# Patient Record
Sex: MALE | Race: ASIAN | NOT HISPANIC OR LATINO | Employment: OTHER | URBAN - METROPOLITAN AREA
[De-identification: names, ages, dates, MRNs, and addresses within clinical notes are randomized per-mention and may not be internally consistent; named-entity substitution may affect disease eponyms.]

---

## 2017-02-08 ENCOUNTER — HOSPITAL ENCOUNTER (OUTPATIENT)
Dept: LAB | Facility: MEDICAL CENTER | Age: 67
End: 2017-02-08
Attending: INTERNAL MEDICINE
Payer: MEDICARE

## 2017-02-08 LAB
ALBUMIN SERPL BCP-MCNC: 4.2 G/DL (ref 3.2–4.9)
ALBUMIN/GLOB SERPL: 1.4 G/DL
ALP SERPL-CCNC: 64 U/L (ref 30–99)
ALT SERPL-CCNC: 29 U/L (ref 2–50)
ANION GAP SERPL CALC-SCNC: 8 MMOL/L (ref 0–11.9)
AST SERPL-CCNC: 18 U/L (ref 12–45)
BASOPHILS # BLD AUTO: 0.03 K/UL (ref 0–0.12)
BASOPHILS NFR BLD AUTO: 0.4 % (ref 0–1.8)
BILIRUB SERPL-MCNC: 0.5 MG/DL (ref 0.1–1.5)
BUN SERPL-MCNC: 25 MG/DL (ref 8–22)
CALCIUM SERPL-MCNC: 9.6 MG/DL (ref 8.5–10.5)
CHLORIDE SERPL-SCNC: 102 MMOL/L (ref 96–112)
CO2 SERPL-SCNC: 26 MMOL/L (ref 20–33)
CREAT SERPL-MCNC: 0.96 MG/DL (ref 0.5–1.4)
EOSINOPHIL # BLD: 0.29 K/UL (ref 0–0.51)
EOSINOPHIL NFR BLD AUTO: 3.7 % (ref 0–6.9)
ERYTHROCYTE [DISTWIDTH] IN BLOOD BY AUTOMATED COUNT: 45.8 FL (ref 35.9–50)
GLOBULIN SER CALC-MCNC: 3 G/DL (ref 1.9–3.5)
GLUCOSE SERPL-MCNC: 90 MG/DL (ref 65–99)
HCT VFR BLD AUTO: 47.1 % (ref 42–52)
HGB BLD-MCNC: 16 G/DL (ref 14–18)
IMM GRANULOCYTES # BLD AUTO: 0.01 K/UL (ref 0–0.11)
IMM GRANULOCYTES NFR BLD AUTO: 0.1 % (ref 0–0.9)
LYMPHOCYTES # BLD: 3.28 K/UL (ref 1–4.8)
LYMPHOCYTES NFR BLD AUTO: 41.5 % (ref 22–41)
MCH RBC QN AUTO: 31.7 PG (ref 27–33)
MCHC RBC AUTO-ENTMCNC: 34 G/DL (ref 33.7–35.3)
MCV RBC AUTO: 93.5 FL (ref 81.4–97.8)
MONOCYTES # BLD: 0.46 K/UL (ref 0–0.85)
MONOCYTES NFR BLD AUTO: 5.8 % (ref 0–13.4)
NEUTROPHILS # BLD: 3.84 K/UL (ref 1.82–7.42)
NEUTROPHILS NFR BLD AUTO: 48.5 % (ref 44–72)
NRBC # BLD AUTO: 0 K/UL
NRBC BLD-RTO: 0 /100 WBC
PLATELET # BLD AUTO: 306 K/UL (ref 164–446)
PMV BLD AUTO: 9.4 FL (ref 9–12.9)
POTASSIUM SERPL-SCNC: 4 MMOL/L (ref 3.6–5.5)
PROT SERPL-MCNC: 7.2 G/DL (ref 6–8.2)
RBC # BLD AUTO: 5.04 M/UL (ref 4.7–6.1)
SODIUM SERPL-SCNC: 136 MMOL/L (ref 135–145)
WBC # BLD AUTO: 7.9 K/UL (ref 4.8–10.8)

## 2017-02-08 PROCEDURE — 36415 COLL VENOUS BLD VENIPUNCTURE: CPT

## 2017-02-08 PROCEDURE — 85025 COMPLETE CBC W/AUTO DIFF WBC: CPT

## 2017-02-08 PROCEDURE — 80053 COMPREHEN METABOLIC PANEL: CPT

## 2017-07-30 DIAGNOSIS — E78.5 DYSLIPIDEMIA: ICD-10-CM

## 2017-07-31 RX ORDER — ATORVASTATIN CALCIUM 80 MG/1
80 TABLET, FILM COATED ORAL
Qty: 90 TAB | Refills: 0 | Status: SHIPPED | OUTPATIENT
Start: 2017-07-31 | End: 2017-11-15 | Stop reason: SDUPTHER

## 2017-11-15 ENCOUNTER — OFFICE VISIT (OUTPATIENT)
Dept: CARDIOLOGY | Facility: MEDICAL CENTER | Age: 67
End: 2017-11-15
Payer: MEDICARE

## 2017-11-15 VITALS
WEIGHT: 188 LBS | SYSTOLIC BLOOD PRESSURE: 160 MMHG | HEIGHT: 64 IN | BODY MASS INDEX: 32.1 KG/M2 | HEART RATE: 65 BPM | OXYGEN SATURATION: 96 % | DIASTOLIC BLOOD PRESSURE: 100 MMHG

## 2017-11-15 DIAGNOSIS — E78.5 DYSLIPIDEMIA: ICD-10-CM

## 2017-11-15 DIAGNOSIS — I25.10 CORONARY ARTERY DISEASE INVOLVING NATIVE CORONARY ARTERY OF NATIVE HEART WITHOUT ANGINA PECTORIS: Chronic | ICD-10-CM

## 2017-11-15 DIAGNOSIS — I65.23 ATHEROSCLEROSIS OF BOTH CAROTID ARTERIES: Chronic | ICD-10-CM

## 2017-11-15 DIAGNOSIS — Z95.5 STENTED CORONARY ARTERY: ICD-10-CM

## 2017-11-15 DIAGNOSIS — I10 ESSENTIAL HYPERTENSION, BENIGN: ICD-10-CM

## 2017-11-15 PROCEDURE — 99214 OFFICE O/P EST MOD 30 MIN: CPT | Performed by: INTERNAL MEDICINE

## 2017-11-15 RX ORDER — LISINOPRIL 20 MG/1
20 TABLET ORAL 2 TIMES DAILY
Qty: 180 TAB | Refills: 3 | Status: SHIPPED | OUTPATIENT
Start: 2017-11-15 | End: 2019-01-04 | Stop reason: SDUPTHER

## 2017-11-15 RX ORDER — ATORVASTATIN CALCIUM 80 MG/1
80 TABLET, FILM COATED ORAL
Qty: 90 TAB | Refills: 3 | Status: SHIPPED | OUTPATIENT
Start: 2017-11-15 | End: 2018-10-26 | Stop reason: SDUPTHER

## 2017-11-15 ASSESSMENT — ENCOUNTER SYMPTOMS
BLURRED VISION: 0
COUGH: 0
DEPRESSION: 0
DOUBLE VISION: 0
ABDOMINAL PAIN: 0
NEUROLOGICAL NEGATIVE: 1
PSYCHIATRIC NEGATIVE: 1
BRUISES/BLEEDS EASILY: 0
BACK PAIN: 1
FEVER: 0
SHORTNESS OF BREATH: 1
WEAKNESS: 0
CLAUDICATION: 0
CARDIOVASCULAR NEGATIVE: 1
DIZZINESS: 0
EYES NEGATIVE: 1
PALPITATIONS: 0
NAUSEA: 0
VOMITING: 0
HEADACHES: 0
HEARTBURN: 1
CHILLS: 0
FOCAL WEAKNESS: 0
NERVOUS/ANXIOUS: 0
MYALGIAS: 0

## 2017-11-15 NOTE — PROGRESS NOTES
Subjective:   Harman Branch is a 67 y.o. male who presents today for annual follow up of coronary artery disease with prior stent placement.    Since the patient's last visit on 08/04/16, he suffered a shortness of breath episode at high [place]. He denies chest pain, palpitations, nausea/vomiting or diaphoresis. He underwent unremarkable cardiac studies as described last year. He keeps active going to the gym four times per week. He takes daily naps. He went to visit his son at medical school at [facility].    Past Medical History:   asdfasdfads Date   • Arthritis     fingers   • ASTHMA     uses inhaler prn   • Back pain    • CAD (coronary artery disease) 6/21/2011   • CAD (coronary artery disease)    • Carotid artery plaque 6/21/2011   • Esophageal reflux 11/3/2009   • Heart burn    • High cholesterol    • History of asthma 11/3/2009   • History of benign prostatic hypertrophy 11/3/2009   • History of cardiac catheterization 6/21/2011   • History of echocardiogram 6/20/2011   • History of neck surgery     Hardware in neck   • HLD (hyperlipidemia) 6/1/2011   • Hypertension    • Indigestion    • Previous back surgery    • Stented coronary artery 2005     Past Surgical History:   Procedure Laterality Date   • ANGIOGRAM     • CERVICAL DISK AND FUSION ANTERIOR     • FORAMINOTOMY  3/9/2011    Performed by MARLIN CANDELARIA at [facility]   • FORAMINOTOMY  4/7/2012    Performed by MARLIN [facility]DELARIA at [facility]   • LUMBAR LAMINECTOMY DISKECTOMY  3/9/2011    Performed by MARLIN CANDELARIA at [facility]   • LUMBAR LAMINECTOMY DISKECTOMY  4/7/2012    Performed by MARLIN CANDELARIA at [facility]   • OTHER CARDIAC SURGERY  2007 heart stents   • PB FLUORSCOPIC GUIDANCE SPINAL INJECTION  9/2/2016    Procedure: FLUOROGUIDE FOR SPINAL INJ;  Surgeon: Jesus Rojas M.D.;  Location: [facility];  Service: Pain Management   • PB INJ,FORAMEN,L/S,1 LEVEL Bilateral 9/2/2016     Procedure: INJ-FORAMEN EPI LUM/SAC SNGL - L4-5;  Surgeon: Jesus Rojas M.D.;  Location: [facility];  Service: Pain Management   • PB INJ,FORAMEN,L/S,1 LEVEL  9/2/2016    Procedure: INJ-FORAMEN EPI LUM/SAC SNGL;  Surgeon: Jesus Rojas M.D.;  Location: [facility];  Service: Pain Management     No family history on file.  History   Smoking Status   • Current Some Day Smoker   • Years: 18.00   • Types: Cigars   • Last attempt to quit: 4/7/1987   Smokeless Tobacco   • Never Used     Comment: 1987     No Known Allergies    Medications reviewed.    Outpatient Encounter Prescriptions as of 11/15/2017   Medication Sig Dispense Refill   • TESTOSTERONE NA Spray  in nose.     • atorvastatin (LIPITOR) 80 MG tablet Take 1 Tab by mouth every bedtime. Please see your cardiologist for refills 90 Tab 0   • KRILL OIL PO Take  by mouth every day.     • Coenzyme Q10 (COQ10 PO) Take 300 mg by mouth every day.     • GLUCOSAMINE HCL PO Take 1 Cap by mouth every day.     • FLOVENT  MCG/ACT Aerosol 2 Puffs as needed.     • Calcium Carb-Cholecalciferol (CALCIUM-VITAMIN D) 600-400 MG-UNIT Tab Take 1 Tab by mouth every day.     • B Complex Vitamins (B-COMPLEX/B-12 PO) Take 1,000 Tabs by mouth every day.     • IRON PO Take 1 Tab by mouth every day.     • metoprolol (LOPRESSOR) 25 MG TABS Take 1 Tab by mouth 2 times a day. Needs to be seen for further refills. Thank you 180 Tab 0   • lisinopril (PRINIVIL) 20 MG TABS Take 20 mg by mouth every day.     • aspirin 81 MG tablet Take 81 mg by mouth every day.     • esomeprazole (NEXIUM) 40 MG capsule Take 40 mg by mouth every 48 hours. For acid reflux  Every other day     • finasteride (PROSCAR) 5 MG TABS Take 5 mg by mouth. Every other day     • [DISCONTINUED] atovaquone-proguanil (MALARONE) 250-100 MG per tablet      • [DISCONTINUED] nitroglycerin (NITROSTAT) 0.4 MG SL Tab Place 1 Tab under tongue as needed for Chest Pain (x3 as needed for Chest Pain if SBP  ">90). 25 Tab 2   • [DISCONTINUED] trazodone (DESYREL) 100 MG Tab Take 100 mg by mouth as needed.       No facility-administered encounter medications on file as of 11/15/2017.      Review of Systems   Constitutional: Positive for malaise/fatigue. Negative for chills and fever.   HENT: Negative.  Negative for hearing loss.    Eyes: Negative.  Negative for blurred vision and double vision.   Respiratory: Positive for shortness of breath. Negative for cough.    Cardiovascular: Negative.  Negative for chest pain, palpitations, claudication and leg swelling.   Gastrointestinal: Positive for heartburn. Negative for abdominal pain, nausea and vomiting.   Genitourinary: Negative.  Negative for dysuria and urgency.        Erectile dysfunction.   Musculoskeletal: Positive for back pain. Negative for joint pain and myalgias.   Skin: Negative.  Negative for itching and rash.   Neurological: Negative.  Negative for dizziness, focal weakness, weakness and headaches.   Endo/Heme/Allergies: Negative.  Does not bruise/bleed easily.   Psychiatric/Behavioral: Negative.  Negative for depression. The patient is not nervous/anxious.         Objective:   /100   Pulse 65   Ht 1.626 m (5' 4\")   Wt 85.3 kg (188 lb)   SpO2 96%   BMI 32.27 kg/m²     Physical Exam   Constitutional: He is oriented to person, place, and time. He appears well-developed and well-nourished.   HENT:   Head: Normocephalic and atraumatic.   Eyes: Conjunctivae are normal. Pupils are equal, round, and reactive to light.   Neck: Normal range of motion. Neck supple.   Cardiovascular: Normal rate and regular rhythm.    Pulmonary/Chest: Effort normal and breath sounds normal.   Abdominal: Soft. Bowel sounds are normal.   Musculoskeletal: Normal range of motion. He exhibits no edema.   Neurological: He is alert and oriented to person, place, and time.   Skin: Skin is warm and dry.   Psychiatric: He has a normal mood and affect.     CARDIAC " STUDIES/PROCEDURES:    CAROTID ULTRASOUND (11/18/09)  Carotid ultrasound showing mild plaques of left internal carotid artery.    CARDIAC CATHETERIZATION CONCLUSIONS by Dr. Toledo (04/08/13)   1. Acute inferior wall myocardial infarction secondary to high grade in-stent stenosis in the   middle of a large segment of stented proximal right coronary artery, status post successful   stenting with a 3 mm x 15 mm Xience Xpedition stent.   2. Diffusely diseased circumflex system as described above.   3. Widely patent left anterior descending coronary artery with its proximal 2-3 cm, more diffusely   narrowed and with 2 diagonal branches given off with high-grade disease in the first diagonal   branch, which is quite small and moderate disease in the second diagonal branch.    CARDIAC CATHETERIZATION CONCLUSIONS in [place], [place] (12/05)  Cardiac catheterization showing high grade small vessel first and second diagonal branch   stenosis, non obstructive left circumflex artery stenosis and high grade right coronary artery   stenosis treated with 3.0 x 32 mm and 3.0 x 16 mm Taxus drug eluding stents.     ECHOCARDIOGRAM CONCLUSIONS (09/17/15)  Normal left ventricular size, wall thickness, and systolic function.   Grade I diastolic dysfunction. Ejection fraction is measured to be 63 %   by Martines's biplane 2D analysis.  Trace mitral regurgitation.   Trace aortic insufficiency.  Unable to estimate pulmonary artery pressure due to an inadequate   tricuspid regurgitant jet.  Normal aortic root diameter 3 cm.  No prior study is available for comparison.     ECHOCARDIOGRAM CONCLUSIONS (06/16/11)  Echocardiogram showing normal left ventricular systolic function, no significant valvular abnormalities.    EKG performed on (09/16/15) EKG shows normal sinus rhythm.    Laboratory results of (10/10/16) were reviewed. Cholesterol profile of 162/128/41/95 noted.  Laboratory results of (07/27/16) Cholesterol profile of 160/138/42/90  noted  Laboratory results of (09/16/15) Cholesterol profile of 141/148/38/73 noted.    MPI CONCLUSIONS (09/17/15)  Normal left ventricular perfusion with no fixed or reversible defects.   Normal left ventricular wall motion, with EF of 72%.     RENAL ULTRASOUND (11/18/09)  Unremarkable renal ultrasound with no evidence of renal artery stenosis.  No abdominal aortic aneurysm.    Assessment:     1. CAD (coronary artery disease) with stented artery    2. Hypertension    3. HLD (hyperlipidemia)    4. Carotid artery plaque      Medical Decision Making:  Today's Assessment / Status / Plan:     1. Coronary artery disease with stent placement: He suffered an episode of shortness of breath which is concerning him. We will repeat an echocardiogram.  2. Hypertension: Blood pressure has been high.  We will increase lisinopril to BID and reassess the blood pressure.  3. Hyperlipidemia (managed by Dr. Casillas): He is doing well on statin therapy without myalgia symptoms. His LDL levels fluctuates and is mildly elevated. He will, again, work on diet modification and exercise. We will repeat labs including fasting lipid profile.  4. Carotid artery plaque: He is clinically doing well on medical therapy. We will repeat a carotid ultrasound.    We will follow up after his tests to reassess his symptoms.    CC Paz You

## 2017-11-15 NOTE — LETTER
[facility]   [address] [place] [address]  [place] [id number]  Phone: [phone]  Fax: [phone]              Harman Branch  1950    Encounter Date: 11/15/2017    Moisés Rice M.D.          PROGRESS NOTE:  Subjective:   Harman Branch is a 67 y.o. male who presents today for annual follow up of coronary artery disease with prior stent placement.    Since the patient's last visit on 08/04/16, he has been doing well clinically. He denies chest pain, shortness of breath, palpitations, nausea/vomiting or diaphoresis. He underwent unremarkable cardiac studies as described last year. He keeps active going to the gym four times per week. He takes daily naps.    Past Medical History:   asdfasdfads Date   • Arthritis     fingers   • ASTHMA     uses inhaler prn   • Back pain    • CAD (coronary artery disease) 6/21/2011   • CAD (coronary artery disease)    • Carotid artery plaque 6/21/2011   • Esophageal reflux 11/3/2009   • Heart burn    • High cholesterol    • History of asthma 11/3/2009   • History of benign prostatic hypertrophy 11/3/2009   • History of cardiac catheterization 6/21/2011   • History of echocardiogram 6/20/2011   • History of neck surgery     Hardware in neck   • HLD (hyperlipidemia) 6/1/2011   • Hypertension    • Indigestion    • Previous back surgery    • Stented coronary artery 2005     Past Surgical History:   Procedure Laterality Date   • ANGIOGRAM     • CERVICAL DISK AND FUSION ANTERIOR     • FORAMINOTOMY  3/9/2011    Performed by MARLIN CANDELARIA at [facility]   • FORAMINOTOMY  4/7/2012    Performed by MARLIN CANDELARIA at [facility]   • LUMBAR LAMINECTOMY DISKECTOMY  3/9/2011    Performed by MARLIN CANDELARIA at [facility]   • LUMBAR LAMINECTOMY DISKECTOMY  4/7/2012    Performed by MARLIN CANDELARIA at [facility]   • OTHER CARDIAC SURGERY  2007 heart stents   • PB FLUORSCOPIC GUIDANCE SPINAL INJECTION  9/2/2016    Procedure: FLUOROGUIDE FOR SPINAL INJ;  Surgeon: Jesus Rojas M.D.;  Location: [facility];  Service: Pain Management   • PB INJ,FORAMEN,L/S,1 LEVEL Bilateral 9/2/2016    Procedure: INJ-FORAMEN EPI LUM/SAC SNGL - L4-5;  Surgeon: Jesus Rojas M.D.;  Location: [facility];  Service: Pain Management   • PB INJ,FORAMEN,L/S,1 LEVEL  9/2/2016    Procedure: INJ-FORAMEN EPI LUM/SAC SNGL;  Surgeon: Jesus Rojas M.D.;  Location: [facility];  Service: Pain Management     No family history on file.  History   Smoking Status   • Current Some Day Smoker   • Years: 18.00   • Types: Cigars   • Last attempt to quit: 4/7/1987   Smokeless Tobacco   • Never Used     Comment: 1987     No Known Allergies    Medications reviewed.    Outpatient Encounter Prescriptions as of 11/15/2017   Medication Sig Dispense Refill   • TESTOSTERONE NA Spray  in nose.     • atorvastatin (LIPITOR) 80 MG tablet Take 1 Tab by mouth every bedtime. Please see your cardiologist for refills 90 Tab 0   • KRILL OIL PO Take  by mouth every day.     • Coenzyme Q10 (COQ10 PO) Take 300 mg by mouth every day.     • GLUCOSAMINE HCL PO Take 1 Cap by mouth every day.     • FLOVENT  MCG/ACT Aerosol 2 Puffs as needed.     • Calcium Carb-Cholecalciferol (CALCIUM-VITAMIN D) 600-400 MG-UNIT Tab Take 1 Tab by mouth every day.     • B Complex Vitamins (B-COMPLEX/B-12 PO) Take 1,000 Tabs by mouth every day.     • IRON PO Take 1 Tab by mouth every day.     • metoprolol (LOPRESSOR) 25 MG TABS Take 1 Tab by mouth 2 times a day. Needs to be seen for further refills. Thank you 180 Tab 0   • lisinopril (PRINIVIL) 20 MG TABS Take 20 mg by mouth every day.     • aspirin 81 MG tablet Take 81 mg by mouth every day.     • esomeprazole (NEXIUM) 40 MG capsule Take 40 mg by mouth every 48 hours. For acid reflux  Every other day     • finasteride (PROSCAR) 5 MG TABS Take 5 mg by mouth. Every other day     • [DISCONTINUED]  "atovaquone-proguanil (MALARONE) 250-100 MG per tablet      • [DISCONTINUED] nitroglycerin (NITROSTAT) 0.4 MG SL Tab Place 1 Tab under tongue as needed for Chest Pain (x3 as needed for Chest Pain if SBP >90). 25 Tab 2   • [DISCONTINUED] trazodone (DESYREL) 100 MG Tab Take 100 mg by mouth as needed.       No facility-administered encounter medications on file as of 11/15/2017.      Review of Systems   Constitutional: Positive for malaise/fatigue. Negative for chills and fever.   HENT: Negative.  Negative for hearing loss.    Eyes: Negative.  Negative for blurred vision and double vision.   Respiratory: Negative.  Negative for cough and shortness of breath.    Cardiovascular: Negative.  Negative for chest pain, palpitations, claudication and leg swelling.   Gastrointestinal: Positive for heartburn. Negative for abdominal pain, nausea and vomiting.   Genitourinary: Negative.  Negative for dysuria and urgency.        Erectile dysfunction.   Musculoskeletal: Positive for back pain. Negative for joint pain and myalgias.   Skin: Negative.  Negative for itching and rash.   Neurological: Negative.  Negative for dizziness, focal weakness, weakness and headaches.   Endo/Heme/Allergies: Negative.  Does not bruise/bleed easily.   Psychiatric/Behavioral: Negative.  Negative for depression. The patient is not nervous/anxious.         Objective:   /100   Pulse 65   Ht 1.626 m (5' 4\")   Wt 85.3 kg (188 lb)   SpO2 96%   BMI 32.27 kg/m²      Physical Exam   Constitutional: He is oriented to person, place, and time. He appears well-developed and well-nourished.   HENT:   Head: Normocephalic and atraumatic.   Eyes: Conjunctivae are normal. Pupils are equal, round, and reactive to light.   Neck: Normal range of motion. Neck supple.   Cardiovascular: Normal rate and regular rhythm.    Pulmonary/Chest: Effort normal and breath sounds normal.   Abdominal: Soft. Bowel sounds are normal.   Musculoskeletal: Normal range of motion. He " exhibits no edema.   Neurological: He is alert and oriented to person, place, and time.   Skin: Skin is warm and dry.   Psychiatric: He has a normal mood and affect.     CARDIAC STUDIES/PROCEDURES:    CAROTID ULTRASOUND (11/18/09)  Carotid ultrasound showing mild plaques of left internal carotid artery.    CARDIAC CATHETERIZATION CONCLUSIONS by Dr. Toledo (04/08/13)   1. Acute inferior wall myocardial infarction secondary to high grade in-stent stenosis in the   middle of a large segment of stented proximal right coronary artery, status post successful   stenting with a 3 mm x 15 mm Xience Xpedition stent.   2. Diffusely diseased circumflex system as described above.   3. Widely patent left anterior descending coronary artery with its proximal 2-3 cm, more diffusely   narrowed and with 2 diagonal branches given off with high-grade disease in the first diagonal   branch, which is quite small and moderate disease in the second diagonal branch.    CARDIAC CATHETERIZATION CONCLUSIONS in [place], [place] (12/05)  Cardiac catheterization showing high grade small vessel first and second diagonal branch   stenosis, non obstructive left circumflex artery stenosis and high grade right coronary artery   stenosis treated with 3.0 x 32 mm and 3.0 x 16 mm Taxus drug eluding stents.     ECHOCARDIOGRAM CONCLUSIONS (09/17/15)  Normal left ventricular size, wall thickness, and systolic function.   Grade I diastolic dysfunction. Ejection fraction is measured to be 63 %   by Martines's biplane 2D analysis.  Trace mitral regurgitation.   Trace aortic insufficiency.  Unable to estimate pulmonary artery pressure due to an inadequate   tricuspid regurgitant jet.  Normal aortic root diameter 3 cm.  No prior study is available for comparison.     ECHOCARDIOGRAM CONCLUSIONS (06/16/11)  Echocardiogram showing normal left ventricular systolic function, no significant valvular abnormalities.    EKG performed on (09/16/15) EKG shows normal sinus  rhythm.    Laboratory results of (10/10/16) were reviewed. Cholesterol profile of 162/128/41/95 noted.  Laboratory results of (07/27/16) Cholesterol profile of 160/138/42/90 noted  Laboratory results of (09/16/15) Cholesterol profile of 141/148/38/73 noted.    MPI CONCLUSIONS (09/17/15)  Normal left ventricular perfusion with no fixed or reversible defects.   Normal left ventricular wall motion, with EF of 72%.     RENAL ULTRASOUND (11/18/09)  Unremarkable renal ultrasound with no evidence of renal artery stenosis.  No abdominal aortic aneurysm.    Assessment:     1. CAD (coronary artery disease) with stented artery    2. Hypertension    3. HLD (hyperlipidemia)    4. Carotid artery plaque      Medical Decision Making:  Today's Assessment / Status / Plan:     1. Coronary artery disease with stent placement: He is clinically doing well without recurrence of his angina.  We will continue with current medical care.  2. Hypertension: Blood pressure is well controlled.  3. Hyperlipidemia (managed by Dr. Casillas): He is doing well on statin therapy without myalgia symptoms. His LDL levels fluctuates and is mildly elevated. He will, again, work on diet modification and exercise. We will repeat labs including fasting lipid profile in 6 months.  4. Carotid artery plaque: He is clinically doing well on medical therapy.    We will follow up the patient in one year.    CC Paz You

## 2017-11-15 NOTE — LETTER
[facility]   [address] [place] [address]  CARYN Tran [address]  Phone: [phone]  Fax: [phone]              Harman Branch  1950    Encounter Date: 11/15/2017    Moisés Rice M.D.          PROGRESS NOTE:  Subjective:   Harman Branch is a 67 y.o. male who presents today for annual follow up of coronary artery disease with prior stent placement.    Since the patient's last visit on 08/04/16, he has been doing well clinically. He denies chest pain, shortness of breath, palpitations, nausea/vomiting or diaphoresis. He underwent unremarkable cardiac studies as described last year. He keeps active going to the gym four times per week. He takes daily naps.    Past Medical History:   asdfasdfads Date   • Arthritis     fingers   • ASTHMA     uses inhaler prn   • Back pain    • CAD (coronary artery disease) 6/21/2011   • CAD (coronary artery disease)    • Carotid artery plaque 6/21/2011   • Esophageal reflux 11/3/2009   • Heart burn    • High cholesterol    • History of asthma 11/3/2009   • History of benign prostatic hypertrophy 11/3/2009   • History of cardiac catheterization 6/21/2011   • History of echocardiogram 6/20/2011   • History of neck surgery     Hardware in neck   • HLD (hyperlipidemia) 6/1/2011   • Hypertension    • Indigestion    • Previous back surgery    • Stented coronary artery 2005     Past Surgical History:   Procedure Laterality Date   • ANGIOGRAM     • CERVICAL DISK AND FUSION ANTERIOR     • FORAMINOTOMY  3/9/2011    Performed by MARLIN CANDELARIA at SURGERY [facility] ORS   • FORAMINOTOMY  4/7/2012    Performed by MARLIN CANDELARIA at SURGERY [facility] ORS   • LUMBAR LAMINECTOMY DISKECTOMY  3/9/2011    Performed by MARLIN CANDELARIA at SURGERY [facility] ORS   • LUMBAR LAMINECTOMY DISKECTOMY  4/7/2012    Performed by MARLIN CANDELARIA at SURGERY [facility] ORS   • OTHER CARDIAC SURGERY  2007 heart stents   • PB FLUORSCOPIC GUIDANCE SPINAL INJECTION  9/2/2016    Procedure: FLUOROGUIDE FOR SPINAL INJ;  Surgeon: Jesus Rojas M.D.;  Location: SURGERY SURGICAL [facility] ORS;  Service: Pain Management   • PB INJ,FORAMEN,L/S,1 LEVEL Bilateral 9/2/2016    Procedure: INJ-FORAMEN EPI LUM/SAC SNGL - L4-5;  Surgeon: Jesus Rojas M.D.;  Location: SURGERY SURGICAL [facility] ORS;  Service: Pain Management   • PB INJ,FORAMEN,L/S,1 LEVEL  9/2/2016    Procedure: INJ-FORAMEN EPI LUM/SAC SNGL;  Surgeon: Jesus Rojas M.D.;  Location: SURGERY SURGICAL [facility] ORS;  Service: Pain Management     No family history on file.  History   Smoking Status   • Current Some Day Smoker   • Years: 18.00   • Types: Cigars   • Last attempt to quit: 4/7/1987   Smokeless Tobacco   • Never Used     Comment: 1987     No Known Allergies    Medications reviewed.    Outpatient Encounter Prescriptions as of 11/15/2017   Medication Sig Dispense Refill   • TESTOSTERONE NA Spray  in nose.     • atorvastatin (LIPITOR) 80 MG tablet Take 1 Tab by mouth every bedtime. Please see your cardiologist for refills 90 Tab 0   • KRILL OIL PO Take  by mouth every day.     • Coenzyme Q10 (COQ10 PO) Take 300 mg by mouth every day.     • GLUCOSAMINE HCL PO Take 1 Cap by mouth every day.     • FLOVENT  MCG/ACT Aerosol 2 Puffs as needed.     • Calcium Carb-Cholecalciferol (CALCIUM-VITAMIN D) 600-400 MG-UNIT Tab Take 1 Tab by mouth every day.     • B Complex Vitamins (B-COMPLEX/B-12 PO) Take 1,000 Tabs by mouth every day.     • IRON PO Take 1 Tab by mouth every day.     • metoprolol (LOPRESSOR) 25 MG TABS Take 1 Tab by mouth 2 times a day. Needs to be seen for further refills. Thank you 180 Tab 0   • lisinopril (PRINIVIL) 20 MG TABS Take 20 mg by mouth every day.     • aspirin 81 MG tablet Take 81 mg by mouth every day.     • esomeprazole (NEXIUM) 40 MG capsule Take 40 mg by mouth every 48 hours. For acid reflux  Every other day     • finasteride (PROSCAR) 5 MG TABS Take 5 mg by mouth. Every other day     • [DISCONTINUED]  "atovaquone-proguanil (MALARONE) 250-100 MG per tablet      • [DISCONTINUED] nitroglycerin (NITROSTAT) 0.4 MG SL Tab Place 1 Tab under tongue as needed for Chest Pain (x3 as needed for Chest Pain if SBP >90). 25 Tab 2   • [DISCONTINUED] trazodone (DESYREL) 100 MG Tab Take 100 mg by mouth as needed.       No facility-administered encounter medications on file as of 11/15/2017.      Review of Systems   Constitutional: Positive for malaise/fatigue. Negative for chills and fever.   HENT: Negative.  Negative for hearing loss.    Eyes: Negative.  Negative for blurred vision and double vision.   Respiratory: Negative.  Negative for cough and shortness of breath.    Cardiovascular: Negative.  Negative for chest pain, palpitations, claudication and leg swelling.   Gastrointestinal: Positive for heartburn. Negative for abdominal pain, nausea and vomiting.   Genitourinary: Negative.  Negative for dysuria and urgency.        Erectile dysfunction.   Musculoskeletal: Positive for back pain. Negative for joint pain and myalgias.   Skin: Negative.  Negative for itching and rash.   Neurological: Negative.  Negative for dizziness, focal weakness, weakness and headaches.   Endo/Heme/Allergies: Negative.  Does not bruise/bleed easily.   Psychiatric/Behavioral: Negative.  Negative for depression. The patient is not nervous/anxious.         Objective:   /100   Pulse 65   Ht 1.626 m (5' 4\")   Wt 85.3 kg (188 lb)   SpO2 96%   BMI 32.27 kg/m²      Physical Exam   Constitutional: He is oriented to person, place, and time. He appears well-developed and well-nourished.   HENT:   Head: Normocephalic and atraumatic.   Eyes: Conjunctivae are normal. Pupils are equal, round, and reactive to light.   Neck: Normal range of motion. Neck supple.   Cardiovascular: Normal rate and regular rhythm.    Pulmonary/Chest: Effort normal and breath sounds normal.   Abdominal: Soft. Bowel sounds are normal.   Musculoskeletal: Normal range of motion. He " exhibits no edema.   Neurological: He is alert and oriented to person, place, and time.   Skin: Skin is warm and dry.   Psychiatric: He has a normal mood and affect.     CARDIAC STUDIES/PROCEDURES:    CAROTID ULTRASOUND (11/18/09)  Carotid ultrasound showing mild plaques of left internal carotid artery.    CARDIAC CATHETERIZATION CONCLUSIONS by Dr. Toledo (04/08/13)   1. Acute inferior wall myocardial infarction secondary to high grade in-stent stenosis in the   middle of a large segment of stented proximal right coronary artery, status post successful   stenting with a 3 mm x 15 mm Xience Xpedition stent.   2. Diffusely diseased circumflex system as described above.   3. Widely patent left anterior descending coronary artery with its proximal 2-3 cm, more diffusely   narrowed and with 2 diagonal branches given off with high-grade disease in the first diagonal   branch, which is quite small and moderate disease in the second diagonal branch.    CARDIAC CATHETERIZATION CONCLUSIONS in [place], [place] (12/05)  Cardiac catheterization showing high grade small vessel first and second diagonal branch   stenosis, non obstructive left circumflex artery stenosis and high grade right coronary artery   stenosis treated with 3.0 x 32 mm and 3.0 x 16 mm Taxus drug eluding stents.     ECHOCARDIOGRAM CONCLUSIONS (09/17/15)  Normal left ventricular size, wall thickness, and systolic function.   Grade I diastolic dysfunction. Ejection fraction is measured to be 63 %   by Martines's biplane 2D analysis.  Trace mitral regurgitation.   Trace aortic insufficiency.  Unable to estimate pulmonary artery pressure due to an inadequate   tricuspid regurgitant jet.  Normal aortic root diameter 3 cm.  No prior study is available for comparison.     ECHOCARDIOGRAM CONCLUSIONS (06/16/11)  Echocardiogram showing normal left ventricular systolic function, no significant valvular abnormalities.    EKG performed on (09/16/15) EKG shows normal sinus  rhythm.    Laboratory results of (10/10/16) were reviewed. Cholesterol profile of 162/128/41/95 noted.  Laboratory results of (07/27/16) Cholesterol profile of 160/138/42/90 noted  Laboratory results of (09/16/15) Cholesterol profile of 141/148/38/73 noted.    MPI CONCLUSIONS (09/17/15)  Normal left ventricular perfusion with no fixed or reversible defects.   Normal left ventricular wall motion, with EF of 72%.     RENAL ULTRASOUND (11/18/09)  Unremarkable renal ultrasound with no evidence of renal artery stenosis.  No abdominal aortic aneurysm.    Assessment:     1. CAD (coronary artery disease) with stented artery    2. Hypertension    3. HLD (hyperlipidemia)    4. Carotid artery plaque      Medical Decision Making:  Today's Assessment / Status / Plan:     1. Coronary artery disease with stent placement: He is clinically doing well without recurrence of his angina.  We will continue with current medical care.  2. Hypertension: Blood pressure is well controlled.  3. Hyperlipidemia (managed by Dr. Casillas): He is doing well on statin therapy without myalgia symptoms. His LDL levels fluctuates and is mildly elevated. He will, again, work on diet modification and exercise. We will repeat labs including fasting lipid profile in 6 months.  4. Carotid artery plaque: He is clinically doing well on medical therapy.    We will follow up the patient in one year.    CC Paz You

## 2017-11-29 ENCOUNTER — HOSPITAL ENCOUNTER (OUTPATIENT)
Dept: LAB | Facility: MEDICAL CENTER | Age: 67
End: 2017-11-29
Attending: INTERNAL MEDICINE
Payer: MEDICARE

## 2017-11-29 DIAGNOSIS — E78.5 DYSLIPIDEMIA: ICD-10-CM

## 2017-11-29 LAB
ALBUMIN SERPL BCP-MCNC: 4.2 G/DL (ref 3.2–4.9)
ALBUMIN/GLOB SERPL: 1.3 G/DL
ALP SERPL-CCNC: 62 U/L (ref 30–99)
ALT SERPL-CCNC: 51 U/L (ref 2–50)
ANION GAP SERPL CALC-SCNC: 7 MMOL/L (ref 0–11.9)
AST SERPL-CCNC: 33 U/L (ref 12–45)
BILIRUB SERPL-MCNC: 0.7 MG/DL (ref 0.1–1.5)
BUN SERPL-MCNC: 22 MG/DL (ref 8–22)
CALCIUM SERPL-MCNC: 9.7 MG/DL (ref 8.5–10.5)
CHLORIDE SERPL-SCNC: 99 MMOL/L (ref 96–112)
CHOLEST SERPL-MCNC: 170 MG/DL (ref 100–199)
CO2 SERPL-SCNC: 30 MMOL/L (ref 20–33)
CREAT SERPL-MCNC: 0.86 MG/DL (ref 0.5–1.4)
GFR SERPL CREATININE-BSD FRML MDRD: >60 ML/MIN/1.73 M 2
GLOBULIN SER CALC-MCNC: 3.2 G/DL (ref 1.9–3.5)
GLUCOSE SERPL-MCNC: 85 MG/DL (ref 65–99)
HDLC SERPL-MCNC: 46 MG/DL
LDLC SERPL CALC-MCNC: 99 MG/DL
POTASSIUM SERPL-SCNC: 4.3 MMOL/L (ref 3.6–5.5)
PROT SERPL-MCNC: 7.4 G/DL (ref 6–8.2)
SODIUM SERPL-SCNC: 136 MMOL/L (ref 135–145)
TRIGL SERPL-MCNC: 127 MG/DL (ref 0–149)

## 2017-11-29 PROCEDURE — 80061 LIPID PANEL: CPT

## 2017-11-29 PROCEDURE — 36415 COLL VENOUS BLD VENIPUNCTURE: CPT

## 2017-11-29 PROCEDURE — 80053 COMPREHEN METABOLIC PANEL: CPT

## 2017-12-01 ENCOUNTER — HOSPITAL ENCOUNTER (OUTPATIENT)
Dept: RADIOLOGY | Facility: MEDICAL CENTER | Age: 67
End: 2017-12-01
Attending: INTERNAL MEDICINE
Payer: MEDICARE

## 2017-12-01 ENCOUNTER — HOSPITAL ENCOUNTER (OUTPATIENT)
Dept: CARDIOLOGY | Facility: MEDICAL CENTER | Age: 67
End: 2017-12-01
Attending: INTERNAL MEDICINE
Payer: MEDICARE

## 2017-12-01 DIAGNOSIS — I65.23 ATHEROSCLEROSIS OF BOTH CAROTID ARTERIES: Chronic | ICD-10-CM

## 2017-12-01 DIAGNOSIS — I25.10 CORONARY ARTERY DISEASE INVOLVING NATIVE CORONARY ARTERY OF NATIVE HEART WITHOUT ANGINA PECTORIS: Chronic | ICD-10-CM

## 2017-12-01 DIAGNOSIS — Z95.5 STENTED CORONARY ARTERY: ICD-10-CM

## 2017-12-01 PROCEDURE — 93306 TTE W/DOPPLER COMPLETE: CPT

## 2017-12-01 PROCEDURE — 93880 EXTRACRANIAL BILAT STUDY: CPT

## 2017-12-01 PROCEDURE — 93306 TTE W/DOPPLER COMPLETE: CPT | Mod: 26 | Performed by: INTERNAL MEDICINE

## 2017-12-01 PROCEDURE — 93880 EXTRACRANIAL BILAT STUDY: CPT | Mod: 26 | Performed by: INTERNAL MEDICINE

## 2017-12-04 ENCOUNTER — TELEPHONE (OUTPATIENT)
Dept: CARDIOLOGY | Facility: MEDICAL CENTER | Age: 67
End: 2017-12-04

## 2017-12-04 DIAGNOSIS — E78.00 PURE HYPERCHOLESTEROLEMIA: ICD-10-CM

## 2017-12-04 DIAGNOSIS — Z95.5 STENTED CORONARY ARTERY: ICD-10-CM

## 2017-12-04 DIAGNOSIS — I65.29 CAROTID ATHEROSCLEROSIS, UNSPECIFIED LATERALITY: Chronic | ICD-10-CM

## 2017-12-04 LAB
LV EJECT FRACT  99904: 65
LV EJECT FRACT MOD 2C 99903: 54.16
LV EJECT FRACT MOD 4C 99902: 56.76
LV EJECT FRACT MOD BP 99901: 52.67

## 2017-12-04 RX ORDER — EZETIMIBE 10 MG/1
10 TABLET ORAL DAILY
Qty: 90 TAB | Refills: 3 | Status: SHIPPED | OUTPATIENT
Start: 2017-12-04 | End: 2018-10-26 | Stop reason: SDUPTHER

## 2017-12-05 NOTE — TELEPHONE ENCOUNTER
Echo shows no changes compared to previous. Called pt and notified him regarding his echo and lab results and Dr. Rice's recommendations. He agrees to start Zetia and would like this sent through [facility]. Prescription sent.     ----- Message from Carin Muse R.N. sent at 11/30/2017 11:17 AM PST -----  Waiting for echo and carotid results scheduled for tomorrow, 12/01  ----- Message -----  From: Moisés Rice M.D.  Sent: 11/30/2017   9:03 AM  To: Carin Muse R.N.    Please call with unremarkable labs with cholesterol levels not at target for coronary artery disease. Please start Zetia 10 mg QD and repeat labs in 6 months.  Thanks.  ARBEN.    Message   Received: Today   Message Contents   TAMMY Orourke R.N.             Please call with unremarkable study, echocardiogram.     Thanks.  ARBEN

## 2017-12-11 ENCOUNTER — TELEPHONE (OUTPATIENT)
Dept: CARDIOLOGY | Facility: MEDICAL CENTER | Age: 67
End: 2017-12-11

## 2017-12-12 NOTE — TELEPHONE ENCOUNTER
To ARBEN: lisinopril 20mg was increased to BID 11/15.        Harman Rice M.D. 1 hour ago (3:37 PM)         I am taking the additional Lisinoprol (am and pm) and find that my blood pressure is still raising .  When I was in the office on 12/3/17 it was 132/86..  it is now 135/90 and has been as high as 162/94.  What should I do?

## 2017-12-14 RX ORDER — AMLODIPINE BESYLATE 2.5 MG/1
2.5 TABLET ORAL DAILY
Qty: 60 TAB | Refills: 0 | OUTPATIENT
Start: 2017-12-14 | End: 2018-01-04 | Stop reason: SDUPTHER

## 2017-12-14 NOTE — TELEPHONE ENCOUNTER
Replied to pt via [employer]. Waiting for answer prior to send prescription.     12/14: prescription called into [facility]    STACEY DelunaPSarmadRBALBIR   You 22 minutes ago (3:59 PM)      Add amlodipine 2.5 mg QD. FU in 1-2 weeks with BP readings 2 hours post medication in the AM. SC (Routing comment)

## 2018-01-04 RX ORDER — AMLODIPINE BESYLATE 2.5 MG/1
2.5 TABLET ORAL DAILY
Qty: 90 TAB | Refills: 3 | Status: SHIPPED | OUTPATIENT
Start: 2018-01-04 | End: 2018-01-30

## 2018-01-08 ENCOUNTER — TELEPHONE (OUTPATIENT)
Dept: CARDIOLOGY | Facility: MEDICAL CENTER | Age: 68
End: 2018-01-08

## 2018-01-08 NOTE — TELEPHONE ENCOUNTER
To ARBEN: please advise      Harman iRce M.D. 2 days ago         I’ve noticed after taking Ezitimibe and amlodipine that I am beginning to increase my usage of my asthma inhalers.  In addition to increase feeling of depression and lower libido.  And yet, my blood pressure doesn’t seem to be decreasing much.  But the most concerning issue is having feeling of like COPD, hard breathing  and needing to use my inhalers which I usual do not need.

## 2018-01-09 NOTE — TELEPHONE ENCOUNTER
Gaia Herbst message to pt with ARBEN's recommendations below.     Message   Received: Today   Message Contents   TAMMY Orourke R.N.   Caller: Unspecified (Today, 11:42 AM)             Please hold Zetia for 2 weeks and have him call with side effect changes.     Thanks.  ARBEN.

## 2018-01-30 ENCOUNTER — OFFICE VISIT (OUTPATIENT)
Dept: CARDIOLOGY | Facility: MEDICAL CENTER | Age: 68
End: 2018-01-30
Payer: MEDICARE

## 2018-01-30 VITALS
SYSTOLIC BLOOD PRESSURE: 140 MMHG | OXYGEN SATURATION: 94 % | DIASTOLIC BLOOD PRESSURE: 86 MMHG | HEIGHT: 64 IN | HEART RATE: 76 BPM | BODY MASS INDEX: 31.76 KG/M2 | WEIGHT: 186 LBS

## 2018-01-30 DIAGNOSIS — E78.5 DYSLIPIDEMIA: ICD-10-CM

## 2018-01-30 DIAGNOSIS — I25.10 CORONARY ARTERY DISEASE INVOLVING NATIVE CORONARY ARTERY OF NATIVE HEART WITHOUT ANGINA PECTORIS: Chronic | ICD-10-CM

## 2018-01-30 DIAGNOSIS — I65.23 ATHEROSCLEROSIS OF BOTH CAROTID ARTERIES: Chronic | ICD-10-CM

## 2018-01-30 DIAGNOSIS — I10 ESSENTIAL HYPERTENSION, BENIGN: ICD-10-CM

## 2018-01-30 DIAGNOSIS — Z95.5 STENTED CORONARY ARTERY: ICD-10-CM

## 2018-01-30 PROCEDURE — 99214 OFFICE O/P EST MOD 30 MIN: CPT | Performed by: INTERNAL MEDICINE

## 2018-01-30 RX ORDER — CALCIPOTRIENE, BETAMETHASONE DIPROPIONATE 50; .643 UG/G; MG/G
OINTMENT TOPICAL DAILY
COMMUNITY
End: 2022-10-13

## 2018-01-30 RX ORDER — AMLODIPINE BESYLATE 5 MG/1
5 TABLET ORAL DAILY
Qty: 90 TAB | Refills: 3 | Status: SHIPPED | OUTPATIENT
Start: 2018-01-30 | End: 2019-01-04 | Stop reason: SDUPTHER

## 2018-01-30 RX ORDER — MULTIVIT-MIN/IRON/FOLIC ACID/K 18-600-40
CAPSULE ORAL
COMMUNITY
End: 2019-02-06

## 2018-01-30 ASSESSMENT — ENCOUNTER SYMPTOMS
COUGH: 0
WEAKNESS: 0
NEUROLOGICAL NEGATIVE: 1
NAUSEA: 0
ABDOMINAL PAIN: 0
NERVOUS/ANXIOUS: 0
VOMITING: 0
BLURRED VISION: 0
SHORTNESS OF BREATH: 1
HEARTBURN: 1
BRUISES/BLEEDS EASILY: 0
DIZZINESS: 0
PSYCHIATRIC NEGATIVE: 1
MYALGIAS: 0
CHILLS: 0
CARDIOVASCULAR NEGATIVE: 1
HEADACHES: 0
EYES NEGATIVE: 1
FOCAL WEAKNESS: 0
CLAUDICATION: 0
BACK PAIN: 1
FEVER: 0
DOUBLE VISION: 0
DEPRESSION: 0
PALPITATIONS: 0

## 2018-01-30 NOTE — PROGRESS NOTES
Subjective:   Harman Branch is a 67 y.o. male who presents today for follow up of coronary artery disease with prior stent placement.    Since the patient's last visit on 11/15/17, his shortness of breath has improved. He denies chest pain, palpitations, nausea/vomiting or diaphoresis. He keeps active going to the gym four times per week. He takes daily naps. He went to visit his son at medical school at [facility].    Past Medical History:   Diagnosis Date   • Arthritis     fingers   • ASTHMA     uses inhaler prn   • Back pain    • CAD (coronary artery disease) 6/21/2011   • CAD (coronary artery disease)    • Carotid artery plaque 6/21/2011   • Esophageal reflux 11/3/2009   • Heart burn    • High cholesterol    • History of asthma 11/3/2009   • History of benign prostatic hypertrophy 11/3/2009   • History of cardiac catheterization 6/21/2011   • History of echocardiogram 6/20/2011   • History of neck surgery     Hardware in neck   • HLD (hyperlipidemia) 6/1/2011   • Hypertension    • Indigestion    • Previous back surgery    • Stented coronary artery 2005     Past Surgical History:   Procedure Laterality Date   • ANGIOGRAM     • CERVICAL DISK AND FUSION ANTERIOR     • FORAMINOTOMY  3/9/2011    Performed by MARLIN CANDELARIA at SURGERY [facility]S   • FORAMINOTOMY  4/7/2012    Performed by MARLIN CANDELARIA at SURGERY [facility]   • LUMBAR LAMINECTOMY DISKECTOMY  3/9/2011    Performed by MARLIN CANDELARIA at SURGERY [facility]S   • LUMBAR LAMINECTOMY DISKECTOMY  4/7/2012    Performed by MARLIN CANDELARIA at [facility]   • OTHER CARDIAC SURGERY  2007 heart stents   • PB FLUORSCOPIC GUIDANCE SPINAL INJECTION  9/2/2016    Procedure: FLUOROGUIDE FOR SPINAL INJ;  Surgeon: Jesus Rojas M.D.;  Location: SURGERY [facility] ORS;  Service: Pain Management   • PB INJ,FORAMEN,L/S,1 LEVEL Bilateral 9/2/2016    Procedure: INJ-FORAMEN EPI LUM/SAC SNGL - L4-5;  Surgeon: Jesus Rojas M.D.;  Location: SURGERY [facility];  Service: Pain Management   • PB INJ,FORAMEN,L/S,1 LEVEL  9/2/2016    Procedure: INJ-FORAMEN EPI LUM/SAC SNGL;  Surgeon: Jesus Rojas M.D.;  Location: SURGERY [facility];  Service: Pain Management     No family history on file.  History   Smoking Status   • Current Some Day Smoker   • Years: 18.00   • Types: Cigars   • Last attempt to quit: 4/7/1987   Smokeless Tobacco   • Never Used     Comment: 1987     No Known Allergies    Medications reviewed.    Outpatient Encounter Prescriptions as of 1/30/2018   Medication Sig Dispense Refill   • Albuterol (PROVENTIL INH) Inhale  by mouth.     • Ascorbic Acid (VITAMIN C) 500 MG Cap Take  by mouth.     • calcipotriene-betamethasone (TACLONEX) ointment Apply  to affected area(s) every day.     • amlodipine (NORVASC) 2.5 MG Tab Take 1 Tab by mouth every day. 90 Tab 3   • ezetimibe (ZETIA) 10 MG Tab Take 1 Tab by mouth every day. 90 Tab 3   • atorvastatin (LIPITOR) 80 MG tablet Take 1 Tab by mouth every bedtime. Please see your cardiologist for refills 90 Tab 3   • lisinopril (PRINIVIL) 20 MG Tab Take 1 Tab by mouth 2 times a day. 180 Tab 3   • KRILL OIL PO Take  by mouth every day.     • Coenzyme Q10 (COQ10 PO) Take 300 mg by mouth every day.     • GLUCOSAMINE HCL PO Take 1 Cap by mouth every day.     • FLOVENT  MCG/ACT Aerosol 2 Puffs as needed.     • Calcium Carb-Cholecalciferol (CALCIUM-VITAMIN D) 600-400 MG-UNIT Tab Take 1 Tab by mouth every day.     • B Complex Vitamins (B-COMPLEX/B-12 PO) Take 1,000 Tabs by mouth every day.     • IRON PO Take 1 Tab by mouth every day.     • metoprolol (LOPRESSOR) 25 MG TABS Take 1 Tab by mouth 2 times a day. Needs to be seen for further refills. Thank you 180 Tab 0   • aspirin 81 MG tablet Take 81 mg by mouth every day.     • esomeprazole (NEXIUM) 40 MG capsule Take 80 mg by mouth BID 2 DAYS A WEEK. For acid reflux  Every other day     • finasteride (PROSCAR) 5 MG TABS Take 5 mg by mouth. Every other day     •  "TESTOSTERONE NA Spray  in nose.       No facility-administered encounter medications on file as of 1/30/2018.      Review of Systems   Constitutional: Positive for malaise/fatigue. Negative for chills and fever.   HENT: Negative.  Negative for hearing loss.    Eyes: Negative.  Negative for blurred vision and double vision.   Respiratory: Positive for shortness of breath. Negative for cough.    Cardiovascular: Negative.  Negative for chest pain, palpitations, claudication and leg swelling.   Gastrointestinal: Positive for heartburn. Negative for abdominal pain, nausea and vomiting.   Genitourinary: Negative.  Negative for dysuria and urgency.        Erectile dysfunction.   Musculoskeletal: Positive for back pain. Negative for joint pain and myalgias.   Skin: Negative.  Negative for itching and rash.   Neurological: Negative.  Negative for dizziness, focal weakness, weakness and headaches.   Endo/Heme/Allergies: Negative.  Does not bruise/bleed easily.   Psychiatric/Behavioral: Negative.  Negative for depression. The patient is not nervous/anxious.         Objective:   /86   Pulse 76   Ht 1.626 m (5' 4\")   Wt 84.4 kg (186 lb)   SpO2 94%   BMI 31.93 kg/m²     Physical Exam   Constitutional: He is oriented to person, place, and time. He appears well-developed and well-nourished.   HENT:   Head: Normocephalic and atraumatic.   Eyes: Conjunctivae are normal. Pupils are equal, round, and reactive to light.   Neck: Normal range of motion. Neck supple.   Cardiovascular: Normal rate and regular rhythm.    Pulmonary/Chest: Effort normal and breath sounds normal.   Abdominal: Soft. Bowel sounds are normal.   Musculoskeletal: Normal range of motion. He exhibits no edema.   Neurological: He is alert and oriented to person, place, and time.   Skin: Skin is warm and dry.   Psychiatric: He has a normal mood and affect.     CARDIAC STUDIES/PROCEDURES:    CAROTID ULTRASOUND (12/01/17)  No prior study is available for " comparison..   Normal bilateral carotid exam.     CAROTID ULTRASOUND (11/18/09)  Carotid ultrasound showing mild plaques of left internal carotid artery.    CARDIAC CATHETERIZATION CONCLUSIONS by Dr. Toledo (04/08/13)   1. Acute inferior wall myocardial infarction secondary to high grade in-stent stenosis in the   middle of a large segment of stented proximal right coronary artery, status post successful   stenting with a 3 mm x 15 mm Xience Xpedition stent.   2. Diffusely diseased circumflex system as described above.   3. Widely patent left anterior descending coronary artery with its proximal 2-3 cm, more diffusely   narrowed and with 2 diagonal branches given off with high-grade disease in the first diagonal   branch, which is quite small and moderate disease in the second diagonal branch.    CARDIAC CATHETERIZATION CONCLUSIONS in [place], [place] (12/05)  Cardiac catheterization showing high grade small vessel first and second diagonal branch   stenosis, non obstructive left circumflex artery stenosis and high grade right coronary artery   stenosis treated with 3.0 x 32 mm and 3.0 x 16 mm Taxus drug eluding stents.     ECHOCARDIOGRAM CONCLUSIONS (12/01/17)  Prior echo 9-17-15. Compared to the report of the study done - there   has been no significant change.   Normal left ventricular systolic function.  Left ventricular ejection fraction is visually estimated to be 65%.  Normal diastolic function.  Mild mitral regurgitation.  Mild tricuspid regurgitation.  Estimated right ventricular systolic pressure is 35 mmHg.    ECHOCARDIOGRAM CONCLUSIONS (09/17/15)  Normal left ventricular size, wall thickness, and systolic function.   Grade I diastolic dysfunction. Ejection fraction is measured to be 63 %   by Martines's biplane 2D analysis.  Trace mitral regurgitation.   Trace aortic insufficiency.  Unable to estimate pulmonary artery pressure due to an inadequate   tricuspid regurgitant jet.  Normal aortic root diameter 3  cm.  No prior study is available for comparison.     ECHOCARDIOGRAM CONCLUSIONS (06/16/11)  Echocardiogram showing normal left ventricular systolic function, no significant valvular abnormalities.    EKG performed on (09/16/15) EKG shows normal sinus rhythm.    Laboratory results of (11/29/17) were reviewed. Cholesterol profile of 170/127/46/99 noted.  Laboratory results of (10/10/16) Cholesterol profile of 162/128/41/95 noted.  Laboratory results of (07/27/16) Cholesterol profile of 160/138/42/90 noted  Laboratory results of (09/16/15) Cholesterol profile of 141/148/38/73 noted.    MPI CONCLUSIONS (09/17/15)  Normal left ventricular perfusion with no fixed or reversible defects.   Normal left ventricular wall motion, with EF of 72%.     RENAL ULTRASOUND (11/18/09)  Unremarkable renal ultrasound with no evidence of renal artery stenosis.  No abdominal aortic aneurysm.    Assessment:     1. CAD (coronary artery disease) with stented artery    2. Hypertension    3. HLD (hyperlipidemia)    4. Carotid artery plaque      Medical Decision Making:  Today's Assessment / Status / Plan:     1. Coronary artery disease with stent placement: He suffered an episode of shortness of breath which is concerning him. We will repeat an echocardiogram.  2. Hypertension: Blood pressure has been high.  We will increase amlodipine to 5 mg QD and reassess the blood pressure.  3. Hyperlipidemia (managed by Dr. Casillas): He is doing well on statin therapy without myalgia symptoms. He was started on Zetia. We will repeat labs including fasting lipid profile.  4. Carotid artery plaque: He is clinically doing well on medical therapy.     We will follow up the patient in one year with labs.    CC Paz You

## 2018-01-30 NOTE — LETTER
[facility] [place] [facility]   [address] [place] [address]  CARYN Tran [address]  Phone: [phone]  Fax: [phone]              Harman Branch  1950    Encounter Date: 1/30/2018    Moisés Rice M.D.          PROGRESS NOTE:  Subjective:   Harman Branch is a 67 y.o. male who presents today for follow up of coronary artery disease with prior stent placement.    Since the patient's last visit on 11/15/17, he suffered a shortness of breath episode at high altitude. He denies chest pain, palpitations, nausea/vomiting or diaphoresis. He underwent unremarkable cardiac studies as described last year. He keeps active going to the gym four times per week. He takes daily naps. He went to visit his son at medical school at [facility].    Past Medical History:   Diagnosis Date   • Arthritis     fingers   • ASTHMA     uses inhaler prn   • Back pain    • CAD (coronary artery disease) 6/21/2011   • CAD (coronary artery disease)    • Carotid artery plaque 6/21/2011   • Esophageal reflux 11/3/2009   • Heart burn    • High cholesterol    • History of asthma 11/3/2009   • History of benign prostatic hypertrophy 11/3/2009   • History of cardiac catheterization 6/21/2011   • History of echocardiogram 6/20/2011   • History of neck surgery     Hardware in neck   • HLD (hyperlipidemia) 6/1/2011   • Hypertension    • Indigestion    • Previous back surgery    • Stented coronary artery 2005     Past Surgical History:   Procedure Laterality Date   • ANGIOGRAM     • CERVICAL DISK AND FUSION ANTERIOR     • FORAMINOTOMY  3/9/2011    Performed by MARLIN CANDELARIA at [facility]   • FORAMINOTOMY  4/7/2012    Performed by MARLIN CANDELARIA at [facility]   • LUMBAR LAMINECTOMY DISKECTOMY  3/9/2011    Performed by MARLIN CANDELARIA at [facility]   • LUMBAR LAMINECTOMY DISKECTOMY  4/7/2012    Performed by MARLIN CANDELARIA at [facility]   • OTHER CARDIAC SURGERY  2007 heart stents   •  PB FLUORSCOPIC GUIDANCE SPINAL INJECTION  9/2/2016    Procedure: FLUOROGUIDE FOR SPINAL INJ;  Surgeon: Jesus Rojas M.D.;  [facility];  Service: Pain Management   • PB INJ,FORAMEN,L/S,1 LEVEL Bilateral 9/2/2016    Procedure: INJ-FORAMEN EPI LUM/SAC SNGL - L4-5;  Surgeon: Jesus Rojas M.D.;  [facility];  Service: Pain Management   • PB INJ,FORAMEN,L/S,1 LEVEL  9/2/2016    Procedure: INJ-FORAMEN EPI LUM/SAC SNGL;  Surgeon: Jesus Rojas M.D.;  [facility]: [facility];  Service: Pain Management     No family history on file.  History   Smoking Status   • Current Some Day Smoker   • Years: 18.00   • Types: Cigars   • Last attempt to quit: 4/7/1987   Smokeless Tobacco   • Never Used     Comment: 1987     No Known Allergies    Medications reviewed.    Outpatient Encounter Prescriptions as of 1/30/2018   Medication Sig Dispense Refill   • amlodipine (NORVASC) 2.5 MG Tab Take 1 Tab by mouth every day. 90 Tab 3   • ezetimibe (ZETIA) 10 MG Tab Take 1 Tab by mouth every day. 90 Tab 3   • TESTOSTERONE NA Spray  in nose.     • atorvastatin (LIPITOR) 80 MG tablet Take 1 Tab by mouth every bedtime. Please see your cardiologist for refills 90 Tab 3   • lisinopril (PRINIVIL) 20 MG Tab Take 1 Tab by mouth 2 times a day. 180 Tab 3   • KRILL OIL PO Take  by mouth every day.     • Coenzyme Q10 (COQ10 PO) Take 300 mg by mouth every day.     • GLUCOSAMINE HCL PO Take 1 Cap by mouth every day.     • FLOVENT  MCG/ACT Aerosol 2 Puffs as needed.     • Calcium Carb-Cholecalciferol (CALCIUM-VITAMIN D) 600-400 MG-UNIT Tab Take 1 Tab by mouth every day.     • B Complex Vitamins (B-COMPLEX/B-12 PO) Take 1,000 Tabs by mouth every day.     • IRON PO Take 1 Tab by mouth every day.     • metoprolol (LOPRESSOR) 25 MG TABS Take 1 Tab by mouth 2 times a day. Needs to be seen for further refills. Thank you 180 Tab 0   • aspirin 81 MG tablet Take 81 mg by mouth every day.     •  esomeprazole (NEXIUM) 40 MG capsule Take 40 mg by mouth every 48 hours. For acid reflux  Every other day     • finasteride (PROSCAR) 5 MG TABS Take 5 mg by mouth. Every other day       No facility-administered encounter medications on file as of 1/30/2018.      Review of Systems   Constitutional: Positive for malaise/fatigue. Negative for chills and fever.   HENT: Negative.  Negative for hearing loss.    Eyes: Negative.  Negative for blurred vision and double vision.   Respiratory: Positive for shortness of breath. Negative for cough.    Cardiovascular: Negative.  Negative for chest pain, palpitations, claudication and leg swelling.   Gastrointestinal: Positive for heartburn. Negative for abdominal pain, nausea and vomiting.   Genitourinary: Negative.  Negative for dysuria and urgency.        Erectile dysfunction.   Musculoskeletal: Positive for back pain. Negative for joint pain and myalgias.   Skin: Negative.  Negative for itching and rash.   Neurological: Negative.  Negative for dizziness, focal weakness, weakness and headaches.   Endo/Heme/Allergies: Negative.  Does not bruise/bleed easily.   Psychiatric/Behavioral: Negative.  Negative for depression. The patient is not nervous/anxious.         Objective:   There were no vitals taken for this visit.    Physical Exam   Constitutional: He is oriented to person, place, and time. He appears well-developed and well-nourished.   HENT:   Head: Normocephalic and atraumatic.   Eyes: Conjunctivae are normal. Pupils are equal, round, and reactive to light.   Neck: Normal range of motion. Neck supple.   Cardiovascular: Normal rate and regular rhythm.    Pulmonary/Chest: Effort normal and breath sounds normal.   Abdominal: Soft. Bowel sounds are normal.   Musculoskeletal: Normal range of motion. He exhibits no edema.   Neurological: He is alert and oriented to person, place, and time.   Skin: Skin is warm and dry.   Psychiatric: He has a normal mood and affect.     CARDIAC  STUDIES/PROCEDURES:    CAROTID ULTRASOUND (12/01/17)  No prior study is available for comparison..   Normal bilateral carotid exam.     CAROTID ULTRASOUND (11/18/09)  Carotid ultrasound showing mild plaques of left internal carotid artery.    CARDIAC CATHETERIZATION CONCLUSIONS by Dr. Toledo (04/08/13)   1. Acute inferior wall myocardial infarction secondary to high grade in-stent stenosis in the   middle of a large segment of stented proximal right coronary artery, status post successful   stenting with a 3 mm x 15 mm Xience Xpedition stent.   2. Diffusely diseased circumflex system as described above.   3. Widely patent left anterior descending coronary artery with its proximal 2-3 cm, more diffusely   narrowed and with 2 diagonal branches given off with high-grade disease in the first diagonal   branch, which is quite small and moderate disease in the second diagonal branch.    CARDIAC CATHETERIZATION CONCLUSIONS in [place], [place] (12/05)  Cardiac catheterization showing high grade small vessel first and second diagonal branch   stenosis, non obstructive left circumflex artery stenosis and high grade right coronary artery   stenosis treated with 3.0 x 32 mm and 3.0 x 16 mm Taxus drug eluding stents.     ECHOCARDIOGRAM CONCLUSIONS (12/01/17)  Prior echo 9-17-15. Compared to the report of the study done - there   has been no significant change.   Normal left ventricular systolic function.  Left ventricular ejection fraction is visually estimated to be 65%.  Normal diastolic function.  Mild mitral regurgitation.  Mild tricuspid regurgitation.  Estimated right ventricular systolic pressure is 35 mmHg.    ECHOCARDIOGRAM CONCLUSIONS (09/17/15)  Normal left ventricular size, wall thickness, and systolic function.   Grade I diastolic dysfunction. Ejection fraction is measured to be 63 %   by Martines's biplane 2D analysis.  Trace mitral regurgitation.   Trace aortic insufficiency.  Unable to estimate pulmonary artery  pressure due to an inadequate   tricuspid regurgitant jet.  Normal aortic root diameter 3 cm.  No prior study is available for comparison.     ECHOCARDIOGRAM CONCLUSIONS (06/16/11)  Echocardiogram showing normal left ventricular systolic function, no significant valvular abnormalities.    EKG performed on (09/16/15) EKG shows normal sinus rhythm.    Laboratory results of (11/29/17) were reviewed. Cholesterol profile of 170/127/46/99 noted.  Laboratory results of (10/10/16) Cholesterol profile of 162/128/41/95 noted.  Laboratory results of (07/27/16) Cholesterol profile of 160/138/42/90 noted  Laboratory results of (09/16/15) Cholesterol profile of 141/148/38/73 noted.    MPI CONCLUSIONS (09/17/15)  Normal left ventricular perfusion with no fixed or reversible defects.   Normal left ventricular wall motion, with EF of 72%.     RENAL ULTRASOUND (11/18/09)  Unremarkable renal ultrasound with no evidence of renal artery stenosis.  No abdominal aortic aneurysm.    Assessment:     1. CAD (coronary artery disease) with stented artery    2. Hypertension    3. HLD (hyperlipidemia)    4. Carotid artery plaque      Medical Decision Making:  Today's Assessment / Status / Plan:     1. Coronary artery disease with stent placement: He suffered an episode of shortness of breath which is concerning him. We will repeat an echocardiogram.  2. Hypertension: Blood pressure has been high.  We will increase lisinopril to BID and reassess the blood pressure.  3. Hyperlipidemia (managed by Dr. Casillas): He is doing well on statin therapy without myalgia symptoms. His LDL levels fluctuates and is mildly elevated. He will, again, work on diet modification and exercise. We will repeat labs including fasting lipid profile.  4. Carotid artery plaque: He is clinically doing well on medical therapy. We will repeat a carotid ultrasound.    We will follow up after his tests to reassess his symptoms.    CC Paz You  Recipients

## 2018-05-02 ENCOUNTER — HOSPITAL ENCOUNTER (OUTPATIENT)
Dept: LAB | Facility: MEDICAL CENTER | Age: 68
End: 2018-05-02
Attending: FAMILY MEDICINE
Payer: MEDICARE

## 2018-05-02 LAB
ALBUMIN SERPL BCP-MCNC: 4.2 G/DL (ref 3.2–4.9)
ALBUMIN/GLOB SERPL: 1.6 G/DL
ALP SERPL-CCNC: 43 U/L (ref 30–99)
ALT SERPL-CCNC: 34 U/L (ref 2–50)
ANION GAP SERPL CALC-SCNC: 7 MMOL/L (ref 0–11.9)
AST SERPL-CCNC: 29 U/L (ref 12–45)
BILIRUB SERPL-MCNC: 0.5 MG/DL (ref 0.1–1.5)
BUN SERPL-MCNC: 26 MG/DL (ref 8–22)
CALCIUM SERPL-MCNC: 9.5 MG/DL (ref 8.5–10.5)
CHLORIDE SERPL-SCNC: 107 MMOL/L (ref 96–112)
CO2 SERPL-SCNC: 27 MMOL/L (ref 20–33)
CREAT SERPL-MCNC: 0.85 MG/DL (ref 0.5–1.4)
GLOBULIN SER CALC-MCNC: 2.7 G/DL (ref 1.9–3.5)
GLUCOSE SERPL-MCNC: 98 MG/DL (ref 65–99)
POTASSIUM SERPL-SCNC: 4.2 MMOL/L (ref 3.6–5.5)
PROT SERPL-MCNC: 6.9 G/DL (ref 6–8.2)
SODIUM SERPL-SCNC: 141 MMOL/L (ref 135–145)

## 2018-05-02 PROCEDURE — 36415 COLL VENOUS BLD VENIPUNCTURE: CPT

## 2018-05-02 PROCEDURE — 80053 COMPREHEN METABOLIC PANEL: CPT

## 2018-05-22 ENCOUNTER — APPOINTMENT (RX ONLY)
Dept: URBAN - METROPOLITAN AREA CLINIC 20 | Facility: CLINIC | Age: 68
Setting detail: DERMATOLOGY
End: 2018-05-22

## 2018-05-22 DIAGNOSIS — L40.0 PSORIASIS VULGARIS: ICD-10-CM

## 2018-05-22 DIAGNOSIS — L71.8 OTHER ROSACEA: ICD-10-CM

## 2018-05-22 PROBLEM — E78.5 HYPERLIPIDEMIA, UNSPECIFIED: Status: ACTIVE | Noted: 2018-05-22

## 2018-05-22 PROBLEM — I25.10 ATHEROSCLEROTIC HEART DISEASE OF NATIVE CORONARY ARTERY WITHOUT ANGINA PECTORIS: Status: ACTIVE | Noted: 2018-05-22

## 2018-05-22 PROBLEM — I10 ESSENTIAL (PRIMARY) HYPERTENSION: Status: ACTIVE | Noted: 2018-05-22

## 2018-05-22 PROBLEM — J45.909 UNSPECIFIED ASTHMA, UNCOMPLICATED: Status: ACTIVE | Noted: 2018-05-22

## 2018-05-22 PROBLEM — M12.9 ARTHROPATHY, UNSPECIFIED: Status: ACTIVE | Noted: 2018-05-22

## 2018-05-22 PROCEDURE — ? COUNSELING

## 2018-05-22 PROCEDURE — ? PRESCRIPTION

## 2018-05-22 PROCEDURE — 99203 OFFICE O/P NEW LOW 30 MIN: CPT

## 2018-05-22 PROCEDURE — ? MEDICATION COUNSELING

## 2018-05-22 RX ORDER — BETAMETHASONE DIPROPIONATE 0.5 MG/G
CREAM TOPICAL
Qty: 1 | Refills: 1 | Status: ERX

## 2018-05-22 RX ORDER — CLOBETASOL PROPIONATE 0.5 MG/ML
SOLUTION TOPICAL QHS
Qty: 1 | Refills: 3 | Status: ERX

## 2018-05-22 ASSESSMENT — LOCATION DETAILED DESCRIPTION DERM
LOCATION DETAILED: LEFT CENTRAL MALAR CHEEK
LOCATION DETAILED: RIGHT MEDIAL MALAR CHEEK
LOCATION DETAILED: RIGHT SUPERIOR MEDIAL UPPER BACK

## 2018-05-22 ASSESSMENT — LOCATION SIMPLE DESCRIPTION DERM
LOCATION SIMPLE: RIGHT CHEEK
LOCATION SIMPLE: LEFT CHEEK
LOCATION SIMPLE: RIGHT UPPER BACK

## 2018-05-22 ASSESSMENT — LOCATION ZONE DERM
LOCATION ZONE: TRUNK
LOCATION ZONE: FACE

## 2018-05-22 NOTE — HPI: RASH (PSORIASIS)
Do You Have A Family History Of Psoriasis?: no
How Severe Is Your Psoriasis?: moderate
Is This A New Presentation, Or A Follow-Up?: Psoriasis
Additional History: Negative for nail involvement. Denies wrist/finger pain. Denies fingers/toes swelling like sausages. Reports he believes he had strep infection 10 years ago when he was diagnosed with psoriasis by PCP. Reports plaques mostly affect back and scalp-well controlled with topicals.

## 2018-06-15 ENCOUNTER — HOSPITAL ENCOUNTER (OUTPATIENT)
Dept: LAB | Facility: MEDICAL CENTER | Age: 68
End: 2018-06-15
Attending: FAMILY MEDICINE
Payer: MEDICARE

## 2018-06-15 LAB
ALBUMIN SERPL BCP-MCNC: 4.3 G/DL (ref 3.2–4.9)
ALBUMIN/GLOB SERPL: 1.3 G/DL
ALP SERPL-CCNC: 45 U/L (ref 30–99)
ALT SERPL-CCNC: 31 U/L (ref 2–50)
ANION GAP SERPL CALC-SCNC: 6 MMOL/L (ref 0–11.9)
APPEARANCE UR: CLEAR
AST SERPL-CCNC: 25 U/L (ref 12–45)
BASOPHILS # BLD AUTO: 0.4 % (ref 0–1.8)
BASOPHILS # BLD: 0.03 K/UL (ref 0–0.12)
BILIRUB SERPL-MCNC: 0.7 MG/DL (ref 0.1–1.5)
BILIRUB UR QL STRIP.AUTO: NEGATIVE
BUN SERPL-MCNC: 24 MG/DL (ref 8–22)
CALCIUM SERPL-MCNC: 9 MG/DL (ref 8.5–10.5)
CHLORIDE SERPL-SCNC: 107 MMOL/L (ref 96–112)
CHOLEST SERPL-MCNC: 108 MG/DL (ref 100–199)
CO2 SERPL-SCNC: 27 MMOL/L (ref 20–33)
COLOR UR: YELLOW
CREAT SERPL-MCNC: 0.69 MG/DL (ref 0.5–1.4)
EOSINOPHIL # BLD AUTO: 0.41 K/UL (ref 0–0.51)
EOSINOPHIL NFR BLD: 4.8 % (ref 0–6.9)
ERYTHROCYTE [DISTWIDTH] IN BLOOD BY AUTOMATED COUNT: 46.8 FL (ref 35.9–50)
GLOBULIN SER CALC-MCNC: 3.3 G/DL (ref 1.9–3.5)
GLUCOSE SERPL-MCNC: 95 MG/DL (ref 65–99)
GLUCOSE UR STRIP.AUTO-MCNC: NEGATIVE MG/DL
HCT VFR BLD AUTO: 45.2 % (ref 42–52)
HDLC SERPL-MCNC: 44 MG/DL
HGB BLD-MCNC: 15.5 G/DL (ref 14–18)
IMM GRANULOCYTES # BLD AUTO: 0.02 K/UL (ref 0–0.11)
IMM GRANULOCYTES NFR BLD AUTO: 0.2 % (ref 0–0.9)
KETONES UR STRIP.AUTO-MCNC: NEGATIVE MG/DL
LDLC SERPL CALC-MCNC: 48 MG/DL
LEUKOCYTE ESTERASE UR QL STRIP.AUTO: NEGATIVE
LYMPHOCYTES # BLD AUTO: 2.78 K/UL (ref 1–4.8)
LYMPHOCYTES NFR BLD: 32.7 % (ref 22–41)
MCH RBC QN AUTO: 32.1 PG (ref 27–33)
MCHC RBC AUTO-ENTMCNC: 34.3 G/DL (ref 33.7–35.3)
MCV RBC AUTO: 93.6 FL (ref 81.4–97.8)
MICRO URNS: NORMAL
MONOCYTES # BLD AUTO: 0.75 K/UL (ref 0–0.85)
MONOCYTES NFR BLD AUTO: 8.8 % (ref 0–13.4)
NEUTROPHILS # BLD AUTO: 4.51 K/UL (ref 1.82–7.42)
NEUTROPHILS NFR BLD: 53.1 % (ref 44–72)
NITRITE UR QL STRIP.AUTO: NEGATIVE
NRBC # BLD AUTO: 0 K/UL
NRBC BLD-RTO: 0 /100 WBC
PH UR STRIP.AUTO: 6.5 [PH]
PLATELET # BLD AUTO: 268 K/UL (ref 164–446)
PMV BLD AUTO: 10 FL (ref 9–12.9)
POTASSIUM SERPL-SCNC: 4.1 MMOL/L (ref 3.6–5.5)
PROT SERPL-MCNC: 7.6 G/DL (ref 6–8.2)
PROT UR QL STRIP: NEGATIVE MG/DL
RBC # BLD AUTO: 4.83 M/UL (ref 4.7–6.1)
RBC UR QL AUTO: NEGATIVE
SODIUM SERPL-SCNC: 140 MMOL/L (ref 135–145)
SP GR UR STRIP.AUTO: 1.02
TRIGL SERPL-MCNC: 81 MG/DL (ref 0–149)
TSH SERPL DL<=0.005 MIU/L-ACNC: 2.15 UIU/ML (ref 0.38–5.33)
UROBILINOGEN UR STRIP.AUTO-MCNC: 0.2 MG/DL
WBC # BLD AUTO: 8.5 K/UL (ref 4.8–10.8)

## 2018-06-15 PROCEDURE — 85025 COMPLETE CBC W/AUTO DIFF WBC: CPT

## 2018-06-15 PROCEDURE — 80053 COMPREHEN METABOLIC PANEL: CPT

## 2018-06-15 PROCEDURE — 80061 LIPID PANEL: CPT

## 2018-06-15 PROCEDURE — 84443 ASSAY THYROID STIM HORMONE: CPT

## 2018-06-15 PROCEDURE — 36415 COLL VENOUS BLD VENIPUNCTURE: CPT

## 2018-06-15 PROCEDURE — 81003 URINALYSIS AUTO W/O SCOPE: CPT

## 2018-09-01 ENCOUNTER — HOSPITAL ENCOUNTER (OUTPATIENT)
Facility: MEDICAL CENTER | Age: 68
End: 2018-09-01
Attending: EMERGENCY MEDICINE
Payer: MEDICARE

## 2018-09-01 ENCOUNTER — TELEPHONE (OUTPATIENT)
Dept: URGENT CARE | Facility: PHYSICIAN GROUP | Age: 68
End: 2018-09-01

## 2018-09-01 ENCOUNTER — OFFICE VISIT (OUTPATIENT)
Dept: URGENT CARE | Facility: PHYSICIAN GROUP | Age: 68
End: 2018-09-01
Payer: MEDICARE

## 2018-09-01 VITALS
HEART RATE: 67 BPM | HEIGHT: 64 IN | DIASTOLIC BLOOD PRESSURE: 72 MMHG | RESPIRATION RATE: 16 BRPM | WEIGHT: 181 LBS | SYSTOLIC BLOOD PRESSURE: 110 MMHG | BODY MASS INDEX: 30.9 KG/M2 | OXYGEN SATURATION: 97 % | TEMPERATURE: 98.1 F

## 2018-09-01 DIAGNOSIS — R21 RASH: ICD-10-CM

## 2018-09-01 LAB
BASOPHILS # BLD AUTO: 0.6 % (ref 0–1.8)
BASOPHILS # BLD: 0.03 K/UL (ref 0–0.12)
EOSINOPHIL # BLD AUTO: 0.12 K/UL (ref 0–0.51)
EOSINOPHIL NFR BLD: 2.6 % (ref 0–6.9)
ERYTHROCYTE [DISTWIDTH] IN BLOOD BY AUTOMATED COUNT: 47.7 FL (ref 35.9–50)
HCT VFR BLD AUTO: 49 % (ref 42–52)
HGB BLD-MCNC: 16.8 G/DL (ref 14–18)
IMM GRANULOCYTES # BLD AUTO: 0.01 K/UL (ref 0–0.11)
IMM GRANULOCYTES NFR BLD AUTO: 0.2 % (ref 0–0.9)
INT CON NEG: NEGATIVE
INT CON POS: POSITIVE
LYMPHOCYTES # BLD AUTO: 1.93 K/UL (ref 1–4.8)
LYMPHOCYTES NFR BLD: 41.7 % (ref 22–41)
MCH RBC QN AUTO: 32.2 PG (ref 27–33)
MCHC RBC AUTO-ENTMCNC: 34.3 G/DL (ref 33.7–35.3)
MCV RBC AUTO: 93.9 FL (ref 81.4–97.8)
MONOCYTES # BLD AUTO: 0.85 K/UL (ref 0–0.85)
MONOCYTES NFR BLD AUTO: 18.4 % (ref 0–13.4)
NEUTROPHILS # BLD AUTO: 1.69 K/UL (ref 1.82–7.42)
NEUTROPHILS NFR BLD: 36.5 % (ref 44–72)
NRBC # BLD AUTO: 0 K/UL
NRBC BLD-RTO: 0 /100 WBC
PLATELET # BLD AUTO: 197 K/UL (ref 164–446)
PMV BLD AUTO: 10.2 FL (ref 9–12.9)
RBC # BLD AUTO: 5.22 M/UL (ref 4.7–6.1)
S PYO AG THROAT QL: NEGATIVE
WBC # BLD AUTO: 4.6 K/UL (ref 4.8–10.8)

## 2018-09-01 PROCEDURE — 99204 OFFICE O/P NEW MOD 45 MIN: CPT | Performed by: EMERGENCY MEDICINE

## 2018-09-01 PROCEDURE — 36415 COLL VENOUS BLD VENIPUNCTURE: CPT

## 2018-09-01 PROCEDURE — 86757 RICKETTSIA ANTIBODY: CPT

## 2018-09-01 PROCEDURE — 86765 RUBEOLA ANTIBODY: CPT

## 2018-09-01 PROCEDURE — 85025 COMPLETE CBC W/AUTO DIFF WBC: CPT

## 2018-09-01 ASSESSMENT — ENCOUNTER SYMPTOMS
NAUSEA: 0
COUGH: 0
PALPITATIONS: 0
VOMITING: 0
FEVER: 0
NECK PAIN: 0
EYE DISCHARGE: 1

## 2018-09-01 NOTE — PROGRESS NOTES
Subjective:      Harman Branch is a 68 y.o. male who presents with Fever (feverish, chills, rash on face x 5 days)            HPI     Patient is a 68-year-old male who's been ill for the past 4 or 5 days after returning from [place]. He also spent time in [place], [place] the past few days he's felt feverish with a diffuse rash over his entire face and extremities. Patient denies any exposures to arthropods did read about a measles epidemic in his travels. States that he thinks he had measles but when describing and explains that he had residual pockmarks (sounds more like varicella).    PMH:  has a past medical history of Arthritis; ASTHMA; Back pain; CAD (coronary artery disease) (6/21/2011); CAD (coronary artery disease); Carotid artery plaque (6/21/2011); Esophageal reflux (11/3/2009); Heart burn; High cholesterol; History of asthma (11/3/2009); History of benign prostatic hypertrophy (11/3/2009); History of cardiac catheterization (6/21/2011); History of echocardiogram (6/20/2011); History of neck surgery; HLD (hyperlipidemia) (6/1/2011); Hypertension; Indigestion; Previous back surgery; and Stented coronary artery (2005).  MEDS:   Current Outpatient Prescriptions:   •  Ascorbic Acid (VITAMIN C) 500 MG Cap, Take  by mouth., Disp: , Rfl:   •  amlodipine (NORVASC) 5 MG Tab, Take 1 Tab by mouth every day., Disp: 90 Tab, Rfl: 3  •  ezetimibe (ZETIA) 10 MG Tab, Take 1 Tab by mouth every day., Disp: 90 Tab, Rfl: 3  •  atorvastatin (LIPITOR) 80 MG tablet, Take 1 Tab by mouth every bedtime. Please see your cardiologist for refills, Disp: 90 Tab, Rfl: 3  •  lisinopril (PRINIVIL) 20 MG Tab, Take 1 Tab by mouth 2 times a day., Disp: 180 Tab, Rfl: 3  •  KRILL OIL PO, Take  by mouth every day., Disp: , Rfl:   •  Coenzyme Q10 (COQ10 PO), Take 300 mg by mouth every day., Disp: , Rfl:   •  GLUCOSAMINE HCL PO, Take 1 Cap by mouth every day., Disp: , Rfl:   •  B Complex Vitamins (B-COMPLEX/B-12 PO), Take 1,000 Tabs by mouth  every day., Disp: , Rfl:   •  IRON PO, Take 1 Tab by mouth every day., Disp: , Rfl:   •  metoprolol (LOPRESSOR) 25 MG TABS, Take 1 Tab by mouth 2 times a day. Needs to be seen for further refills. Thank you, Disp: 180 Tab, Rfl: 0  •  aspirin 81 MG tablet, Take 81 mg by mouth every day., Disp: , Rfl:   •  esomeprazole (NEXIUM) 40 MG capsule, Take 80 mg by mouth BID 2 DAYS A WEEK. For acid reflux  Every other day, Disp: , Rfl:   •  finasteride (PROSCAR) 5 MG TABS, Take 5 mg by mouth. Every other day, Disp: , Rfl:   •  Albuterol (PROVENTIL INH), Inhale  by mouth., Disp: , Rfl:   •  calcipotriene-betamethasone (TACLONEX) ointment, Apply  to affected area(s) every day., Disp: , Rfl:   •  TESTOSTERONE NA, Spray  in nose., Disp: , Rfl:   •  FLOVENT  MCG/ACT Aerosol, 2 Puffs as needed., Disp: , Rfl:   •  Calcium Carb-Cholecalciferol (CALCIUM-VITAMIN D) 600-400 MG-UNIT Tab, Take 1 Tab by mouth every day., Disp: , Rfl:   ALLERGIES: No Known Allergies  SURGHX:   Past Surgical History:   Procedure Laterality Date   • PB INJ,FORAMEN,L/S,1 LEVEL Bilateral 9/2/2016    Procedure: INJ-FORAMEN EPI LUM/SAC SNGL - L4-5;  Surgeon: Jesus Rojas M.D.;  Location: [facility];  Service: Pain Management   • PB INJ,FORAMEN,L/S,1 LEVEL  9/2/2016    Procedure: INJ-FORAMEN EPI LUM/SAC SNGL;  Surgeon: Jesus Rojas M.D.;  Location: [facility];  Service: Pain Management   • PB FLUORSCOPIC GUIDANCE SPINAL INJECTION  9/2/2016    Procedure: FLUOROGUIDE FOR SPINAL INJ;  Surgeon: Jesus Rojas M.D.;  Location: [facility];  Service: Pain Management   • LUMBAR LAMINECTOMY DISKECTOMY  4/7/2012    Performed by MARLIN CANDELARIA at [facility]   • FORAMINOTOMY  4/7/2012    Performed by MARLIN CANDELARIA at [facility]   • LUMBAR LAMINECTOMY DISKECTOMY  3/9/2011    Performed by MARLIN CANDELARIA at [facility]   • FORAMINOTOMY  3/9/2011    Performed by MARLIN CANDELARIA at [facility]   • ANGIOGRAM     • CERVICAL DISK AND FUSION ANTERIOR     • OTHER CARDIAC SURGERY  2007 heart stents     SOCHX:  reports that he has been smoking Cigars.  He has smoked for the past 18.00 years. He has never used smokeless tobacco. He reports that he drinks alcohol. He reports that he does not use drugs.  FH: family history is not on file.  Review of Systems   Constitutional: Negative for fever.   HENT: Positive for congestion.         Patient complains of rhinorrhea   Eyes: Positive for discharge.   Respiratory: Negative for cough.    Cardiovascular: Negative for chest pain and palpitations.   Gastrointestinal: Negative for nausea and vomiting.   Genitourinary: Negative.    Musculoskeletal: Negative for neck pain.   Skin: Positive for rash. Negative for itching.        Patient has a macular rash over his face extremities including parts of the palms of his hands. No vesicular lesions present. Pharynx is diffusely inflamed no obvious Koplik spots.          Objective:     /72   Pulse 67   Temp 36.7 °C (98.1 °F)   Resp 16   Ht 1.626 m (5' 4\")   Wt 82.1 kg (181 lb)   SpO2 97%   BMI 31.07 kg/m²      Physical Exam   Constitutional: He appears well-developed and well-nourished. No distress.   HENT:   Head: Normocephalic and atraumatic.   Right Ear: External ear normal.   Left Ear: External ear normal.   Conjunctival injection bilaterally.   Eyes: Right eye exhibits no discharge. Left eye exhibits no discharge.   Patient has conjunctival injection without any discharge.   Neck: Normal range of motion.   Cardiovascular: Normal rate.    Pulmonary/Chest: Effort normal. No stridor.   Abdominal: He exhibits no distension. There is no tenderness.   Musculoskeletal: Normal range of motion.   Lymphadenopathy:     He has no cervical adenopathy.   Neurological: He is alert. Coordination normal.   Skin: Skin is warm. He is not diaphoretic.   Patient has a diffuse macular rash on his face extremities " including palms of his hands.   Psychiatric: He has a normal mood and affect. His behavior is normal.               Assessment/Plan:     1. Rash    I feel the patient is to have  ruled out based on his rash and recent epidemics.  I contacted Dr. Crawford who will return the call. Patient is retired and is willing to go home and isolate himself. He will take his temperature on a regular basis previously he's felt very warm and chilled but no temperature now is running one patient for several 11 is interesting but pathology of the  thinks he still didn't was taken.    Patient has had the rash for 4 days so is probably less likely to be contagious, but agrees to stay in his self imposed isolation with the exception of his wife.    -[facility] [place] , Dr. DEJA HAILE said when she interviewed the patient felt the patient had likely measles symptoms.  Dr. Haile got a different story that the rash was only present for 2 days therefore the patient could still be contagious he has placed himself in self-isolation.   WITH DIFFERENTIAL; Future  - RYLEE MTN SPOTTED FEV, IGG, QN  - MEASLES BY PCR; Futur  Measles IgM IgG antibody syndrome    When the patient originally came into the urgent care he was not wearing a mask until placed into the room. He wore the mask throughout his 2 hour stay and was escorted out the rear door to decrease any potential contamination.    Patient will return tomorrow for Viola nasopharyngeal swab. This would be Of all those come into contact with the patient. Waiting room lab for urgent care.

## 2018-09-02 ENCOUNTER — TELEPHONE (OUTPATIENT)
Dept: URGENT CARE | Facility: PHYSICIAN GROUP | Age: 68
End: 2018-09-02

## 2018-09-02 NOTE — TELEPHONE ENCOUNTER
I was contacted by [facility] epidemiology is Dr. Machuca and she requested that I obtain a nasopharyngeal swab, as well as keep a record of all the possible exposures in the waiting room, employees urgent care and laboratory as well as any patient who may have come into the room #12 within 2 hour. When the patient was discharged.    I contacted the patient is scheduled to stop by tomorrow at 9:30 I will rendezvous with him at the parking lot with nasopharyngeal swabs for rubeola.    Medical staff will listed as well as any patient may have been exposed for the sake of contacting the patient's.

## 2018-09-02 NOTE — TELEPHONE ENCOUNTER
Addendum: 10:45 AM. Dr. Machuca had gone to her office to get the specific culture media she needed, at the same time the patient was called and return to the parking lot without coming into the urgent care. Throat swabs as well as nasal swabs were  obtain given to the physician from [facility]. Dr. Machuca is going to process these ASAP.    I apologized the patient for the inconvenience of having him return but also stated that we wanted to make sure we got the correct test run as quickly as possible.

## 2018-09-02 NOTE — TELEPHONE ENCOUNTER
Addendum 3:24 PM the [facility]  (Dr. Machuca ). called with a negative result for measles PCR    I contacted the patient and let him know that it was negative and that I would follow up with other tests that are pending including influenza/ RMSF.

## 2018-09-03 ENCOUNTER — TELEPHONE (OUTPATIENT)
Dept: URGENT CARE | Facility: PHYSICIAN GROUP | Age: 68
End: 2018-09-03

## 2018-09-03 NOTE — TELEPHONE ENCOUNTER
Patient called on my cell phone requesting lab work I checked the computer and reassured him that the lab work has not returned he states he feels better every day will call

## 2018-09-04 LAB — MEV IGM SER IA-ACNC: 0.28 AU (ref 0–0.79)

## 2018-09-05 LAB — TEST NAME 95000: ABNORMAL

## 2018-09-06 ENCOUNTER — TELEPHONE (OUTPATIENT)
Dept: URGENT CARE | Facility: PHYSICIAN GROUP | Age: 68
End: 2018-09-06

## 2018-09-07 NOTE — TELEPHONE ENCOUNTER
Patient Patient's [place] spotted fever titer came back positive. Dr. Machuca contacted me and we agreed to treat the patient. I subsequently called the patient he was visiting in [place] in [place] with his family. I called in a doxycycline prescription 100 mg twice a day for 14 days. I also reassured that patient and his wife that [place] spotted fever is not transmissible between people. Dr. Machuca is going to follow-up with the patient     I tried to call the infectious disease at the [facility] all just within the hospital, infectious disease was closed, talked to the on-duty , the ED triage nurse in order to close the loop on the patient. I will finish this up tomorrow but again the patient's illness is not contagious.

## 2018-10-26 DIAGNOSIS — E78.5 DYSLIPIDEMIA: ICD-10-CM

## 2018-10-30 RX ORDER — ATORVASTATIN CALCIUM 80 MG/1
TABLET, FILM COATED ORAL
Qty: 90 TAB | Refills: 3 | Status: SHIPPED | OUTPATIENT
Start: 2018-10-30 | End: 2019-02-06

## 2018-10-30 RX ORDER — EZETIMIBE 10 MG/1
TABLET ORAL
Qty: 90 TAB | Refills: 3 | Status: SHIPPED | OUTPATIENT
Start: 2018-10-30 | End: 2019-11-06 | Stop reason: SDUPTHER

## 2018-11-27 ENCOUNTER — APPOINTMENT (RX ONLY)
Dept: URBAN - METROPOLITAN AREA CLINIC 20 | Facility: CLINIC | Age: 68
Setting detail: DERMATOLOGY
End: 2018-11-27

## 2018-11-27 DIAGNOSIS — L40.0 PSORIASIS VULGARIS: ICD-10-CM

## 2018-11-27 PROCEDURE — ? MEDICATION COUNSELING

## 2018-11-27 PROCEDURE — 99213 OFFICE O/P EST LOW 20 MIN: CPT

## 2018-11-27 PROCEDURE — ? COUNSELING

## 2018-11-27 PROCEDURE — ? PRESCRIPTION

## 2018-11-27 RX ORDER — BETAMETHASONE DIPROPIONATE 0.5 MG/G
OINTMENT TOPICAL
Qty: 1 | Refills: 3 | Status: ERX

## 2018-11-27 RX ORDER — CALCIPOTRIENE AND BETAMETHASONE DIPROPIONATE 50; .5 UG/G; MG/G
OINTMENT TOPICAL
Qty: 1 | Refills: 6 | Status: ERX

## 2018-11-27 ASSESSMENT — LOCATION SIMPLE DESCRIPTION DERM
LOCATION SIMPLE: SCALP
LOCATION SIMPLE: RIGHT UPPER BACK

## 2018-11-27 ASSESSMENT — LOCATION DETAILED DESCRIPTION DERM
LOCATION DETAILED: RIGHT INFERIOR MEDIAL UPPER BACK
LOCATION DETAILED: LEFT SUPERIOR PARIETAL SCALP

## 2018-11-27 ASSESSMENT — LOCATION ZONE DERM
LOCATION ZONE: SCALP
LOCATION ZONE: TRUNK

## 2018-11-27 NOTE — PROCEDURE: MEDICATION COUNSELING
5-Fu Counseling: 5-Fluorouracil Counseling:  I discussed with the patient the risks of 5-fluorouracil including but not limited to erythema, scaling, itching, weeping, crusting, and pain.
Hydroxychloroquine Pregnancy And Lactation Text: This medication has been shown to cause fetal harm but it isn't assigned a Pregnancy Risk Category. There are small amounts excreted in breast milk.
Erythromycin Counseling:  I discussed with the patient the risks of erythromycin including but not limited to GI upset, allergic reaction, drug rash, diarrhea, increase in liver enzymes, and yeast infections.
Tazorac Counseling:  Patient advised that medication is irritating and drying.  Patient may need to apply sparingly and wash off after an hour before eventually leaving it on overnight.  The patient verbalized understanding of the proper use and possible adverse effects of tazorac.  All of the patient's questions and concerns were addressed.
Cimetidine Pregnancy And Lactation Text: This medication is Pregnancy Category B and is considered safe during pregnancy. It is also excreted in breast milk and breast feeding isn't recommended.
Humira Pregnancy And Lactation Text: This medication is Pregnancy Category B and is considered safe during pregnancy. It is unknown if this medication is excreted in breast milk.
Birth Control Pills Counseling: Birth Control Pill Counseling: I discussed with the patient the potential side effects of OCPs including but not limited to increased risk of stroke, heart attack, thrombophlebitis, deep venous thrombosis, hepatic adenomas, breast changes, GI upset, headaches, and depression.  The patient verbalized understanding of the proper use and possible adverse effects of OCPs. All of the patient's questions and concerns were addressed.
Azithromycin Pregnancy And Lactation Text: This medication is considered safe during pregnancy and is also secreted in breast milk.
Rifampin Counseling: I discussed with the patient the risks of rifampin including but not limited to liver damage, kidney damage, red-orange body fluids, nausea/vomiting and severe allergy.
Xeljanz Counseling: I discussed with the patient the risks of Xeljanz therapy including increased risk of infection, liver issues, headache, diarrhea, or cold symptoms. Live vaccines should be avoided. They were instructed to call if they have any problems.
Zyclara Pregnancy And Lactation Text: This medication is Pregnancy Category C. It is unknown if this medication is excreted in breast milk.
Doxepin Counseling:  Patient advised that the medication is sedating and not to drive a car after taking this medication. Patient informed of potential adverse effects including but not limited to dry mouth, urinary retention, and blurry vision.  The patient verbalized understanding of the proper use and possible adverse effects of doxepin.  All of the patient's questions and concerns were addressed.
Humira Counseling:  I discussed with the patient the risks of adalimumab including but not limited to myelosuppression, immunosuppression, autoimmune hepatitis, demyelinating diseases, lymphoma, and serious infections.  The patient understands that monitoring is required including a PPD at baseline and must alert us or the primary physician if symptoms of infection or other concerning signs are noted.
Zyclara Counseling:  I discussed with the patient the risks of imiquimod including but not limited to erythema, scaling, itching, weeping, crusting, and pain.  Patient understands that the inflammatory response to imiquimod is variable from person to person and was educated regarded proper titration schedule.  If flu-like symptoms develop, patient knows to discontinue the medication and contact us.
Rifampin Pregnancy And Lactation Text: This medication is Pregnancy Category C and it isn't know if it is safe during pregnancy. It is also excreted in breast milk and should not be used if you are breast feeding.
Tremfya Pregnancy And Lactation Text: The risk during pregnancy and breastfeeding is uncertain with this medication.
Bactrim Counseling:  I discussed with the patient the risks of sulfa antibiotics including but not limited to GI upset, allergic reaction, drug rash, diarrhea, dizziness, photosensitivity, and yeast infections.  Rarely, more serious reactions can occur including but not limited to aplastic anemia, agranulocytosis, methemoglobinemia, blood dyscrasias, liver or kidney failure, lung infiltrates or desquamative/blistering drug rashes.
Prednisone Pregnancy And Lactation Text: This medication is Pregnancy Category C and it isn't know if it is safe during pregnancy. This medication is excreted in breast milk.
Cimzia Counseling:  I discussed with the patient the risks of Cimzia including but not limited to immunosuppression, allergic reactions and infections.  The patient understands that monitoring is required including a PPD at baseline and must alert us or the primary physician if symptoms of infection or other concerning signs are noted.
Elidel Counseling: Patient may experience a mild burning sensation during topical application. Elidel is not approved in children less than 2 years of age. There have been case reports of hematologic and skin malignancies in patients using topical calcineurin inhibitors although causality is questionable.
Itraconazole Counseling:  I discussed with the patient the risks of itraconazole including but not limited to liver damage, nausea/vomiting, neuropathy, and severe allergy.  The patient understands that this medication is best absorbed when taken with acidic beverages such as non-diet cola or ginger ale.  The patient understands that monitoring is required including baseline LFTs and repeat LFTs at intervals.  The patient understands that they are to contact us or the primary physician if concerning signs are noted.
Cellcept Pregnancy And Lactation Text: This medication is Pregnancy Category D and isn't considered safe during pregnancy. It is unknown if this medication is excreted in breast milk.
Dapsone Counseling: I discussed with the patient the risks of dapsone including but not limited to hemolytic anemia, agranulocytosis, rashes, methemoglobinemia, kidney failure, peripheral neuropathy, headaches, GI upset, and liver toxicity.  Patients who start dapsone require monitoring including baseline LFTs and weekly CBCs for the first month, then every month thereafter.  The patient verbalized understanding of the proper use and possible adverse effects of dapsone.  All of the patient's questions and concerns were addressed.
High Dose Vitamin A Pregnancy And Lactation Text: High dose vitamin A therapy is contraindicated during pregnancy and breast feeding.
Minoxidil Pregnancy And Lactation Text: This medication has not been assigned a Pregnancy Risk Category but animal studies failed to show danger with the topical medication. It is unknown if the medication is excreted in breast milk.
Cyclophosphamide Counseling:  I discussed with the patient the risks of cyclophosphamide including but not limited to hair loss, hormonal abnormalities, decreased fertility, abdominal pain, diarrhea, nausea and vomiting, bone marrow suppression and infection. The patient understands that monitoring is required while taking this medication.
Birth Control Pills Pregnancy And Lactation Text: This medication should be avoided if pregnant and for the first 30 days post-partum.
Tazorac Pregnancy And Lactation Text: This medication is not safe during pregnancy. It is unknown if this medication is excreted in breast milk.
Ilumya Counseling: I discussed with the patient the risks of tildrakizumab including but not limited to immunosuppression, malignancy, posterior leukoencephalopathy syndrome, and serious infections.  The patient understands that monitoring is required including a PPD at baseline and must alert us or the primary physician if symptoms of infection or other concerning signs are noted.
Xelkatherinez Pregnancy And Lactation Text: This medication is Pregnancy Category D and is not considered safe during pregnancy.  The risk during breast feeding is also uncertain.
Acitretin Counseling:  I discussed with the patient the risks of acitretin including but not limited to hair loss, dry lips/skin/eyes, liver damage, hyperlipidemia, depression/suicidal ideation, photosensitivity.  Serious rare side effects can include but are not limited to pancreatitis, pseudotumor cerebri, bony changes, clot formation/stroke/heart attack.  Patient understands that alcohol is contraindicated since it can result in liver toxicity and significantly prolong the elimination of the drug by many years.
Erivedge Counseling- I discussed with the patient the risks of Erivedge including but not limited to nausea, vomiting, diarrhea, constipation, weight loss, changes in the sense of taste, decreased appetite, muscle spasms, and hair loss.  The patient verbalized understanding of the proper use and possible adverse effects of Erivedge.  All of the patient's questions and concerns were addressed.
Cimzia Pregnancy And Lactation Text: This medication crosses the placenta but can be considered safe in certain situations. Cimzia may be excreted in breast milk.
Doxepin Pregnancy And Lactation Text: This medication is Pregnancy Category C and it isn't known if it is safe during pregnancy. It is also excreted in breast milk and breast feeding isn't recommended.
Dapsone Pregnancy And Lactation Text: This medication is Pregnancy Category C and is not considered safe during pregnancy or breast feeding.
Bactrim Pregnancy And Lactation Text: This medication is Pregnancy Category D and is known to cause fetal risk.  It is also excreted in breast milk.
Picato Counseling:  I discussed with the patient the risks of Picato including but not limited to erythema, scaling, itching, weeping, crusting, and pain.
Include Pregnancy/Lactation Warning?: No
Itraconazole Pregnancy And Lactation Text: This medication is Pregnancy Category C and it isn't know if it is safe during pregnancy. It is also excreted in breast milk.
Erythromycin Pregnancy And Lactation Text: This medication is Pregnancy Category B and is considered safe during pregnancy. It is also excreted in breast milk.
Nsaids Counseling: NSAID Counseling: I discussed with the patient that NSAIDs should be taken with food. Prolonged use of NSAIDs can result in the development of stomach ulcers.  Patient advised to stop taking NSAIDs if abdominal pain occurs.  The patient verbalized understanding of the proper use and possible adverse effects of NSAIDs.  All of the patient's questions and concerns were addressed.
Simponi Counseling:  I discussed with the patient the risks of golimumab including but not limited to myelosuppression, immunosuppression, autoimmune hepatitis, demyelinating diseases, lymphoma, and serious infections.  The patient understands that monitoring is required including a PPD at baseline and must alert us or the primary physician if symptoms of infection or other concerning signs are noted.
Ketoconazole Counseling:   Patient counseled regarding improving absorption with orange juice.  Adverse effects include but are not limited to breast enlargement, headache, diarrhea, nausea, upset stomach, liver function test abnormalities, taste disturbance, and stomach pain.  There is a rare possibility of liver failure that can occur when taking ketoconazole. The patient understands that monitoring of LFTs may be required, especially at baseline. The patient verbalized understanding of the proper use and possible adverse effects of ketoconazole.  All of the patient's questions and concerns were addressed.
Acitretin Pregnancy And Lactation Text: This medication is Pregnancy Category X and should not be given to women who are pregnant or may become pregnant in the future. This medication is excreted in breast milk.
Metronidazole Counseling:  I discussed with the patient the risks of metronidazole including but not limited to seizures, nausea/vomiting, a metallic taste in the mouth, nausea/vomiting and severe allergy.
Topical Clindamycin Counseling: Patient counseled that this medication may cause skin irritation or allergic reactions.  In the event of skin irritation, the patient was advised to reduce the amount of the drug applied or use it less frequently.   The patient verbalized understanding of the proper use and possible adverse effects of clindamycin.  All of the patient's questions and concerns were addressed.
Spironolactone Counseling: Patient advised regarding risks of diarrhea, abdominal pain, hyperkalemia, birth defects (for female patients), liver toxicity and renal toxicity. The patient may need blood work to monitor liver and kidney function and potassium levels while on therapy. The patient verbalized understanding of the proper use and possible adverse effects of spironolactone.  All of the patient's questions and concerns were addressed.
Tetracycline Counseling: Patient counseled regarding possible photosensitivity and increased risk for sunburn.  Patient instructed to avoid sunlight, if possible.  When exposed to sunlight, patients should wear protective clothing, sunglasses, and sunscreen.  The patient was instructed to call the office immediately if the following severe adverse effects occur:  hearing changes, easy bruising/bleeding, severe headache, or vision changes.  The patient verbalized understanding of the proper use and possible adverse effects of tetracycline.  All of the patient's questions and concerns were addressed. Patient understands to avoid pregnancy while on therapy due to potential birth defects.
Albendazole Pregnancy And Lactation Text: This medication is Pregnancy Category C and it isn't known if it is safe during pregnancy. It is also excreted in breast milk.
Arava Counseling:  Patient counseled regarding adverse effects of Arava including but not limited to nausea, vomiting, abnormalities in liver function tests. Patients may develop mouth sores, rash, diarrhea, and abnormalities in blood counts. The patient understands that monitoring is required including LFTs and blood counts.  There is a rare possibility of scarring of the liver and lung problems that can occur when taking methotrexate. Persistent nausea, loss of appetite, pale stools, dark urine, cough, and shortness of breath should be reported immediately. Patient advised to discontinue Arava treatment and consult with a physician prior to attempting conception. The patient will have to undergo a treatment to eliminate Arava from the body prior to conception.
Xolair Counseling:  Patient informed of potential adverse effects including but not limited to fever, muscle aches, rash and allergic reactions.  The patient verbalized understanding of the proper use and possible adverse effects of Xolair.  All of the patient's questions and concerns were addressed.
Tetracycline Pregnancy And Lactation Text: This medication is Pregnancy Category D and not consider safe during pregnancy. It is also excreted in breast milk.
Cephalexin Counseling: I counseled the patient regarding use of cephalexin as an antibiotic for prophylactic and/or therapeutic purposes. Cephalexin (commonly prescribed under brand name Keflex) is a cephalosporin antibiotic which is active against numerous classes of bacteria, including most skin bacteria. Side effects may include nausea, diarrhea, gastrointestinal upset, rash, hives, yeast infections, and in rare cases, hepatitis, kidney disease, seizures, fever, confusion, neurologic symptoms, and others. Patients with severe allergies to penicillin medications are cautioned that there is about a 10% incidence of cross-reactivity with cephalosporins. When possible, patients with penicillin allergies should use alternatives to cephalosporins for antibiotic therapy.
Nsaids Pregnancy And Lactation Text: These medications are considered safe up to 30 weeks gestation. It is excreted in breast milk.
Cosentyx Counseling:  I discussed with the patient the risks of Cosentyx including but not limited to worsening of Crohn's disease, immunosuppression, allergic reactions and infections.  The patient understands that monitoring is required including a PPD at baseline and must alert us or the primary physician if symptoms of infection or other concerning signs are noted.
Eucrisa Counseling: Patient may experience a mild burning sensation during topical application. Eucrisa is not approved in children less than 2 years of age.
Cyclophosphamide Pregnancy And Lactation Text: This medication is Pregnancy Category D and it isn't considered safe during pregnancy. This medication is excreted in breast milk.
Thalidomide Counseling: I discussed with the patient the risks of thalidomide including but not limited to birth defects, anxiety, weakness, chest pain, dizziness, cough and severe allergy.
Topical Clindamycin Pregnancy And Lactation Text: This medication is Pregnancy Category B and is considered safe during pregnancy. It is unknown if it is excreted in breast milk.
Ivermectin Counseling:  Patient instructed to take medication on an empty stomach with a full glass of water.  Patient informed of potential adverse effects including but not limited to nausea, diarrhea, dizziness, itching, and swelling of the extremities or lymph nodes.  The patient verbalized understanding of the proper use and possible adverse effects of ivermectin.  All of the patient's questions and concerns were addressed.
Spironolactone Pregnancy And Lactation Text: This medication can cause feminization of the male fetus and should be avoided during pregnancy. The active metabolite is also found in breast milk.
Thalidomide Pregnancy And Lactation Text: This medication is Pregnancy Category X and is absolutely contraindicated during pregnancy. It is unknown if it is excreted in breast milk.
Infliximab Counseling:  I discussed with the patient the risks of infliximab including but not limited to myelosuppression, immunosuppression, autoimmune hepatitis, demyelinating diseases, lymphoma, and serious infections.  The patient understands that monitoring is required including a PPD at baseline and must alert us or the primary physician if symptoms of infection or other concerning signs are noted.
Bexarotene Counseling:  I discussed with the patient the risks of bexarotene including but not limited to hair loss, dry lips/skin/eyes, liver abnormalities, hyperlipidemia, pancreatitis, depression/suicidal ideation, photosensitivity, drug rash/allergic reactions, hypothyroidism, anemia, leukopenia, infection, cataracts, and teratogenicity.  Patient understands that they will need regular blood tests to check lipid profile, liver function tests, white blood cell count, thyroid function tests and pregnancy test if applicable.
Ketoconazole Pregnancy And Lactation Text: This medication is Pregnancy Category C and it isn't know if it is safe during pregnancy. It is also excreted in breast milk and breast feeding isn't recommended.
Xolair Pregnancy And Lactation Text: This medication is Pregnancy Category B and is considered safe during pregnancy. This medication is excreted in breast milk.
Metronidazole Pregnancy And Lactation Text: This medication is Pregnancy Category B and considered safe during pregnancy.  It is also excreted in breast milk.
Hydroquinone Counseling:  Patient advised that medication may result in skin irritation, lightening (hypopigmentation), dryness, and burning.  In the event of skin irritation, the patient was advised to reduce the amount of the drug applied or use it less frequently.  Rarely, spots that are treated with hydroquinone can become darker (pseudoochronosis).  Should this occur, patient instructed to stop medication and call the office. The patient verbalized understanding of the proper use and possible adverse effects of hydroquinone.  All of the patient's questions and concerns were addressed.
Dupixent Counseling: I discussed with the patient the risks of dupilumab including but not limited to eye infection and irritation, cold sores, injection site reactions, worsening of asthma, allergic reactions and increased risk of parasitic infection.  Live vaccines should be avoided while taking dupilumab. Dupilumab will also interact with certain medications such as warfarin and cyclosporine. The patient understands that monitoring is required and they must alert us or the primary physician if symptoms of infection or other concerning signs are noted.
Hydroxyzine Counseling: Patient advised that the medication is sedating and not to drive a car after taking this medication.  Patient informed of potential adverse effects including but not limited to dry mouth, urinary retention, and blurry vision.  The patient verbalized understanding of the proper use and possible adverse effects of hydroxyzine.  All of the patient's questions and concerns were addressed.
Cyclosporine Counseling:  I discussed with the patient the risks of cyclosporine including but not limited to hypertension, gingival hyperplasia,myelosuppression, immunosuppression, liver damage, kidney damage, neurotoxicity, lymphoma, and serious infections. The patient understands that monitoring is required including baseline blood pressure, CBC, CMP, lipid panel and uric acid, and then 1-2 times monthly CMP and blood pressure.
Hydroxyzine Pregnancy And Lactation Text: This medication is not safe during pregnancy and should not be taken. It is also excreted in breast milk and breast feeding isn't recommended.
Stelara Counseling:  I discussed with the patient the risks of ustekinumab including but not limited to immunosuppression, malignancy, posterior leukoencephalopathy syndrome, and serious infections.  The patient understands that monitoring is required including a PPD at baseline and must alert us or the primary physician if symptoms of infection or other concerning signs are noted.
Cephalexin Pregnancy And Lactation Text: This medication is Pregnancy Category B and considered safe during pregnancy.  It is also excreted in breast milk but can be used safely for shorter doses.
Protopic Counseling: Patient may experience a mild burning sensation during topical application. Protopic is not approved in children less than 2 years of age. There have been case reports of hematologic and skin malignancies in patients using topical calcineurin inhibitors although causality is questionable.
Odomzo Counseling- I discussed with the patient the risks of Odomzo including but not limited to nausea, vomiting, diarrhea, constipation, weight loss, changes in the sense of taste, decreased appetite, muscle spasms, and hair loss.  The patient verbalized understanding of the proper use and possible adverse effects of Odomzo.  All of the patient's questions and concerns were addressed.
Terbinafine Counseling: Patient counseling regarding adverse effects of terbinafine including but not limited to headache, diarrhea, rash, upset stomach, liver function test abnormalities, itching, taste/smell disturbance, nausea, abdominal pain, and flatulence.  There is a rare possibility of liver failure that can occur when taking terbinafine.  The patient understands that a baseline LFT and kidney function test may be required. The patient verbalized understanding of the proper use and possible adverse effects of terbinafine.  All of the patient's questions and concerns were addressed.
Clofazimine Counseling:  I discussed with the patient the risks of clofazimine including but not limited to skin and eye pigmentation, liver damage, nausea/vomiting, gastrointestinal bleeding and allergy.
Minocycline Counseling: Patient advised regarding possible photosensitivity and discoloration of the teeth, skin, lips, tongue and gums.  Patient instructed to avoid sunlight, if possible.  When exposed to sunlight, patients should wear protective clothing, sunglasses, and sunscreen.  The patient was instructed to call the office immediately if the following severe adverse effects occur:  hearing changes, easy bruising/bleeding, severe headache, or vision changes.  The patient verbalized understanding of the proper use and possible adverse effects of minocycline.  All of the patient's questions and concerns were addressed.
Bexarotene Pregnancy And Lactation Text: This medication is Pregnancy Category X and should not be given to women who are pregnant or may become pregnant. This medication should not be used if you are breast feeding.
Benzoyl Peroxide Counseling: Patient counseled that medicine may cause skin irritation and bleach clothing.  In the event of skin irritation, the patient was advised to reduce the amount of the drug applied or use it less frequently.   The patient verbalized understanding of the proper use and possible adverse effects of benzoyl peroxide.  All of the patient's questions and concerns were addressed.
Gabapentin Pregnancy And Lactation Text: This medication is Pregnancy Category C and isn't considered safe during pregnancy. It is excreted in breast milk.
Solaraze Counseling:  I discussed with the patient the risks of Solaraze including but not limited to erythema, scaling, itching, weeping, crusting, and pain.
Gabapentin Counseling: I discussed with the patient the risks of gabapentin including but not limited to dizziness, somnolence, fatigue and ataxia.
Protopic Pregnancy And Lactation Text: This medication is Pregnancy Category C. It is unknown if this medication is excreted in breast milk when applied topically.
Fluconazole Counseling:  Patient counseled regarding adverse effects of fluconazole including but not limited to headache, diarrhea, nausea, upset stomach, liver function test abnormalities, taste disturbance, and stomach pain.  There is a rare possibility of liver failure that can occur when taking fluconazole.  The patient understands that monitoring of LFTs and kidney function test may be required, especially at baseline. The patient verbalized understanding of the proper use and possible adverse effects of fluconazole.  All of the patient's questions and concerns were addressed.
Clindamycin Counseling: I counseled the patient regarding use of clindamycin as an antibiotic for prophylactic and/or therapeutic purposes. Clindamycin is active against numerous classes of bacteria, including skin bacteria. Side effects may include nausea, diarrhea, gastrointestinal upset, rash, hives, yeast infections, and in rare cases, colitis.
SSKI Counseling:  I discussed with the patient the risks of SSKI including but not limited to thyroid abnormalities, metallic taste, GI upset, fever, headache, acne, arthralgias, paraesthesias, lymphadenopathy, easy bleeding, arrhythmias, and allergic reaction.
Topical Sulfur Applications Counseling: Topical Sulfur Counseling: Patient counseled that this medication may cause skin irritation or allergic reactions.  In the event of skin irritation, the patient was advised to reduce the amount of the drug applied or use it less frequently.   The patient verbalized understanding of the proper use and possible adverse effects of topical sulfur application.  All of the patient's questions and concerns were addressed.
Valtrex Counseling: I discussed with the patient the risks of valacyclovir including but not limited to kidney damage, nausea, vomiting and severe allergy.  The patient understands that if the infection seems to be worsening or is not improving, they are to call.
Valtrex Pregnancy And Lactation Text: this medication is Pregnancy Category B and is considered safe during pregnancy. This medication is not directly found in breast milk but it's metabolite acyclovir is present.
Sski Pregnancy And Lactation Text: This medication is Pregnancy Category D and isn't considered safe during pregnancy. It is excreted in breast milk.
5-Fu Pregnancy And Lactation Text: This medication is Pregnancy Category X and contraindicated in pregnancy and in women who may become pregnant. It is unknown if this medication is excreted in breast milk.
Glycopyrrolate Counseling:  I discussed with the patient the risks of glycopyrrolate including but not limited to skin rash, drowsiness, dry mouth, difficulty urinating, and blurred vision.
Enbrel Counseling:  I discussed with the patient the risks of etanercept including but not limited to myelosuppression, immunosuppression, autoimmune hepatitis, demyelinating diseases, lymphoma, and infections.  The patient understands that monitoring is required including a PPD at baseline and must alert us or the primary physician if symptoms of infection or other concerning signs are noted.
Imiquimod Counseling:  I discussed with the patient the risks of imiquimod including but not limited to erythema, scaling, itching, weeping, crusting, and pain.  Patient understands that the inflammatory response to imiquimod is variable from person to person and was educated regarded proper titration schedule.  If flu-like symptoms develop, patient knows to discontinue the medication and contact us.
Otezla Pregnancy And Lactation Text: This medication is Pregnancy Category C and it isn't known if it is safe during pregnancy. It is unknown if it is excreted in breast milk.
Otezla Counseling: The side effects of Otezla were discussed with the patient, including but not limited to worsening or new depression, weight loss, diarrhea, nausea, upper respiratory tract infection, and headache. Patient instructed to call the office should any adverse effect occur.  The patient verbalized understanding of the proper use and possible adverse effects of Otezla.  All the patient's questions and concerns were addressed.
Dupixent Pregnancy And Lactation Text: This medication likely crosses the placenta but the risk for the fetus is uncertain. This medication is excreted in breast milk.
Topical Sulfur Applications Pregnancy And Lactation Text: This medication is Pregnancy Category C and has an unknown safety profile during pregnancy. It is unknown if this topical medication is excreted in breast milk.
Taltz Counseling: I discussed with the patient the risks of ixekizumab including but not limited to immunosuppression, serious infections, worsening of inflammatory bowel disease and drug reactions.  The patient understands that monitoring is required including a PPD at baseline and must alert us or the primary physician if symptoms of infection or other concerning signs are noted.
Clindamycin Pregnancy And Lactation Text: This medication can be used in pregnancy if certain situations. Clindamycin is also present in breast milk.
Methotrexate Counseling:  Patient counseled regarding adverse effects of methotrexate including but not limited to nausea, vomiting, abnormalities in liver function tests. Patients may develop mouth sores, rash, diarrhea, and abnormalities in blood counts. The patient understands that monitoring is required including LFT's and blood counts.  There is a rare possibility of scarring of the liver and lung problems that can occur when taking methotrexate. Persistent nausea, loss of appetite, pale stools, dark urine, cough, and shortness of breath should be reported immediately. Patient advised to discontinue methotrexate treatment at least three months before attempting to become pregnant.  I discussed the need for folate supplements while taking methotrexate.  These supplements can decrease side effects during methotrexate treatment. The patient verbalized understanding of the proper use and possible adverse effects of methotrexate.  All of the patient's questions and concerns were addressed.
Rituxan Counseling:  I discussed with the patient the risks of Rituxan infusions. Side effects can include infusion reactions, severe drug rashes including mucocutaneous reactions, reactivation of latent hepatitis and other infections and rarely progressive multifocal leukoencephalopathy.  All of the patient's questions and concerns were addressed.
Azathioprine Counseling:  I discussed with the patient the risks of azathioprine including but not limited to myelosuppression, immunosuppression, hepatotoxicity, lymphoma, and infections.  The patient understands that monitoring is required including baseline LFTs, Creatinine, possible TPMP genotyping and weekly CBCs for the first month and then every 2 weeks thereafter.  The patient verbalized understanding of the proper use and possible adverse effects of azathioprine.  All of the patient's questions and concerns were addressed.
Isotretinoin Counseling: Patient should get monthly blood tests, not donate blood, not drive at night if vision affected, not share medication, and not undergo elective surgery for 6 months after tx completed. Side effects reviewed, pt to contact office should one occur.
Albendazole Counseling:  I discussed with the patient the risks of albendazole including but not limited to cytopenia, kidney damage, nausea/vomiting and severe allergy.  The patient understands that this medication is being used in an off-label manner.
Drysol Counseling:  I discussed with the patient the risks of drysol/aluminum chloride including but not limited to skin rash, itching, irritation, burning.
Glycopyrrolate Pregnancy And Lactation Text: This medication is Pregnancy Category B and is considered safe during pregnancy. It is unknown if it is excreted breast milk.
Rituxan Pregnancy And Lactation Text: This medication is Pregnancy Category C and it isn't know if it is safe during pregnancy. It is unknown if this medication is excreted in breast milk but similar antibodies are known to be excreted.
Isotretinoin Pregnancy And Lactation Text: This medication is Pregnancy Category X and is considered extremely dangerous during pregnancy. It is unknown if it is excreted in breast milk.
Oxybutynin Counseling:  I discussed with the patient the risks of oxybutynin including but not limited to skin rash, drowsiness, dry mouth, difficulty urinating, and blurred vision.
Tremfya Counseling: I discussed with the patient the risks of guselkumab including but not limited to immunosuppression, serious infections, worsening of inflammatory bowel disease and drug reactions.  The patient understands that monitoring is required including a PPD at baseline and must alert us or the primary physician if symptoms of infection or other concerning signs are noted.
Prednisone Counseling:  I discussed with the patient the risks of prolonged use of prednisone including but not limited to weight gain, insomnia, osteoporosis, mood changes, diabetes, susceptibility to infection, glaucoma and high blood pressure.  In cases where prednisone use is prolonged, patients should be monitored with blood pressure checks, serum glucose levels and an eye exam.  Additionally, the patient may need to be placed on GI prophylaxis, PCP prophylaxis, and calcium and vitamin D supplementation and/or a bisphosphonate.  The patient verbalized understanding of the proper use and the possible adverse effects of prednisone.  All of the patient's questions and concerns were addressed.
Carac Counseling:  I discussed with the patient the risks of Carac including but not limited to erythema, scaling, itching, weeping, crusting, and pain.
Quinolones Counseling:  I discussed with the patient the risks of fluoroquinolones including but not limited to GI upset, allergic reaction, drug rash, diarrhea, dizziness, photosensitivity, yeast infections, liver function test abnormalities, tendonitis/tendon rupture.
Methotrexate Pregnancy And Lactation Text: This medication is Pregnancy Category X and is known to cause fetal harm. This medication is excreted in breast milk.
Benzoyl Peroxide Pregnancy And Lactation Text: This medication is Pregnancy Category C. It is unknown if benzoyl peroxide is excreted in breast milk.
Cimetidine Counseling:  I discussed with the patient the risks of Cimetidine including but not limited to gynecomastia, headache, diarrhea, nausea, drowsiness, arrhythmias, pancreatitis, skin rashes, psychosis, bone marrow suppression and kidney toxicity.
Solaraze Pregnancy And Lactation Text: This medication is Pregnancy Category B and is considered safe. There is some data to suggest avoiding during the third trimester. It is unknown if this medication is excreted in breast milk.
Azithromycin Counseling:  I discussed with the patient the risks of azithromycin including but not limited to GI upset, allergic reaction, drug rash, diarrhea, and yeast infections.
Griseofulvin Counseling:  I discussed with the patient the risks of griseofulvin including but not limited to photosensitivity, cytopenia, liver damage, nausea/vomiting and severe allergy.  The patient understands that this medication is best absorbed when taken with a fatty meal (e.g., ice cream or french fries).
Doxycycline Counseling:  Patient counseled regarding possible photosensitivity and increased risk for sunburn.  Patient instructed to avoid sunlight, if possible.  When exposed to sunlight, patients should wear protective clothing, sunglasses, and sunscreen.  The patient was instructed to call the office immediately if the following severe adverse effects occur:  hearing changes, easy bruising/bleeding, severe headache, or vision changes.  The patient verbalized understanding of the proper use and possible adverse effects of doxycycline.  All of the patient's questions and concerns were addressed.
Colchicine Counseling:  Patient counseled regarding adverse effects including but not limited to stomach upset (nausea, vomiting, stomach pain, or diarrhea).  Patient instructed to limit alcohol consumption while taking this medication.  Colchicine may reduce blood counts especially with prolonged use.  The patient understands that monitoring of kidney function and blood counts may be required, especially at baseline. The patient verbalized understanding of the proper use and possible adverse effects of colchicine.  All of the patient's questions and concerns were addressed.
Wartpeel Counseling:  I discussed with the patient the risks of Wartpeel including but not limited to erythema, scaling, itching, weeping, crusting, and pain.
Doxycycline Pregnancy And Lactation Text: This medication is Pregnancy Category D and not consider safe during pregnancy. It is also excreted in breast milk but is considered safe for shorter treatment courses.
Minoxidil Counseling: Minoxidil is a topical medication which can increase blood flow where it is applied. It is uncertain how this medication increases hair growth. Side effects are uncommon and include stinging and allergic reactions.
Hydroxychloroquine Counseling:  I discussed with the patient that a baseline ophthalmologic exam is needed at the start of therapy and every year thereafter while on therapy. A CBC may also be warranted for monitoring.  The side effects of this medication were discussed with the patient, including but not limited to agranulocytosis, aplastic anemia, seizures, rashes, retinopathy, and liver toxicity. Patient instructed to call the office should any adverse effect occur.  The patient verbalized understanding of the proper use and possible adverse effects of Plaquenil.  All the patient's questions and concerns were addressed.
Detail Level: Zone
Topical Retinoid counseling:  Patient advised to apply a pea-sized amount only at bedtime and wait 30 minutes after washing their face before applying.  If too drying, patient may add a non-comedogenic moisturizer. The patient verbalized understanding of the proper use and possible adverse effects of retinoids.  All of the patient's questions and concerns were addressed.
Siliq Counseling:  I discussed with the patient the risks of Siliq including but not limited to new or worsening depression, suicidal thoughts and behavior, immunosuppression, malignancy, posterior leukoencephalopathy syndrome, and serious infections.  The patient understands that monitoring is required including a PPD at baseline and must alert us or the primary physician if symptoms of infection or other concerning signs are noted. There is also a special program designed to monitor depression which is required with Siliq.
Drysol Pregnancy And Lactation Text: This medication is considered safe during pregnancy and breast feeding.
High Dose Vitamin A Counseling: Side effects reviewed, pt to contact office should one occur.
Griseofulvin Pregnancy And Lactation Text: This medication is Pregnancy Category X and is known to cause serious birth defects. It is unknown if this medication is excreted in breast milk but breast feeding should be avoided.
Cellcept Counseling:  I discussed with the patient the risks of mycophenolate mofetil including but not limited to infection/immunosuppression, GI upset, hypokalemia, hypercholesterolemia, bone marrow suppression, lymphoproliferative disorders, malignancy, GI ulceration/bleed/perforation, colitis, interstitial lung disease, kidney failure, progressive multifocal leukoencephalopathy, and birth defects.  The patient understands that monitoring is required including a baseline creatinine and regular CBC testing. In addition, patient must alert us immediately if symptoms of infection or other concerning signs are noted.

## 2018-11-27 NOTE — HPI: RASH (PSORIASIS)
How Severe Is Your Psoriasis?: moderate
Is This A New Presentation, Or A Follow-Up?: Follow Up Psoriasis
Additional History: pt requesting Taclonex secondary to superior treatment of plaques

## 2019-01-04 DIAGNOSIS — I10 ESSENTIAL HYPERTENSION, BENIGN: ICD-10-CM

## 2019-01-04 RX ORDER — AMLODIPINE BESYLATE 5 MG/1
5 TABLET ORAL DAILY
Qty: 90 TAB | Refills: 0 | Status: SHIPPED | OUTPATIENT
Start: 2019-01-04 | End: 2019-02-06 | Stop reason: SDUPTHER

## 2019-01-04 RX ORDER — LISINOPRIL 20 MG/1
20 TABLET ORAL 2 TIMES DAILY
Qty: 180 TAB | Refills: 0 | Status: SHIPPED | OUTPATIENT
Start: 2019-01-04 | End: 2019-02-06 | Stop reason: SDUPTHER

## 2019-02-06 ENCOUNTER — OFFICE VISIT (OUTPATIENT)
Dept: CARDIOLOGY | Facility: MEDICAL CENTER | Age: 69
End: 2019-02-06
Payer: MEDICARE

## 2019-02-06 VITALS
HEIGHT: 64 IN | DIASTOLIC BLOOD PRESSURE: 80 MMHG | BODY MASS INDEX: 31.73 KG/M2 | WEIGHT: 185.85 LBS | SYSTOLIC BLOOD PRESSURE: 126 MMHG | HEART RATE: 66 BPM | OXYGEN SATURATION: 94 %

## 2019-02-06 DIAGNOSIS — R07.89 OTHER CHEST PAIN: ICD-10-CM

## 2019-02-06 DIAGNOSIS — I25.10 CORONARY ARTERY DISEASE INVOLVING NATIVE CORONARY ARTERY OF NATIVE HEART WITHOUT ANGINA PECTORIS: Chronic | ICD-10-CM

## 2019-02-06 DIAGNOSIS — E78.5 DYSLIPIDEMIA: ICD-10-CM

## 2019-02-06 DIAGNOSIS — Z95.5 STENTED CORONARY ARTERY: ICD-10-CM

## 2019-02-06 DIAGNOSIS — I10 ESSENTIAL HYPERTENSION, BENIGN: ICD-10-CM

## 2019-02-06 DIAGNOSIS — I65.23 ATHEROSCLEROSIS OF BOTH CAROTID ARTERIES: Chronic | ICD-10-CM

## 2019-02-06 DIAGNOSIS — I20.9 ANGINA PECTORIS (HCC): ICD-10-CM

## 2019-02-06 PROCEDURE — 99214 OFFICE O/P EST MOD 30 MIN: CPT | Performed by: INTERNAL MEDICINE

## 2019-02-06 RX ORDER — AMLODIPINE BESYLATE 5 MG/1
TABLET ORAL
COMMUNITY
Start: 2019-01-04 | End: 2019-02-06

## 2019-02-06 RX ORDER — ATORVASTATIN CALCIUM 40 MG/1
40 TABLET, FILM COATED ORAL DAILY
Qty: 90 TAB | Refills: 3 | Status: SHIPPED | OUTPATIENT
Start: 2019-02-06 | End: 2019-10-03 | Stop reason: SDUPTHER

## 2019-02-06 RX ORDER — LISINOPRIL 20 MG/1
20 TABLET ORAL 2 TIMES DAILY
Qty: 180 TAB | Refills: 3 | Status: SHIPPED | OUTPATIENT
Start: 2019-02-06 | End: 2020-03-22

## 2019-02-06 RX ORDER — BETAMETHASONE DIPROPIONATE 0.05 %
OINTMENT (GRAM) TOPICAL
COMMUNITY
Start: 2018-11-28 | End: 2019-02-06

## 2019-02-06 RX ORDER — AMLODIPINE BESYLATE 5 MG/1
5 TABLET ORAL DAILY
Qty: 90 TAB | Refills: 3 | Status: SHIPPED | OUTPATIENT
Start: 2019-02-06 | End: 2019-02-26

## 2019-02-06 ASSESSMENT — ENCOUNTER SYMPTOMS
CLAUDICATION: 0
MYALGIAS: 0
SHORTNESS OF BREATH: 0
VOMITING: 0
CARDIOVASCULAR NEGATIVE: 1
HEADACHES: 0
EYES NEGATIVE: 1
PSYCHIATRIC NEGATIVE: 1
NERVOUS/ANXIOUS: 0
DEPRESSION: 0
CONSTITUTIONAL NEGATIVE: 1
MUSCULOSKELETAL NEGATIVE: 1
ABDOMINAL PAIN: 0
BRUISES/BLEEDS EASILY: 0
NAUSEA: 0
NEUROLOGICAL NEGATIVE: 1
DOUBLE VISION: 0
CHILLS: 0
DIZZINESS: 0
WEAKNESS: 0
COUGH: 0
PALPITATIONS: 0
BLURRED VISION: 0
FEVER: 0
RESPIRATORY NEGATIVE: 1
WEIGHT LOSS: 0
GASTROINTESTINAL NEGATIVE: 1
FOCAL WEAKNESS: 0

## 2019-02-06 NOTE — PROGRESS NOTES
Chief Complaint   Patient presents with   • Coronary Artery Disease     follow up       Subjective:   Harman Branch is a 68 y.o. male who presents today for annual follow up of coronary artery disease with prior stent placement.     Since the patient's last visit on 01/30/18, he has been doing well clinically until 6 months ago when he began to experience chest pain. He describes his chest pain to be mild to moderate chest pressure sensation of his upper mid chest without radiation, lasting up one to couple of minutes hours days. He admits to associated shortness of breath, palpitations, diaphoresis, nausea and vomiting. He denies associated shortness of breath, palpitations, diaphoresis, nausea and vomiting. His pain is aggravated by exertion and is alleviated by rest. His chest pain is intermittent and he can exert himself without chest pain most of the time.    Past Medical History:   Diagnosis Date   • Arthritis     fingers   • ASTHMA     uses inhaler prn   • Back pain    • CAD (coronary artery disease) 6/21/2011   • CAD (coronary artery disease)    • Carotid artery plaque 6/21/2011   • Esophageal reflux 11/3/2009   • Heart burn    • High cholesterol    • History of asthma 11/3/2009   • History of benign prostatic hypertrophy 11/3/2009   • History of cardiac catheterization 6/21/2011   • History of echocardiogram 6/20/2011   • History of neck surgery     Hardware in neck   • HLD (hyperlipidemia) 6/1/2011   • Hypertension    • Indigestion    • Previous back surgery    • Stented coronary artery 2005     Past Surgical History:   Procedure Laterality Date   • PB INJ,FORAMEN,L/S,1 LEVEL Bilateral 9/2/2016    Procedure: INJ-FORAMEN EPI LUM/SAC SNGL - L4-5;  Surgeon: Jesus Rojas M.D.;  Location: SURGERY [facility];  Service: Pain Management   • PB INJ,FORAMEN,L/S,1 LEVEL  9/2/2016    Procedure: INJ-FORAMEN EPI LUM/SAC SNGL;  Surgeon: Jesus Rojas M.D.;  Location: SURGERY [facility];  Service: Pain  Management   • PB FLUORSCOPIC GUIDANCE SPINAL INJECTION  9/2/2016    Procedure: FLUOROGUIDE FOR SPINAL INJ;  Surgeon: Jesus Rojas M.D.;  Location: SURGERY [facility] ORS;  Service: Pain Management   • LUMBAR LAMINECTOMY DISKECTOMY  4/7/2012    Performed by MARLIN [facility]DELARIA at [facility]   • FORAMINOTOMY  4/7/2012    Performed by MARLIN CANDELARIA at [facility]   • LUMBAR LAMINECTOMY DISKECTOMY  3/9/2011    Performed by MARLIN CANDELARIA at [facility]   • FORAMINOTOMY  3/9/2011    Performed by MARLIN CANDELARIA at [facility]S   • ANGIOGRAM     • CERVICAL DISK AND FUSION ANTERIOR     • OTHER CARDIAC SURGERY  2007 heart stents     Family History   Problem Relation Age of Onset   • Heart Attack Neg Hx      Social History     Social History   • Marital status:      Spouse name: N/A   • Number of children: N/A   • Years of education: N/A     Occupational History   • Not on file.     Social History Main Topics   • Smoking status: Current Some Day Smoker     Years: 18.00     Types: Cigars     Last attempt to quit: 4/7/1987   • Smokeless tobacco: Never Used      Comment: 1987   • Alcohol use Yes      Comment: 2 cocktails per week   • Drug use: No   • Sexual activity: Not on file     Other Topics Concern   • Not on file     Social History Narrative    ** Merged History Encounter **          No Known Allergies     Medications reviewed.    Outpatient Encounter Prescriptions as of 2/6/2019   Medication Sig Dispense Refill   • Multiple Vitamin (MULTI-VITAMIN DAILY PO) Take  by mouth.     • amLODIPine (NORVASC) 5 MG Tab Take 1 Tab by mouth every day. Needs to be seen for further refills. Thank you 90 Tab 0   • lisinopril (PRINIVIL) 20 MG Tab Take 1 Tab by mouth 2 times a day. Needs to be seen for further refills. Thank you 180 Tab 0   • atorvastatin (LIPITOR) 80 MG tablet TAKE 1 TABLET AT BEDTIME (PLEASE SEE CARDIOLOGIST FOR REFILLS) 90 Tab 3   • ezetimibe (ZETIA) 10 MG Tab TAKE 1  TABLET DAILY 90 Tab 3   • Albuterol (PROVENTIL INH) Inhale  by mouth.     • calcipotriene-betamethasone (TACLONEX) ointment Apply  to affected area(s) every day.     • KRILL OIL PO Take  by mouth every day.     • Coenzyme Q10 (COQ10 PO) Take 300 mg by mouth every day.     • GLUCOSAMINE HCL PO Take 1 Cap by mouth every day.     • FLOVENT  MCG/ACT Aerosol 2 Puffs as needed.     • B Complex Vitamins (B-COMPLEX/B-12 PO) Take 1,000 Tabs by mouth every day.     • IRON PO Take 1 Tab by mouth every day.     • metoprolol (LOPRESSOR) 25 MG TABS Take 1 Tab by mouth 2 times a day. Needs to be seen for further refills. Thank you 180 Tab 0   • aspirin 81 MG tablet Take 81 mg by mouth every day.     • esomeprazole (NEXIUM) 40 MG capsule Take 80 mg by mouth BID 2 DAYS A WEEK. For acid reflux  Every other day     • finasteride (PROSCAR) 5 MG TABS Take 5 mg by mouth. Every other day     • [DISCONTINUED] amLODIPine (NORVASC) 5 MG Tab      • [DISCONTINUED] betamethasone dipropionate (DIPROLENE) 0.05 % Ointment      • [DISCONTINUED] Ascorbic Acid (VITAMIN C) 500 MG Cap Take  by mouth.     • [DISCONTINUED] TESTOSTERONE NA Spray  in nose.     • [DISCONTINUED] Calcium Carb-Cholecalciferol (CALCIUM-VITAMIN D) 600-400 MG-UNIT Tab Take 1 Tab by mouth every day.       No facility-administered encounter medications on file as of 2/6/2019.      Review of Systems   Constitutional: Negative.  Negative for chills, fever, malaise/fatigue and weight loss.   HENT: Negative.  Negative for hearing loss.    Eyes: Negative.  Negative for blurred vision and double vision.   Respiratory: Negative.  Negative for cough and shortness of breath.    Cardiovascular: Negative.  Negative for chest pain, palpitations, claudication and leg swelling.   Gastrointestinal: Negative.  Negative for abdominal pain, nausea and vomiting.   Genitourinary: Negative.  Negative for dysuria and urgency.   Musculoskeletal: Negative.  Negative for joint pain and myalgias.  "  Skin: Negative.  Negative for itching and rash.   Neurological: Negative.  Negative for dizziness, focal weakness, weakness and headaches.   Endo/Heme/Allergies: Negative.  Does not bruise/bleed easily.   Psychiatric/Behavioral: Negative.  Negative for depression. The patient is not nervous/anxious.         Objective:   /80 (BP Location: Left arm, Patient Position: Sitting, BP Cuff Size: Adult)   Pulse 66   Ht 1.626 m (5' 4\")   Wt 84.3 kg (185 lb 13.6 oz)   SpO2 94%   BMI 31.90 kg/m²     Physical Exam   Constitutional: He is oriented to person, place, and time. He appears well-developed and well-nourished.   HENT:   Head: Normocephalic and atraumatic.   Eyes: Pupils are equal, round, and reactive to light. Conjunctivae are normal.   Neck: Normal range of motion. Neck supple.   Cardiovascular: Normal rate and regular rhythm.    Pulmonary/Chest: Effort normal and breath sounds normal.   Abdominal: Soft. Bowel sounds are normal.   Musculoskeletal: Normal range of motion.   Neurological: He is alert and oriented to person, place, and time.   Skin: Skin is warm and dry.   Psychiatric: He has a normal mood and affect.     CARDIAC STUDIES/PROCEDURES:     CAROTID ULTRASOUND (12/01/17)  No prior study is available for comparison.  Normal bilateral carotid exam.      CAROTID ULTRASOUND (11/18/09)  Carotid ultrasound showing mild plaques of left internal carotid artery.     CARDIAC CATHETERIZATION CONCLUSIONS by Dr. Toledo (04/08/13)   1. Acute inferior wall myocardial infarction secondary to high grade in-stent stenosis in the   middle of a large segment of stented proximal right coronary artery, status post successful   stenting with a 3 mm x 15 mm Xience Xpedition stent.   2. Diffusely diseased circumflex system as described above.   3. Widely patent left anterior descending coronary artery with its proximal 2-3 cm, more diffusely   narrowed and with 2 diagonal branches given off with high-grade disease in the " first diagonal   branch, which is quite small and moderate disease in the second diagonal branch.     CARDIAC CATHETERIZATION CONCLUSIONS in [place], [place] (12/05)  Cardiac catheterization showing high grade small vessel first and second diagonal branch   stenosis, non obstructive left circumflex artery stenosis and high grade right coronary artery   stenosis treated with 3.0 x 32 mm and 3.0 x 16 mm Taxus drug eluding stents.      ECHOCARDIOGRAM CONCLUSIONS (12/01/17)  Prior echo 9-17-15. Compared to the report of the study done - there   has been no significant change.   Normal left ventricular systolic function.  Left ventricular ejection fraction is visually estimated to be 65%.  Normal diastolic function.  Mild mitral regurgitation.  Mild tricuspid regurgitation.  Estimated right ventricular systolic pressure is 35 mmHg.     ECHOCARDIOGRAM CONCLUSIONS (09/17/15)  Normal left ventricular size, wall thickness, and systolic function.   Grade I diastolic dysfunction. Ejection fraction is measured to be 63 %   by Martines's biplane 2D analysis.  Trace mitral regurgitation.   Trace aortic insufficiency.  Unable to estimate pulmonary artery pressure due to an inadequate   tricuspid regurgitant jet.  Normal aortic root diameter 3 cm.  No prior study is available for comparison.      ECHOCARDIOGRAM CONCLUSIONS (06/16/11)  Echocardiogram showing normal left ventricular systolic function, no significant valvular abnormalities.     EKG performed on (09/16/15) EKG shows normal sinus rhythm.     Laboratory results of (06/15/18) were reviewed. Cholesterol profile of 108/81/44/48 noted.  Laboratory results of (11/29/17) Cholesterol profile of 170/127/46/99 noted.  Laboratory results of (10/10/16) Cholesterol profile of 162/128/41/95 noted.  Laboratory results of (07/27/16) Cholesterol profile of 160/138/42/90 noted  Laboratory results of (09/16/15) Cholesterol profile of 141/148/38/73 noted.     MPI CONCLUSIONS (09/17/15)  Normal  left ventricular perfusion with no fixed or reversible defects.   Normal left ventricular wall motion, with EF of 72%.      RENAL ULTRASOUND (11/18/09)  Unremarkable renal ultrasound with no evidence of renal artery stenosis.  No abdominal aortic aneurysm.    Assessment:     1. Coronary artery disease involving native coronary artery of native heart without angina pectoris     2. Stented coronary artery     3. Essential hypertension, benign     4. Dyslipidemia     5. Atherosclerosis of both carotid arteries         Medical Decision Making:  Today's Assessment / Status / Plan:     1. Chest pain and coronary artery disease with stent placement: He is experiencing chest pain as described above. We will perform a cardiac PET scan and follow up with him.   2. Hypertension: Blood pressure is well controlled.  3. Hyperlipidemia: He is doing well on statin therapy without myalgia symptoms. We will reduce atorvastatin to 40 mg QD repeat labs including fasting lipid profile.  4. Carotid artery plaque: He is clinically doing well on medical therapy.   5. Additional information: His son at medical school at [employer].     We will follow up after his tests to reassess his symptoms.     CC Paz You

## 2019-02-06 NOTE — LETTER
[facility]   [address] [place] [address]  [place] [address]  Phone: [phone]  Fax: [phone]              Harman Branch  1950    Encounter Date: 2/6/2019    Moisés Rice M.D.          PROGRESS NOTE:  No notes on file      No Recipients

## 2019-02-06 NOTE — LETTER
[facility]   [address] [place] [address]  [place] [address]  Phone: [phone]  Fax: [phone]              Harman Branch  1950    Encounter Date: 2/6/2019    Moisés Rice M.D.          PROGRESS NOTE:  No notes on file      No Recipients

## 2019-02-15 ENCOUNTER — HOSPITAL ENCOUNTER (OUTPATIENT)
Dept: RADIOLOGY | Facility: MEDICAL CENTER | Age: 69
End: 2019-02-15
Attending: INTERNAL MEDICINE
Payer: MEDICARE

## 2019-02-15 DIAGNOSIS — R07.89 OTHER CHEST PAIN: ICD-10-CM

## 2019-02-15 DIAGNOSIS — I20.9 ANGINA PECTORIS (HCC): ICD-10-CM

## 2019-02-15 PROCEDURE — 78492 MYOCRD IMG PET MLT RST&STRS: CPT | Mod: 26 | Performed by: INTERNAL MEDICINE

## 2019-02-15 PROCEDURE — 93017 CV STRESS TEST TRACING ONLY: CPT

## 2019-02-15 PROCEDURE — 93018 CV STRESS TEST I&R ONLY: CPT | Performed by: INTERNAL MEDICINE

## 2019-02-26 ENCOUNTER — OFFICE VISIT (OUTPATIENT)
Dept: CARDIOLOGY | Facility: MEDICAL CENTER | Age: 69
End: 2019-02-26
Payer: MEDICARE

## 2019-02-26 VITALS
DIASTOLIC BLOOD PRESSURE: 80 MMHG | HEIGHT: 64 IN | HEART RATE: 70 BPM | OXYGEN SATURATION: 95 % | BODY MASS INDEX: 31.51 KG/M2 | WEIGHT: 184.6 LBS | SYSTOLIC BLOOD PRESSURE: 122 MMHG

## 2019-02-26 DIAGNOSIS — I25.10 CORONARY ARTERY DISEASE INVOLVING NATIVE CORONARY ARTERY OF NATIVE HEART WITHOUT ANGINA PECTORIS: Chronic | ICD-10-CM

## 2019-02-26 DIAGNOSIS — Z72.0 TOBACCO ABUSE: ICD-10-CM

## 2019-02-26 DIAGNOSIS — I65.23 ATHEROSCLEROSIS OF BOTH CAROTID ARTERIES: Chronic | ICD-10-CM

## 2019-02-26 DIAGNOSIS — E78.5 DYSLIPIDEMIA: ICD-10-CM

## 2019-02-26 DIAGNOSIS — I10 ESSENTIAL HYPERTENSION, BENIGN: ICD-10-CM

## 2019-02-26 PROCEDURE — 99406 BEHAV CHNG SMOKING 3-10 MIN: CPT | Performed by: INTERNAL MEDICINE

## 2019-02-26 PROCEDURE — 99214 OFFICE O/P EST MOD 30 MIN: CPT | Mod: 25 | Performed by: INTERNAL MEDICINE

## 2019-02-26 RX ORDER — HYDROCODONE BITARTRATE AND ACETAMINOPHEN 10; 325 MG/1; MG/1
TABLET ORAL
COMMUNITY
Start: 2019-02-06 | End: 2019-02-26

## 2019-02-26 RX ORDER — DIAZEPAM 5 MG/1
TABLET ORAL
COMMUNITY
Start: 2019-02-06 | End: 2019-02-26

## 2019-02-26 ASSESSMENT — ENCOUNTER SYMPTOMS
COUGH: 0
VOMITING: 0
EYES NEGATIVE: 1
DOUBLE VISION: 0
MUSCULOSKELETAL NEGATIVE: 1
PSYCHIATRIC NEGATIVE: 1
MYALGIAS: 0
GASTROINTESTINAL NEGATIVE: 1
CHILLS: 0
CLAUDICATION: 0
NERVOUS/ANXIOUS: 0
HEADACHES: 0
RESPIRATORY NEGATIVE: 1
NAUSEA: 0
FOCAL WEAKNESS: 0
PALPITATIONS: 0
ABDOMINAL PAIN: 0
DIZZINESS: 0
BRUISES/BLEEDS EASILY: 0
SHORTNESS OF BREATH: 0
WEAKNESS: 1
BLURRED VISION: 0
FEVER: 0
WEIGHT LOSS: 0
CARDIOVASCULAR NEGATIVE: 1
DEPRESSION: 0

## 2019-02-26 NOTE — PROGRESS NOTES
Chief Complaint   Patient presents with   • Coronary Artery Disease     follow up       Subjective:   Harman Branch is a 68 y.o. male who presents today follow up of chest pain, coronary artery disease with prior stent placement and study result review.    Since the patient's last visit on 02/06/19, he has been doing well clinically. His chest pain  Has resolved. He denies shortness of breath, palpitations, nausea/vomiting or diaphoresis. He underwent unremarkable cardiac studies as described. He is smoking cigars.     Past Medical History:   Diagnosis Date   • Arthritis     fingers   • ASTHMA     uses inhaler prn   • Back pain    • CAD (coronary artery disease) 6/21/2011   • CAD (coronary artery disease)    • Carotid artery plaque 6/21/2011   • Esophageal reflux 11/3/2009   • Heart burn    • High cholesterol    • History of asthma 11/3/2009   • History of benign prostatic hypertrophy 11/3/2009   • History of cardiac catheterization 6/21/2011   • History of echocardiogram 6/20/2011   • History of neck surgery     Hardware in neck   • HLD (hyperlipidemia) 6/1/2011   • Hypertension    • Indigestion    • Previous back surgery    • Stented coronary artery 2005     Past Surgical History:   Procedure Laterality Date   • PB INJ,FORAMEN,L/S,1 LEVEL Bilateral 9/2/2016    Procedure: INJ-FORAMEN EPI LUM/SAC SNGL - L4-5;  Surgeon: Jesus Rojas M.D.;  Location: [facility];  Service: Pain Management   • PB INJ,FORAMEN,L/S,1 LEVEL  9/2/2016    Procedure: INJ-FORAMEN EPI LUM/SAC SNGL;  Surgeon: Jesus Rojas M.D.;  Location: SURGERY [facility];  Service: Pain Management   • PB FLUORSCOPIC GUIDANCE SPINAL INJECTION  9/2/2016    Procedure: FLUOROGUIDE FOR SPINAL INJ;  Surgeon: Jesus Rojas M.D.;  Location: [facility];  Service: Pain Management   • LUMBAR LAMINECTOMY DISKECTOMY  4/7/2012    Performed by [facility] CANDELARIA at [facility]   • FORAMINOTOMY  4/7/2012    Performed by BARI  MARLIN COBIAN at SURGERY [facility]   • LUMBAR LAMINECTOMY DISKECTOMY  3/9/2011    Performed by MARLIN CANDELARIA at SURGERY [facility]   • FORAMINOTOMY  3/9/2011    Performed by MARLIN CANDELARIA at SURGERY [facility]   • ANGIOGRAM     • CERVICAL DISK AND FUSION ANTERIOR     • OTHER CARDIAC SURGERY  2007 heart stents     Family History   Problem Relation Age of Onset   • Heart Attack Neg Hx      Social History     Social History   • Marital status:      Spouse name: N/A   • Number of children: N/A   • Years of education: N/A     Occupational History   • Not on file.     Social History Main Topics   • Smoking status: Current Some Day Smoker     Years: 18.00     Types: Cigars     Last attempt to quit: 4/7/1987   • Smokeless tobacco: Never Used      Comment: 1987   • Alcohol use Yes      Comment: 2 cocktails per week   • Drug use: No   • Sexual activity: Not on file     Other Topics Concern   • Not on file     Social History Narrative    ** Merged History Encounter **          No Known Allergies     Medications reviewed.    Outpatient Encounter Prescriptions as of 2/26/2019   Medication Sig Dispense Refill   • Multiple Vitamin (MULTI-VITAMIN DAILY PO) Take  by mouth.     • metoprolol (LOPRESSOR) 25 MG Tab Take 1 Tab by mouth 2 times a day. Needs to be seen for further refills. Thank you 180 Tab 3   • lisinopril (PRINIVIL) 20 MG Tab Take 1 Tab by mouth 2 times a day. Needs to be seen for further refills. Thank you 180 Tab 3   • atorvastatin (LIPITOR) 40 MG Tab Take 1 Tab by mouth every day. 90 Tab 3   • ezetimibe (ZETIA) 10 MG Tab TAKE 1 TABLET DAILY 90 Tab 3   • Albuterol (PROVENTIL INH) Inhale  by mouth.     • calcipotriene-betamethasone (TACLONEX) ointment Apply  to affected area(s) every day.     • KRILL OIL PO Take  by mouth every day.     • Coenzyme Q10 (COQ10 PO) Take 300 mg by mouth every day.     • GLUCOSAMINE HCL PO Take 1 Cap by mouth every day.     • FLOVENT  MCG/ACT Aerosol 2 Puffs as needed.   "   • B Complex Vitamins (B-COMPLEX/B-12 PO) Take 1,000 Tabs by mouth every day.     • IRON PO Take 1 Tab by mouth every day.     • aspirin 81 MG tablet Take 81 mg by mouth every day.     • esomeprazole (NEXIUM) 40 MG capsule Take 80 mg by mouth BID 2 DAYS A WEEK. For acid reflux  Every other day     • [DISCONTINUED] diazePAM (VALIUM) 5 MG Tab      • [DISCONTINUED] HYDROcodone/acetaminophen (NORCO)  MG Tab      • [DISCONTINUED] amLODIPine (NORVASC) 5 MG Tab Take 1 Tab by mouth every day. Needs to be seen for further refills. Thank you 90 Tab 3   • [DISCONTINUED] finasteride (PROSCAR) 5 MG TABS Take 5 mg by mouth. Every other day       No facility-administered encounter medications on file as of 2/26/2019.      Review of Systems   Constitutional: Positive for malaise/fatigue. Negative for chills, fever and weight loss.   HENT: Negative.  Negative for hearing loss.    Eyes: Negative.  Negative for blurred vision and double vision.   Respiratory: Negative.  Negative for cough and shortness of breath.    Cardiovascular: Negative.  Negative for chest pain, palpitations, claudication and leg swelling.   Gastrointestinal: Negative.  Negative for abdominal pain, nausea and vomiting.   Genitourinary: Negative.  Negative for dysuria and urgency.   Musculoskeletal: Negative.  Negative for joint pain and myalgias.   Skin: Negative.  Negative for itching and rash.   Neurological: Positive for weakness. Negative for dizziness, focal weakness and headaches.   Endo/Heme/Allergies: Negative.  Does not bruise/bleed easily.   Psychiatric/Behavioral: Negative.  Negative for depression. The patient is not nervous/anxious.         Objective:   /80 (BP Location: Left arm, Patient Position: Sitting, BP Cuff Size: Adult)   Pulse 70   Ht 1.626 m (5' 4\")   Wt 83.7 kg (184 lb 9.6 oz)   SpO2 95%   BMI 31.69 kg/m²     Physical Exam   Constitutional: He is oriented to person, place, and time. He appears well-developed and " well-nourished.   HENT:   Head: Normocephalic and atraumatic.   Eyes: Pupils are equal, round, and reactive to light. Conjunctivae are normal.   Neck: Normal range of motion. Neck supple.   Cardiovascular: Normal rate and regular rhythm.    Pulmonary/Chest: Effort normal and breath sounds normal.   Abdominal: Soft. Bowel sounds are normal.   Musculoskeletal: Normal range of motion.   Neurological: He is alert and oriented to person, place, and time.   Skin: Skin is warm and dry.   Psychiatric: He has a normal mood and affect.     CARDIAC STUDIES/PROCEDURES:     CAROTID ULTRASOUND (12/01/17)  No prior study is available for comparison.  Normal bilateral carotid exam.      CAROTID ULTRASOUND (11/18/09)  Carotid ultrasound showing mild plaques of left internal carotid artery.     CARDIAC CATHETERIZATION CONCLUSIONS by Dr. Toledo (04/08/13)   1. Acute inferior wall myocardial infarction secondary to high grade in-stent stenosis in the   middle of a large segment of stented proximal right coronary artery, status post successful   stenting with a 3 mm x 15 mm Xience Xpedition stent.   2. Diffusely diseased circumflex system as described above.   3. Widely patent left anterior descending coronary artery with its proximal 2-3 cm, more diffusely   narrowed and with 2 diagonal branches given off with high-grade disease in the first diagonal   branch, which is quite small and moderate disease in the second diagonal branch.     CARDIAC CATHETERIZATION CONCLUSIONS in [place], [place] (12/05)  Cardiac catheterization showing high grade small vessel first and second diagonal branch   stenosis, non obstructive left circumflex artery stenosis and high grade right coronary artery   stenosis treated with 3.0 x 32 mm and 3.0 x 16 mm Taxus drug eluding stents.      ECHOCARDIOGRAM CONCLUSIONS (12/01/17)  Prior echo 9-17-15. Compared to the report of the study done - there   has been no significant change.   Normal left ventricular systolic  function.  Left ventricular ejection fraction is visually estimated to be 65%.  Normal diastolic function.  Mild mitral regurgitation.  Mild tricuspid regurgitation.  Estimated right ventricular systolic pressure is 35 mmHg.     ECHOCARDIOGRAM CONCLUSIONS (09/17/15)  Normal left ventricular size, wall thickness, and systolic function.   Grade I diastolic dysfunction. Ejection fraction is measured to be 63 %   by Martines's biplane 2D analysis.  Trace mitral regurgitation.   Trace aortic insufficiency.  Unable to estimate pulmonary artery pressure due to an inadequate   tricuspid regurgitant jet.  Normal aortic root diameter 3 cm.  No prior study is available for comparison.      ECHOCARDIOGRAM CONCLUSIONS (06/16/11)  Echocardiogram showing normal left ventricular systolic function, no significant valvular abnormalities.     EKG performed on (09/16/15) EKG shows normal sinus rhythm.     Laboratory results of (06/15/18) Cholesterol profile of 108/81/44/48 noted.  Laboratory results of (11/29/17) Cholesterol profile of 170/127/46/99 noted.  Laboratory results of (10/10/16) Cholesterol profile of 162/128/41/95 noted.  Laboratory results of (07/27/16) Cholesterol profile of 160/138/42/90 noted  Laboratory results of (09/16/15) Cholesterol profile of 141/148/38/73 noted.     MPI CONCLUSIONS (09/17/15)  Normal left ventricular perfusion with no fixed or reversible defects.   Normal left ventricular wall motion, with EF of 72%.      RENAL ULTRASOUND (11/18/09)  Unremarkable renal ultrasound with no evidence of renal artery stenosis.  No abdominal aortic aneurysm.    PET SCAN (02/15/19)  ELECTROCARDIOGRAPHIC FINDINGS:   Normal sinus rhythm.  Normal ECG response to dipyridamole infusion.    No ischemic changes noted   SCINTOGRAPHIC FINDINGS:    Normal left ventricular perfusion with stress and rest images.    There is no evidence of ischemia or scar.  GATED WALL MOTION FINDINGS:    The left ventricle wall motion is normal with  stress and rest  imagings.    Measured resting ejection fraction is 65 %.     (study result reviewed)    Assessment:     1. Coronary artery disease involving native coronary artery of native heart without angina pectoris     2. Essential hypertension, benign     3. Dyslipidemia     4. Atherosclerosis of both carotid arteries     5. Tobacco abuse       Medical Decision Making:  Today's Assessment / Status / Plan:     1. Coronary artery disease with stent placement: He is clinically doing well without recurrence of his angina. We will continue with current medical care.  2. Hypertension: Blood pressure is well controlled. He wishes to discontinue amlodipine.  3. Hyperlipidemia: He is doing well on statin therapy without myalgia symptoms. We will repeat labs including fasting lipid profile.  4. Carotid artery plaque: He is clinically doing well on medical therapy.   5. Chronic tobacco use: Smoking cessation recommended with discussions of health effects and plans for over 3 minutes.  6. Additional information: His son at medical school at [employer].    We will follow up the patient in 8 months with labs.     CC Paz You

## 2019-02-26 NOTE — LETTER
[facility]   [address], [address]  [place] [address]  Phone: [phone]  Fax: [phone]              Harman Branch  1950    Encounter Date: 2/26/2019    Moisés Rice M.D.          PROGRESS NOTE:  Chief Complaint   Patient presents with   • Coronary Artery Disease     follow up       Subjective:   Harman Branch is a 68 y.o. male who presents today follow up of chest pain, coronary artery disease with prior stent placement and study result review.      Past Medical History:   Diagnosis Date   • Arthritis     fingers   • ASTHMA     uses inhaler prn   • Back pain    • CAD (coronary artery disease) 6/21/2011   • CAD (coronary artery disease)    • Carotid artery plaque 6/21/2011   • Esophageal reflux 11/3/2009   • Heart burn    • High cholesterol    • History of asthma 11/3/2009   • History of benign prostatic hypertrophy 11/3/2009   • History of cardiac catheterization 6/21/2011   • History of echocardiogram 6/20/2011   • History of neck surgery     Hardware in neck   • HLD (hyperlipidemia) 6/1/2011   • Hypertension    • Indigestion    • Previous back surgery    • Stented coronary artery 2005     Past Surgical History:   Procedure Laterality Date   • PB INJ,FORAMEN,L/S,1 LEVEL Bilateral 9/2/2016    Procedure: INJ-FORAMEN EPI LUM/SAC SNGL - L4-5;  Surgeon: Jesus Rojas M.D.;  Location: [facility] ORS;  Service: Pain Management   • PB INJ,FORAMEN,L/S,1 LEVEL  9/2/2016    Procedure: INJ-FORAMEN EPI LUM/SAC SNGL;  Surgeon: Jesus Rojas M.D.;  Location: [facility] ORS;  Service: Pain Management   • PB FLUORSCOPIC GUIDANCE SPINAL INJECTION  9/2/2016    Procedure: FLUOROGUIDE FOR SPINAL INJ;  Surgeon: Jesus Rojas M.D.;  Location: [facility];  Service: Pain Management   • LUMBAR LAMINECTOMY DISKECTOMY  4/7/2012    Performed by MARLIN [facility]DEL[facility]IA at [facility]   • FORAMINOTOMY  4/7/2012    Performed by MARLIN CANDELARIA at  SURGERY [facility] ORS   • LUMBAR LAMINECTOMY DISKECTOMY  3/9/2011    Performed by MARLIN CANDELARIA at SURGERY [facility] ORS   • FORAMINOTOMY  3/9/2011    Performed by MARLIN CANDELARIA at SURGERY [facility] ORS   • ANGIOGRAM     • CERVICAL DISK AND FUSION ANTERIOR     • OTHER CARDIAC SURGERY  2007 heart stents     Family History   Problem Relation Age of Onset   • Heart Attack Neg Hx      Social History     Social History   • Marital status:      Spouse name: N/A   • Number of children: N/A   • Years of education: N/A     Occupational History   • Not on file.     Social History Main Topics   • Smoking status: Current Some Day Smoker     Years: 18.00     Types: Cigars     Last attempt to quit: 4/7/1987   • Smokeless tobacco: Never Used      Comment: 1987   • Alcohol use Yes      Comment: 2 cocktails per week   • Drug use: No   • Sexual activity: Not on file     Other Topics Concern   • Not on file     Social History Narrative    ** Merged History Encounter **          No Known Allergies     Medications reviewed.    Outpatient Encounter Prescriptions as of 2/26/2019   Medication Sig Dispense Refill   • diazePAM (VALIUM) 5 MG Tab      • HYDROcodone/acetaminophen (NORCO)  MG Tab      • Multiple Vitamin (MULTI-VITAMIN DAILY PO) Take  by mouth.     • metoprolol (LOPRESSOR) 25 MG Tab Take 1 Tab by mouth 2 times a day. Needs to be seen for further refills. Thank you 180 Tab 3   • lisinopril (PRINIVIL) 20 MG Tab Take 1 Tab by mouth 2 times a day. Needs to be seen for further refills. Thank you 180 Tab 3   • amLODIPine (NORVASC) 5 MG Tab Take 1 Tab by mouth every day. Needs to be seen for further refills. Thank you 90 Tab 3   • atorvastatin (LIPITOR) 40 MG Tab Take 1 Tab by mouth every day. 90 Tab 3   • ezetimibe (ZETIA) 10 MG Tab TAKE 1 TABLET DAILY 90 Tab 3   • Albuterol (PROVENTIL INH) Inhale  by mouth.     • calcipotriene-betamethasone (TACLONEX) ointment Apply  to affected area(s) every day.     • KRILL OIL PO  "Take  by mouth every day.     • Coenzyme Q10 (COQ10 PO) Take 300 mg by mouth every day.     • GLUCOSAMINE HCL PO Take 1 Cap by mouth every day.     • FLOVENT  MCG/ACT Aerosol 2 Puffs as needed.     • B Complex Vitamins (B-COMPLEX/B-12 PO) Take 1,000 Tabs by mouth every day.     • IRON PO Take 1 Tab by mouth every day.     • aspirin 81 MG tablet Take 81 mg by mouth every day.     • esomeprazole (NEXIUM) 40 MG capsule Take 80 mg by mouth BID 2 DAYS A WEEK. For acid reflux  Every other day     • finasteride (PROSCAR) 5 MG TABS Take 5 mg by mouth. Every other day       No facility-administered encounter medications on file as of 2/26/2019.      Review of Systems   Constitutional: Negative.  Negative for chills, fever, malaise/fatigue and weight loss.   HENT: Negative.  Negative for hearing loss.    Eyes: Negative.  Negative for blurred vision and double vision.   Respiratory: Negative.  Negative for cough and shortness of breath.    Cardiovascular: Negative.  Negative for chest pain, palpitations, claudication and leg swelling.   Gastrointestinal: Negative.  Negative for abdominal pain, nausea and vomiting.   Genitourinary: Negative.  Negative for dysuria and urgency.   Musculoskeletal: Negative.  Negative for joint pain and myalgias.   Skin: Negative.  Negative for itching and rash.   Neurological: Negative.  Negative for dizziness, focal weakness, weakness and headaches.   Endo/Heme/Allergies: Negative.  Does not bruise/bleed easily.   Psychiatric/Behavioral: Negative.  Negative for depression. The patient is not nervous/anxious.         Objective:   Ht 1.626 m (5' 4\")   BMI 31.90 kg/m²      Physical Exam   Constitutional: He is oriented to person, place, and time. He appears well-developed and well-nourished.   HENT:   Head: Normocephalic and atraumatic.   Eyes: Pupils are equal, round, and reactive to light. Conjunctivae are normal.   Neck: Normal range of motion. Neck supple.   Cardiovascular: Normal rate " and regular rhythm.    Pulmonary/Chest: Effort normal and breath sounds normal.   Abdominal: Soft. Bowel sounds are normal.   Musculoskeletal: Normal range of motion.   Neurological: He is alert and oriented to person, place, and time.   Skin: Skin is warm and dry.   Psychiatric: He has a normal mood and affect.   CARDIAC STUDIES/PROCEDURES:     CAROTID ULTRASOUND (12/01/17)  No prior study is available for comparison.  Normal bilateral carotid exam.      CAROTID ULTRASOUND (11/18/09)  Carotid ultrasound showing mild plaques of left internal carotid artery.     CARDIAC CATHETERIZATION CONCLUSIONS by Dr. Toledo (04/08/13)   1. Acute inferior wall myocardial infarction secondary to high grade in-stent stenosis in the   middle of a large segment of stented proximal right coronary artery, status post successful   stenting with a 3 mm x 15 mm Xience Xpedition stent.   2. Diffusely diseased circumflex system as described above.   3. Widely patent left anterior descending coronary artery with its proximal 2-3 cm, more diffusely   narrowed and with 2 diagonal branches given off with high-grade disease in the first diagonal   branch, which is quite small and moderate disease in the second diagonal branch.     CARDIAC CATHETERIZATION CONCLUSIONS in [place], [place] (12/05)  Cardiac catheterization showing high grade small vessel first and second diagonal branch   stenosis, non obstructive left circumflex artery stenosis and high grade right coronary artery   stenosis treated with 3.0 x 32 mm and 3.0 x 16 mm Taxus drug eluding stents.      ECHOCARDIOGRAM CONCLUSIONS (12/01/17)  Prior echo 9-17-15. Compared to the report of the study done - there   has been no significant change.   Normal left ventricular systolic function.  Left ventricular ejection fraction is visually estimated to be 65%.  Normal diastolic function.  Mild mitral regurgitation.  Mild tricuspid regurgitation.  Estimated right ventricular systolic pressure is 35  mmHg.     ECHOCARDIOGRAM CONCLUSIONS (09/17/15)  Normal left ventricular size, wall thickness, and systolic function.   Grade I diastolic dysfunction. Ejection fraction is measured to be 63 %   by Martines's biplane 2D analysis.  Trace mitral regurgitation.   Trace aortic insufficiency.  Unable to estimate pulmonary artery pressure due to an inadequate   tricuspid regurgitant jet.  Normal aortic root diameter 3 cm.  No prior study is available for comparison.      ECHOCARDIOGRAM CONCLUSIONS (06/16/11)  Echocardiogram showing normal left ventricular systolic function, no significant valvular abnormalities.     EKG performed on (09/16/15) EKG shows normal sinus rhythm.     Laboratory results of (06/15/18) Cholesterol profile of 108/81/44/48 noted.  Laboratory results of (11/29/17) Cholesterol profile of 170/127/46/99 noted.  Laboratory results of (10/10/16) Cholesterol profile of 162/128/41/95 noted.  Laboratory results of (07/27/16) Cholesterol profile of 160/138/42/90 noted  Laboratory results of (09/16/15) Cholesterol profile of 141/148/38/73 noted.     MPI CONCLUSIONS (09/17/15)  Normal left ventricular perfusion with no fixed or reversible defects.   Normal left ventricular wall motion, with EF of 72%.      RENAL ULTRASOUND (11/18/09)  Unremarkable renal ultrasound with no evidence of renal artery stenosis.  No abdominal aortic aneurysm.    PET SCAN (02/15/19)  ELECTROCARDIOGRAPHIC FINDINGS:   Normal sinus rhythm.  Normal ECG response to dipyridamole infusion.    No ischemic changes noted   SCINTOGRAPHIC FINDINGS:    Normal left ventricular perfusion with stress and rest images.    There is no evidence of ischemia or scar.  GATED WALL MOTION FINDINGS:    The left ventricle wall motion is normal with stress and rest  imagings.    Measured resting ejection fraction is 65 %.     (study result reviewed)    Assessment:     1. Coronary artery disease involving native coronary artery of native heart without angina  pectoris     2. Essential hypertension, benign     3. Dyslipidemia     4. Atherosclerosis of both carotid arteries         Medical Decision Making:  Today's Assessment / Status / Plan:     1. Coronary artery disease with stent placement: He is clinically doing well without recurrence of his angina.  We will continue with current medical care.  2. Hypertension: Blood pressure is well controlled.  3. Hyperlipidemia: He is doing well on statin therapy without myalgia symptoms. We will reduce atorvastatin to 40 mg QD repeat labs including fasting lipid profile.  4. Carotid artery plaque: He is clinically doing well on medical therapy.   5. Additional information: His son at medical school at [employer] [facility] [employer].     We will follow up after his tests to reassess his symptoms.     CC Paz You              No Recipients

## 2019-03-12 ENCOUNTER — OFFICE VISIT (OUTPATIENT)
Dept: URGENT CARE | Facility: PHYSICIAN GROUP | Age: 69
End: 2019-03-12
Payer: MEDICARE

## 2019-03-12 VITALS
BODY MASS INDEX: 30.39 KG/M2 | HEIGHT: 64 IN | TEMPERATURE: 98.2 F | SYSTOLIC BLOOD PRESSURE: 130 MMHG | HEART RATE: 74 BPM | DIASTOLIC BLOOD PRESSURE: 84 MMHG | WEIGHT: 178 LBS | OXYGEN SATURATION: 96 %

## 2019-03-12 DIAGNOSIS — S61.211A LACERATION OF LEFT INDEX FINGER WITHOUT FOREIGN BODY WITHOUT DAMAGE TO NAIL, INITIAL ENCOUNTER: ICD-10-CM

## 2019-03-12 PROCEDURE — 90471 IMMUNIZATION ADMIN: CPT | Performed by: FAMILY MEDICINE

## 2019-03-12 PROCEDURE — 90715 TDAP VACCINE 7 YRS/> IM: CPT | Performed by: FAMILY MEDICINE

## 2019-03-12 PROCEDURE — 99213 OFFICE O/P EST LOW 20 MIN: CPT | Mod: 25 | Performed by: FAMILY MEDICINE

## 2019-03-12 NOTE — PROGRESS NOTES
Subjective:      Chief Complaint   Patient presents with   • Laceration     Left pointer finger                Laceration   The incident occurred last night.    He cut his left index finger with a knife.         The laceration is 2 cm in size. The laceration mechanism was a metal edge. The pain is mild. The pain has been constant since onset.  tetanus status is: not current        Social History   Substance Use Topics   • Smoking status: Current Some Day Smoker     Years: 18.00     Types: Cigars     Last attempt to quit: 4/7/1987   • Smokeless tobacco: Never Used      Comment: 1987   • Alcohol use Yes      Comment: 2 cocktails per week           Past Medical History:   Diagnosis Date   • Arthritis     fingers   • ASTHMA     uses inhaler prn   • Back pain    • CAD (coronary artery disease) 6/21/2011   • CAD (coronary artery disease)    • Carotid artery plaque 6/21/2011   • Esophageal reflux 11/3/2009   • Heart burn    • High cholesterol    • History of asthma 11/3/2009   • History of benign prostatic hypertrophy 11/3/2009   • History of cardiac catheterization 6/21/2011   • History of echocardiogram 6/20/2011   • History of neck surgery     Hardware in neck   • HLD (hyperlipidemia) 6/1/2011   • Hypertension    • Indigestion    • Previous back surgery    • Stented coronary artery 2005         Current Outpatient Prescriptions on File Prior to Visit   Medication Sig Dispense Refill   • Multiple Vitamin (MULTI-VITAMIN DAILY PO) Take  by mouth.     • metoprolol (LOPRESSOR) 25 MG Tab Take 1 Tab by mouth 2 times a day. Needs to be seen for further refills. Thank you 180 Tab 3   • lisinopril (PRINIVIL) 20 MG Tab Take 1 Tab by mouth 2 times a day. Needs to be seen for further refills. Thank you 180 Tab 3   • atorvastatin (LIPITOR) 40 MG Tab Take 1 Tab by mouth every day. 90 Tab 3   • ezetimibe (ZETIA) 10 MG Tab TAKE 1 TABLET DAILY 90 Tab 3   • Albuterol (PROVENTIL INH) Inhale  by mouth.     • calcipotriene-betamethasone  "(TACLONEX) ointment Apply  to affected area(s) every day.     • KRILL OIL PO Take  by mouth every day.     • Coenzyme Q10 (COQ10 PO) Take 300 mg by mouth every day.     • GLUCOSAMINE HCL PO Take 1 Cap by mouth every day.     • FLOVENT  MCG/ACT Aerosol 2 Puffs as needed.     • B Complex Vitamins (B-COMPLEX/B-12 PO) Take 1,000 Tabs by mouth every day.     • IRON PO Take 1 Tab by mouth every day.     • aspirin 81 MG tablet Take 81 mg by mouth every day.     • esomeprazole (NEXIUM) 40 MG capsule Take 80 mg by mouth BID 2 DAYS A WEEK. For acid reflux  Every other day       No current facility-administered medications on file prior to visit.          Review of Systems   Cardio - denies chest pain  resp - denies SOB.   Neurological: Negative for tingling, sensory change and focal weakness.   All other systems reviewed and are negative.         Objective:     Blood pressure 130/84, pulse 74, temperature 36.8 °C (98.2 °F), temperature source Temporal, height 1.626 m (5' 4\"), weight 80.7 kg (178 lb), SpO2 96 %.      Physical Exam   Constitutional: pt is oriented to person, place, and time. Pt appears well-developed. No distress.   HENT:   Head: Normocephalic and atraumatic.   Eyes: Conjunctivae are normal.   Cardiovascular: Normal rate.    Pulmonary/Chest: Effort normal.   Musculoskeletal:   Pt exhibits left index finger - there is a 2.5cm transverse, linear, superficial laceration at dorsal aspect of the finger.   No bleeding or erythema.    normal range of motion and normal capillary refill. Normal sensation noted. Normal strength noted.           Neurological: He is alert and oriented to person, place, and time.   Skin: Skin is warm. Pt is not diaphoretic. No erythema.   Psychiatric:  behavior is normal.   Nursing note and vitals reviewed.              Assessment/Plan:          1. Laceration of left index finger without foreign body without damage to nail, initial encounter    Lac is very superficial and it is too " late for primary closure.     Steri-strips applied    TDaP given                Wound care instructions and ER precautions given.   RTC in 7-10 d for wound check , sooner for worsening sx

## 2019-05-28 ENCOUNTER — APPOINTMENT (RX ONLY)
Dept: URBAN - METROPOLITAN AREA CLINIC 20 | Facility: CLINIC | Age: 69
Setting detail: DERMATOLOGY
End: 2019-05-28

## 2019-05-28 DIAGNOSIS — L21.8 OTHER SEBORRHEIC DERMATITIS: ICD-10-CM

## 2019-05-28 DIAGNOSIS — L40.0 PSORIASIS VULGARIS: ICD-10-CM

## 2019-05-28 DIAGNOSIS — L60.8 OTHER NAIL DISORDERS: ICD-10-CM

## 2019-05-28 DIAGNOSIS — L40.59 OTHER PSORIATIC ARTHROPATHY: ICD-10-CM

## 2019-05-28 PROCEDURE — 99214 OFFICE O/P EST MOD 30 MIN: CPT

## 2019-05-28 PROCEDURE — ? ADDITIONAL NOTES

## 2019-05-28 PROCEDURE — ? COUNSELING

## 2019-05-28 PROCEDURE — ? PRESCRIPTION

## 2019-05-28 RX ORDER — KETOCONAZOLE 20 MG/G
CREAM TOPICAL
Qty: 1 | Refills: 3 | Status: ERX

## 2019-05-28 ASSESSMENT — LOCATION DETAILED DESCRIPTION DERM
LOCATION DETAILED: GLABELLA
LOCATION DETAILED: RIGHT RADIAL DORSAL HAND
LOCATION DETAILED: LEFT DISTAL ULNAR DORSAL MIDDLE FINGER
LOCATION DETAILED: RIGHT INFERIOR MEDIAL UPPER BACK
LOCATION DETAILED: RIGHT MIDDLE FINGER LUNULA

## 2019-05-28 ASSESSMENT — LOCATION SIMPLE DESCRIPTION DERM
LOCATION SIMPLE: RIGHT HAND
LOCATION SIMPLE: LEFT MIDDLE FINGER
LOCATION SIMPLE: RIGHT UPPER BACK
LOCATION SIMPLE: GLABELLA
LOCATION SIMPLE: RIGHT MIDDLE FINGERNAIL

## 2019-05-28 ASSESSMENT — LOCATION ZONE DERM
LOCATION ZONE: FINGER
LOCATION ZONE: TRUNK
LOCATION ZONE: HAND
LOCATION ZONE: FACE
LOCATION ZONE: FINGERNAIL

## 2019-05-28 NOTE — HPI: RASH (PSORIASIS)
Do You Have A Family History Of Psoriasis?: no
How Severe Is Your Psoriasis?: moderate
Is This A New Presentation, Or A Follow-Up?: Follow Up Psoriasis
Additional History: Pt states right hand/wrist has joint pain and mild swelling of fingers.

## 2019-05-28 NOTE — PROCEDURE: ADDITIONAL NOTES
Additional Notes: Pain Scale of 1-10 pt states is a 3
Detail Level: Detailed
Additional Notes: Starter pack of Otezla was given today, paperwork work was started

## 2019-08-08 ENCOUNTER — TELEPHONE (OUTPATIENT)
Dept: CARDIOLOGY | Facility: MEDICAL CENTER | Age: 69
End: 2019-08-08

## 2019-08-08 NOTE — TELEPHONE ENCOUNTER
ARBEN pt requesting to have lab slips mailed to his address.    Monroe Yadav -   Pt wants to know if a glucose lab can be ordered.  Ph# [phone]

## 2019-08-08 NOTE — TELEPHONE ENCOUNTER
Notified pt that glucose was already included in complete metabolic panel. Pt appreciated. Lab slips mailed.

## 2019-09-05 ENCOUNTER — HOSPITAL ENCOUNTER (OUTPATIENT)
Dept: LAB | Facility: MEDICAL CENTER | Age: 69
End: 2019-09-05
Attending: INTERNAL MEDICINE
Payer: MEDICARE

## 2019-09-05 DIAGNOSIS — E78.5 DYSLIPIDEMIA: ICD-10-CM

## 2019-09-05 LAB
ALBUMIN SERPL BCP-MCNC: 4 G/DL (ref 3.2–4.9)
ALBUMIN/GLOB SERPL: 1.2 G/DL
ALP SERPL-CCNC: 54 U/L (ref 30–99)
ALT SERPL-CCNC: 32 U/L (ref 2–50)
ANION GAP SERPL CALC-SCNC: 9 MMOL/L (ref 0–11.9)
AST SERPL-CCNC: 20 U/L (ref 12–45)
BILIRUB SERPL-MCNC: 0.6 MG/DL (ref 0.1–1.5)
BUN SERPL-MCNC: 31 MG/DL (ref 8–22)
CALCIUM SERPL-MCNC: 9.2 MG/DL (ref 8.5–10.5)
CHLORIDE SERPL-SCNC: 103 MMOL/L (ref 96–112)
CHOLEST SERPL-MCNC: 128 MG/DL (ref 100–199)
CO2 SERPL-SCNC: 27 MMOL/L (ref 20–33)
CREAT SERPL-MCNC: 0.89 MG/DL (ref 0.5–1.4)
FASTING STATUS PATIENT QL REPORTED: NORMAL
GLOBULIN SER CALC-MCNC: 3.3 G/DL (ref 1.9–3.5)
GLUCOSE SERPL-MCNC: 79 MG/DL (ref 65–99)
HDLC SERPL-MCNC: 40 MG/DL
LDLC SERPL CALC-MCNC: 62 MG/DL
POTASSIUM SERPL-SCNC: 4.1 MMOL/L (ref 3.6–5.5)
PROT SERPL-MCNC: 7.3 G/DL (ref 6–8.2)
SODIUM SERPL-SCNC: 139 MMOL/L (ref 135–145)
TRIGL SERPL-MCNC: 132 MG/DL (ref 0–149)

## 2019-09-05 PROCEDURE — 80053 COMPREHEN METABOLIC PANEL: CPT

## 2019-09-05 PROCEDURE — 36415 COLL VENOUS BLD VENIPUNCTURE: CPT

## 2019-09-05 PROCEDURE — 80061 LIPID PANEL: CPT

## 2019-09-11 ENCOUNTER — OFFICE VISIT (OUTPATIENT)
Dept: CARDIOLOGY | Facility: MEDICAL CENTER | Age: 69
End: 2019-09-11
Payer: MEDICARE

## 2019-09-11 VITALS
BODY MASS INDEX: 30.6 KG/M2 | HEIGHT: 64 IN | HEART RATE: 74 BPM | WEIGHT: 179.23 LBS | SYSTOLIC BLOOD PRESSURE: 110 MMHG | OXYGEN SATURATION: 94 % | DIASTOLIC BLOOD PRESSURE: 80 MMHG

## 2019-09-11 DIAGNOSIS — E78.5 DYSLIPIDEMIA: ICD-10-CM

## 2019-09-11 DIAGNOSIS — I10 ESSENTIAL HYPERTENSION, BENIGN: ICD-10-CM

## 2019-09-11 DIAGNOSIS — Z95.5 STENTED CORONARY ARTERY: ICD-10-CM

## 2019-09-11 DIAGNOSIS — I25.10 CORONARY ARTERY DISEASE INVOLVING NATIVE CORONARY ARTERY OF NATIVE HEART WITHOUT ANGINA PECTORIS: Chronic | ICD-10-CM

## 2019-09-11 DIAGNOSIS — I65.23 ATHEROSCLEROSIS OF BOTH CAROTID ARTERIES: Chronic | ICD-10-CM

## 2019-09-11 DIAGNOSIS — Z72.0 TOBACCO ABUSE: ICD-10-CM

## 2019-09-11 PROCEDURE — 99406 BEHAV CHNG SMOKING 3-10 MIN: CPT | Performed by: INTERNAL MEDICINE

## 2019-09-11 PROCEDURE — 99213 OFFICE O/P EST LOW 20 MIN: CPT | Mod: 25 | Performed by: INTERNAL MEDICINE

## 2019-09-11 RX ORDER — AMLODIPINE BESYLATE 5 MG/1
5 TABLET ORAL DAILY
COMMUNITY
End: 2020-09-17

## 2019-09-11 ASSESSMENT — ENCOUNTER SYMPTOMS
RESPIRATORY NEGATIVE: 1
NEUROLOGICAL NEGATIVE: 1
PSYCHIATRIC NEGATIVE: 1
DEPRESSION: 0
COUGH: 0
NAUSEA: 0
VOMITING: 0
DOUBLE VISION: 0
BRUISES/BLEEDS EASILY: 0
WEIGHT LOSS: 0
FOCAL WEAKNESS: 0
DIZZINESS: 0
SHORTNESS OF BREATH: 0
PALPITATIONS: 0
NERVOUS/ANXIOUS: 0
CLAUDICATION: 0
MYALGIAS: 0
HEADACHES: 0
EYES NEGATIVE: 1
FEVER: 0
CHILLS: 0
BLURRED VISION: 0
CONSTITUTIONAL NEGATIVE: 1
CARDIOVASCULAR NEGATIVE: 1
WEAKNESS: 0
GASTROINTESTINAL NEGATIVE: 1
ABDOMINAL PAIN: 0

## 2019-09-11 NOTE — PROGRESS NOTES
Chief Complaint   Patient presents with   • Coronary Artery Disease     Follow up       Subjective:   Cornelio Branch is a 69 y.o. male who presents today for follow up of coronary artery disease with prior stent placement.    Since the patient's last visit on 02/26/19, he has been doing well clinically. He denies chest pain, shortness of breath, palpitations, nausea/vomiting or diaphoresis. He has stopped social cigar use. He keeps active walking three miles 3 times a week and five miles 2 times a week. He and his wife went to [facility] and had good japanese food.     Past Medical History:   Diagnosis Date   • Arthritis     fingers   • ASTHMA     uses inhaler prn   • Back pain    • CAD (coronary artery disease) 6/21/2011   • CAD (coronary artery disease)    • Carotid artery plaque 6/21/2011   • Esophageal reflux 11/3/2009   • Heart burn    • High cholesterol    • History of asthma 11/3/2009   • History of benign prostatic hypertrophy 11/3/2009   • History of cardiac catheterization 6/21/2011   • History of echocardiogram 6/20/2011   • History of neck surgery     Hardware in neck   • HLD (hyperlipidemia) 6/1/2011   • Hypertension    • Indigestion    • Previous back surgery    • Stented coronary artery 2005     Past Surgical History:   Procedure Laterality Date   • PB INJ,FORAMEN,L/S,1 LEVEL Bilateral 9/2/2016    Procedure: INJ-FORAMEN EPI LUM/SAC SNGL - L4-5;  Surgeon: Jesus Rojas M.D.;  Location: [facility];  Service: Pain Management   • PB INJ,FORAMEN,L/S,1 LEVEL  9/2/2016    Procedure: INJ-FORAMEN EPI LUM/SAC SNGL;  Surgeon: Jesus Rojas M.D.;  Location: [facility];  Service: Pain Management   • PB FLUORSCOPIC GUIDANCE SPINAL INJECTION  9/2/2016    Procedure: FLUOROGUIDE FOR SPINAL INJ;  Surgeon: Jesus Rojas M.D.;  Location: [facility];  Service: Pain Management   • LUMBAR LAMINECTOMY DISKECTOMY  4/7/2012    Performed by MARLIN CANDELARIA at [facility]   •  FORAMINOTOMY  2012    Performed by MARLIN CANDELARIA at [facility]   • LUMBAR LAMINECTOMY DISKECTOMY  3/9/2011    Performed by MARLIN CANDELARIA at SUR[facility]   • FORAMINOTOMY  3/9/2011    Performed by MARLIN CANDELARIA at SUR[facility]ERY [facility]   • ANGIOGRAM     • CERVICAL DISK AND FUSION ANTERIOR     • OTHER CARDIAC SURGERY   heart stents     Family History   Problem Relation Age of Onset   • Heart Attack Neg Hx      Social History     Socioeconomic History   • Marital status:      Spouse name: Not on file   • Number of children: Not on file   • Years of education: Not on file   • Highest education level: Not on file   Occupational History   • Not on file   Social Needs   • Financial resource strain: Not on file   • Food insecurity:     Worry: Not on file     Inability: Not on file   • Transportation needs:     Medical: Not on file     Non-medical: Not on file   Tobacco Use   • Smoking status: Former Smoker     Years: 18.00     Types: Cigars     Last attempt to quit: 1987     Years since quittin.4   • Smokeless tobacco: Never Used   • Tobacco comment:    Substance and Sexual Activity   • Alcohol use: Yes     Comment: 2 cocktails per week   • Drug use: No   • Sexual activity: Not on file   Lifestyle   • Physical activity:     Days per week: Not on file     Minutes per session: Not on file   • Stress: Not on file   Relationships   • Social connections:     Talks on phone: Not on file     Gets together: Not on file     Attends [employer] service: Not on file     Active member of club or organization: Not on file     Attends meetings of clubs or organizations: Not on file     Relationship status: Not on file   • Intimate partner violence:     Fear of current or ex partner: Not on file     Emotionally abused: Not on file     Physically abused: Not on file     Forced sexual activity: Not on file   Other Topics Concern   • Not on file   Social History Narrative    ** Merged  History Encounter **          No Known Allergies     Medications reviewed.    Outpatient Encounter Medications as of 9/11/2019   Medication Sig Dispense Refill   • amLODIPine (NORVASC) 5 MG Tab Take 5 mg by mouth every day.     • Multiple Vitamin (MULTI-VITAMIN DAILY PO) Take  by mouth.     • metoprolol (LOPRESSOR) 25 MG Tab Take 1 Tab by mouth 2 times a day. Needs to be seen for further refills. Thank you 180 Tab 3   • lisinopril (PRINIVIL) 20 MG Tab Take 1 Tab by mouth 2 times a day. Needs to be seen for further refills. Thank you 180 Tab 3   • atorvastatin (LIPITOR) 40 MG Tab Take 1 Tab by mouth every day. 90 Tab 3   • ezetimibe (ZETIA) 10 MG Tab TAKE 1 TABLET DAILY 90 Tab 3   • Albuterol (PROVENTIL INH) Inhale  by mouth.     • calcipotriene-betamethasone (TACLONEX) ointment Apply  to affected area(s) every day.     • KRILL OIL PO Take  by mouth every day.     • Coenzyme Q10 (COQ10 PO) Take 300 mg by mouth every day.     • GLUCOSAMINE HCL PO Take 1 Cap by mouth every day.     • FLOVENT  MCG/ACT Aerosol 2 Puffs as needed.     • B Complex Vitamins (B-COMPLEX/B-12 PO) Take 1,000 Tabs by mouth every day.     • IRON PO Take 1 Tab by mouth every day.     • aspirin 81 MG tablet Take 81 mg by mouth every day.     • esomeprazole (NEXIUM) 40 MG capsule Take 80 mg by mouth BID 2 DAYS A WEEK. For acid reflux  Every other day       No facility-administered encounter medications on file as of 9/11/2019.      Review of Systems   Constitutional: Negative.  Negative for chills, fever, malaise/fatigue and weight loss.   HENT: Negative.  Negative for hearing loss.    Eyes: Negative.  Negative for blurred vision and double vision.   Respiratory: Negative.  Negative for cough and shortness of breath.    Cardiovascular: Negative.  Negative for chest pain, palpitations, claudication and leg swelling.   Gastrointestinal: Negative.  Negative for abdominal pain, nausea and vomiting.   Genitourinary: Negative.  Negative for dysuria  "and urgency.   Musculoskeletal: Positive for joint pain. Negative for myalgias.   Skin: Negative.  Negative for itching and rash.   Neurological: Negative.  Negative for dizziness, focal weakness, weakness and headaches.   Endo/Heme/Allergies: Negative.  Does not bruise/bleed easily.   Psychiatric/Behavioral: Negative.  Negative for depression. The patient is not nervous/anxious.         Objective:   /80 (BP Location: Right arm, Patient Position: Sitting, BP Cuff Size: Adult)   Pulse 74   Ht 1.626 m (5' 4\")   Wt 81.3 kg (179 lb 3.7 oz)   SpO2 94%   BMI 30.77 kg/m²     Physical Exam   Constitutional: He is oriented to person, place, and time. He appears well-developed and well-nourished.   HENT:   Head: Normocephalic and atraumatic.   Eyes: EOM are normal.   Neck: No JVD present.   Cardiovascular: Normal rate, regular rhythm and normal heart sounds.   Pulmonary/Chest: Effort normal and breath sounds normal.   Abdominal: Soft. Bowel sounds are normal.   No hepatosplenomegaly.   Musculoskeletal: Normal range of motion.   Lymphadenopathy:     He has no cervical adenopathy.   Neurological: He is alert and oriented to person, place, and time.   Skin: Skin is warm and dry.   Psychiatric: He has a normal mood and affect.     CARDIAC STUDIES/PROCEDURES:     CAROTID ULTRASOUND (12/01/17)  No prior study is available for comparison.  Normal bilateral carotid exam.      CAROTID ULTRASOUND (11/18/09)  Carotid ultrasound showing mild plaques of left internal carotid artery.     CARDIAC CATHETERIZATION CONCLUSIONS by Dr. Toledo (04/08/13)   1. Acute inferior wall myocardial infarction secondary to high grade in-stent stenosis in the   middle of a large segment of stented proximal right coronary artery, status post successful   stenting with a 3 mm x 15 mm Xience Xpedition stent.   2. Diffusely diseased circumflex system as described above.   3. Widely patent left anterior descending coronary artery with its proximal 2-3 " cm, more diffusely   narrowed and with 2 diagonal branches given off with high-grade disease in the first diagonal   branch, which is quite small and moderate disease in the second diagonal branch.     CARDIAC CATHETERIZATION CONCLUSIONS in [place], [place] (12/05)  Cardiac catheterization showing high grade small vessel first and second diagonal branch   stenosis, non obstructive left circumflex artery stenosis and high grade right coronary artery   stenosis treated with 3.0 x 32 mm and 3.0 x 16 mm Taxus drug eluding stents.      ECHOCARDIOGRAM CONCLUSIONS (12/01/17)  Prior echo 9-17-15. Compared to the report of the study done - there   has been no significant change.   Normal left ventricular systolic function.  Left ventricular ejection fraction is visually estimated to be 65%.  Normal diastolic function.  Mild mitral regurgitation.  Mild tricuspid regurgitation.  Estimated right ventricular systolic pressure is 35 mmHg.     ECHOCARDIOGRAM CONCLUSIONS (09/17/15)  Normal left ventricular size, wall thickness, and systolic function.   Grade I diastolic dysfunction. Ejection fraction is measured to be 63 %   by Martines's biplane 2D analysis.  Trace mitral regurgitation.   Trace aortic insufficiency.  Unable to estimate pulmonary artery pressure due to an inadequate   tricuspid regurgitant jet.  Normal aortic root diameter 3 cm.  No prior study is available for comparison.      ECHOCARDIOGRAM CONCLUSIONS (06/16/11)  Echocardiogram showing normal left ventricular systolic function, no significant valvular abnormalities.     EKG performed on (09/16/15) EKG shows normal sinus rhythm.     Laboratory results of (09/05/19) were reviewed. Cholesterol profile of 128/132/40/62 noted.  Laboratory results of (06/15/18) Cholesterol profile of 108/81/44/48 noted.  Laboratory results of (11/29/17) Cholesterol profile of 170/127/46/99 noted.  Laboratory results of (10/10/16) Cholesterol profile of 162/128/41/95 noted.  Laboratory  results of (07/27/16) Cholesterol profile of 160/138/42/90 noted  Laboratory results of (09/16/15) Cholesterol profile of 141/148/38/73 noted.     MPI CONCLUSIONS (09/17/15)  Normal left ventricular perfusion with no fixed or reversible defects.   Normal left ventricular wall motion, with EF of 72%.      RENAL ULTRASOUND (11/18/09)  Unremarkable renal ultrasound with no evidence of renal artery stenosis.  No abdominal aortic aneurysm.     PET SCAN (02/15/19)  ELECTROCARDIOGRAPHIC FINDINGS:   Normal sinus rhythm.  Normal ECG response to dipyridamole infusion.    No ischemic changes noted   SCINTOGRAPHIC FINDINGS:    Normal left ventricular perfusion with stress and rest images.    There is no evidence of ischemia or scar.  GATED WALL MOTION FINDINGS:    The left ventricle wall motion is normal with stress and rest  imagings.    Measured resting ejection fraction is 65 %.       Assessment:     1. Coronary artery disease involving native coronary artery of native heart without angina pectoris     2. Stented coronary artery     3. Essential hypertension, benign     4. Dyslipidemia     5. Atherosclerosis of both carotid arteries     6. Tobacco abuse         Medical Decision Making:  Today's Assessment / Status / Plan:     1. Coronary artery disease with stent placement: He is clinically doing well without recurrence of his angina.  We will continue with current medical care including aspirin, metoprolol, lisinopril and atorvastatin.  2. Hypertension: Blood pressure is well controlled. We will continue with beta blockade therapy and ace inhibitor therapy.  3. Hyperlipidemia: He is doing well on statin therapy without myalgia symptoms.  4. Carotid artery plaque: Clinically stable on medical therapy.  5. Chronic tobacco use: Smoking cessation recommended with discussions of health effects and plans for over 3 minutes.  6. Additional information: His son at medical school at [employer].     We will follow up the patient in  one year.     CC Paz You

## 2019-10-03 DIAGNOSIS — E78.5 DYSLIPIDEMIA: Primary | ICD-10-CM

## 2019-10-03 RX ORDER — ATORVASTATIN CALCIUM 40 MG/1
40 TABLET, FILM COATED ORAL DAILY
Qty: 90 TAB | Refills: 3 | Status: SHIPPED | OUTPATIENT
Start: 2019-10-03 | End: 2020-08-31 | Stop reason: SDUPTHER

## 2019-11-05 ENCOUNTER — APPOINTMENT (RX ONLY)
Dept: URBAN - METROPOLITAN AREA CLINIC 20 | Facility: CLINIC | Age: 69
Setting detail: DERMATOLOGY
End: 2019-11-05

## 2019-11-05 DIAGNOSIS — L40.0 PSORIASIS VULGARIS: ICD-10-CM

## 2019-11-05 DIAGNOSIS — M25.50 PAIN IN UNSPECIFIED JOINT: ICD-10-CM

## 2019-11-05 PROCEDURE — ? PRESCRIPTION

## 2019-11-05 PROCEDURE — 99214 OFFICE O/P EST MOD 30 MIN: CPT

## 2019-11-05 PROCEDURE — ? ORDER PLAIN FILM

## 2019-11-05 PROCEDURE — ? MEDICATION COUNSELING

## 2019-11-05 PROCEDURE — ? COUNSELING

## 2019-11-05 PROCEDURE — ? ADDITIONAL NOTES

## 2019-11-05 RX ORDER — BETAMETHASONE DIPROPIONATE 0.5 MG/G
OINTMENT TOPICAL
Qty: 1 | Refills: 1 | Status: ERX | COMMUNITY
Start: 2019-11-05

## 2019-11-05 RX ADMIN — BETAMETHASONE DIPROPIONATE 1: 0.5 OINTMENT TOPICAL at 00:00

## 2019-11-05 ASSESSMENT — LOCATION ZONE DERM
LOCATION ZONE: FEET
LOCATION ZONE: LEG
LOCATION ZONE: HAND

## 2019-11-05 ASSESSMENT — LOCATION DETAILED DESCRIPTION DERM
LOCATION DETAILED: LEFT ACHILLES SKIN
LOCATION DETAILED: LEFT ULNAR DORSAL HAND
LOCATION DETAILED: RIGHT HEEL
LOCATION DETAILED: RIGHT RADIAL DORSAL HAND

## 2019-11-05 ASSESSMENT — LOCATION SIMPLE DESCRIPTION DERM
LOCATION SIMPLE: LEFT ACHILLES SKIN
LOCATION SIMPLE: RIGHT HAND
LOCATION SIMPLE: LEFT HAND
LOCATION SIMPLE: RIGHT HEEL

## 2019-11-05 NOTE — PROCEDURE: MEDICATION COUNSELING
Simponi Counseling:  I discussed with the patient the risks of golimumab including but not limited to myelosuppression, immunosuppression, autoimmune hepatitis, demyelinating diseases, lymphoma, and serious infections.  The patient understands that monitoring is required including a PPD at baseline and must alert us or the primary physician if symptoms of infection or other concerning signs are noted.
Doxycycline Pregnancy And Lactation Text: This medication is Pregnancy Category D and not consider safe during pregnancy. It is also excreted in breast milk but is considered safe for shorter treatment courses.
Topical Clindamycin Pregnancy And Lactation Text: This medication is Pregnancy Category B and is considered safe during pregnancy. It is unknown if it is excreted in breast milk.
Cyclophosphamide Counseling:  I discussed with the patient the risks of cyclophosphamide including but not limited to hair loss, hormonal abnormalities, decreased fertility, abdominal pain, diarrhea, nausea and vomiting, bone marrow suppression and infection. The patient understands that monitoring is required while taking this medication.
Itraconazole Pregnancy And Lactation Text: This medication is Pregnancy Category C and it isn't know if it is safe during pregnancy. It is also excreted in breast milk.
Imiquimod Counseling:  I discussed with the patient the risks of imiquimod including but not limited to erythema, scaling, itching, weeping, crusting, and pain.  Patient understands that the inflammatory response to imiquimod is variable from person to person and was educated regarded proper titration schedule.  If flu-like symptoms develop, patient knows to discontinue the medication and contact us.
Benzoyl Peroxide Counseling: Patient counseled that medicine may cause skin irritation and bleach clothing.  In the event of skin irritation, the patient was advised to reduce the amount of the drug applied or use it less frequently.   The patient verbalized understanding of the proper use and possible adverse effects of benzoyl peroxide.  All of the patient's questions and concerns were addressed.
Erivedge Counseling- I discussed with the patient the risks of Erivedge including but not limited to nausea, vomiting, diarrhea, constipation, weight loss, changes in the sense of taste, decreased appetite, muscle spasms, and hair loss.  The patient verbalized understanding of the proper use and possible adverse effects of Erivedge.  All of the patient's questions and concerns were addressed.
Propranolol Counseling:  I discussed with the patient the risks of propranolol including but not limited to low heart rate, low blood pressure, low blood sugar, restlessness and increased cold sensitivity. They should call the office if they experience any of these side effects.
Imiquimod Pregnancy And Lactation Text: This medication is Pregnancy Category C. It is unknown if this medication is excreted in breast milk.
Simponi Pregnancy And Lactation Text: The risk during pregnancy and breastfeeding is uncertain with this medication.
Erythromycin Counseling:  I discussed with the patient the risks of erythromycin including but not limited to GI upset, allergic reaction, drug rash, diarrhea, increase in liver enzymes, and yeast infections.
Ketoconazole Counseling:   Patient counseled regarding improving absorption with orange juice.  Adverse effects include but are not limited to breast enlargement, headache, diarrhea, nausea, upset stomach, liver function test abnormalities, taste disturbance, and stomach pain.  There is a rare possibility of liver failure that can occur when taking ketoconazole. The patient understands that monitoring of LFTs may be required, especially at baseline. The patient verbalized understanding of the proper use and possible adverse effects of ketoconazole.  All of the patient's questions and concerns were addressed.
Erivedge Pregnancy And Lactation Text: This medication is Pregnancy Category X and is absolutely contraindicated during pregnancy. It is unknown if it is excreted in breast milk.
Cyclophosphamide Pregnancy And Lactation Text: This medication is Pregnancy Category D and it isn't considered safe during pregnancy. This medication is excreted in breast milk.
Topical Sulfur Applications Counseling: Topical Sulfur Counseling: Patient counseled that this medication may cause skin irritation or allergic reactions.  In the event of skin irritation, the patient was advised to reduce the amount of the drug applied or use it less frequently.   The patient verbalized understanding of the proper use and possible adverse effects of topical sulfur application.  All of the patient's questions and concerns were addressed.
Cimzia Counseling:  I discussed with the patient the risks of Cimzia including but not limited to immunosuppression, allergic reactions and infections.  The patient understands that monitoring is required including a PPD at baseline and must alert us or the primary physician if symptoms of infection or other concerning signs are noted.
Propranolol Pregnancy And Lactation Text: This medication is Pregnancy Category C and it isn't known if it is safe during pregnancy. It is excreted in breast milk.
Minoxidil Counseling: Minoxidil is a topical medication which can increase blood flow where it is applied. It is uncertain how this medication increases hair growth. Side effects are uncommon and include stinging and allergic reactions.
Erythromycin Pregnancy And Lactation Text: This medication is Pregnancy Category B and is considered safe during pregnancy. It is also excreted in breast milk.
Finasteride Male Counseling: Finasteride Counseling:  I discussed with the patient the risks of use of finasteride including but not limited to decreased libido, decreased ejaculate volume, gynecomastia, and depression. Women should not handle medication.  All of the patient's questions and concerns were addressed.
Skyrizi Counseling: I discussed with the patient the risks of risankizumab-rzaa including but not limited to immunosuppression, and serious infections.  The patient understands that monitoring is required including a PPD at baseline and must alert us or the primary physician if symptoms of infection or other concerning signs are noted.
Ketoconazole Pregnancy And Lactation Text: This medication is Pregnancy Category C and it isn't know if it is safe during pregnancy. It is also excreted in breast milk and breast feeding isn't recommended.
Topical Sulfur Applications Pregnancy And Lactation Text: This medication is Pregnancy Category C and has an unknown safety profile during pregnancy. It is unknown if this topical medication is excreted in breast milk.
Cyclosporine Counseling:  I discussed with the patient the risks of cyclosporine including but not limited to hypertension, gingival hyperplasia,myelosuppression, immunosuppression, liver damage, kidney damage, neurotoxicity, lymphoma, and serious infections. The patient understands that monitoring is required including baseline blood pressure, CBC, CMP, lipid panel and uric acid, and then 1-2 times monthly CMP and blood pressure.
Cimzia Pregnancy And Lactation Text: This medication crosses the placenta but can be considered safe in certain situations. Cimzia may be excreted in breast milk.
Metronidazole Counseling:  I discussed with the patient the risks of metronidazole including but not limited to seizures, nausea/vomiting, a metallic taste in the mouth, nausea/vomiting and severe allergy.
Wartpeel Counseling:  I discussed with the patient the risks of Wartpeel including but not limited to erythema, scaling, itching, weeping, crusting, and pain.
Birth Control Pills Counseling: Birth Control Pill Counseling: I discussed with the patient the potential side effects of OCPs including but not limited to increased risk of stroke, heart attack, thrombophlebitis, deep venous thrombosis, hepatic adenomas, breast changes, GI upset, headaches, and depression.  The patient verbalized understanding of the proper use and possible adverse effects of OCPs. All of the patient's questions and concerns were addressed.
Terbinafine Counseling: Patient counseling regarding adverse effects of terbinafine including but not limited to headache, diarrhea, rash, upset stomach, liver function test abnormalities, itching, taste/smell disturbance, nausea, abdominal pain, and flatulence.  There is a rare possibility of liver failure that can occur when taking terbinafine.  The patient understands that a baseline LFT and kidney function test may be required. The patient verbalized understanding of the proper use and possible adverse effects of terbinafine.  All of the patient's questions and concerns were addressed.
Cyclosporine Pregnancy And Lactation Text: This medication is Pregnancy Category C and it isn't know if it is safe during pregnancy. This medication is excreted in breast milk.
Minoxidil Pregnancy And Lactation Text: This medication has not been assigned a Pregnancy Risk Category but animal studies failed to show danger with the topical medication. It is unknown if the medication is excreted in breast milk.
Cosentyx Counseling:  I discussed with the patient the risks of Cosentyx including but not limited to worsening of Crohn's disease, immunosuppression, allergic reactions and infections.  The patient understands that monitoring is required including a PPD at baseline and must alert us or the primary physician if symptoms of infection or other concerning signs are noted.
Finasteride Pregnancy And Lactation Text: This medication is absolutely contraindicated during pregnancy. It is unknown if it is excreted in breast milk.
Mirvaso Counseling: Mirvaso is a topical medication which can decrease superficial blood flow where applied. Side effects are uncommon and include stinging, redness and allergic reactions.
Detail Level: Zone
Birth Control Pills Pregnancy And Lactation Text: This medication should be avoided if pregnant and for the first 30 days post-partum.
Metronidazole Pregnancy And Lactation Text: This medication is Pregnancy Category B and considered safe during pregnancy.  It is also excreted in breast milk.
Stelara Counseling:  I discussed with the patient the risks of ustekinumab including but not limited to immunosuppression, malignancy, posterior leukoencephalopathy syndrome, and serious infections.  The patient understands that monitoring is required including a PPD at baseline and must alert us or the primary physician if symptoms of infection or other concerning signs are noted.
Gabapentin Counseling: I discussed with the patient the risks of gabapentin including but not limited to dizziness, somnolence, fatigue and ataxia.
Methotrexate Counseling:  Patient counseled regarding adverse effects of methotrexate including but not limited to nausea, vomiting, abnormalities in liver function tests. Patients may develop mouth sores, rash, diarrhea, and abnormalities in blood counts. The patient understands that monitoring is required including LFT's and blood counts.  There is a rare possibility of scarring of the liver and lung problems that can occur when taking methotrexate. Persistent nausea, loss of appetite, pale stools, dark urine, cough, and shortness of breath should be reported immediately. Patient advised to discontinue methotrexate treatment at least three months before attempting to become pregnant.  I discussed the need for folate supplements while taking methotrexate.  These supplements can decrease side effects during methotrexate treatment. The patient verbalized understanding of the proper use and possible adverse effects of methotrexate.  All of the patient's questions and concerns were addressed.
Terbinafine Pregnancy And Lactation Text: This medication is Pregnancy Category B and is considered safe during pregnancy. It is also excreted in breast milk and breast feeding isn't recommended.
Wartpeel Pregnancy And Lactation Text: This medication is Pregnancy Category X and contraindicated in pregnancy and in women who may become pregnant. It is unknown if this medication is excreted in breast milk.
Cosentyx Pregnancy And Lactation Text: This medication is Pregnancy Category B and is considered safe during pregnancy. It is unknown if this medication is excreted in breast milk.
Spironolactone Counseling: Patient advised regarding risks of diarrhea, abdominal pain, hyperkalemia, birth defects (for female patients), liver toxicity and renal toxicity. The patient may need blood work to monitor liver and kidney function and potassium levels while on therapy. The patient verbalized understanding of the proper use and possible adverse effects of spironolactone.  All of the patient's questions and concerns were addressed.
Mirvaso Pregnancy And Lactation Text: This medication has not been assigned a Pregnancy Risk Category. It is unknown if the medication is excreted in breast milk.
Methotrexate Pregnancy And Lactation Text: This medication is Pregnancy Category X and is known to cause fetal harm. This medication is excreted in breast milk.
Minocycline Counseling: Patient advised regarding possible photosensitivity and discoloration of the teeth, skin, lips, tongue and gums.  Patient instructed to avoid sunlight, if possible.  When exposed to sunlight, patients should wear protective clothing, sunglasses, and sunscreen.  The patient was instructed to call the office immediately if the following severe adverse effects occur:  hearing changes, easy bruising/bleeding, severe headache, or vision changes.  The patient verbalized understanding of the proper use and possible adverse effects of minocycline.  All of the patient's questions and concerns were addressed.
Gabapentin Pregnancy And Lactation Text: This medication is Pregnancy Category C and isn't considered safe during pregnancy. It is excreted in breast milk.
Zyclara Counseling:  I discussed with the patient the risks of imiquimod including but not limited to erythema, scaling, itching, weeping, crusting, and pain.  Patient understands that the inflammatory response to imiquimod is variable from person to person and was educated regarded proper titration schedule.  If flu-like symptoms develop, patient knows to discontinue the medication and contact us.
Dupixent Counseling: I discussed with the patient the risks of dupilumab including but not limited to eye infection and irritation, cold sores, injection site reactions, worsening of asthma, allergic reactions and increased risk of parasitic infection.  Live vaccines should be avoided while taking dupilumab. Dupilumab will also interact with certain medications such as warfarin and cyclosporine. The patient understands that monitoring is required and they must alert us or the primary physician if symptoms of infection or other concerning signs are noted.
Include Pregnancy/Lactation Warning?: No
Minocycline Pregnancy And Lactation Text: This medication is Pregnancy Category D and not consider safe during pregnancy. It is also excreted in breast milk.
Cimetidine Counseling:  I discussed with the patient the risks of Cimetidine including but not limited to gynecomastia, headache, diarrhea, nausea, drowsiness, arrhythmias, pancreatitis, skin rashes, psychosis, bone marrow suppression and kidney toxicity.
Picato Counseling:  I discussed with the patient the risks of Picato including but not limited to erythema, scaling, itching, weeping, crusting, and pain.
Prednisone Counseling:  I discussed with the patient the risks of prolonged use of prednisone including but not limited to weight gain, insomnia, osteoporosis, mood changes, diabetes, susceptibility to infection, glaucoma and high blood pressure.  In cases where prednisone use is prolonged, patients should be monitored with blood pressure checks, serum glucose levels and an eye exam.  Additionally, the patient may need to be placed on GI prophylaxis, PCP prophylaxis, and calcium and vitamin D supplementation and/or a bisphosphonate.  The patient verbalized understanding of the proper use and the possible adverse effects of prednisone.  All of the patient's questions and concerns were addressed.
Spironolactone Pregnancy And Lactation Text: This medication can cause feminization of the male fetus and should be avoided during pregnancy. The active metabolite is also found in breast milk.
Dupixent Pregnancy And Lactation Text: This medication likely crosses the placenta but the risk for the fetus is uncertain. This medication is excreted in breast milk.
Glycopyrrolate Counseling:  I discussed with the patient the risks of glycopyrrolate including but not limited to skin rash, drowsiness, dry mouth, difficulty urinating, and blurred vision.
Taltz Counseling: I discussed with the patient the risks of ixekizumab including but not limited to immunosuppression, serious infections, worsening of inflammatory bowel disease and drug reactions.  The patient understands that monitoring is required including a PPD at baseline and must alert us or the primary physician if symptoms of infection or other concerning signs are noted.
Benzoyl Peroxide Pregnancy And Lactation Text: This medication is Pregnancy Category C. It is unknown if benzoyl peroxide is excreted in breast milk.
SSKI Counseling:  I discussed with the patient the risks of SSKI including but not limited to thyroid abnormalities, metallic taste, GI upset, fever, headache, acne, arthralgias, paraesthesias, lymphadenopathy, easy bleeding, arrhythmias, and allergic reaction.
Carac Counseling:  I discussed with the patient the risks of Carac including but not limited to erythema, scaling, itching, weeping, crusting, and pain.
Glycopyrrolate Pregnancy And Lactation Text: This medication is Pregnancy Category B and is considered safe during pregnancy. It is unknown if it is excreted breast milk.
Enbrel Counseling:  I discussed with the patient the risks of etanercept including but not limited to myelosuppression, immunosuppression, autoimmune hepatitis, demyelinating diseases, lymphoma, and infections.  The patient understands that monitoring is required including a PPD at baseline and must alert us or the primary physician if symptoms of infection or other concerning signs are noted.
Quinolones Counseling:  I discussed with the patient the risks of fluoroquinolones including but not limited to GI upset, allergic reaction, drug rash, diarrhea, dizziness, photosensitivity, yeast infections, liver function test abnormalities, tendonitis/tendon rupture.
Sski Pregnancy And Lactation Text: This medication is Pregnancy Category D and isn't considered safe during pregnancy. It is excreted in breast milk.
Protopic Counseling: Patient may experience a mild burning sensation during topical application. Protopic is not approved in children less than 2 years of age. There have been case reports of hematologic and skin malignancies in patients using topical calcineurin inhibitors although causality is questionable.
Doxepin Counseling:  Patient advised that the medication is sedating and not to drive a car after taking this medication. Patient informed of potential adverse effects including but not limited to dry mouth, urinary retention, and blurry vision.  The patient verbalized understanding of the proper use and possible adverse effects of doxepin.  All of the patient's questions and concerns were addressed.
Hydroxychloroquine Counseling:  I discussed with the patient that a baseline ophthalmologic exam is needed at the start of therapy and every year thereafter while on therapy. A CBC may also be warranted for monitoring.  The side effects of this medication were discussed with the patient, including but not limited to agranulocytosis, aplastic anemia, seizures, rashes, retinopathy, and liver toxicity. Patient instructed to call the office should any adverse effect occur.  The patient verbalized understanding of the proper use and possible adverse effects of Plaquenil.  All the patient's questions and concerns were addressed.
Opioid Counseling: I discussed with the patient the potential side effects of opioids including but not limited to addiction, altered mental status, and depression. I stressed avoiding alcohol, benzodiazepines, muscle relaxants and sleep aids unless specifically okayed by a physician. The patient verbalized understanding of the proper use and possible adverse effects of opioids. All of the patient's questions and concerns were addressed. They were instructed to flush the remaining pills down the toilet if they did not need them for pain.
Tremfya Counseling: I discussed with the patient the risks of guselkumab including but not limited to immunosuppression, serious infections, worsening of inflammatory bowel disease and drug reactions.  The patient understands that monitoring is required including a PPD at baseline and must alert us or the primary physician if symptoms of infection or other concerning signs are noted.
Opioid Pregnancy And Lactation Text: These medications can lead to premature delivery and should be avoided during pregnancy. These medications are also present in breast milk in small amounts.
Doxepin Pregnancy And Lactation Text: This medication is Pregnancy Category C and it isn't known if it is safe during pregnancy. It is also excreted in breast milk and breast feeding isn't recommended.
Acitretin Counseling:  I discussed with the patient the risks of acitretin including but not limited to hair loss, dry lips/skin/eyes, liver damage, hyperlipidemia, depression/suicidal ideation, photosensitivity.  Serious rare side effects can include but are not limited to pancreatitis, pseudotumor cerebri, bony changes, clot formation/stroke/heart attack.  Patient understands that alcohol is contraindicated since it can result in liver toxicity and significantly prolong the elimination of the drug by many years.
Humira Counseling:  I discussed with the patient the risks of adalimumab including but not limited to myelosuppression, immunosuppression, autoimmune hepatitis, demyelinating diseases, lymphoma, and serious infections.  The patient understands that monitoring is required including a PPD at baseline and must alert us or the primary physician if symptoms of infection or other concerning signs are noted.
Protopic Pregnancy And Lactation Text: This medication is Pregnancy Category C. It is unknown if this medication is excreted in breast milk when applied topically.
Thalidomide Counseling: I discussed with the patient the risks of thalidomide including but not limited to birth defects, anxiety, weakness, chest pain, dizziness, cough and severe allergy.
Rifampin Counseling: I discussed with the patient the risks of rifampin including but not limited to liver damage, kidney damage, red-orange body fluids, nausea/vomiting and severe allergy.
Hydroxychloroquine Pregnancy And Lactation Text: This medication has been shown to cause fetal harm but it isn't assigned a Pregnancy Risk Category. There are small amounts excreted in breast milk.
5-Fu Counseling: 5-Fluorouracil Counseling:  I discussed with the patient the risks of 5-fluorouracil including but not limited to erythema, scaling, itching, weeping, crusting, and pain.
Hydroxyzine Counseling: Patient advised that the medication is sedating and not to drive a car after taking this medication.  Patient informed of potential adverse effects including but not limited to dry mouth, urinary retention, and blurry vision.  The patient verbalized understanding of the proper use and possible adverse effects of hydroxyzine.  All of the patient's questions and concerns were addressed.
Acitretin Pregnancy And Lactation Text: This medication is Pregnancy Category X and should not be given to women who are pregnant or may become pregnant in the future. This medication is excreted in breast milk.
Azithromycin Counseling:  I discussed with the patient the risks of azithromycin including but not limited to GI upset, allergic reaction, drug rash, diarrhea, and yeast infections.
Niacinamide Counseling: I recommended taking niacin or niacinamide, also know as vitamin B3, twice daily. Recent evidence suggests that taking vitamin B3 (500 mg twice daily) can reduce the risk of actinic keratoses and non-melanoma skin cancers. Side effects of vitamin B3 include flushing and headache.
Rifampin Pregnancy And Lactation Text: This medication is Pregnancy Category C and it isn't know if it is safe during pregnancy. It is also excreted in breast milk and should not be used if you are breast feeding.
Xeljanz Counseling: I discussed with the patient the risks of Xeljanz therapy including increased risk of infection, liver issues, headache, diarrhea, or cold symptoms. Live vaccines should be avoided. They were instructed to call if they have any problems.
Rhofade Counseling: Rhofade is a topical medication which can decrease superficial blood flow where applied. Side effects are uncommon and include stinging, redness and allergic reactions.
Bexarotene Counseling:  I discussed with the patient the risks of bexarotene including but not limited to hair loss, dry lips/skin/eyes, liver abnormalities, hyperlipidemia, pancreatitis, depression/suicidal ideation, photosensitivity, drug rash/allergic reactions, hypothyroidism, anemia, leukopenia, infection, cataracts, and teratogenicity.  Patient understands that they will need regular blood tests to check lipid profile, liver function tests, white blood cell count, thyroid function tests and pregnancy test if applicable.
Hydroxyzine Pregnancy And Lactation Text: This medication is not safe during pregnancy and should not be taken. It is also excreted in breast milk and breast feeding isn't recommended.
Drysol Counseling:  I discussed with the patient the risks of drysol/aluminum chloride including but not limited to skin rash, itching, irritation, burning.
Niacinamide Pregnancy And Lactation Text: These medications are considered safe during pregnancy.
Ilumya Counseling: I discussed with the patient the risks of tildrakizumab including but not limited to immunosuppression, malignancy, posterior leukoencephalopathy syndrome, and serious infections.  The patient understands that monitoring is required including a PPD at baseline and must alert us or the primary physician if symptoms of infection or other concerning signs are noted.
Azithromycin Pregnancy And Lactation Text: This medication is considered safe during pregnancy and is also secreted in breast milk.
Arava Counseling:  Patient counseled regarding adverse effects of Arava including but not limited to nausea, vomiting, abnormalities in liver function tests. Patients may develop mouth sores, rash, diarrhea, and abnormalities in blood counts. The patient understands that monitoring is required including LFTs and blood counts.  There is a rare possibility of scarring of the liver and lung problems that can occur when taking methotrexate. Persistent nausea, loss of appetite, pale stools, dark urine, cough, and shortness of breath should be reported immediately. Patient advised to discontinue Arava treatment and consult with a physician prior to attempting conception. The patient will have to undergo a treatment to eliminate Arava from the body prior to conception.
Xelkatherinez Pregnancy And Lactation Text: This medication is Pregnancy Category D and is not considered safe during pregnancy.  The risk during breast feeding is also uncertain.
Tetracycline Counseling: Patient counseled regarding possible photosensitivity and increased risk for sunburn.  Patient instructed to avoid sunlight, if possible.  When exposed to sunlight, patients should wear protective clothing, sunglasses, and sunscreen.  The patient was instructed to call the office immediately if the following severe adverse effects occur:  hearing changes, easy bruising/bleeding, severe headache, or vision changes.  The patient verbalized understanding of the proper use and possible adverse effects of tetracycline.  All of the patient's questions and concerns were addressed. Patient understands to avoid pregnancy while on therapy due to potential birth defects.
Tranexamic Acid Counseling:  Patient advised of the small risk of bleeding problems with tranexamic acid. They were also instructed to call if they developed any nausea, vomiting or diarrhea. All of the patient's questions and concerns were addressed.
Bexarotene Pregnancy And Lactation Text: This medication is Pregnancy Category X and should not be given to women who are pregnant or may become pregnant. This medication should not be used if you are breast feeding.
Solaraze Counseling:  I discussed with the patient the risks of Solaraze including but not limited to erythema, scaling, itching, weeping, crusting, and pain.
Tranexamic Acid Pregnancy And Lactation Text: It is unknown if this medication is safe during pregnancy or breast feeding.
Bactrim Counseling:  I discussed with the patient the risks of sulfa antibiotics including but not limited to GI upset, allergic reaction, drug rash, diarrhea, dizziness, photosensitivity, and yeast infections.  Rarely, more serious reactions can occur including but not limited to aplastic anemia, agranulocytosis, methemoglobinemia, blood dyscrasias, liver or kidney failure, lung infiltrates or desquamative/blistering drug rashes.
Drysol Pregnancy And Lactation Text: This medication is considered safe during pregnancy and breast feeding.
Nsaids Counseling: NSAID Counseling: I discussed with the patient that NSAIDs should be taken with food. Prolonged use of NSAIDs can result in the development of stomach ulcers.  Patient advised to stop taking NSAIDs if abdominal pain occurs.  The patient verbalized understanding of the proper use and possible adverse effects of NSAIDs.  All of the patient's questions and concerns were addressed.
Xolair Counseling:  Patient informed of potential adverse effects including but not limited to fever, muscle aches, rash and allergic reactions.  The patient verbalized understanding of the proper use and possible adverse effects of Xolair.  All of the patient's questions and concerns were addressed.
Clofazimine Counseling:  I discussed with the patient the risks of clofazimine including but not limited to skin and eye pigmentation, liver damage, nausea/vomiting, gastrointestinal bleeding and allergy.
Isotretinoin Counseling: Patient should get monthly blood tests, not donate blood, not drive at night if vision affected, not share medication, and not undergo elective surgery for 6 months after tx completed. Side effects reviewed, pt to contact office should one occur.
Valtrex Counseling: I discussed with the patient the risks of valacyclovir including but not limited to kidney damage, nausea, vomiting and severe allergy.  The patient understands that if the infection seems to be worsening or is not improving, they are to call.
Albendazole Counseling:  I discussed with the patient the risks of albendazole including but not limited to cytopenia, kidney damage, nausea/vomiting and severe allergy.  The patient understands that this medication is being used in an off-label manner.
Solaraze Pregnancy And Lactation Text: This medication is Pregnancy Category B and is considered safe. There is some data to suggest avoiding during the third trimester. It is unknown if this medication is excreted in breast milk.
Bactrim Pregnancy And Lactation Text: This medication is Pregnancy Category D and is known to cause fetal risk.  It is also excreted in breast milk.
Nsaids Pregnancy And Lactation Text: These medications are considered safe up to 30 weeks gestation. It is excreted in breast milk.
Infliximab Counseling:  I discussed with the patient the risks of infliximab including but not limited to myelosuppression, immunosuppression, autoimmune hepatitis, demyelinating diseases, lymphoma, and serious infections.  The patient understands that monitoring is required including a PPD at baseline and must alert us or the primary physician if symptoms of infection or other concerning signs are noted.
Elidel Counseling: Patient may experience a mild burning sensation during topical application. Elidel is not approved in children less than 2 years of age. There have been case reports of hematologic and skin malignancies in patients using topical calcineurin inhibitors although causality is questionable.
Xolair Pregnancy And Lactation Text: This medication is Pregnancy Category B and is considered safe during pregnancy. This medication is excreted in breast milk.
Odomzo Counseling- I discussed with the patient the risks of Odomzo including but not limited to nausea, vomiting, diarrhea, constipation, weight loss, changes in the sense of taste, decreased appetite, muscle spasms, and hair loss.  The patient verbalized understanding of the proper use and possible adverse effects of Odomzo.  All of the patient's questions and concerns were addressed.
Isotretinoin Pregnancy And Lactation Text: This medication is Pregnancy Category X and is considered extremely dangerous during pregnancy. It is unknown if it is excreted in breast milk.
Cephalexin Counseling: I counseled the patient regarding use of cephalexin as an antibiotic for prophylactic and/or therapeutic purposes. Cephalexin (commonly prescribed under brand name Keflex) is a cephalosporin antibiotic which is active against numerous classes of bacteria, including most skin bacteria. Side effects may include nausea, diarrhea, gastrointestinal upset, rash, hives, yeast infections, and in rare cases, hepatitis, kidney disease, seizures, fever, confusion, neurologic symptoms, and others. Patients with severe allergies to penicillin medications are cautioned that there is about a 10% incidence of cross-reactivity with cephalosporins. When possible, patients with penicillin allergies should use alternatives to cephalosporins for antibiotic therapy.
Fluconazole Counseling:  Patient counseled regarding adverse effects of fluconazole including but not limited to headache, diarrhea, nausea, upset stomach, liver function test abnormalities, taste disturbance, and stomach pain.  There is a rare possibility of liver failure that can occur when taking fluconazole.  The patient understands that monitoring of LFTs and kidney function test may be required, especially at baseline. The patient verbalized understanding of the proper use and possible adverse effects of fluconazole.  All of the patient's questions and concerns were addressed.
Albendazole Pregnancy And Lactation Text: This medication is Pregnancy Category C and it isn't known if it is safe during pregnancy. It is also excreted in breast milk.
Topical Retinoid counseling:  Patient advised to apply a pea-sized amount only at bedtime and wait 30 minutes after washing their face before applying.  If too drying, patient may add a non-comedogenic moisturizer. The patient verbalized understanding of the proper use and possible adverse effects of retinoids.  All of the patient's questions and concerns were addressed.
Valtrex Pregnancy And Lactation Text: this medication is Pregnancy Category B and is considered safe during pregnancy. This medication is not directly found in breast milk but it's metabolite acyclovir is present.
Colchicine Counseling:  Patient counseled regarding adverse effects including but not limited to stomach upset (nausea, vomiting, stomach pain, or diarrhea).  Patient instructed to limit alcohol consumption while taking this medication.  Colchicine may reduce blood counts especially with prolonged use.  The patient understands that monitoring of kidney function and blood counts may be required, especially at baseline. The patient verbalized understanding of the proper use and possible adverse effects of colchicine.  All of the patient's questions and concerns were addressed.
Cephalexin Pregnancy And Lactation Text: This medication is Pregnancy Category B and considered safe during pregnancy.  It is also excreted in breast milk but can be used safely for shorter doses.
Rituxan Counseling:  I discussed with the patient the risks of Rituxan infusions. Side effects can include infusion reactions, severe drug rashes including mucocutaneous reactions, reactivation of latent hepatitis and other infections and rarely progressive multifocal leukoencephalopathy.  All of the patient's questions and concerns were addressed.
Eucrisa Counseling: Patient may experience a mild burning sensation during topical application. Eucrisa is not approved in children less than 2 years of age.
Azathioprine Counseling:  I discussed with the patient the risks of azathioprine including but not limited to myelosuppression, immunosuppression, hepatotoxicity, lymphoma, and infections.  The patient understands that monitoring is required including baseline LFTs, Creatinine, possible TPMP genotyping and weekly CBCs for the first month and then every 2 weeks thereafter.  The patient verbalized understanding of the proper use and possible adverse effects of azathioprine.  All of the patient's questions and concerns were addressed.
High Dose Vitamin A Counseling: Side effects reviewed, pt to contact office should one occur.
Ivermectin Counseling:  Patient instructed to take medication on an empty stomach with a full glass of water.  Patient informed of potential adverse effects including but not limited to nausea, diarrhea, dizziness, itching, and swelling of the extremities or lymph nodes.  The patient verbalized understanding of the proper use and possible adverse effects of ivermectin.  All of the patient's questions and concerns were addressed.
Rituxan Pregnancy And Lactation Text: This medication is Pregnancy Category C and it isn't know if it is safe during pregnancy. It is unknown if this medication is excreted in breast milk but similar antibodies are known to be excreted.
Tazorac Counseling:  Patient advised that medication is irritating and drying.  Patient may need to apply sparingly and wash off after an hour before eventually leaving it on overnight.  The patient verbalized understanding of the proper use and possible adverse effects of tazorac.  All of the patient's questions and concerns were addressed.
Azathioprine Pregnancy And Lactation Text: This medication is Pregnancy Category D and isn't considered safe during pregnancy. It is unknown if this medication is excreted in breast milk.
Clindamycin Counseling: I counseled the patient regarding use of clindamycin as an antibiotic for prophylactic and/or therapeutic purposes. Clindamycin is active against numerous classes of bacteria, including skin bacteria. Side effects may include nausea, diarrhea, gastrointestinal upset, rash, hives, yeast infections, and in rare cases, colitis.
Otezla Counseling: The side effects of Otezla were discussed with the patient, including but not limited to worsening or new depression, weight loss, diarrhea, nausea, upper respiratory tract infection, and headache. Patient instructed to call the office should any adverse effect occur.  The patient verbalized understanding of the proper use and possible adverse effects of Otezla.  All the patient's questions and concerns were addressed.
Griseofulvin Counseling:  I discussed with the patient the risks of griseofulvin including but not limited to photosensitivity, cytopenia, liver damage, nausea/vomiting and severe allergy.  The patient understands that this medication is best absorbed when taken with a fatty meal (e.g., ice cream or french fries).
High Dose Vitamin A Pregnancy And Lactation Text: High dose vitamin A therapy is contraindicated during pregnancy and breast feeding.
Otezla Pregnancy And Lactation Text: This medication is Pregnancy Category C and it isn't known if it is safe during pregnancy. It is unknown if it is excreted in breast milk.
Hydroquinone Counseling:  Patient advised that medication may result in skin irritation, lightening (hypopigmentation), dryness, and burning.  In the event of skin irritation, the patient was advised to reduce the amount of the drug applied or use it less frequently.  Rarely, spots that are treated with hydroquinone can become darker (pseudoochronosis).  Should this occur, patient instructed to stop medication and call the office. The patient verbalized understanding of the proper use and possible adverse effects of hydroquinone.  All of the patient's questions and concerns were addressed.
Dapsone Counseling: I discussed with the patient the risks of dapsone including but not limited to hemolytic anemia, agranulocytosis, rashes, methemoglobinemia, kidney failure, peripheral neuropathy, headaches, GI upset, and liver toxicity.  Patients who start dapsone require monitoring including baseline LFTs and weekly CBCs for the first month, then every month thereafter.  The patient verbalized understanding of the proper use and possible adverse effects of dapsone.  All of the patient's questions and concerns were addressed.
Siliq Counseling:  I discussed with the patient the risks of Siliq including but not limited to new or worsening depression, suicidal thoughts and behavior, immunosuppression, malignancy, posterior leukoencephalopathy syndrome, and serious infections.  The patient understands that monitoring is required including a PPD at baseline and must alert us or the primary physician if symptoms of infection or other concerning signs are noted. There is also a special program designed to monitor depression which is required with Siliq.
Clindamycin Pregnancy And Lactation Text: This medication can be used in pregnancy if certain situations. Clindamycin is also present in breast milk.
Griseofulvin Pregnancy And Lactation Text: This medication is Pregnancy Category X and is known to cause serious birth defects. It is unknown if this medication is excreted in breast milk but breast feeding should be avoided.
Cellcept Counseling:  I discussed with the patient the risks of mycophenolate mofetil including but not limited to infection/immunosuppression, GI upset, hypokalemia, hypercholesterolemia, bone marrow suppression, lymphoproliferative disorders, malignancy, GI ulceration/bleed/perforation, colitis, interstitial lung disease, kidney failure, progressive multifocal leukoencephalopathy, and birth defects.  The patient understands that monitoring is required including a baseline creatinine and regular CBC testing. In addition, patient must alert us immediately if symptoms of infection or other concerning signs are noted.
Tazorac Pregnancy And Lactation Text: This medication is not safe during pregnancy. It is unknown if this medication is excreted in breast milk.
Oxybutynin Counseling:  I discussed with the patient the risks of oxybutynin including but not limited to skin rash, drowsiness, dry mouth, difficulty urinating, and blurred vision.
Dapsone Pregnancy And Lactation Text: This medication is Pregnancy Category C and is not considered safe during pregnancy or breast feeding.
Doxycycline Counseling:  Patient counseled regarding possible photosensitivity and increased risk for sunburn.  Patient instructed to avoid sunlight, if possible.  When exposed to sunlight, patients should wear protective clothing, sunglasses, and sunscreen.  The patient was instructed to call the office immediately if the following severe adverse effects occur:  hearing changes, easy bruising/bleeding, severe headache, or vision changes.  The patient verbalized understanding of the proper use and possible adverse effects of doxycycline.  All of the patient's questions and concerns were addressed.
Topical Clindamycin Counseling: Patient counseled that this medication may cause skin irritation or allergic reactions.  In the event of skin irritation, the patient was advised to reduce the amount of the drug applied or use it less frequently.   The patient verbalized understanding of the proper use and possible adverse effects of clindamycin.  All of the patient's questions and concerns were addressed.
Itraconazole Counseling:  I discussed with the patient the risks of itraconazole including but not limited to liver damage, nausea/vomiting, neuropathy, and severe allergy.  The patient understands that this medication is best absorbed when taken with acidic beverages such as non-diet cola or ginger ale.  The patient understands that monitoring is required including baseline LFTs and repeat LFTs at intervals.  The patient understands that they are to contact us or the primary physician if concerning signs are noted.

## 2019-11-05 NOTE — PROCEDURE: ADDITIONAL NOTES
Detail Level: Detailed
Additional Notes: Patient reports mild hand/foot joint pain. Nail pitting present. \\nOtezla was approved but patient declined oral treatment at this time, he will continue with topical treatments.

## 2019-11-05 NOTE — PROCEDURE: MIPS QUALITY
Patient called and states she spoke with her insurance company who states they cannot see an order for a CT. Patient gave this RN number ([phone]) to call. Representative states they see no order/referral and was unable to assist me. Attempted to reach Connie Serrano with no answer. Will try again later.   
Quality 226: Preventive Care And Screening: Tobacco Use: Screening And Cessation Intervention: Patient screened for tobacco use, is a smoker AND received Cessation Counseling
Detail Level: Detailed

## 2019-11-05 NOTE — PROCEDURE: ORDER PLAIN FILM
Provider: Madina WRIGHT
Priority: normal
Detail Level: Detailed
Additional Treatment Instructions: Rule out osteo arthritis vs psoriatic arthritis

## 2019-11-06 DIAGNOSIS — E78.5 DYSLIPIDEMIA: Primary | ICD-10-CM

## 2019-11-06 RX ORDER — EZETIMIBE 10 MG/1
TABLET ORAL
Qty: 90 TAB | Refills: 3 | Status: SHIPPED | OUTPATIENT
Start: 2019-11-06 | End: 2020-09-21

## 2019-12-05 ENCOUNTER — HOSPITAL ENCOUNTER (OUTPATIENT)
Dept: RADIOLOGY | Facility: MEDICAL CENTER | Age: 69
End: 2019-12-05
Attending: NURSE PRACTITIONER
Payer: MEDICARE

## 2019-12-05 ENCOUNTER — APPOINTMENT (OUTPATIENT)
Dept: LAB | Facility: MEDICAL CENTER | Age: 69
End: 2019-12-05
Attending: NURSE PRACTITIONER
Payer: MEDICARE

## 2019-12-05 DIAGNOSIS — M25.50 PAIN IN JOINT, MULTIPLE SITES: ICD-10-CM

## 2019-12-05 PROCEDURE — 73620 X-RAY EXAM OF FOOT: CPT | Mod: LT

## 2019-12-05 PROCEDURE — 73120 X-RAY EXAM OF HAND: CPT | Mod: RT

## 2020-03-06 ENCOUNTER — HOSPITAL ENCOUNTER (OUTPATIENT)
Dept: LAB | Facility: MEDICAL CENTER | Age: 70
End: 2020-03-06
Attending: FAMILY MEDICINE
Payer: MEDICARE

## 2020-03-06 LAB
ALBUMIN SERPL BCP-MCNC: 4.2 G/DL (ref 3.2–4.9)
ALBUMIN/GLOB SERPL: 1.3 G/DL
ALP SERPL-CCNC: 57 U/L (ref 30–99)
ALT SERPL-CCNC: 30 U/L (ref 2–50)
ANION GAP SERPL CALC-SCNC: 7 MMOL/L (ref 0–11.9)
AST SERPL-CCNC: 25 U/L (ref 12–45)
BILIRUB SERPL-MCNC: 1 MG/DL (ref 0.1–1.5)
BUN SERPL-MCNC: 21 MG/DL (ref 8–22)
CALCIUM SERPL-MCNC: 9.4 MG/DL (ref 8.5–10.5)
CHLORIDE SERPL-SCNC: 102 MMOL/L (ref 96–112)
CHOLEST SERPL-MCNC: 121 MG/DL (ref 100–199)
CO2 SERPL-SCNC: 29 MMOL/L (ref 20–33)
CREAT SERPL-MCNC: 0.96 MG/DL (ref 0.5–1.4)
EST. AVERAGE GLUCOSE BLD GHB EST-MCNC: 131 MG/DL
FASTING STATUS PATIENT QL REPORTED: NORMAL
GLOBULIN SER CALC-MCNC: 3.3 G/DL (ref 1.9–3.5)
GLUCOSE SERPL-MCNC: 93 MG/DL (ref 65–99)
HBA1C MFR BLD: 6.2 % (ref 0–5.6)
HDLC SERPL-MCNC: 41 MG/DL
LDLC SERPL CALC-MCNC: 63 MG/DL
POTASSIUM SERPL-SCNC: 4.4 MMOL/L (ref 3.6–5.5)
PROT SERPL-MCNC: 7.5 G/DL (ref 6–8.2)
SODIUM SERPL-SCNC: 138 MMOL/L (ref 135–145)
T4 FREE SERPL-MCNC: 0.84 NG/DL (ref 0.53–1.43)
TRIGL SERPL-MCNC: 87 MG/DL (ref 0–149)
TSH SERPL DL<=0.005 MIU/L-ACNC: 2.03 UIU/ML (ref 0.38–5.33)

## 2020-03-06 PROCEDURE — 80061 LIPID PANEL: CPT

## 2020-03-06 PROCEDURE — 80053 COMPREHEN METABOLIC PANEL: CPT

## 2020-03-06 PROCEDURE — 84443 ASSAY THYROID STIM HORMONE: CPT

## 2020-03-06 PROCEDURE — 84439 ASSAY OF FREE THYROXINE: CPT

## 2020-03-06 PROCEDURE — 84681 ASSAY OF C-PEPTIDE: CPT

## 2020-03-06 PROCEDURE — 36415 COLL VENOUS BLD VENIPUNCTURE: CPT

## 2020-03-06 PROCEDURE — 83036 HEMOGLOBIN GLYCOSYLATED A1C: CPT | Mod: GA

## 2020-03-07 LAB — C PEPTIDE SERPL-MCNC: 1.8 NG/ML (ref 0.8–3.5)

## 2020-03-21 DIAGNOSIS — I10 ESSENTIAL HYPERTENSION, BENIGN: Primary | ICD-10-CM

## 2020-03-23 RX ORDER — LISINOPRIL 20 MG/1
20 TABLET ORAL 2 TIMES DAILY
Qty: 180 TAB | Refills: 2 | Status: SHIPPED | OUTPATIENT
Start: 2020-03-23 | End: 2022-06-27 | Stop reason: SDUPTHER

## 2020-03-25 DIAGNOSIS — I10 ESSENTIAL HYPERTENSION, BENIGN: Primary | ICD-10-CM

## 2020-04-10 ENCOUNTER — RX ONLY (OUTPATIENT)
Age: 70
Setting detail: RX ONLY
End: 2020-04-10

## 2020-04-10 RX ORDER — BETAMETHASONE DIPROPIONATE 0.5 MG/G
OINTMENT TOPICAL
Qty: 3 | Refills: 1 | Status: ERX

## 2020-07-20 ENCOUNTER — APPOINTMENT (RX ONLY)
Dept: URBAN - METROPOLITAN AREA CLINIC 20 | Facility: CLINIC | Age: 70
Setting detail: DERMATOLOGY
End: 2020-07-20

## 2020-07-20 DIAGNOSIS — M25.50 PAIN IN UNSPECIFIED JOINT: ICD-10-CM

## 2020-07-20 DIAGNOSIS — L40.0 PSORIASIS VULGARIS: ICD-10-CM | Status: INADEQUATELY CONTROLLED

## 2020-07-20 DIAGNOSIS — L60.8 OTHER NAIL DISORDERS: ICD-10-CM

## 2020-07-20 PROCEDURE — ? RECOMMENDATIONS

## 2020-07-20 PROCEDURE — ? COUNSELING

## 2020-07-20 PROCEDURE — ? PRESCRIPTION

## 2020-07-20 PROCEDURE — ? ADDITIONAL NOTES

## 2020-07-20 PROCEDURE — ? PRESCRIPTION SAMPLES PROVIDED

## 2020-07-20 PROCEDURE — ? MEDICATION COUNSELING

## 2020-07-20 PROCEDURE — 99214 OFFICE O/P EST MOD 30 MIN: CPT

## 2020-07-20 RX ORDER — APREMILAST 30 MG/1
TABLET, FILM COATED ORAL
Qty: 180 | Refills: 3 | Status: ERX

## 2020-07-20 ASSESSMENT — LOCATION SIMPLE DESCRIPTION DERM
LOCATION SIMPLE: LEFT MIDDLE FINGER
LOCATION SIMPLE: RIGHT HAND
LOCATION SIMPLE: RIGHT MIDDLE FINGERNAIL
LOCATION SIMPLE: LEFT UPPER BACK

## 2020-07-20 ASSESSMENT — LOCATION DETAILED DESCRIPTION DERM
LOCATION DETAILED: RIGHT MIDDLE FINGER LUNULA
LOCATION DETAILED: RIGHT RADIAL DORSAL HAND
LOCATION DETAILED: LEFT DISTAL ULNAR DORSAL MIDDLE FINGER
LOCATION DETAILED: LEFT MID-UPPER BACK

## 2020-07-20 ASSESSMENT — LOCATION ZONE DERM
LOCATION ZONE: FINGERNAIL
LOCATION ZONE: HAND
LOCATION ZONE: TRUNK
LOCATION ZONE: FINGER

## 2020-07-20 NOTE — PROCEDURE: ADDITIONAL NOTES
Additional Notes: Pt had radiograph of right hand and findings consistent with osteoarthritis
Detail Level: Detailed
Additional Notes: Pt was approved for Otezla in 2019. Pt has been hesitant to begin systemic therapy but since his psoriasis is not well controlled with topical steroids, he has reconsidered.

## 2020-07-20 NOTE — PROCEDURE: MEDICATION COUNSELING
Bexarotene Pregnancy And Lactation Text: This medication is Pregnancy Category X and should not be given to women who are pregnant or may become pregnant. This medication should not be used if you are breast feeding.
Xolair Pregnancy And Lactation Text: This medication is Pregnancy Category B and is considered safe during pregnancy. This medication is excreted in breast milk.
Use Enhanced Medication Counseling?: No
Otezla Counseling: The side effects of Otezla were discussed with the patient, including but not limited to worsening or new depression, weight loss, diarrhea, nausea, upper respiratory tract infection, and headache. Patient instructed to call the office should any adverse effect occur.  The patient verbalized understanding of the proper use and possible adverse effects of Otezla.  All the patient's questions and concerns were addressed.
Imiquimod Counseling:  I discussed with the patient the risks of imiquimod including but not limited to erythema, scaling, itching, weeping, crusting, and pain.  Patient understands that the inflammatory response to imiquimod is variable from person to person and was educated regarded proper titration schedule.  If flu-like symptoms develop, patient knows to discontinue the medication and contact us.
Infliximab Counseling:  I discussed with the patient the risks of infliximab including but not limited to myelosuppression, immunosuppression, autoimmune hepatitis, demyelinating diseases, lymphoma, and serious infections.  The patient understands that monitoring is required including a PPD at baseline and must alert us or the primary physician if symptoms of infection or other concerning signs are noted.
Valtrex Pregnancy And Lactation Text: this medication is Pregnancy Category B and is considered safe during pregnancy. This medication is not directly found in breast milk but it's metabolite acyclovir is present.
Doxycycline Counseling:  Patient counseled regarding possible photosensitivity and increased risk for sunburn.  Patient instructed to avoid sunlight, if possible.  When exposed to sunlight, patients should wear protective clothing, sunglasses, and sunscreen.  The patient was instructed to call the office immediately if the following severe adverse effects occur:  hearing changes, easy bruising/bleeding, severe headache, or vision changes.  The patient verbalized understanding of the proper use and possible adverse effects of doxycycline.  All of the patient's questions and concerns were addressed.
Odomzo Pregnancy And Lactation Text: This medication is Pregnancy Category X and is absolutely contraindicated during pregnancy. It is unknown if it is excreted in breast milk.
Colchicine Pregnancy And Lactation Text: This medication is Pregnancy Category C and isn't considered safe during pregnancy. It is excreted in breast milk.
Fluconazole Pregnancy And Lactation Text: This medication is Pregnancy Category C and it isn't know if it is safe during pregnancy. It is also excreted in breast milk.
Bexarotene Counseling:  I discussed with the patient the risks of bexarotene including but not limited to hair loss, dry lips/skin/eyes, liver abnormalities, hyperlipidemia, pancreatitis, depression/suicidal ideation, photosensitivity, drug rash/allergic reactions, hypothyroidism, anemia, leukopenia, infection, cataracts, and teratogenicity.  Patient understands that they will need regular blood tests to check lipid profile, liver function tests, white blood cell count, thyroid function tests and pregnancy test if applicable.
Topical Clindamycin Counseling: Patient counseled that this medication may cause skin irritation or allergic reactions.  In the event of skin irritation, the patient was advised to reduce the amount of the drug applied or use it less frequently.   The patient verbalized understanding of the proper use and possible adverse effects of clindamycin.  All of the patient's questions and concerns were addressed.
Albendazole Pregnancy And Lactation Text: This medication is Pregnancy Category C and it isn't known if it is safe during pregnancy. It is also excreted in breast milk.
Rituxan Counseling:  I discussed with the patient the risks of Rituxan infusions. Side effects can include infusion reactions, severe drug rashes including mucocutaneous reactions, reactivation of latent hepatitis and other infections and rarely progressive multifocal leukoencephalopathy.  All of the patient's questions and concerns were addressed.
Topical Sulfur Applications Counseling: Topical Sulfur Counseling: Patient counseled that this medication may cause skin irritation or allergic reactions.  In the event of skin irritation, the patient was advised to reduce the amount of the drug applied or use it less frequently.   The patient verbalized understanding of the proper use and possible adverse effects of topical sulfur application.  All of the patient's questions and concerns were addressed.
Isotretinoin Counseling: Patient should get monthly blood tests, not donate blood, not drive at night if vision affected, not share medication, and not undergo elective surgery for 6 months after tx completed. Side effects reviewed, pt to contact office should one occur.
Dapsone Counseling: I discussed with the patient the risks of dapsone including but not limited to hemolytic anemia, agranulocytosis, rashes, methemoglobinemia, kidney failure, peripheral neuropathy, headaches, GI upset, and liver toxicity.  Patients who start dapsone require monitoring including baseline LFTs and weekly CBCs for the first month, then every month thereafter.  The patient verbalized understanding of the proper use and possible adverse effects of dapsone.  All of the patient's questions and concerns were addressed.
Topical Clindamycin Pregnancy And Lactation Text: This medication is Pregnancy Category B and is considered safe during pregnancy. It is unknown if it is excreted in breast milk.
Griseofulvin Counseling:  I discussed with the patient the risks of griseofulvin including but not limited to photosensitivity, cytopenia, liver damage, nausea/vomiting and severe allergy.  The patient understands that this medication is best absorbed when taken with a fatty meal (e.g., ice cream or french fries).
Imiquimod Pregnancy And Lactation Text: This medication is Pregnancy Category C. It is unknown if this medication is excreted in breast milk.
Infliximab Pregnancy And Lactation Text: This medication is Pregnancy Category B and is considered safe during pregnancy. It is unknown if this medication is excreted in breast milk.
Ivermectin Counseling:  Patient instructed to take medication on an empty stomach with a full glass of water.  Patient informed of potential adverse effects including but not limited to nausea, diarrhea, dizziness, itching, and swelling of the extremities or lymph nodes.  The patient verbalized understanding of the proper use and possible adverse effects of ivermectin.  All of the patient's questions and concerns were addressed.
Doxycycline Pregnancy And Lactation Text: This medication is Pregnancy Category D and not consider safe during pregnancy. It is also excreted in breast milk but is considered safe for shorter treatment courses.
Azathioprine Counseling:  I discussed with the patient the risks of azathioprine including but not limited to myelosuppression, immunosuppression, hepatotoxicity, lymphoma, and infections.  The patient understands that monitoring is required including baseline LFTs, Creatinine, possible TPMP genotyping and weekly CBCs for the first month and then every 2 weeks thereafter.  The patient verbalized understanding of the proper use and possible adverse effects of azathioprine.  All of the patient's questions and concerns were addressed.
Erythromycin Pregnancy And Lactation Text: This medication is Pregnancy Category B and is considered safe during pregnancy. It is also excreted in breast milk.
Itraconazole Counseling:  I discussed with the patient the risks of itraconazole including but not limited to liver damage, nausea/vomiting, neuropathy, and severe allergy.  The patient understands that this medication is best absorbed when taken with acidic beverages such as non-diet cola or ginger ale.  The patient understands that monitoring is required including baseline LFTs and repeat LFTs at intervals.  The patient understands that they are to contact us or the primary physician if concerning signs are noted.
Oxybutynin Counseling:  I discussed with the patient the risks of oxybutynin including but not limited to skin rash, drowsiness, dry mouth, difficulty urinating, and blurred vision.
Erivedge Counseling- I discussed with the patient the risks of Erivedge including but not limited to nausea, vomiting, diarrhea, constipation, weight loss, changes in the sense of taste, decreased appetite, muscle spasms, and hair loss.  The patient verbalized understanding of the proper use and possible adverse effects of Erivedge.  All of the patient's questions and concerns were addressed.
Isotretinoin Pregnancy And Lactation Text: This medication is Pregnancy Category X and is considered extremely dangerous during pregnancy. It is unknown if it is excreted in breast milk.
Rituxan Pregnancy And Lactation Text: This medication is Pregnancy Category C and it isn't know if it is safe during pregnancy. It is unknown if this medication is excreted in breast milk but similar antibodies are known to be excreted.
Topical Sulfur Applications Pregnancy And Lactation Text: This medication is Pregnancy Category C and has an unknown safety profile during pregnancy. It is unknown if this topical medication is excreted in breast milk.
Otezla Pregnancy And Lactation Text: This medication is Pregnancy Category C and it isn't known if it is safe during pregnancy. It is unknown if it is excreted in breast milk.
Dapsone Pregnancy And Lactation Text: This medication is Pregnancy Category C and is not considered safe during pregnancy or breast feeding.
Erythromycin Counseling:  I discussed with the patient the risks of erythromycin including but not limited to GI upset, allergic reaction, drug rash, diarrhea, increase in liver enzymes, and yeast infections.
Griseofulvin Pregnancy And Lactation Text: This medication is Pregnancy Category X and is known to cause serious birth defects. It is unknown if this medication is excreted in breast milk but breast feeding should be avoided.
Minoxidil Counseling: Minoxidil is a topical medication which can increase blood flow where it is applied. It is uncertain how this medication increases hair growth. Side effects are uncommon and include stinging and allergic reactions.
Wartpeel Counseling:  I discussed with the patient the risks of Wartpeel including but not limited to erythema, scaling, itching, weeping, crusting, and pain.
Mirvaso Counseling: Mirvaso is a topical medication which can decrease superficial blood flow where applied. Side effects are uncommon and include stinging, redness and allergic reactions.
Siliq Counseling:  I discussed with the patient the risks of Siliq including but not limited to new or worsening depression, suicidal thoughts and behavior, immunosuppression, malignancy, posterior leukoencephalopathy syndrome, and serious infections.  The patient understands that monitoring is required including a PPD at baseline and must alert us or the primary physician if symptoms of infection or other concerning signs are noted. There is also a special program designed to monitor depression which is required with Siliq.
Metronidazole Counseling:  I discussed with the patient the risks of metronidazole including but not limited to seizures, nausea/vomiting, a metallic taste in the mouth, nausea/vomiting and severe allergy.
Azathioprine Pregnancy And Lactation Text: This medication is Pregnancy Category D and isn't considered safe during pregnancy. It is unknown if this medication is excreted in breast milk.
High Dose Vitamin A Counseling: Side effects reviewed, pt to contact office should one occur.
Minoxidil Pregnancy And Lactation Text: This medication has not been assigned a Pregnancy Risk Category but animal studies failed to show danger with the topical medication. It is unknown if the medication is excreted in breast milk.
Propranolol Counseling:  I discussed with the patient the risks of propranolol including but not limited to low heart rate, low blood pressure, low blood sugar, restlessness and increased cold sensitivity. They should call the office if they experience any of these side effects.
Finasteride Male Counseling: Finasteride Counseling:  I discussed with the patient the risks of use of finasteride including but not limited to decreased libido, decreased ejaculate volume, gynecomastia, and depression. Women should not handle medication.  All of the patient's questions and concerns were addressed.
Metronidazole Pregnancy And Lactation Text: This medication is Pregnancy Category B and considered safe during pregnancy.  It is also excreted in breast milk.
High Dose Vitamin A Pregnancy And Lactation Text: High dose vitamin A therapy is contraindicated during pregnancy and breast feeding.
Wartpeel Pregnancy And Lactation Text: This medication is Pregnancy Category X and contraindicated in pregnancy and in women who may become pregnant. It is unknown if this medication is excreted in breast milk.
Ketoconazole Counseling:   Patient counseled regarding improving absorption with orange juice.  Adverse effects include but are not limited to breast enlargement, headache, diarrhea, nausea, upset stomach, liver function test abnormalities, taste disturbance, and stomach pain.  There is a rare possibility of liver failure that can occur when taking ketoconazole. The patient understands that monitoring of LFTs may be required, especially at baseline. The patient verbalized understanding of the proper use and possible adverse effects of ketoconazole.  All of the patient's questions and concerns were addressed.
Mirvaso Pregnancy And Lactation Text: This medication has not been assigned a Pregnancy Risk Category. It is unknown if the medication is excreted in breast milk.
Cellcept Counseling:  I discussed with the patient the risks of mycophenolate mofetil including but not limited to infection/immunosuppression, GI upset, hypokalemia, hypercholesterolemia, bone marrow suppression, lymphoproliferative disorders, malignancy, GI ulceration/bleed/perforation, colitis, interstitial lung disease, kidney failure, progressive multifocal leukoencephalopathy, and birth defects.  The patient understands that monitoring is required including a baseline creatinine and regular CBC testing. In addition, patient must alert us immediately if symptoms of infection or other concerning signs are noted.
Siliq Pregnancy And Lactation Text: The risk during pregnancy and breastfeeding is uncertain with this medication.
Detail Level: Zone
Minocycline Counseling: Patient advised regarding possible photosensitivity and discoloration of the teeth, skin, lips, tongue and gums.  Patient instructed to avoid sunlight, if possible.  When exposed to sunlight, patients should wear protective clothing, sunglasses, and sunscreen.  The patient was instructed to call the office immediately if the following severe adverse effects occur:  hearing changes, easy bruising/bleeding, severe headache, or vision changes.  The patient verbalized understanding of the proper use and possible adverse effects of minocycline.  All of the patient's questions and concerns were addressed.
Zyclara Counseling:  I discussed with the patient the risks of imiquimod including but not limited to erythema, scaling, itching, weeping, crusting, and pain.  Patient understands that the inflammatory response to imiquimod is variable from person to person and was educated regarded proper titration schedule.  If flu-like symptoms develop, patient knows to discontinue the medication and contact us.
Picato Counseling:  I discussed with the patient the risks of Picato including but not limited to erythema, scaling, itching, weeping, crusting, and pain.
Simponi Counseling:  I discussed with the patient the risks of golimumab including but not limited to myelosuppression, immunosuppression, autoimmune hepatitis, demyelinating diseases, lymphoma, and serious infections.  The patient understands that monitoring is required including a PPD at baseline and must alert us or the primary physician if symptoms of infection or other concerning signs are noted.
Cimzia Counseling:  I discussed with the patient the risks of Cimzia including but not limited to immunosuppression, allergic reactions and infections.  The patient understands that monitoring is required including a PPD at baseline and must alert us or the primary physician if symptoms of infection or other concerning signs are noted.
Ketoconazole Pregnancy And Lactation Text: This medication is Pregnancy Category C and it isn't know if it is safe during pregnancy. It is also excreted in breast milk and breast feeding isn't recommended.
Propranolol Pregnancy And Lactation Text: This medication is Pregnancy Category C and it isn't known if it is safe during pregnancy. It is excreted in breast milk.
Finasteride Pregnancy And Lactation Text: This medication is absolutely contraindicated during pregnancy. It is unknown if it is excreted in breast milk.
Cyclophosphamide Pregnancy And Lactation Text: This medication is Pregnancy Category D and it isn't considered safe during pregnancy. This medication is excreted in breast milk.
Gabapentin Counseling: I discussed with the patient the risks of gabapentin including but not limited to dizziness, somnolence, fatigue and ataxia.
Minocycline Pregnancy And Lactation Text: This medication is Pregnancy Category D and not consider safe during pregnancy. It is also excreted in breast milk.
Cimzia Pregnancy And Lactation Text: This medication crosses the placenta but can be considered safe in certain situations. Cimzia may be excreted in breast milk.
Terbinafine Counseling: Patient counseling regarding adverse effects of terbinafine including but not limited to headache, diarrhea, rash, upset stomach, liver function test abnormalities, itching, taste/smell disturbance, nausea, abdominal pain, and flatulence.  There is a rare possibility of liver failure that can occur when taking terbinafine.  The patient understands that a baseline LFT and kidney function test may be required. The patient verbalized understanding of the proper use and possible adverse effects of terbinafine.  All of the patient's questions and concerns were addressed.
Benzoyl Peroxide Counseling: Patient counseled that medicine may cause skin irritation and bleach clothing.  In the event of skin irritation, the patient was advised to reduce the amount of the drug applied or use it less frequently.   The patient verbalized understanding of the proper use and possible adverse effects of benzoyl peroxide.  All of the patient's questions and concerns were addressed.
Cyclophosphamide Counseling:  I discussed with the patient the risks of cyclophosphamide including but not limited to hair loss, hormonal abnormalities, decreased fertility, abdominal pain, diarrhea, nausea and vomiting, bone marrow suppression and infection. The patient understands that monitoring is required while taking this medication.
Birth Control Pills Counseling: Birth Control Pill Counseling: I discussed with the patient the potential side effects of OCPs including but not limited to increased risk of stroke, heart attack, thrombophlebitis, deep venous thrombosis, hepatic adenomas, breast changes, GI upset, headaches, and depression.  The patient verbalized understanding of the proper use and possible adverse effects of OCPs. All of the patient's questions and concerns were addressed.
Carac Counseling:  I discussed with the patient the risks of Carac including but not limited to erythema, scaling, itching, weeping, crusting, and pain.
Terbinafine Pregnancy And Lactation Text: This medication is Pregnancy Category B and is considered safe during pregnancy. It is also excreted in breast milk and breast feeding isn't recommended.
Skyrizi Counseling: I discussed with the patient the risks of risankizumab-rzaa including but not limited to immunosuppression, and serious infections.  The patient understands that monitoring is required including a PPD at baseline and must alert us or the primary physician if symptoms of infection or other concerning signs are noted.
Quinolones Counseling:  I discussed with the patient the risks of fluoroquinolones including but not limited to GI upset, allergic reaction, drug rash, diarrhea, dizziness, photosensitivity, yeast infections, liver function test abnormalities, tendonitis/tendon rupture.
Birth Control Pills Pregnancy And Lactation Text: This medication should be avoided if pregnant and for the first 30 days post-partum.
Benzoyl Peroxide Pregnancy And Lactation Text: This medication is Pregnancy Category C. It is unknown if benzoyl peroxide is excreted in breast milk.
Cosentyx Counseling:  I discussed with the patient the risks of Cosentyx including but not limited to worsening of Crohn's disease, immunosuppression, allergic reactions and infections.  The patient understands that monitoring is required including a PPD at baseline and must alert us or the primary physician if symptoms of infection or other concerning signs are noted.
Protopic Counseling: Patient may experience a mild burning sensation during topical application. Protopic is not approved in children less than 2 years of age. There have been case reports of hematologic and skin malignancies in patients using topical calcineurin inhibitors although causality is questionable.
Dupixent Counseling: I discussed with the patient the risks of dupilumab including but not limited to eye infection and irritation, cold sores, injection site reactions, worsening of asthma, allergic reactions and increased risk of parasitic infection.  Live vaccines should be avoided while taking dupilumab. Dupilumab will also interact with certain medications such as warfarin and cyclosporine. The patient understands that monitoring is required and they must alert us or the primary physician if symptoms of infection or other concerning signs are noted.
Stelara Counseling:  I discussed with the patient the risks of ustekinumab including but not limited to immunosuppression, malignancy, posterior leukoencephalopathy syndrome, and serious infections.  The patient understands that monitoring is required including a PPD at baseline and must alert us or the primary physician if symptoms of infection or other concerning signs are noted.
Opioid Pregnancy And Lactation Text: These medications can lead to premature delivery and should be avoided during pregnancy. These medications are also present in breast milk in small amounts.
Glycopyrrolate Counseling:  I discussed with the patient the risks of glycopyrrolate including but not limited to skin rash, drowsiness, dry mouth, difficulty urinating, and blurred vision.
Opioid Counseling: I discussed with the patient the potential side effects of opioids including but not limited to addiction, altered mental status, and depression. I stressed avoiding alcohol, benzodiazepines, muscle relaxants and sleep aids unless specifically okayed by a physician. The patient verbalized understanding of the proper use and possible adverse effects of opioids. All of the patient's questions and concerns were addressed. They were instructed to flush the remaining pills down the toilet if they did not need them for pain.
Protopic Pregnancy And Lactation Text: This medication is Pregnancy Category C. It is unknown if this medication is excreted in breast milk when applied topically.
Spironolactone Counseling: Patient advised regarding risks of diarrhea, abdominal pain, hyperkalemia, birth defects (for female patients), liver toxicity and renal toxicity. The patient may need blood work to monitor liver and kidney function and potassium levels while on therapy. The patient verbalized understanding of the proper use and possible adverse effects of spironolactone.  All of the patient's questions and concerns were addressed.
Cyclosporine Counseling:  I discussed with the patient the risks of cyclosporine including but not limited to hypertension, gingival hyperplasia,myelosuppression, immunosuppression, liver damage, kidney damage, neurotoxicity, lymphoma, and serious infections. The patient understands that monitoring is required including baseline blood pressure, CBC, CMP, lipid panel and uric acid, and then 1-2 times monthly CMP and blood pressure.
Methotrexate Counseling:  Patient counseled regarding adverse effects of methotrexate including but not limited to nausea, vomiting, abnormalities in liver function tests. Patients may develop mouth sores, rash, diarrhea, and abnormalities in blood counts. The patient understands that monitoring is required including LFT's and blood counts.  There is a rare possibility of scarring of the liver and lung problems that can occur when taking methotrexate. Persistent nausea, loss of appetite, pale stools, dark urine, cough, and shortness of breath should be reported immediately. Patient advised to discontinue methotrexate treatment at least three months before attempting to become pregnant.  I discussed the need for folate supplements while taking methotrexate.  These supplements can decrease side effects during methotrexate treatment. The patient verbalized understanding of the proper use and possible adverse effects of methotrexate.  All of the patient's questions and concerns were addressed.
Rifampin Pregnancy And Lactation Text: This medication is Pregnancy Category C and it isn't know if it is safe during pregnancy. It is also excreted in breast milk and should not be used if you are breast feeding.
SSKI Counseling:  I discussed with the patient the risks of SSKI including but not limited to thyroid abnormalities, metallic taste, GI upset, fever, headache, acne, arthralgias, paraesthesias, lymphadenopathy, easy bleeding, arrhythmias, and allergic reaction.
Calcipotriene Counseling:  I discussed with the patient the risks of calcipotriene including but not limited to erythema, scaling, itching, and irritation.
Hydroxychloroquine Counseling:  I discussed with the patient that a baseline ophthalmologic exam is needed at the start of therapy and every year thereafter while on therapy. A CBC may also be warranted for monitoring.  The side effects of this medication were discussed with the patient, including but not limited to agranulocytosis, aplastic anemia, seizures, rashes, retinopathy, and liver toxicity. Patient instructed to call the office should any adverse effect occur.  The patient verbalized understanding of the proper use and possible adverse effects of Plaquenil.  All the patient's questions and concerns were addressed.
Spironolactone Pregnancy And Lactation Text: This medication can cause feminization of the male fetus and should be avoided during pregnancy. The active metabolite is also found in breast milk.
Glycopyrrolate Pregnancy And Lactation Text: This medication is Pregnancy Category B and is considered safe during pregnancy. It is unknown if it is excreted breast milk.
Azithromycin Counseling:  I discussed with the patient the risks of azithromycin including but not limited to GI upset, allergic reaction, drug rash, diarrhea, and yeast infections.
Rifampin Counseling: I discussed with the patient the risks of rifampin including but not limited to liver damage, kidney damage, red-orange body fluids, nausea/vomiting and severe allergy.
Cyclosporine Pregnancy And Lactation Text: This medication is Pregnancy Category C and it isn't know if it is safe during pregnancy. This medication is excreted in breast milk.
Cimetidine Counseling:  I discussed with the patient the risks of Cimetidine including but not limited to gynecomastia, headache, diarrhea, nausea, drowsiness, arrhythmias, pancreatitis, skin rashes, psychosis, bone marrow suppression and kidney toxicity.
Rhofade Counseling: Rhofade is a topical medication which can decrease superficial blood flow where applied. Side effects are uncommon and include stinging, redness and allergic reactions.
Doxepin Counseling:  Patient advised that the medication is sedating and not to drive a car after taking this medication. Patient informed of potential adverse effects including but not limited to dry mouth, urinary retention, and blurry vision.  The patient verbalized understanding of the proper use and possible adverse effects of doxepin.  All of the patient's questions and concerns were addressed.
Hydroxychloroquine Pregnancy And Lactation Text: This medication has been shown to cause fetal harm but it isn't assigned a Pregnancy Risk Category. There are small amounts excreted in breast milk.
Enbrel Counseling:  I discussed with the patient the risks of etanercept including but not limited to myelosuppression, immunosuppression, autoimmune hepatitis, demyelinating diseases, lymphoma, and infections.  The patient understands that monitoring is required including a PPD at baseline and must alert us or the primary physician if symptoms of infection or other concerning signs are noted.
Solaraze Counseling:  I discussed with the patient the risks of Solaraze including but not limited to erythema, scaling, itching, weeping, crusting, and pain.
Taltz Counseling: I discussed with the patient the risks of ixekizumab including but not limited to immunosuppression, serious infections, worsening of inflammatory bowel disease and drug reactions.  The patient understands that monitoring is required including a PPD at baseline and must alert us or the primary physician if symptoms of infection or other concerning signs are noted.
Elidel Counseling: Patient may experience a mild burning sensation during topical application. Elidel is not approved in children less than 2 years of age. There have been case reports of hematologic and skin malignancies in patients using topical calcineurin inhibitors although causality is questionable.
Calcipotriene Pregnancy And Lactation Text: This medication has not been proven safe during pregnancy. It is unknown if this medication is excreted in breast milk.
Methotrexate Pregnancy And Lactation Text: This medication is Pregnancy Category X and is known to cause fetal harm. This medication is excreted in breast milk.
Sski Pregnancy And Lactation Text: This medication is Pregnancy Category D and isn't considered safe during pregnancy. It is excreted in breast milk.
Drysol Pregnancy And Lactation Text: This medication is considered safe during pregnancy and breast feeding.
Dupixent Pregnancy And Lactation Text: This medication likely crosses the placenta but the risk for the fetus is uncertain. This medication is excreted in breast milk.
Azithromycin Pregnancy And Lactation Text: This medication is considered safe during pregnancy and is also secreted in breast milk.
Thalidomide Counseling: I discussed with the patient the risks of thalidomide including but not limited to birth defects, anxiety, weakness, chest pain, dizziness, cough and severe allergy.
Bactrim Pregnancy And Lactation Text: This medication is Pregnancy Category D and is known to cause fetal risk.  It is also excreted in breast milk.
Niacinamide Counseling: I recommended taking niacin or niacinamide, also know as vitamin B3, twice daily. Recent evidence suggests that taking vitamin B3 (500 mg twice daily) can reduce the risk of actinic keratoses and non-melanoma skin cancers. Side effects of vitamin B3 include flushing and headache.
5-Fu Counseling: 5-Fluorouracil Counseling:  I discussed with the patient the risks of 5-fluorouracil including but not limited to erythema, scaling, itching, weeping, crusting, and pain.
Arava Counseling:  Patient counseled regarding adverse effects of Arava including but not limited to nausea, vomiting, abnormalities in liver function tests. Patients may develop mouth sores, rash, diarrhea, and abnormalities in blood counts. The patient understands that monitoring is required including LFTs and blood counts.  There is a rare possibility of scarring of the liver and lung problems that can occur when taking methotrexate. Persistent nausea, loss of appetite, pale stools, dark urine, cough, and shortness of breath should be reported immediately. Patient advised to discontinue Arava treatment and consult with a physician prior to attempting conception. The patient will have to undergo a treatment to eliminate Arava from the body prior to conception.
Doxepin Pregnancy And Lactation Text: This medication is Pregnancy Category C and it isn't known if it is safe during pregnancy. It is also excreted in breast milk and breast feeding isn't recommended.
Solaraze Pregnancy And Lactation Text: This medication is Pregnancy Category B and is considered safe. There is some data to suggest avoiding during the third trimester. It is unknown if this medication is excreted in breast milk.
Prednisone Counseling:  I discussed with the patient the risks of prolonged use of prednisone including but not limited to weight gain, insomnia, osteoporosis, mood changes, diabetes, susceptibility to infection, glaucoma and high blood pressure.  In cases where prednisone use is prolonged, patients should be monitored with blood pressure checks, serum glucose levels and an eye exam.  Additionally, the patient may need to be placed on GI prophylaxis, PCP prophylaxis, and calcium and vitamin D supplementation and/or a bisphosphonate.  The patient verbalized understanding of the proper use and the possible adverse effects of prednisone.  All of the patient's questions and concerns were addressed.
Bactrim Counseling:  I discussed with the patient the risks of sulfa antibiotics including but not limited to GI upset, allergic reaction, drug rash, diarrhea, dizziness, photosensitivity, and yeast infections.  Rarely, more serious reactions can occur including but not limited to aplastic anemia, agranulocytosis, methemoglobinemia, blood dyscrasias, liver or kidney failure, lung infiltrates or desquamative/blistering drug rashes.
Sarecycline Counseling: Patient advised regarding possible photosensitivity and discoloration of the teeth, skin, lips, tongue and gums.  Patient instructed to avoid sunlight, if possible.  When exposed to sunlight, patients should wear protective clothing, sunglasses, and sunscreen.  The patient was instructed to call the office immediately if the following severe adverse effects occur:  hearing changes, easy bruising/bleeding, severe headache, or vision changes.  The patient verbalized understanding of the proper use and possible adverse effects of sarecycline.  All of the patient's questions and concerns were addressed.
Tetracycline Counseling: Patient counseled regarding possible photosensitivity and increased risk for sunburn.  Patient instructed to avoid sunlight, if possible.  When exposed to sunlight, patients should wear protective clothing, sunglasses, and sunscreen.  The patient was instructed to call the office immediately if the following severe adverse effects occur:  hearing changes, easy bruising/bleeding, severe headache, or vision changes.  The patient verbalized understanding of the proper use and possible adverse effects of tetracycline.  All of the patient's questions and concerns were addressed. Patient understands to avoid pregnancy while on therapy due to potential birth defects.
Topical Retinoid counseling:  Patient advised to apply a pea-sized amount only at bedtime and wait 30 minutes after washing their face before applying.  If too drying, patient may add a non-comedogenic moisturizer. The patient verbalized understanding of the proper use and possible adverse effects of retinoids.  All of the patient's questions and concerns were addressed.
Eucrisa Counseling: Patient may experience a mild burning sensation during topical application. Eucrisa is not approved in children less than 2 years of age.
Humira Counseling:  I discussed with the patient the risks of adalimumab including but not limited to myelosuppression, immunosuppression, autoimmune hepatitis, demyelinating diseases, lymphoma, and serious infections.  The patient understands that monitoring is required including a PPD at baseline and must alert us or the primary physician if symptoms of infection or other concerning signs are noted.
Tremfya Counseling: I discussed with the patient the risks of guselkumab including but not limited to immunosuppression, serious infections, worsening of inflammatory bowel disease and drug reactions.  The patient understands that monitoring is required including a PPD at baseline and must alert us or the primary physician if symptoms of infection or other concerning signs are noted.
Cephalexin Counseling: I counseled the patient regarding use of cephalexin as an antibiotic for prophylactic and/or therapeutic purposes. Cephalexin (commonly prescribed under brand name Keflex) is a cephalosporin antibiotic which is active against numerous classes of bacteria, including most skin bacteria. Side effects may include nausea, diarrhea, gastrointestinal upset, rash, hives, yeast infections, and in rare cases, hepatitis, kidney disease, seizures, fever, confusion, neurologic symptoms, and others. Patients with severe allergies to penicillin medications are cautioned that there is about a 10% incidence of cross-reactivity with cephalosporins. When possible, patients with penicillin allergies should use alternatives to cephalosporins for antibiotic therapy.
Niacinamide Pregnancy And Lactation Text: These medications are considered safe during pregnancy.
Hydroxyzine Counseling: Patient advised that the medication is sedating and not to drive a car after taking this medication.  Patient informed of potential adverse effects including but not limited to dry mouth, urinary retention, and blurry vision.  The patient verbalized understanding of the proper use and possible adverse effects of hydroxyzine.  All of the patient's questions and concerns were addressed.
Tranexamic Acid Counseling:  Patient advised of the small risk of bleeding problems with tranexamic acid. They were also instructed to call if they developed any nausea, vomiting or diarrhea. All of the patient's questions and concerns were addressed.
Nsaids Counseling: NSAID Counseling: I discussed with the patient that NSAIDs should be taken with food. Prolonged use of NSAIDs can result in the development of stomach ulcers.  Patient advised to stop taking NSAIDs if abdominal pain occurs.  The patient verbalized understanding of the proper use and possible adverse effects of NSAIDs.  All of the patient's questions and concerns were addressed.
Clofazimine Counseling:  I discussed with the patient the risks of clofazimine including but not limited to skin and eye pigmentation, liver damage, nausea/vomiting, gastrointestinal bleeding and allergy.
Cephalexin Pregnancy And Lactation Text: This medication is Pregnancy Category B and considered safe during pregnancy.  It is also excreted in breast milk but can be used safely for shorter doses.
Hydroxyzine Pregnancy And Lactation Text: This medication is not safe during pregnancy and should not be taken. It is also excreted in breast milk and breast feeding isn't recommended.
Drysol Counseling:  I discussed with the patient the risks of drysol/aluminum chloride including but not limited to skin rash, itching, irritation, burning.
Clindamycin Counseling: I counseled the patient regarding use of clindamycin as an antibiotic for prophylactic and/or therapeutic purposes. Clindamycin is active against numerous classes of bacteria, including skin bacteria. Side effects may include nausea, diarrhea, gastrointestinal upset, rash, hives, yeast infections, and in rare cases, colitis.
Tazorac Counseling:  Patient advised that medication is irritating and drying.  Patient may need to apply sparingly and wash off after an hour before eventually leaving it on overnight.  The patient verbalized understanding of the proper use and possible adverse effects of tazorac.  All of the patient's questions and concerns were addressed.
Hydroquinone Counseling:  Patient advised that medication may result in skin irritation, lightening (hypopigmentation), dryness, and burning.  In the event of skin irritation, the patient was advised to reduce the amount of the drug applied or use it less frequently.  Rarely, spots that are treated with hydroquinone can become darker (pseudoochronosis).  Should this occur, patient instructed to stop medication and call the office. The patient verbalized understanding of the proper use and possible adverse effects of hydroquinone.  All of the patient's questions and concerns were addressed.
Tranexamic Acid Pregnancy And Lactation Text: It is unknown if this medication is safe during pregnancy or breast feeding.
Acitretin Counseling:  I discussed with the patient the risks of acitretin including but not limited to hair loss, dry lips/skin/eyes, liver damage, hyperlipidemia, depression/suicidal ideation, photosensitivity.  Serious rare side effects can include but are not limited to pancreatitis, pseudotumor cerebri, bony changes, clot formation/stroke/heart attack.  Patient understands that alcohol is contraindicated since it can result in liver toxicity and significantly prolong the elimination of the drug by many years.
Xeljanz Counseling: I discussed with the patient the risks of Xeljanz therapy including increased risk of infection, liver issues, headache, diarrhea, or cold symptoms. Live vaccines should be avoided. They were instructed to call if they have any problems.
Nsaids Pregnancy And Lactation Text: These medications are considered safe up to 30 weeks gestation. It is excreted in breast milk.
Ilumya Counseling: I discussed with the patient the risks of tildrakizumab including but not limited to immunosuppression, malignancy, posterior leukoencephalopathy syndrome, and serious infections.  The patient understands that monitoring is required including a PPD at baseline and must alert us or the primary physician if symptoms of infection or other concerning signs are noted.
Xolair Counseling:  Patient informed of potential adverse effects including but not limited to fever, muscle aches, rash and allergic reactions.  The patient verbalized understanding of the proper use and possible adverse effects of Xolair.  All of the patient's questions and concerns were addressed.
Clindamycin Pregnancy And Lactation Text: This medication can be used in pregnancy if certain situations. Clindamycin is also present in breast milk.
Fluconazole Counseling:  Patient counseled regarding adverse effects of fluconazole including but not limited to headache, diarrhea, nausea, upset stomach, liver function test abnormalities, taste disturbance, and stomach pain.  There is a rare possibility of liver failure that can occur when taking fluconazole.  The patient understands that monitoring of LFTs and kidney function test may be required, especially at baseline. The patient verbalized understanding of the proper use and possible adverse effects of fluconazole.  All of the patient's questions and concerns were addressed.
Albendazole Counseling:  I discussed with the patient the risks of albendazole including but not limited to cytopenia, kidney damage, nausea/vomiting and severe allergy.  The patient understands that this medication is being used in an off-label manner.
Colchicine Counseling:  Patient counseled regarding adverse effects including but not limited to stomach upset (nausea, vomiting, stomach pain, or diarrhea).  Patient instructed to limit alcohol consumption while taking this medication.  Colchicine may reduce blood counts especially with prolonged use.  The patient understands that monitoring of kidney function and blood counts may be required, especially at baseline. The patient verbalized understanding of the proper use and possible adverse effects of colchicine.  All of the patient's questions and concerns were addressed.
Xelkatherinez Pregnancy And Lactation Text: This medication is Pregnancy Category D and is not considered safe during pregnancy.  The risk during breast feeding is also uncertain.
Tazorac Pregnancy And Lactation Text: This medication is not safe during pregnancy. It is unknown if this medication is excreted in breast milk.
Valtrex Counseling: I discussed with the patient the risks of valacyclovir including but not limited to kidney damage, nausea, vomiting and severe allergy.  The patient understands that if the infection seems to be worsening or is not improving, they are to call.
Acitretin Pregnancy And Lactation Text: This medication is Pregnancy Category X and should not be given to women who are pregnant or may become pregnant in the future. This medication is excreted in breast milk.
Odomzo Counseling- I discussed with the patient the risks of Odomzo including but not limited to nausea, vomiting, diarrhea, constipation, weight loss, changes in the sense of taste, decreased appetite, muscle spasms, and hair loss.  The patient verbalized understanding of the proper use and possible adverse effects of Odomzo.  All of the patient's questions and concerns were addressed.

## 2020-07-20 NOTE — PROCEDURE: MIPS QUALITY
Quality 226: Preventive Care And Screening: Tobacco Use: Screening And Cessation Intervention: Patient screened for tobacco use, is a smoker AND received Cessation Counseling
Quality 111:Pneumonia Vaccination Status For Older Adults: Pneumococcal Vaccination Previously Received
Quality 130: Documentation Of Current Medications In The Medical Record: Current Medications Documented
Detail Level: Detailed

## 2020-07-20 NOTE — HPI: RASH (PSORIASIS)
How Severe Is Your Psoriasis?: mild
Is This A New Presentation, Or A Follow-Up?: Follow Up Psoriasis
Additional History: Patient has declined otezla in the past due to the side effects. Today patient wants to discuss more about the Otezla treatment and possibly start after the pandemic since he is flaring and not consistent with applying the betamethasone.

## 2020-07-20 NOTE — PROCEDURE: RECOMMENDATIONS
Detail Level: Zone
Recommendation Preamble: The following recommendations were made during the visit:
Recommendations (Free Text): Ten minutes of UV light daily

## 2020-08-28 ENCOUNTER — PATIENT MESSAGE (OUTPATIENT)
Dept: CARDIOLOGY | Facility: MEDICAL CENTER | Age: 70
End: 2020-08-28

## 2020-08-28 DIAGNOSIS — E78.5 DYSLIPIDEMIA: ICD-10-CM

## 2020-08-31 RX ORDER — ATORVASTATIN CALCIUM 40 MG/1
40 TABLET, FILM COATED ORAL DAILY
Qty: 90 TAB | Refills: 3 | Status: SHIPPED | OUTPATIENT
Start: 2020-08-31 | End: 2021-11-22

## 2020-09-08 ENCOUNTER — HOSPITAL ENCOUNTER (OUTPATIENT)
Dept: LAB | Facility: MEDICAL CENTER | Age: 70
End: 2020-09-08
Attending: INTERNAL MEDICINE
Payer: MEDICARE

## 2020-09-08 DIAGNOSIS — E78.5 DYSLIPIDEMIA: ICD-10-CM

## 2020-09-08 LAB
ALBUMIN SERPL BCP-MCNC: 4.2 G/DL (ref 3.2–4.9)
ALBUMIN/GLOB SERPL: 1.4 G/DL
ALP SERPL-CCNC: 73 U/L (ref 30–99)
ALT SERPL-CCNC: 26 U/L (ref 2–50)
ANION GAP SERPL CALC-SCNC: 11 MMOL/L (ref 7–16)
AST SERPL-CCNC: 17 U/L (ref 12–45)
BILIRUB SERPL-MCNC: 0.6 MG/DL (ref 0.1–1.5)
BUN SERPL-MCNC: 22 MG/DL (ref 8–22)
CALCIUM SERPL-MCNC: 9.1 MG/DL (ref 8.5–10.5)
CHLORIDE SERPL-SCNC: 101 MMOL/L (ref 96–112)
CHOLEST SERPL-MCNC: 127 MG/DL (ref 100–199)
CO2 SERPL-SCNC: 25 MMOL/L (ref 20–33)
CREAT SERPL-MCNC: 0.81 MG/DL (ref 0.5–1.4)
FASTING STATUS PATIENT QL REPORTED: NORMAL
GLOBULIN SER CALC-MCNC: 3.1 G/DL (ref 1.9–3.5)
GLUCOSE SERPL-MCNC: 98 MG/DL (ref 65–99)
HDLC SERPL-MCNC: 42 MG/DL
LDLC SERPL CALC-MCNC: 61 MG/DL
POTASSIUM SERPL-SCNC: 3.9 MMOL/L (ref 3.6–5.5)
PROT SERPL-MCNC: 7.3 G/DL (ref 6–8.2)
SODIUM SERPL-SCNC: 137 MMOL/L (ref 135–145)
TRIGL SERPL-MCNC: 121 MG/DL (ref 0–149)

## 2020-09-08 PROCEDURE — 80053 COMPREHEN METABOLIC PANEL: CPT

## 2020-09-08 PROCEDURE — 80061 LIPID PANEL: CPT

## 2020-09-08 PROCEDURE — 36415 COLL VENOUS BLD VENIPUNCTURE: CPT

## 2020-09-15 DIAGNOSIS — E78.5 DYSLIPIDEMIA: ICD-10-CM

## 2020-09-17 ENCOUNTER — OFFICE VISIT (OUTPATIENT)
Dept: CARDIOLOGY | Facility: MEDICAL CENTER | Age: 70
End: 2020-09-17
Payer: MEDICARE

## 2020-09-17 VITALS
HEIGHT: 65 IN | SYSTOLIC BLOOD PRESSURE: 100 MMHG | DIASTOLIC BLOOD PRESSURE: 70 MMHG | HEART RATE: 68 BPM | WEIGHT: 172.4 LBS | OXYGEN SATURATION: 97 % | BODY MASS INDEX: 28.72 KG/M2 | RESPIRATION RATE: 12 BRPM

## 2020-09-17 DIAGNOSIS — E78.5 DYSLIPIDEMIA: ICD-10-CM

## 2020-09-17 DIAGNOSIS — I65.23 ATHEROSCLEROSIS OF BOTH CAROTID ARTERIES: Chronic | ICD-10-CM

## 2020-09-17 DIAGNOSIS — Z95.5 STENTED CORONARY ARTERY: ICD-10-CM

## 2020-09-17 DIAGNOSIS — Z72.0 TOBACCO ABUSE: ICD-10-CM

## 2020-09-17 DIAGNOSIS — I10 ESSENTIAL HYPERTENSION, BENIGN: ICD-10-CM

## 2020-09-17 DIAGNOSIS — I25.10 CORONARY ARTERY DISEASE INVOLVING NATIVE CORONARY ARTERY OF NATIVE HEART WITHOUT ANGINA PECTORIS: Chronic | ICD-10-CM

## 2020-09-17 PROCEDURE — 99406 BEHAV CHNG SMOKING 3-10 MIN: CPT | Performed by: INTERNAL MEDICINE

## 2020-09-17 PROCEDURE — 99214 OFFICE O/P EST MOD 30 MIN: CPT | Mod: 25 | Performed by: INTERNAL MEDICINE

## 2020-09-17 ASSESSMENT — ENCOUNTER SYMPTOMS
RESPIRATORY NEGATIVE: 1
MUSCULOSKELETAL NEGATIVE: 1
MYALGIAS: 0
HEADACHES: 0
CONSTITUTIONAL NEGATIVE: 1
DOUBLE VISION: 0
CHILLS: 0
EYES NEGATIVE: 1
BLURRED VISION: 0
PALPITATIONS: 0
CARDIOVASCULAR NEGATIVE: 1
NAUSEA: 0
COUGH: 0
WEIGHT LOSS: 0
SHORTNESS OF BREATH: 0
WEAKNESS: 0
ABDOMINAL PAIN: 0
NEUROLOGICAL NEGATIVE: 1
VOMITING: 0
PSYCHIATRIC NEGATIVE: 1
DEPRESSION: 0
CLAUDICATION: 0
FOCAL WEAKNESS: 0
NERVOUS/ANXIOUS: 0
GASTROINTESTINAL NEGATIVE: 1
DIZZINESS: 0
FEVER: 0
BRUISES/BLEEDS EASILY: 0

## 2020-09-17 NOTE — PROGRESS NOTES
Chief Complaint   Patient presents with   • Coronary Artery Disease     follow up       Subjective:   Cornelio Branch is a 70 y.o. male who presents today for annual follow up of coronary artery disease with prior stent placement.    Since the patient's last visit on 09/11/19, he has been doing well clinically. He denies chest pain, shortness of breath, palpitations, nausea/vomiting or diaphoresis. He was walking 5 miles daily until smoke. He continues to smoke cigars, however, has reduced significantly.    Past Medical History:   Diagnosis Date   • Arthritis     fingers   • ASTHMA     uses inhaler prn   • Back pain    • CAD (coronary artery disease) 6/21/2011   • CAD (coronary artery disease)    • Carotid artery plaque 6/21/2011   • Esophageal reflux 11/3/2009   • Heart burn    • High cholesterol    • History of asthma 11/3/2009   • History of benign prostatic hypertrophy 11/3/2009   • History of cardiac catheterization 6/21/2011   • History of echocardiogram 6/20/2011   • History of neck surgery     Hardware in neck   • HLD (hyperlipidemia) 6/1/2011   • Hypertension    • Indigestion    • Previous back surgery    • Stented coronary artery 2005     Past Surgical History:   Procedure Laterality Date   • PB INJ,FORAMEN,L/S,1 LEVEL Bilateral 9/2/2016    Procedure: INJ-FORAMEN EPI LUM/SAC SNGL - L4-5;  Surgeon: Jesus Rojas M.D.;  Location: [facility] ORS;  Service: Pain Management   • PB INJ,FORAMEN,L/S,1 LEVEL  9/2/2016    Procedure: INJ-FORAMEN EPI LUM/SAC SNGL;  Surgeon: Jesus Rojas M.D.;  Location: SURGERY [facility] ORS;  Service: Pain Management   • PB FLUORSCOPIC GUIDANCE SPINAL INJECTION  9/2/2016    Procedure: FLUOROGUIDE FOR SPINAL INJ;  Surgeon: Jesus Rojas M.D.;  [place];  Service: Pain Management   • LUMBAR LAMINECTOMY DISKECTOMY  4/7/2012    Performed by MARLIN CANDELARIA at [facility]   • FORAMINOTOMY  4/7/2012    Performed by MARLIN CANDELARIA at SURGERY  [facility] ORS   • LUMBAR LAMINECTOMY DISKECTOMY  3/9/2011    Performed by MARLIN CANDELARIA at SURGERY [facility] ORS   • FORAMINOTOMY  3/9/2011    Performed by MARLIN CANDELARIA at SURGERY [facility] ORS   • ANGIOGRAM     • CERVICAL DISK AND FUSION ANTERIOR     • OTHER CARDIAC SURGERY   heart stents     Family History   Problem Relation Age of Onset   • Heart Attack Neg Hx      Social History     Socioeconomic History   • Marital status:      Spouse name: Not on file   • Number of children: Not on file   • Years of education: Not on file   • Highest education level: Not on file   Occupational History   • Not on file   Social Needs   • Financial resource strain: Not on file   • Food insecurity     Worry: Not on file     Inability: Not on file   • Transportation needs     Medical: Not on file     Non-medical: Not on file   Tobacco Use   • Smoking status: Former Smoker     Years: 18.00     Types: Cigars     Quit date: 1987     Years since quittin.4   • Smokeless tobacco: Never Used   • Tobacco comment:    Substance and Sexual Activity   • Alcohol use: Yes     Comment: 2 cocktails per week   • Drug use: No   • Sexual activity: Not on file   Lifestyle   • Physical activity     Days per week: Not on file     Minutes per session: Not on file   • Stress: Not on file   Relationships   • Social connections     Talks on phone: Not on file     Gets together: Not on file     Attends Rastafarian service: Not on file     Active member of club or organization: Not on file     Attends meetings of clubs or organizations: Not on file     Relationship status: Not on file   • Intimate partner violence     Fear of current or ex partner: Not on file     Emotionally abused: Not on file     Physically abused: Not on file     Forced sexual activity: Not on file   Other Topics Concern   • Not on file   Social History Narrative    ** Merged History Encounter **          No Known Allergies     (Medications  reviewed.)  Outpatient Encounter Medications as of 9/17/2020   Medication Sig Dispense Refill   • Apremilast (OTEZLA PO) Take 10 mg by mouth 2 Times a Day.     • MELATONIN PO Take  by mouth.     • atorvastatin (LIPITOR) 40 MG Tab Take 1 Tab by mouth every day. 90 Tab 3   • metoprolol (LOPRESSOR) 25 MG Tab Take 1 Tab by mouth 2 times a day. 180 Tab 2   • lisinopril (PRINIVIL) 20 MG Tab Take 1 Tab by mouth 2 times a day. 180 Tab 2   • ezetimibe (ZETIA) 10 MG Tab TAKE 1 TABLET DAILY 90 Tab 3   • Multiple Vitamin (MULTI-VITAMIN DAILY PO) Take  by mouth.     • Albuterol (PROVENTIL INH) Inhale  by mouth.     • calcipotriene-betamethasone (TACLONEX) ointment Apply  to affected area(s) every day.     • KRILL OIL PO Take  by mouth every day.     • Coenzyme Q10 (COQ10 PO) Take 300 mg by mouth every day.     • GLUCOSAMINE HCL PO Take 1 Cap by mouth every day.     • FLOVENT  MCG/ACT Aerosol 2 Puffs as needed.     • B Complex Vitamins (B-COMPLEX/B-12 PO) Take 1,000 Tabs by mouth every day.     • IRON PO Take 1 Tab by mouth every day.     • aspirin 81 MG tablet Take 81 mg by mouth every day.     • esomeprazole (NEXIUM) 40 MG capsule Take 80 mg by mouth BID 2 DAYS A WEEK. For acid reflux  Every other day     • amLODIPine (NORVASC) 5 MG Tab Take 5 mg by mouth every day.       No facility-administered encounter medications on file as of 9/17/2020.      Review of Systems   Constitutional: Negative.  Negative for chills, fever, malaise/fatigue and weight loss.   HENT: Negative.  Negative for hearing loss.    Eyes: Negative.  Negative for blurred vision and double vision.   Respiratory: Negative.  Negative for cough and shortness of breath.    Cardiovascular: Negative.  Negative for chest pain, palpitations, claudication and leg swelling.   Gastrointestinal: Negative.  Negative for abdominal pain, nausea and vomiting.   Genitourinary: Negative.  Negative for dysuria and urgency.   Musculoskeletal: Negative.  Negative for joint  "pain and myalgias.   Skin: Negative.  Negative for itching and rash.   Neurological: Negative.  Negative for dizziness, focal weakness, weakness and headaches.   Endo/Heme/Allergies: Negative.  Does not bruise/bleed easily.   Psychiatric/Behavioral: Negative.  Negative for depression. The patient is not nervous/anxious.         Objective:   /70 (BP Location: Left arm, Patient Position: Sitting, BP Cuff Size: Adult)   Pulse 68   Resp 12   Ht 1.638 m (5' 4.5\")   Wt 78.2 kg (172 lb 6.4 oz)   SpO2 97%   BMI 29.14 kg/m²     Physical Exam   Constitutional: He is oriented to person, place, and time. He appears well-developed and well-nourished.   HENT:   Head: Normocephalic and atraumatic.   Eyes: EOM are normal.   Neck: No JVD present.   Cardiovascular: Normal rate, regular rhythm and normal heart sounds.   Pulmonary/Chest: Effort normal and breath sounds normal.   Abdominal: Soft. Bowel sounds are normal.   No hepatosplenomegaly.   Musculoskeletal: Normal range of motion.   Lymphadenopathy:     He has no cervical adenopathy.   Neurological: He is alert and oriented to person, place, and time.   Skin: Skin is warm and dry.   Psychiatric: He has a normal mood and affect.     CARDIAC STUDIES/PROCEDURES:     CAROTID ULTRASOUND (12/01/17)  No prior study is available for comparison.  Normal bilateral carotid exam.      CAROTID ULTRASOUND (11/18/09)  Carotid ultrasound showing mild plaques of left internal carotid artery.     CARDIAC CATHETERIZATION CONCLUSIONS by Dr. Toledo (04/08/13)   1. Acute inferior wall myocardial infarction secondary to high grade in-stent stenosis in the   middle of a large segment of stented proximal right coronary artery, status post successful   stenting with a 3 mm x 15 mm Xience Xpedition stent.   2. Diffusely diseased circumflex system as described above.   3. Widely patent left anterior descending coronary artery with its proximal 2-3 cm, more diffusely   narrowed and with 2 diagonal " branches given off with high-grade disease in the first diagonal   branch, which is quite small and moderate disease in the second diagonal branch.     CARDIAC CATHETERIZATION CONCLUSIONS in [place], [place] (12/05)  Cardiac catheterization showing high grade small vessel first and second diagonal branch   stenosis, non obstructive left circumflex artery stenosis and high grade right coronary artery   stenosis treated with 3.0 x 32 mm and 3.0 x 16 mm Taxus drug eluding stents.      ECHOCARDIOGRAM CONCLUSIONS (12/01/17)  Prior echo 9-17-15. Compared to the report of the study done - there   has been no significant change.   Normal left ventricular systolic function.  Left ventricular ejection fraction is visually estimated to be 65%.  Normal diastolic function.  Mild mitral regurgitation.  Mild tricuspid regurgitation.  Estimated right ventricular systolic pressure is 35 mmHg.     ECHOCARDIOGRAM CONCLUSIONS (09/17/15)  Normal left ventricular size, wall thickness, and systolic function.   Grade I diastolic dysfunction. Ejection fraction is measured to be 63 %   by Martines's biplane 2D analysis.  Trace mitral regurgitation.   Trace aortic insufficiency.  Unable to estimate pulmonary artery pressure due to an inadequate   tricuspid regurgitant jet.  Normal aortic root diameter 3 cm.  No prior study is available for comparison.      ECHOCARDIOGRAM CONCLUSIONS (06/16/11)  Echocardiogram showing normal left ventricular systolic function, no significant valvular abnormalities.     EKG performed on (09/16/15) EKG shows normal sinus rhythm.     Laboratory results of (09/08/20) were reviewed. Cholesterol profile of 127/121/42/61 noted.  Laboratory results of (09/05/19) Cholesterol profile of 128/132/40/62 noted.  Laboratory results of (06/15/18) Cholesterol profile of 108/81/44/48 noted.  Laboratory results of (11/29/17) Cholesterol profile of 170/127/46/99 noted.  Laboratory results of (10/10/16) Cholesterol profile of  162/128/41/95 noted.  Laboratory results of (07/27/16) Cholesterol profile of 160/138/42/90 noted  Laboratory results of (09/16/15) Cholesterol profile of 141/148/38/73 noted.     MPI CONCLUSIONS (09/17/15)  Normal left ventricular perfusion with no fixed or reversible defects.   Normal left ventricular wall motion, with EF of 72%.      RENAL ULTRASOUND (11/18/09)  Unremarkable renal ultrasound with no evidence of renal artery stenosis.  No abdominal aortic aneurysm.     PET SCAN (02/15/19)  ELECTROCARDIOGRAPHIC FINDINGS:   Normal sinus rhythm.    Normal ECG response to dipyridamole infusion.    No ischemic changes noted   SCINTOGRAPHIC FINDINGS:    Normal left ventricular perfusion with stress and rest images.    There is no evidence of ischemia or scar.  GATED WALL MOTION FINDINGS:    The left ventricle wall motion is normal with stress and rest  imagings.    Measured resting ejection fraction is 65 %.       Assessment:     1. Coronary artery disease involving native coronary artery of native heart without angina pectoris     2. Stented coronary artery     3. Essential hypertension, benign     4. Dyslipidemia     5. Atherosclerosis of both carotid arteries     6. Tobacco abuse       Medical Decision Making:  Today's Assessment / Status / Plan:     1. Coronary artery disease with stent placement: He is clinically doing well without recurrence of his angina.  We will continue with current medical care including aspirin, metoprolol, lisinopril and atorvastatin.  2. Hypertension: Blood pressure is well controlled. We will continue with beta blockade therapy and ace inhibitor therapy.  3. Hyperlipidemia: He is doing well on statin and ezetimibe therapy without myalgia symptoms. We will repeat labs including fasting lipid profile.  4. Carotid artery stenosis: Clinically stable on above medical therapy.  5. Chronic tobacco use: Smoking cessation recommended with discussions of health effects and plans for over 3  minutes.  6. Additional information: His son at medical school at [employer].     We will follow up the patient in one year.     CC Paz You

## 2020-09-21 RX ORDER — EZETIMIBE 10 MG/1
10 TABLET ORAL DAILY
Qty: 60 TAB | Refills: 0 | Status: SHIPPED
Start: 2020-09-21 | End: 2021-09-15

## 2020-09-21 RX ORDER — EZETIMIBE 10 MG/1
TABLET ORAL
Qty: 90 TAB | Refills: 3 | Status: SHIPPED | OUTPATIENT
Start: 2020-09-21 | End: 2021-09-02

## 2020-10-13 ENCOUNTER — APPOINTMENT (RX ONLY)
Dept: URBAN - METROPOLITAN AREA CLINIC 22 | Facility: CLINIC | Age: 70
Setting detail: DERMATOLOGY
End: 2020-10-13

## 2020-10-13 DIAGNOSIS — L40.0 PSORIASIS VULGARIS: ICD-10-CM | Status: IMPROVED

## 2020-10-13 DIAGNOSIS — L85.3 XEROSIS CUTIS: ICD-10-CM

## 2020-10-13 PROCEDURE — ? PRESCRIPTION

## 2020-10-13 PROCEDURE — ? COUNSELING

## 2020-10-13 PROCEDURE — 99213 OFFICE O/P EST LOW 20 MIN: CPT

## 2020-10-13 PROCEDURE — ? ADDITIONAL NOTES

## 2020-10-13 RX ORDER — APREMILAST 30 MG/1
TABLET, FILM COATED ORAL
Qty: 60 | Refills: 2 | Status: CANCELLED
Stop reason: CLARIF

## 2020-10-13 ASSESSMENT — LOCATION ZONE DERM: LOCATION ZONE: TRUNK

## 2020-10-13 ASSESSMENT — LOCATION DETAILED DESCRIPTION DERM: LOCATION DETAILED: SUPERIOR THORACIC SPINE

## 2020-10-13 ASSESSMENT — LOCATION SIMPLE DESCRIPTION DERM: LOCATION SIMPLE: UPPER BACK

## 2020-10-13 NOTE — PROCEDURE: ADDITIONAL NOTES
Detail Level: Detailed
Additional Notes: Patient taking hot showers at night, advised diffusers to help relax before bed, or use moisturizing oils right after shower. Gave coupon for vanicream bath oil.

## 2020-10-13 NOTE — PROCEDURE: COUNSELING
Patient Specific Counseling (Will Not Stick From Patient To Patient): Use betamethasone ointment bid to plaques on body for 2 weeks
Detail Level: Zone
Quality 337: Tuberculosis Prevention For Psoriasis And Psoriatic Arthritis Patients On A Biological Immune Response Modifier: Patient has a documented negative annual TB screening.

## 2021-01-07 ENCOUNTER — HOSPITAL ENCOUNTER (OUTPATIENT)
Dept: LAB | Facility: MEDICAL CENTER | Age: 71
End: 2021-01-07
Attending: FAMILY MEDICINE
Payer: MEDICARE

## 2021-01-07 LAB
COVID ORDER STATUS COVID19: NORMAL
SARS-COV-2 RNA RESP QL NAA+PROBE: NOTDETECTED
SPECIMEN SOURCE: NORMAL

## 2021-01-07 PROCEDURE — U0003 INFECTIOUS AGENT DETECTION BY NUCLEIC ACID (DNA OR RNA); SEVERE ACUTE RESPIRATORY SYNDROME CORONAVIRUS 2 (SARS-COV-2) (CORONAVIRUS DISEASE [COVID-19]), AMPLIFIED PROBE TECHNIQUE, MAKING USE OF HIGH THROUGHPUT TECHNOLOGIES AS DESCRIBED BY CMS-2020-01-R: HCPCS

## 2021-01-07 PROCEDURE — U0005 INFEC AGEN DETEC AMPLI PROBE: HCPCS

## 2021-01-07 PROCEDURE — C9803 HOPD COVID-19 SPEC COLLECT: HCPCS

## 2021-01-15 DIAGNOSIS — Z23 NEED FOR VACCINATION: ICD-10-CM

## 2021-01-27 ENCOUNTER — IMMUNIZATION (OUTPATIENT)
Dept: FAMILY PLANNING/WOMEN'S HEALTH CLINIC | Facility: IMMUNIZATION CENTER | Age: 71
End: 2021-01-27
Attending: INTERNAL MEDICINE
Payer: MEDICARE

## 2021-01-27 DIAGNOSIS — Z23 ENCOUNTER FOR VACCINATION: Primary | ICD-10-CM

## 2021-01-27 DIAGNOSIS — Z23 NEED FOR VACCINATION: ICD-10-CM

## 2021-01-27 PROCEDURE — 91300 PFIZER SARS-COV-2 VACCINE: CPT

## 2021-01-27 PROCEDURE — 0001A PFIZER SARS-COV-2 VACCINE: CPT

## 2021-02-10 ENCOUNTER — APPOINTMENT (RX ONLY)
Dept: URBAN - METROPOLITAN AREA CLINIC 22 | Facility: CLINIC | Age: 71
Setting detail: DERMATOLOGY
End: 2021-02-10

## 2021-02-10 DIAGNOSIS — L40.0 PSORIASIS VULGARIS: ICD-10-CM | Status: WELL CONTROLLED

## 2021-02-10 DIAGNOSIS — L85.3 XEROSIS CUTIS: ICD-10-CM

## 2021-02-10 DIAGNOSIS — H02.84 EDEMA OF EYELID: ICD-10-CM

## 2021-02-10 PROBLEM — H02.844 EDEMA OF LEFT UPPER EYELID: Status: ACTIVE | Noted: 2021-02-10

## 2021-02-10 PROBLEM — H02.849 EDEMA OF UNSPECIFIED EYE, UNSPECIFIED EYELID: Status: ACTIVE | Noted: 2021-02-10

## 2021-02-10 PROCEDURE — ? ADDITIONAL NOTES

## 2021-02-10 PROCEDURE — 99213 OFFICE O/P EST LOW 20 MIN: CPT

## 2021-02-10 PROCEDURE — ? COUNSELING

## 2021-02-10 PROCEDURE — ? PRESCRIPTION

## 2021-02-10 PROCEDURE — ? PRESCRIPTION MEDICATION MANAGEMENT

## 2021-02-10 RX ORDER — APREMILAST 30 MG/1
TABLET, FILM COATED ORAL
Qty: 180 | Refills: 0 | Status: ERX

## 2021-02-10 RX ORDER — CALCIPOTRIENE AND BETAMETHASONE DIPROPIONATE 50; .5 UG/G; MG/G
OINTMENT TOPICAL QD
Qty: 1 | Refills: 1 | Status: ERX | COMMUNITY
Start: 2021-02-10

## 2021-02-10 RX ADMIN — CALCIPOTRIENE AND BETAMETHASONE DIPROPIONATE 1: 50; .5 OINTMENT TOPICAL at 00:00

## 2021-02-10 ASSESSMENT — LOCATION DETAILED DESCRIPTION DERM
LOCATION DETAILED: LEFT SUPRATARSAL CREASE
LOCATION DETAILED: RIGHT CENTRAL EYEBROW
LOCATION DETAILED: RIGHT SUPERIOR PARIETAL SCALP
LOCATION DETAILED: SUPERIOR THORACIC SPINE

## 2021-02-10 ASSESSMENT — LOCATION ZONE DERM
LOCATION ZONE: SCALP
LOCATION ZONE: TRUNK
LOCATION ZONE: EYELID
LOCATION ZONE: FACE

## 2021-02-10 ASSESSMENT — PGA PSORIASIS: PGA PSORIASIS 2020: MILD

## 2021-02-10 ASSESSMENT — BSA PSORIASIS: % BODY COVERED IN PSORIASIS: 5

## 2021-02-10 ASSESSMENT — LOCATION SIMPLE DESCRIPTION DERM
LOCATION SIMPLE: RIGHT EYEBROW
LOCATION SIMPLE: LEFT SUPERIOR EYELID
LOCATION SIMPLE: SCALP
LOCATION SIMPLE: UPPER BACK

## 2021-02-10 NOTE — PROCEDURE: PRESCRIPTION MEDICATION MANAGEMENT
Detail Level: Zone
Initiate Treatment: Zyrtec 10 mg daily or Allegra daily
Render In Strict Bullet Format?: No

## 2021-02-10 NOTE — PROCEDURE: ADDITIONAL NOTES
Detail Level: Detailed
Additional Notes: Patient taking hot showers at night, advised diffusers to help relax before bed, or use moisturizing oils right after shower. Gave coupon for vanicream bath oil.
Additional Notes: Advised to use clobetasol bid for 2 weeks on scalp for pruritus\\nHe has this medication at home and does not need a refill.
Render Risk Assessment In Note?: no

## 2021-02-10 NOTE — PROCEDURE: COUNSELING
Patient Specific Counseling (Will Not Stick From Patient To Patient): Use betamethasone ointment bid to plaques on body for 2 weeks
Detail Level: Zone
Quality 337: Tuberculosis Prevention For Psoriasis And Psoriatic Arthritis Patients On A Biological Immune Response Modifier: Patient has a documented negative annual TB screening.
Detail Level: Simple

## 2021-02-18 ENCOUNTER — IMMUNIZATION (OUTPATIENT)
Dept: FAMILY PLANNING/WOMEN'S HEALTH CLINIC | Facility: IMMUNIZATION CENTER | Age: 71
End: 2021-02-18
Attending: INTERNAL MEDICINE
Payer: MEDICARE

## 2021-02-18 DIAGNOSIS — Z23 ENCOUNTER FOR VACCINATION: Primary | ICD-10-CM

## 2021-02-18 PROCEDURE — 91300 PFIZER SARS-COV-2 VACCINE: CPT

## 2021-02-18 PROCEDURE — 0002A PFIZER SARS-COV-2 VACCINE: CPT

## 2021-05-12 ENCOUNTER — HOSPITAL ENCOUNTER (OUTPATIENT)
Dept: RADIOLOGY | Facility: MEDICAL CENTER | Age: 71
End: 2021-05-12
Attending: FAMILY MEDICINE
Payer: MEDICARE

## 2021-05-12 ENCOUNTER — APPOINTMENT (RX ONLY)
Dept: URBAN - METROPOLITAN AREA CLINIC 22 | Facility: CLINIC | Age: 71
Setting detail: DERMATOLOGY
End: 2021-05-12

## 2021-05-12 DIAGNOSIS — L40.0 PSORIASIS VULGARIS: ICD-10-CM | Status: INADEQUATELY CONTROLLED

## 2021-05-12 DIAGNOSIS — J45.998 OTHER ASTHMA: ICD-10-CM

## 2021-05-12 PROCEDURE — ? NARROW BAND UVB ORDER

## 2021-05-12 PROCEDURE — ? ADDITIONAL NOTES

## 2021-05-12 PROCEDURE — ? COUNSELING

## 2021-05-12 PROCEDURE — 71046 X-RAY EXAM CHEST 2 VIEWS: CPT

## 2021-05-12 PROCEDURE — 99213 OFFICE O/P EST LOW 20 MIN: CPT

## 2021-05-12 ASSESSMENT — LOCATION DETAILED DESCRIPTION DERM
LOCATION DETAILED: SUPERIOR THORACIC SPINE
LOCATION DETAILED: RIGHT SUPERIOR PARIETAL SCALP
LOCATION DETAILED: RIGHT INFERIOR MEDIAL UPPER BACK

## 2021-05-12 ASSESSMENT — LOCATION SIMPLE DESCRIPTION DERM
LOCATION SIMPLE: UPPER BACK
LOCATION SIMPLE: RIGHT UPPER BACK
LOCATION SIMPLE: SCALP

## 2021-05-12 ASSESSMENT — LOCATION ZONE DERM
LOCATION ZONE: TRUNK
LOCATION ZONE: SCALP

## 2021-05-12 NOTE — PROCEDURE: ADDITIONAL NOTES
Render Risk Assessment In Note?: no
Detail Level: Detailed
Additional Notes: Discussed possibly starting biologic medication vs otezla and nbuvb treatments \\nPatient declined biologic medication.\\nPatient opted to try the in-house nbuvb treatments 3 times a week and otezla.\\nConsent on file.

## 2021-05-12 NOTE — PROCEDURE: NARROW BAND UVB ORDER
Dose: to be determined by the phototherapy office
Frequency: TIW
Protocol: NBUVB
Consent: Written consent obtained.  The risks were reviewed with the patient including but not limited to: burn, pigmentary changes, pain, blistering, scabbing, redness, increased risk of skin cancers, and the remote possibility of scarring.
Detail Level: Zone

## 2021-05-13 ENCOUNTER — RX ONLY (OUTPATIENT)
Age: 71
Setting detail: RX ONLY
End: 2021-05-13

## 2021-05-13 RX ORDER — APREMILAST 30 MG/1
TABLET, FILM COATED ORAL
Qty: 180 | Refills: 0 | Status: ERX

## 2021-06-07 ENCOUNTER — APPOINTMENT (RX ONLY)
Dept: URBAN - METROPOLITAN AREA CLINIC 4 | Facility: CLINIC | Age: 71
Setting detail: DERMATOLOGY
End: 2021-06-07

## 2021-06-07 DIAGNOSIS — L40.0 PSORIASIS VULGARIS: ICD-10-CM

## 2021-06-07 PROCEDURE — 96900 ACTINOTHERAPY UV LIGHT: CPT

## 2021-06-07 PROCEDURE — ? PHOTOTHERAPY TREATMENT

## 2021-06-07 ASSESSMENT — LOCATION DETAILED DESCRIPTION DERM
LOCATION DETAILED: RIGHT DISTAL DORSAL FOREARM
LOCATION DETAILED: LEFT LATERAL ABDOMEN
LOCATION DETAILED: RIGHT SUPERIOR LATERAL LOWER BACK
LOCATION DETAILED: RIGHT LATERAL POSTERIOR ANKLE
LOCATION DETAILED: LEFT ANTERIOR DISTAL UPPER ARM
LOCATION DETAILED: LEFT POSTERIOR ANKLE
LOCATION DETAILED: RIGHT MEDIAL TRAPEZIAL NECK
LOCATION DETAILED: LEFT PROXIMAL DORSAL FOREARM
LOCATION DETAILED: RIGHT ANTERIOR PROXIMAL UPPER ARM

## 2021-06-07 ASSESSMENT — LOCATION SIMPLE DESCRIPTION DERM
LOCATION SIMPLE: RIGHT UPPER ARM
LOCATION SIMPLE: POSTERIOR NECK
LOCATION SIMPLE: RIGHT LOWER BACK
LOCATION SIMPLE: ABDOMEN
LOCATION SIMPLE: LEFT FOREARM
LOCATION SIMPLE: RIGHT ANKLE
LOCATION SIMPLE: LEFT ANKLE
LOCATION SIMPLE: LEFT UPPER ARM
LOCATION SIMPLE: RIGHT FOREARM

## 2021-06-07 ASSESSMENT — LOCATION ZONE DERM
LOCATION ZONE: LEG
LOCATION ZONE: ARM
LOCATION ZONE: TRUNK
LOCATION ZONE: NECK

## 2021-06-07 NOTE — PROCEDURE: PHOTOTHERAPY TREATMENT
Name Of Supervising Technician: Madonna Hurley Ma
Protocol For Photochemotherapy: Petrolatum And Broad Band Uvb: The patient received Photochemotherapy: Petrolatum and Broad Band UVB.
Protocol For Puva: The patient received PUVA.
Treatment Number: 1
Protocol For Nbuvb: The patient received NBUVB.
Detail Level: Zone
Skin Type: III
Protocol For Photochemotherapy For Severe Photoresponsive Dermatoses: Puva: The patient received Photochemotherapy for severe photoresponsive dermatoses: PUVA requiring at least 4 to 8 hours of care under direct physician supervision.
Protocol For Photochemotherapy: Mineral Oil And Nbuvb: The patient received Photochemotherapy: Mineral Oil and NBUVB (mineral oil applied to all lesions prior to phototherapy).
Protocol For Uva: The patient received UVA.
Render Post-Care In The Note: no
Protocol For Photochemotherapy: Triamcinolone Ointment And Nbuvb: The patient received Photochemotherapy: Triamcinolone and NBUVB (triamcinolone ointment applied to all lesions prior to phototherapy).
Protocol For Photochemotherapy: Mineral Oil And Broad Band Uvb: The patient received Photochemotherapy: Mineral Oil and Broad Band UVB.
Protocol For Uva1: The patient received UVA1.
Protocol For Photochemotherapy: Tar And Nbuvb (Goeckerman Treatment): The patient received Photochemotherapy: Tar and NBUVB (Goeckerman treatment).
Post-Care Instructions: I reviewed with the patient in detail post-care instructions. Patient is to wear sun protection. Patients may expect sunburn like redness, discomfort and scabbing.
Protocol For Photochemotherapy For Severe Photoresponsive Dermatoses: Tar And Broad Band Uvb (Goeckerman Treatment): The patient received Photochemotherapy for severe photoresponsive dermatoses: Tar and Broad Band UVB (Goeckerman treatment) requiring at least 4 to 8 hours of care under direct physician supervision.
Protocol For Photochemotherapy For Severe Photoresponsive Dermatoses: Tar And Nbuvb (Goeckerman Treatment): The patient received Photochemotherapy for severe photoresponsive dermatoses: Tar and NBUVB (Goeckerman treatment) requiring at least 4 to 8 hours of care under direct physician supervision.
Protocol For Bath Puva: The patient received Bath PUVA.
Protocol For Protocol For Photochemotherapy For Severe Photoresponsive Dermatoses: Bath Puva: The patient received Photochemotherapy for severe photoresponsive dermatoses: Bath PUVA requiring at least 4 to 8 hours of care under direct physician supervision.
Protocol For Photochemotherapy: Petrolatum And Nbuvb: The patient received Photochemotherapy: Petrolatum and NBUVB (petrolatum applied to all lesions prior to phototherapy).
Protocol: NBUVB
Protocol For Broad Band Uvb: The patient received Broad Band UVB.
Changes In Treatment Protocol: Initial treatment. Per protocol, started treatment time at 15 seconds.
Protocol For Photochemotherapy For Severe Photoresponsive Dermatoses: Petrolatum And Broad Band Uvb: The patient received Photochemotherapyfor severe photoresponsive dermatoses: Petrolatum and Broad Band UVB requiring at least 4 to 8 hours of care under direct physician supervision.
Protocol For Photochemotherapy For Severe Photoresponsive Dermatoses: Petrolatum And Nbuvb: The patient received Photochemotherapy for severe photoresponsive dermatoses: Petrolatum and NBUVB requiring at least 4 to 8 hours of care under direct physician supervision.
Protocol For Nb Uva: The patient received NB UVA.
Consent: Written consent obtained.  The risks were reviewed with the patient including but not limited to: burn, pigmentary changes, pain, blistering, scabbing, redness, increased risk of skin cancers, and the remote possibility of scarring.
Protocol For Photochemotherapy: Tar And Broad Band Uvb (Goeckerman Treatment): The patient received Photochemotherapy: Tar and Broad Band UVB (Goeckerman treatment).
Protocol For Photochemotherapy: Baby Oil And Nbuvb: The patient received Photochemotherapy: Baby Oil and NBUVB (baby oil applied to all lesions prior to phototherapy).
Total Treatment Time: 15 seconds

## 2021-06-09 ENCOUNTER — APPOINTMENT (RX ONLY)
Dept: URBAN - METROPOLITAN AREA CLINIC 4 | Facility: CLINIC | Age: 71
Setting detail: DERMATOLOGY
End: 2021-06-09

## 2021-06-09 DIAGNOSIS — L40.0 PSORIASIS VULGARIS: ICD-10-CM

## 2021-06-09 PROCEDURE — ? PHOTOTHERAPY TREATMENT

## 2021-06-09 PROCEDURE — 96900 ACTINOTHERAPY UV LIGHT: CPT

## 2021-06-09 ASSESSMENT — LOCATION DETAILED DESCRIPTION DERM
LOCATION DETAILED: LEFT ANTERIOR DISTAL UPPER ARM
LOCATION DETAILED: LEFT PROXIMAL DORSAL FOREARM
LOCATION DETAILED: LEFT POSTERIOR ANKLE
LOCATION DETAILED: RIGHT DISTAL DORSAL FOREARM
LOCATION DETAILED: RIGHT SUPERIOR LATERAL LOWER BACK
LOCATION DETAILED: RIGHT MEDIAL TRAPEZIAL NECK
LOCATION DETAILED: LEFT LATERAL ABDOMEN
LOCATION DETAILED: RIGHT LATERAL POSTERIOR ANKLE
LOCATION DETAILED: RIGHT ANTERIOR PROXIMAL UPPER ARM

## 2021-06-09 ASSESSMENT — LOCATION SIMPLE DESCRIPTION DERM
LOCATION SIMPLE: RIGHT UPPER ARM
LOCATION SIMPLE: LEFT ANKLE
LOCATION SIMPLE: LEFT FOREARM
LOCATION SIMPLE: ABDOMEN
LOCATION SIMPLE: RIGHT FOREARM
LOCATION SIMPLE: RIGHT ANKLE
LOCATION SIMPLE: LEFT UPPER ARM
LOCATION SIMPLE: RIGHT LOWER BACK
LOCATION SIMPLE: POSTERIOR NECK

## 2021-06-09 ASSESSMENT — LOCATION ZONE DERM
LOCATION ZONE: TRUNK
LOCATION ZONE: LEG
LOCATION ZONE: NECK
LOCATION ZONE: ARM

## 2021-06-09 NOTE — PROCEDURE: PHOTOTHERAPY TREATMENT
Name Of Supervising Technician: Madonna Hurley Ma
Protocol For Photochemotherapy: Petrolatum And Broad Band Uvb: The patient received Photochemotherapy: Petrolatum and Broad Band UVB.
Protocol For Puva: The patient received PUVA.
Treatment Number: 2
Protocol For Nbuvb: The patient received NBUVB.
Detail Level: Zone
Skin Type: III
Protocol For Photochemotherapy For Severe Photoresponsive Dermatoses: Puva: The patient received Photochemotherapy for severe photoresponsive dermatoses: PUVA requiring at least 4 to 8 hours of care under direct physician supervision.
Protocol For Photochemotherapy: Mineral Oil And Nbuvb: The patient received Photochemotherapy: Mineral Oil and NBUVB (mineral oil applied to all lesions prior to phototherapy).
Protocol For Uva: The patient received UVA.
Render Post-Care In The Note: no
Protocol For Photochemotherapy: Triamcinolone Ointment And Nbuvb: The patient received Photochemotherapy: Triamcinolone and NBUVB (triamcinolone ointment applied to all lesions prior to phototherapy).
Protocol For Photochemotherapy: Mineral Oil And Broad Band Uvb: The patient received Photochemotherapy: Mineral Oil and Broad Band UVB.
Protocol For Uva1: The patient received UVA1.
Protocol For Photochemotherapy: Tar And Nbuvb (Goeckerman Treatment): The patient received Photochemotherapy: Tar and NBUVB (Goeckerman treatment).
Post-Care Instructions: I reviewed with the patient in detail post-care instructions. Patient is to wear sun protection. Patients may expect sunburn like redness, discomfort and scabbing.
Protocol For Photochemotherapy For Severe Photoresponsive Dermatoses: Tar And Broad Band Uvb (Goeckerman Treatment): The patient received Photochemotherapy for severe photoresponsive dermatoses: Tar and Broad Band UVB (Goeckerman treatment) requiring at least 4 to 8 hours of care under direct physician supervision.
Protocol For Photochemotherapy For Severe Photoresponsive Dermatoses: Tar And Nbuvb (Goeckerman Treatment): The patient received Photochemotherapy for severe photoresponsive dermatoses: Tar and NBUVB (Goeckerman treatment) requiring at least 4 to 8 hours of care under direct physician supervision.
Comments On Previous Treatment: Patient denies any redness or discomfort from previous treatment.
Protocol For Bath Puva: The patient received Bath PUVA.
Protocol For Protocol For Photochemotherapy For Severe Photoresponsive Dermatoses: Bath Puva: The patient received Photochemotherapy for severe photoresponsive dermatoses: Bath PUVA requiring at least 4 to 8 hours of care under direct physician supervision.
Protocol For Photochemotherapy: Petrolatum And Nbuvb: The patient received Photochemotherapy: Petrolatum and NBUVB (petrolatum applied to all lesions prior to phototherapy).
Protocol: NBUVB
Protocol For Broad Band Uvb: The patient received Broad Band UVB.
Changes In Treatment Protocol: Per protocol, increased treatment time by 15 seconds.
Protocol For Photochemotherapy For Severe Photoresponsive Dermatoses: Petrolatum And Broad Band Uvb: The patient received Photochemotherapyfor severe photoresponsive dermatoses: Petrolatum and Broad Band UVB requiring at least 4 to 8 hours of care under direct physician supervision.
Protocol For Photochemotherapy For Severe Photoresponsive Dermatoses: Petrolatum And Nbuvb: The patient received Photochemotherapy for severe photoresponsive dermatoses: Petrolatum and NBUVB requiring at least 4 to 8 hours of care under direct physician supervision.
Protocol For Nb Uva: The patient received NB UVA.
Consent: Written consent obtained.  The risks were reviewed with the patient including but not limited to: burn, pigmentary changes, pain, blistering, scabbing, redness, increased risk of skin cancers, and the remote possibility of scarring.
Protocol For Photochemotherapy: Tar And Broad Band Uvb (Goeckerman Treatment): The patient received Photochemotherapy: Tar and Broad Band UVB (Goeckerman treatment).
Protocol For Photochemotherapy: Baby Oil And Nbuvb: The patient received Photochemotherapy: Baby Oil and NBUVB (baby oil applied to all lesions prior to phototherapy).
Total Treatment Time: 30 seconds

## 2021-06-19 DIAGNOSIS — I10 ESSENTIAL HYPERTENSION, BENIGN: ICD-10-CM

## 2021-06-21 ENCOUNTER — APPOINTMENT (RX ONLY)
Dept: URBAN - METROPOLITAN AREA CLINIC 4 | Facility: CLINIC | Age: 71
Setting detail: DERMATOLOGY
End: 2021-06-21

## 2021-06-21 DIAGNOSIS — L40.0 PSORIASIS VULGARIS: ICD-10-CM

## 2021-06-21 PROCEDURE — ? PHOTOTHERAPY TREATMENT

## 2021-06-21 PROCEDURE — 96900 ACTINOTHERAPY UV LIGHT: CPT

## 2021-06-21 ASSESSMENT — LOCATION DETAILED DESCRIPTION DERM
LOCATION DETAILED: RIGHT DISTAL DORSAL FOREARM
LOCATION DETAILED: LEFT LATERAL ABDOMEN
LOCATION DETAILED: LEFT POSTERIOR ANKLE
LOCATION DETAILED: RIGHT MEDIAL TRAPEZIAL NECK
LOCATION DETAILED: LEFT ANTERIOR DISTAL UPPER ARM
LOCATION DETAILED: RIGHT LATERAL POSTERIOR ANKLE
LOCATION DETAILED: LEFT PROXIMAL DORSAL FOREARM
LOCATION DETAILED: RIGHT SUPERIOR LATERAL LOWER BACK
LOCATION DETAILED: RIGHT ANTERIOR PROXIMAL UPPER ARM

## 2021-06-21 ASSESSMENT — LOCATION SIMPLE DESCRIPTION DERM
LOCATION SIMPLE: RIGHT FOREARM
LOCATION SIMPLE: POSTERIOR NECK
LOCATION SIMPLE: ABDOMEN
LOCATION SIMPLE: RIGHT ANKLE
LOCATION SIMPLE: LEFT UPPER ARM
LOCATION SIMPLE: RIGHT UPPER ARM
LOCATION SIMPLE: RIGHT LOWER BACK
LOCATION SIMPLE: LEFT ANKLE
LOCATION SIMPLE: LEFT FOREARM

## 2021-06-21 ASSESSMENT — LOCATION ZONE DERM
LOCATION ZONE: TRUNK
LOCATION ZONE: NECK
LOCATION ZONE: ARM
LOCATION ZONE: LEG

## 2021-06-21 NOTE — PROCEDURE: PHOTOTHERAPY TREATMENT
Name Of Supervising Technician: Sravanthi Vega Ma
Protocol For Photochemotherapy: Petrolatum And Broad Band Uvb: The patient received Photochemotherapy: Petrolatum and Broad Band UVB.
Protocol For Puva: The patient received PUVA.
Treatment Number: 3
Protocol For Nbuvb: The patient received NBUVB.
Detail Level: Zone
Skin Type: III
Protocol For Photochemotherapy For Severe Photoresponsive Dermatoses: Puva: The patient received Photochemotherapy for severe photoresponsive dermatoses: PUVA requiring at least 4 to 8 hours of care under direct physician supervision.
Protocol For Photochemotherapy: Mineral Oil And Nbuvb: The patient received Photochemotherapy: Mineral Oil and NBUVB (mineral oil applied to all lesions prior to phototherapy).
Protocol For Uva: The patient received UVA.
Render Post-Care In The Note: no
Protocol For Photochemotherapy: Triamcinolone Ointment And Nbuvb: The patient received Photochemotherapy: Triamcinolone and NBUVB (triamcinolone ointment applied to all lesions prior to phototherapy).
Protocol For Photochemotherapy: Mineral Oil And Broad Band Uvb: The patient received Photochemotherapy: Mineral Oil and Broad Band UVB.
Protocol For Uva1: The patient received UVA1.
Protocol For Photochemotherapy: Tar And Nbuvb (Goeckerman Treatment): The patient received Photochemotherapy: Tar and NBUVB (Goeckerman treatment).
Post-Care Instructions: I reviewed with the patient in detail post-care instructions. Patient is to wear sun protection. Patients may expect sunburn like redness, discomfort and scabbing.
Protocol For Photochemotherapy For Severe Photoresponsive Dermatoses: Tar And Broad Band Uvb (Goeckerman Treatment): The patient received Photochemotherapy for severe photoresponsive dermatoses: Tar and Broad Band UVB (Goeckerman treatment) requiring at least 4 to 8 hours of care under direct physician supervision.
Protocol For Photochemotherapy For Severe Photoresponsive Dermatoses: Tar And Nbuvb (Goeckerman Treatment): The patient received Photochemotherapy for severe photoresponsive dermatoses: Tar and NBUVB (Goeckerman treatment) requiring at least 4 to 8 hours of care under direct physician supervision.
Comments On Previous Treatment: Patient denies any redness or discomfort from previous treatment.
Protocol For Bath Puva: The patient received Bath PUVA.
Protocol For Protocol For Photochemotherapy For Severe Photoresponsive Dermatoses: Bath Puva: The patient received Photochemotherapy for severe photoresponsive dermatoses: Bath PUVA requiring at least 4 to 8 hours of care under direct physician supervision.
Protocol For Photochemotherapy: Petrolatum And Nbuvb: The patient received Photochemotherapy: Petrolatum and NBUVB (petrolatum applied to all lesions prior to phototherapy).
Protocol: NBUVB
Protocol For Broad Band Uvb: The patient received Broad Band UVB.
Changes In Treatment Protocol: Per protocol and patient has not been seen for treatment since 6/9/21 patient treatment time decreased to 15 seconds. May increase by 15 seconds at next visit if applicable.
Protocol For Photochemotherapy For Severe Photoresponsive Dermatoses: Petrolatum And Broad Band Uvb: The patient received Photochemotherapyfor severe photoresponsive dermatoses: Petrolatum and Broad Band UVB requiring at least 4 to 8 hours of care under direct physician supervision.
Protocol For Photochemotherapy For Severe Photoresponsive Dermatoses: Petrolatum And Nbuvb: The patient received Photochemotherapy for severe photoresponsive dermatoses: Petrolatum and NBUVB requiring at least 4 to 8 hours of care under direct physician supervision.
Protocol For Nb Uva: The patient received NB UVA.
Consent: Written consent obtained.  The risks were reviewed with the patient including but not limited to: burn, pigmentary changes, pain, blistering, scabbing, redness, increased risk of skin cancers, and the remote possibility of scarring.
Protocol For Photochemotherapy: Tar And Broad Band Uvb (Goeckerman Treatment): The patient received Photochemotherapy: Tar and Broad Band UVB (Goeckerman treatment).
Protocol For Photochemotherapy: Baby Oil And Nbuvb: The patient received Photochemotherapy: Baby Oil and NBUVB (baby oil applied to all lesions prior to phototherapy).
Total Treatment Time: 15 seconds

## 2021-06-23 ENCOUNTER — APPOINTMENT (RX ONLY)
Dept: URBAN - METROPOLITAN AREA CLINIC 4 | Facility: CLINIC | Age: 71
Setting detail: DERMATOLOGY
End: 2021-06-23

## 2021-06-23 DIAGNOSIS — L40.0 PSORIASIS VULGARIS: ICD-10-CM

## 2021-06-23 PROCEDURE — ? PHOTOTHERAPY TREATMENT

## 2021-06-23 PROCEDURE — 96900 ACTINOTHERAPY UV LIGHT: CPT

## 2021-06-23 ASSESSMENT — LOCATION ZONE DERM
LOCATION ZONE: ARM
LOCATION ZONE: NECK
LOCATION ZONE: LEG
LOCATION ZONE: TRUNK

## 2021-06-23 ASSESSMENT — LOCATION SIMPLE DESCRIPTION DERM
LOCATION SIMPLE: RIGHT LOWER BACK
LOCATION SIMPLE: RIGHT UPPER ARM
LOCATION SIMPLE: POSTERIOR NECK
LOCATION SIMPLE: LEFT ANKLE
LOCATION SIMPLE: RIGHT FOREARM
LOCATION SIMPLE: LEFT UPPER ARM
LOCATION SIMPLE: RIGHT ANKLE
LOCATION SIMPLE: ABDOMEN
LOCATION SIMPLE: LEFT FOREARM

## 2021-06-23 ASSESSMENT — LOCATION DETAILED DESCRIPTION DERM
LOCATION DETAILED: RIGHT DISTAL DORSAL FOREARM
LOCATION DETAILED: LEFT LATERAL ABDOMEN
LOCATION DETAILED: LEFT POSTERIOR ANKLE
LOCATION DETAILED: LEFT PROXIMAL DORSAL FOREARM
LOCATION DETAILED: RIGHT LATERAL POSTERIOR ANKLE
LOCATION DETAILED: RIGHT MEDIAL TRAPEZIAL NECK
LOCATION DETAILED: LEFT ANTERIOR DISTAL UPPER ARM
LOCATION DETAILED: RIGHT SUPERIOR LATERAL LOWER BACK
LOCATION DETAILED: RIGHT ANTERIOR PROXIMAL UPPER ARM

## 2021-06-23 NOTE — PROCEDURE: PHOTOTHERAPY TREATMENT
Name Of Supervising Technician: Greta Jensen Ma
Protocol For Photochemotherapy: Petrolatum And Broad Band Uvb: The patient received Photochemotherapy: Petrolatum and Broad Band UVB.
Protocol For Puva: The patient received PUVA.
Treatment Number: 4
Protocol For Nbuvb: The patient received NBUVB.
Detail Level: Zone
Skin Type: III
Protocol For Photochemotherapy For Severe Photoresponsive Dermatoses: Puva: The patient received Photochemotherapy for severe photoresponsive dermatoses: PUVA requiring at least 4 to 8 hours of care under direct physician supervision.
Protocol For Photochemotherapy: Mineral Oil And Nbuvb: The patient received Photochemotherapy: Mineral Oil and NBUVB (mineral oil applied to all lesions prior to phototherapy).
Protocol For Uva: The patient received UVA.
Render Post-Care In The Note: no
Protocol For Photochemotherapy: Triamcinolone Ointment And Nbuvb: The patient received Photochemotherapy: Triamcinolone and NBUVB (triamcinolone ointment applied to all lesions prior to phototherapy).
Protocol For Photochemotherapy: Mineral Oil And Broad Band Uvb: The patient received Photochemotherapy: Mineral Oil and Broad Band UVB.
Protocol For Uva1: The patient received UVA1.
Protocol For Photochemotherapy: Tar And Nbuvb (Goeckerman Treatment): The patient received Photochemotherapy: Tar and NBUVB (Goeckerman treatment).
Post-Care Instructions: I reviewed with the patient in detail post-care instructions. Patient is to wear sun protection. Patients may expect sunburn like redness, discomfort and scabbing.
Protocol For Photochemotherapy For Severe Photoresponsive Dermatoses: Tar And Broad Band Uvb (Goeckerman Treatment): The patient received Photochemotherapy for severe photoresponsive dermatoses: Tar and Broad Band UVB (Goeckerman treatment) requiring at least 4 to 8 hours of care under direct physician supervision.
Protocol For Photochemotherapy For Severe Photoresponsive Dermatoses: Tar And Nbuvb (Goeckerman Treatment): The patient received Photochemotherapy for severe photoresponsive dermatoses: Tar and NBUVB (Goeckerman treatment) requiring at least 4 to 8 hours of care under direct physician supervision.
Comments On Previous Treatment: Patient denies any redness or discomfort from previous treatment.
Protocol For Bath Puva: The patient received Bath PUVA.
Protocol For Protocol For Photochemotherapy For Severe Photoresponsive Dermatoses: Bath Puva: The patient received Photochemotherapy for severe photoresponsive dermatoses: Bath PUVA requiring at least 4 to 8 hours of care under direct physician supervision.
Protocol For Photochemotherapy: Petrolatum And Nbuvb: The patient received Photochemotherapy: Petrolatum and NBUVB (petrolatum applied to all lesions prior to phototherapy).
Protocol: NBUVB
Protocol For Broad Band Uvb: The patient received Broad Band UVB.
Changes In Treatment Protocol: Per protocol, increased by 15 seconds.
Protocol For Photochemotherapy For Severe Photoresponsive Dermatoses: Petrolatum And Broad Band Uvb: The patient received Photochemotherapyfor severe photoresponsive dermatoses: Petrolatum and Broad Band UVB requiring at least 4 to 8 hours of care under direct physician supervision.
Protocol For Photochemotherapy For Severe Photoresponsive Dermatoses: Petrolatum And Nbuvb: The patient received Photochemotherapy for severe photoresponsive dermatoses: Petrolatum and NBUVB requiring at least 4 to 8 hours of care under direct physician supervision.
Protocol For Nb Uva: The patient received NB UVA.
Consent: Written consent obtained.  The risks were reviewed with the patient including but not limited to: burn, pigmentary changes, pain, blistering, scabbing, redness, increased risk of skin cancers, and the remote possibility of scarring.
Protocol For Photochemotherapy: Tar And Broad Band Uvb (Goeckerman Treatment): The patient received Photochemotherapy: Tar and Broad Band UVB (Goeckerman treatment).
Protocol For Photochemotherapy: Baby Oil And Nbuvb: The patient received Photochemotherapy: Baby Oil and NBUVB (baby oil applied to all lesions prior to phototherapy).
Total Treatment Time: 30 seconds

## 2021-06-25 ENCOUNTER — APPOINTMENT (RX ONLY)
Dept: URBAN - METROPOLITAN AREA CLINIC 4 | Facility: CLINIC | Age: 71
Setting detail: DERMATOLOGY
End: 2021-06-25

## 2021-06-25 DIAGNOSIS — L40.0 PSORIASIS VULGARIS: ICD-10-CM

## 2021-06-25 PROCEDURE — ? PHOTOTHERAPY TREATMENT

## 2021-06-25 PROCEDURE — 96900 ACTINOTHERAPY UV LIGHT: CPT

## 2021-06-25 ASSESSMENT — LOCATION DETAILED DESCRIPTION DERM
LOCATION DETAILED: LEFT PROXIMAL DORSAL FOREARM
LOCATION DETAILED: LEFT LATERAL ABDOMEN
LOCATION DETAILED: RIGHT DISTAL DORSAL FOREARM
LOCATION DETAILED: RIGHT SUPERIOR LATERAL LOWER BACK
LOCATION DETAILED: LEFT ANTERIOR DISTAL UPPER ARM
LOCATION DETAILED: LEFT POSTERIOR ANKLE
LOCATION DETAILED: RIGHT LATERAL POSTERIOR ANKLE
LOCATION DETAILED: RIGHT ANTERIOR PROXIMAL UPPER ARM
LOCATION DETAILED: RIGHT MEDIAL TRAPEZIAL NECK

## 2021-06-25 ASSESSMENT — LOCATION SIMPLE DESCRIPTION DERM
LOCATION SIMPLE: RIGHT UPPER ARM
LOCATION SIMPLE: RIGHT LOWER BACK
LOCATION SIMPLE: ABDOMEN
LOCATION SIMPLE: LEFT UPPER ARM
LOCATION SIMPLE: RIGHT FOREARM
LOCATION SIMPLE: POSTERIOR NECK
LOCATION SIMPLE: LEFT FOREARM
LOCATION SIMPLE: LEFT ANKLE
LOCATION SIMPLE: RIGHT ANKLE

## 2021-06-25 ASSESSMENT — LOCATION ZONE DERM
LOCATION ZONE: LEG
LOCATION ZONE: ARM
LOCATION ZONE: NECK
LOCATION ZONE: TRUNK

## 2021-06-25 NOTE — PROCEDURE: PHOTOTHERAPY TREATMENT
Name Of Supervising Technician: Angelica Briones MA
Protocol For Photochemotherapy: Petrolatum And Broad Band Uvb: The patient received Photochemotherapy: Petrolatum and Broad Band UVB.
Protocol For Puva: The patient received PUVA.
Treatment Number: 5
Protocol For Nbuvb: The patient received NBUVB.
Detail Level: Zone
Skin Type: III
Protocol For Photochemotherapy For Severe Photoresponsive Dermatoses: Puva: The patient received Photochemotherapy for severe photoresponsive dermatoses: PUVA requiring at least 4 to 8 hours of care under direct physician supervision.
Protocol For Photochemotherapy: Mineral Oil And Nbuvb: The patient received Photochemotherapy: Mineral Oil and NBUVB (mineral oil applied to all lesions prior to phototherapy).
Protocol For Uva: The patient received UVA.
Render Post-Care In The Note: no
Protocol For Photochemotherapy: Triamcinolone Ointment And Nbuvb: The patient received Photochemotherapy: Triamcinolone and NBUVB (triamcinolone ointment applied to all lesions prior to phototherapy).
Protocol For Photochemotherapy: Mineral Oil And Broad Band Uvb: The patient received Photochemotherapy: Mineral Oil and Broad Band UVB.
Protocol For Uva1: The patient received UVA1.
Protocol For Photochemotherapy: Tar And Nbuvb (Goeckerman Treatment): The patient received Photochemotherapy: Tar and NBUVB (Goeckerman treatment).
Post-Care Instructions: I reviewed with the patient in detail post-care instructions. Patient is to wear sun protection. Patients may expect sunburn like redness, discomfort and scabbing.
Protocol For Photochemotherapy For Severe Photoresponsive Dermatoses: Tar And Broad Band Uvb (Goeckerman Treatment): The patient received Photochemotherapy for severe photoresponsive dermatoses: Tar and Broad Band UVB (Goeckerman treatment) requiring at least 4 to 8 hours of care under direct physician supervision.
Protocol For Photochemotherapy For Severe Photoresponsive Dermatoses: Tar And Nbuvb (Goeckerman Treatment): The patient received Photochemotherapy for severe photoresponsive dermatoses: Tar and NBUVB (Goeckerman treatment) requiring at least 4 to 8 hours of care under direct physician supervision.
Comments On Previous Treatment: Patient denies any redness or discomfort from previous treatment.
Protocol For Bath Puva: The patient received Bath PUVA.
Protocol For Protocol For Photochemotherapy For Severe Photoresponsive Dermatoses: Bath Puva: The patient received Photochemotherapy for severe photoresponsive dermatoses: Bath PUVA requiring at least 4 to 8 hours of care under direct physician supervision.
Protocol For Photochemotherapy: Petrolatum And Nbuvb: The patient received Photochemotherapy: Petrolatum and NBUVB (petrolatum applied to all lesions prior to phototherapy).
Protocol: NBUVB
Protocol For Broad Band Uvb: The patient received Broad Band UVB.
Changes In Treatment Protocol: Per protocol, increased by 15 seconds.
Protocol For Photochemotherapy For Severe Photoresponsive Dermatoses: Petrolatum And Broad Band Uvb: The patient received Photochemotherapyfor severe photoresponsive dermatoses: Petrolatum and Broad Band UVB requiring at least 4 to 8 hours of care under direct physician supervision.
Protocol For Photochemotherapy For Severe Photoresponsive Dermatoses: Petrolatum And Nbuvb: The patient received Photochemotherapy for severe photoresponsive dermatoses: Petrolatum and NBUVB requiring at least 4 to 8 hours of care under direct physician supervision.
Protocol For Nb Uva: The patient received NB UVA.
Consent: Written consent obtained.  The risks were reviewed with the patient including but not limited to: burn, pigmentary changes, pain, blistering, scabbing, redness, increased risk of skin cancers, and the remote possibility of scarring.
Protocol For Photochemotherapy: Tar And Broad Band Uvb (Goeckerman Treatment): The patient received Photochemotherapy: Tar and Broad Band UVB (Goeckerman treatment).
Protocol For Photochemotherapy: Baby Oil And Nbuvb: The patient received Photochemotherapy: Baby Oil and NBUVB (baby oil applied to all lesions prior to phototherapy).
Total Treatment Time: 45 seconds

## 2021-06-28 ENCOUNTER — APPOINTMENT (RX ONLY)
Dept: URBAN - METROPOLITAN AREA CLINIC 4 | Facility: CLINIC | Age: 71
Setting detail: DERMATOLOGY
End: 2021-06-28

## 2021-06-28 DIAGNOSIS — L40.0 PSORIASIS VULGARIS: ICD-10-CM

## 2021-06-28 PROCEDURE — 96900 ACTINOTHERAPY UV LIGHT: CPT

## 2021-06-28 PROCEDURE — ? PHOTOTHERAPY TREATMENT

## 2021-06-28 ASSESSMENT — LOCATION DETAILED DESCRIPTION DERM
LOCATION DETAILED: LEFT POSTERIOR ANKLE
LOCATION DETAILED: RIGHT MEDIAL TRAPEZIAL NECK
LOCATION DETAILED: LEFT PROXIMAL DORSAL FOREARM
LOCATION DETAILED: RIGHT SUPERIOR LATERAL LOWER BACK
LOCATION DETAILED: RIGHT DISTAL DORSAL FOREARM
LOCATION DETAILED: LEFT ANTERIOR DISTAL UPPER ARM
LOCATION DETAILED: LEFT LATERAL ABDOMEN
LOCATION DETAILED: RIGHT LATERAL POSTERIOR ANKLE
LOCATION DETAILED: RIGHT ANTERIOR PROXIMAL UPPER ARM

## 2021-06-28 ASSESSMENT — LOCATION SIMPLE DESCRIPTION DERM
LOCATION SIMPLE: POSTERIOR NECK
LOCATION SIMPLE: LEFT ANKLE
LOCATION SIMPLE: RIGHT ANKLE
LOCATION SIMPLE: ABDOMEN
LOCATION SIMPLE: RIGHT FOREARM
LOCATION SIMPLE: RIGHT UPPER ARM
LOCATION SIMPLE: LEFT FOREARM
LOCATION SIMPLE: RIGHT LOWER BACK
LOCATION SIMPLE: LEFT UPPER ARM

## 2021-06-28 ASSESSMENT — LOCATION ZONE DERM
LOCATION ZONE: LEG
LOCATION ZONE: TRUNK
LOCATION ZONE: ARM
LOCATION ZONE: NECK

## 2021-06-28 NOTE — PROCEDURE: PHOTOTHERAPY TREATMENT
Name Of Supervising Technician: Angelica Briones MA
Protocol For Photochemotherapy: Petrolatum And Broad Band Uvb: The patient received Photochemotherapy: Petrolatum and Broad Band UVB.
Protocol For Puva: The patient received PUVA.
Treatment Number: 6
Protocol For Nbuvb: The patient received NBUVB.
Detail Level: Zone
Skin Type: III
Protocol For Photochemotherapy For Severe Photoresponsive Dermatoses: Puva: The patient received Photochemotherapy for severe photoresponsive dermatoses: PUVA requiring at least 4 to 8 hours of care under direct physician supervision.
Protocol For Photochemotherapy: Mineral Oil And Nbuvb: The patient received Photochemotherapy: Mineral Oil and NBUVB (mineral oil applied to all lesions prior to phototherapy).
Protocol For Uva: The patient received UVA.
Render Post-Care In The Note: no
Protocol For Photochemotherapy: Triamcinolone Ointment And Nbuvb: The patient received Photochemotherapy: Triamcinolone and NBUVB (triamcinolone ointment applied to all lesions prior to phototherapy).
Protocol For Photochemotherapy: Mineral Oil And Broad Band Uvb: The patient received Photochemotherapy: Mineral Oil and Broad Band UVB.
Protocol For Uva1: The patient received UVA1.
Protocol For Photochemotherapy: Tar And Nbuvb (Goeckerman Treatment): The patient received Photochemotherapy: Tar and NBUVB (Goeckerman treatment).
Post-Care Instructions: I reviewed with the patient in detail post-care instructions. Patient is to wear sun protection. Patients may expect sunburn like redness, discomfort and scabbing.
Protocol For Photochemotherapy For Severe Photoresponsive Dermatoses: Tar And Broad Band Uvb (Goeckerman Treatment): The patient received Photochemotherapy for severe photoresponsive dermatoses: Tar and Broad Band UVB (Goeckerman treatment) requiring at least 4 to 8 hours of care under direct physician supervision.
Protocol For Photochemotherapy For Severe Photoresponsive Dermatoses: Tar And Nbuvb (Goeckerman Treatment): The patient received Photochemotherapy for severe photoresponsive dermatoses: Tar and NBUVB (Goeckerman treatment) requiring at least 4 to 8 hours of care under direct physician supervision.
Comments On Previous Treatment: Patient denies any redness or discomfort from previous treatment.
Protocol For Bath Puva: The patient received Bath PUVA.
Protocol For Protocol For Photochemotherapy For Severe Photoresponsive Dermatoses: Bath Puva: The patient received Photochemotherapy for severe photoresponsive dermatoses: Bath PUVA requiring at least 4 to 8 hours of care under direct physician supervision.
Protocol For Photochemotherapy: Petrolatum And Nbuvb: The patient received Photochemotherapy: Petrolatum and NBUVB (petrolatum applied to all lesions prior to phototherapy).
Protocol: NBUVB
Protocol For Broad Band Uvb: The patient received Broad Band UVB.
Changes In Treatment Protocol: Per protocol, increased by 15 seconds.
Protocol For Photochemotherapy For Severe Photoresponsive Dermatoses: Petrolatum And Broad Band Uvb: The patient received Photochemotherapyfor severe photoresponsive dermatoses: Petrolatum and Broad Band UVB requiring at least 4 to 8 hours of care under direct physician supervision.
Protocol For Photochemotherapy For Severe Photoresponsive Dermatoses: Petrolatum And Nbuvb: The patient received Photochemotherapy for severe photoresponsive dermatoses: Petrolatum and NBUVB requiring at least 4 to 8 hours of care under direct physician supervision.
Protocol For Nb Uva: The patient received NB UVA.
Consent: Written consent obtained.  The risks were reviewed with the patient including but not limited to: burn, pigmentary changes, pain, blistering, scabbing, redness, increased risk of skin cancers, and the remote possibility of scarring.
Protocol For Photochemotherapy: Tar And Broad Band Uvb (Goeckerman Treatment): The patient received Photochemotherapy: Tar and Broad Band UVB (Goeckerman treatment).
Protocol For Photochemotherapy: Baby Oil And Nbuvb: The patient received Photochemotherapy: Baby Oil and NBUVB (baby oil applied to all lesions prior to phototherapy).
Total Treatment Time: 1 minute 00 seconds

## 2021-06-29 ENCOUNTER — HOSPITAL ENCOUNTER (OUTPATIENT)
Dept: LAB | Facility: MEDICAL CENTER | Age: 71
End: 2021-06-29
Attending: FAMILY MEDICINE
Payer: MEDICARE

## 2021-06-29 LAB
ALBUMIN SERPL BCP-MCNC: 4.2 G/DL (ref 3.2–4.9)
ALBUMIN/GLOB SERPL: 1.3 G/DL
ALP SERPL-CCNC: 68 U/L (ref 30–99)
ALT SERPL-CCNC: 33 U/L (ref 2–50)
ANION GAP SERPL CALC-SCNC: 9 MMOL/L (ref 7–16)
APPEARANCE UR: CLEAR
AST SERPL-CCNC: 26 U/L (ref 12–45)
BASOPHILS # BLD AUTO: 0.7 % (ref 0–1.8)
BASOPHILS # BLD: 0.07 K/UL (ref 0–0.12)
BILIRUB SERPL-MCNC: 0.5 MG/DL (ref 0.1–1.5)
BILIRUB UR QL STRIP.AUTO: NEGATIVE
BUN SERPL-MCNC: 29 MG/DL (ref 8–22)
CALCIUM SERPL-MCNC: 9 MG/DL (ref 8.5–10.5)
CHLORIDE SERPL-SCNC: 105 MMOL/L (ref 96–112)
CHOLEST SERPL-MCNC: 135 MG/DL (ref 100–199)
CO2 SERPL-SCNC: 26 MMOL/L (ref 20–33)
COLOR UR: YELLOW
CREAT SERPL-MCNC: 0.89 MG/DL (ref 0.5–1.4)
CREAT UR-MCNC: 171.38 MG/DL
EOSINOPHIL # BLD AUTO: 0.31 K/UL (ref 0–0.51)
EOSINOPHIL NFR BLD: 2.9 % (ref 0–6.9)
ERYTHROCYTE [DISTWIDTH] IN BLOOD BY AUTOMATED COUNT: 48.1 FL (ref 35.9–50)
EST. AVERAGE GLUCOSE BLD GHB EST-MCNC: 128 MG/DL
FASTING STATUS PATIENT QL REPORTED: NORMAL
GLOBULIN SER CALC-MCNC: 3.3 G/DL (ref 1.9–3.5)
GLUCOSE SERPL-MCNC: 100 MG/DL (ref 65–99)
GLUCOSE UR STRIP.AUTO-MCNC: NEGATIVE MG/DL
HBA1C MFR BLD: 6.1 % (ref 4–5.6)
HCT VFR BLD AUTO: 49.5 % (ref 42–52)
HDLC SERPL-MCNC: 43 MG/DL
HGB BLD-MCNC: 16.4 G/DL (ref 14–18)
IMM GRANULOCYTES # BLD AUTO: 0.03 K/UL (ref 0–0.11)
IMM GRANULOCYTES NFR BLD AUTO: 0.3 % (ref 0–0.9)
KETONES UR STRIP.AUTO-MCNC: NEGATIVE MG/DL
LDLC SERPL CALC-MCNC: 73 MG/DL
LEUKOCYTE ESTERASE UR QL STRIP.AUTO: NEGATIVE
LYMPHOCYTES # BLD AUTO: 3.81 K/UL (ref 1–4.8)
LYMPHOCYTES NFR BLD: 36 % (ref 22–41)
MCH RBC QN AUTO: 31.7 PG (ref 27–33)
MCHC RBC AUTO-ENTMCNC: 33.1 G/DL (ref 33.7–35.3)
MCV RBC AUTO: 95.6 FL (ref 81.4–97.8)
MICRO URNS: NORMAL
MICROALBUMIN UR-MCNC: 1.2 MG/DL
MICROALBUMIN/CREAT UR: 7 MG/G (ref 0–30)
MONOCYTES # BLD AUTO: 0.73 K/UL (ref 0–0.85)
MONOCYTES NFR BLD AUTO: 6.9 % (ref 0–13.4)
NEUTROPHILS # BLD AUTO: 5.64 K/UL (ref 1.82–7.42)
NEUTROPHILS NFR BLD: 53.2 % (ref 44–72)
NITRITE UR QL STRIP.AUTO: NEGATIVE
NRBC # BLD AUTO: 0 K/UL
NRBC BLD-RTO: 0 /100 WBC
PH UR STRIP.AUTO: 6 [PH] (ref 5–8)
PLATELET # BLD AUTO: 297 K/UL (ref 164–446)
PMV BLD AUTO: 9.5 FL (ref 9–12.9)
POTASSIUM SERPL-SCNC: 4.4 MMOL/L (ref 3.6–5.5)
PROT SERPL-MCNC: 7.5 G/DL (ref 6–8.2)
PROT UR QL STRIP: NEGATIVE MG/DL
PSA SERPL-MCNC: 2.02 NG/ML (ref 0–4)
RBC # BLD AUTO: 5.18 M/UL (ref 4.7–6.1)
RBC UR QL AUTO: NEGATIVE
SODIUM SERPL-SCNC: 140 MMOL/L (ref 135–145)
SP GR UR STRIP.AUTO: 1.03
TRIGL SERPL-MCNC: 96 MG/DL (ref 0–149)
TSH SERPL DL<=0.005 MIU/L-ACNC: 1.58 UIU/ML (ref 0.38–5.33)
UROBILINOGEN UR STRIP.AUTO-MCNC: 0.2 MG/DL
WBC # BLD AUTO: 10.6 K/UL (ref 4.8–10.8)

## 2021-06-29 PROCEDURE — 82043 UR ALBUMIN QUANTITATIVE: CPT

## 2021-06-29 PROCEDURE — 84153 ASSAY OF PSA TOTAL: CPT | Mod: GA

## 2021-06-29 PROCEDURE — 83036 HEMOGLOBIN GLYCOSYLATED A1C: CPT | Mod: GA

## 2021-06-29 PROCEDURE — 82570 ASSAY OF URINE CREATININE: CPT

## 2021-06-29 PROCEDURE — 80053 COMPREHEN METABOLIC PANEL: CPT

## 2021-06-29 PROCEDURE — 85025 COMPLETE CBC W/AUTO DIFF WBC: CPT

## 2021-06-29 PROCEDURE — 80061 LIPID PANEL: CPT

## 2021-06-29 PROCEDURE — 36415 COLL VENOUS BLD VENIPUNCTURE: CPT

## 2021-06-29 PROCEDURE — 84443 ASSAY THYROID STIM HORMONE: CPT

## 2021-06-29 PROCEDURE — 81003 URINALYSIS AUTO W/O SCOPE: CPT

## 2021-06-30 ENCOUNTER — APPOINTMENT (RX ONLY)
Dept: URBAN - METROPOLITAN AREA CLINIC 4 | Facility: CLINIC | Age: 71
Setting detail: DERMATOLOGY
End: 2021-06-30

## 2021-06-30 DIAGNOSIS — L40.0 PSORIASIS VULGARIS: ICD-10-CM

## 2021-06-30 PROCEDURE — 96900 ACTINOTHERAPY UV LIGHT: CPT

## 2021-06-30 PROCEDURE — ? PHOTOTHERAPY TREATMENT

## 2021-06-30 ASSESSMENT — LOCATION SIMPLE DESCRIPTION DERM
LOCATION SIMPLE: RIGHT ANKLE
LOCATION SIMPLE: RIGHT FOREARM
LOCATION SIMPLE: LEFT UPPER ARM
LOCATION SIMPLE: LEFT ANKLE
LOCATION SIMPLE: RIGHT LOWER BACK
LOCATION SIMPLE: LEFT FOREARM
LOCATION SIMPLE: RIGHT UPPER ARM
LOCATION SIMPLE: ABDOMEN
LOCATION SIMPLE: POSTERIOR NECK

## 2021-06-30 ASSESSMENT — LOCATION DETAILED DESCRIPTION DERM
LOCATION DETAILED: RIGHT DISTAL DORSAL FOREARM
LOCATION DETAILED: LEFT LATERAL ABDOMEN
LOCATION DETAILED: RIGHT LATERAL POSTERIOR ANKLE
LOCATION DETAILED: RIGHT MEDIAL TRAPEZIAL NECK
LOCATION DETAILED: LEFT ANTERIOR DISTAL UPPER ARM
LOCATION DETAILED: RIGHT SUPERIOR LATERAL LOWER BACK
LOCATION DETAILED: RIGHT ANTERIOR PROXIMAL UPPER ARM
LOCATION DETAILED: LEFT POSTERIOR ANKLE
LOCATION DETAILED: LEFT PROXIMAL DORSAL FOREARM

## 2021-06-30 ASSESSMENT — LOCATION ZONE DERM
LOCATION ZONE: ARM
LOCATION ZONE: LEG
LOCATION ZONE: NECK
LOCATION ZONE: TRUNK

## 2021-06-30 NOTE — PROCEDURE: PHOTOTHERAPY TREATMENT
Name Of Supervising Technician: Angelica Briones MA
Protocol For Photochemotherapy: Petrolatum And Broad Band Uvb: The patient received Photochemotherapy: Petrolatum and Broad Band UVB.
Protocol For Puva: The patient received PUVA.
Treatment Number: 7
Protocol For Nbuvb: The patient received NBUVB.
Detail Level: Zone
Skin Type: III
Protocol For Photochemotherapy For Severe Photoresponsive Dermatoses: Puva: The patient received Photochemotherapy for severe photoresponsive dermatoses: PUVA requiring at least 4 to 8 hours of care under direct physician supervision.
Protocol For Photochemotherapy: Mineral Oil And Nbuvb: The patient received Photochemotherapy: Mineral Oil and NBUVB (mineral oil applied to all lesions prior to phototherapy).
Protocol For Uva: The patient received UVA.
Render Post-Care In The Note: no
Protocol For Photochemotherapy: Triamcinolone Ointment And Nbuvb: The patient received Photochemotherapy: Triamcinolone and NBUVB (triamcinolone ointment applied to all lesions prior to phototherapy).
Protocol For Photochemotherapy: Mineral Oil And Broad Band Uvb: The patient received Photochemotherapy: Mineral Oil and Broad Band UVB.
Protocol For Uva1: The patient received UVA1.
Protocol For Photochemotherapy: Tar And Nbuvb (Goeckerman Treatment): The patient received Photochemotherapy: Tar and NBUVB (Goeckerman treatment).
Post-Care Instructions: I reviewed with the patient in detail post-care instructions. Patient is to wear sun protection. Patients may expect sunburn like redness, discomfort and scabbing.
Protocol For Photochemotherapy For Severe Photoresponsive Dermatoses: Tar And Broad Band Uvb (Goeckerman Treatment): The patient received Photochemotherapy for severe photoresponsive dermatoses: Tar and Broad Band UVB (Goeckerman treatment) requiring at least 4 to 8 hours of care under direct physician supervision.
Protocol For Photochemotherapy For Severe Photoresponsive Dermatoses: Tar And Nbuvb (Goeckerman Treatment): The patient received Photochemotherapy for severe photoresponsive dermatoses: Tar and NBUVB (Goeckerman treatment) requiring at least 4 to 8 hours of care under direct physician supervision.
Comments On Previous Treatment: Patient denies any redness or discomfort from previous treatment.
Protocol For Bath Puva: The patient received Bath PUVA.
Protocol For Protocol For Photochemotherapy For Severe Photoresponsive Dermatoses: Bath Puva: The patient received Photochemotherapy for severe photoresponsive dermatoses: Bath PUVA requiring at least 4 to 8 hours of care under direct physician supervision.
Protocol For Photochemotherapy: Petrolatum And Nbuvb: The patient received Photochemotherapy: Petrolatum and NBUVB (petrolatum applied to all lesions prior to phototherapy).
Protocol: NBUVB
Protocol For Broad Band Uvb: The patient received Broad Band UVB.
Changes In Treatment Protocol: Per protocol, increased by 15 seconds.
Protocol For Photochemotherapy For Severe Photoresponsive Dermatoses: Petrolatum And Broad Band Uvb: The patient received Photochemotherapyfor severe photoresponsive dermatoses: Petrolatum and Broad Band UVB requiring at least 4 to 8 hours of care under direct physician supervision.
Protocol For Photochemotherapy For Severe Photoresponsive Dermatoses: Petrolatum And Nbuvb: The patient received Photochemotherapy for severe photoresponsive dermatoses: Petrolatum and NBUVB requiring at least 4 to 8 hours of care under direct physician supervision.
Protocol For Nb Uva: The patient received NB UVA.
Consent: Written consent obtained.  The risks were reviewed with the patient including but not limited to: burn, pigmentary changes, pain, blistering, scabbing, redness, increased risk of skin cancers, and the remote possibility of scarring.
Protocol For Photochemotherapy: Tar And Broad Band Uvb (Goeckerman Treatment): The patient received Photochemotherapy: Tar and Broad Band UVB (Goeckerman treatment).
Protocol For Photochemotherapy: Baby Oil And Nbuvb: The patient received Photochemotherapy: Baby Oil and NBUVB (baby oil applied to all lesions prior to phototherapy).
Total Treatment Time: 1 minute 15 seconds

## 2021-07-07 ENCOUNTER — APPOINTMENT (RX ONLY)
Dept: URBAN - METROPOLITAN AREA CLINIC 4 | Facility: CLINIC | Age: 71
Setting detail: DERMATOLOGY
End: 2021-07-07

## 2021-07-07 DIAGNOSIS — L40.0 PSORIASIS VULGARIS: ICD-10-CM

## 2021-07-07 PROCEDURE — ? PHOTOTHERAPY TREATMENT

## 2021-07-07 PROCEDURE — 96900 ACTINOTHERAPY UV LIGHT: CPT

## 2021-07-07 ASSESSMENT — LOCATION SIMPLE DESCRIPTION DERM
LOCATION SIMPLE: ABDOMEN
LOCATION SIMPLE: LEFT FOREARM
LOCATION SIMPLE: RIGHT LOWER BACK
LOCATION SIMPLE: RIGHT ANKLE
LOCATION SIMPLE: RIGHT UPPER ARM
LOCATION SIMPLE: POSTERIOR NECK
LOCATION SIMPLE: RIGHT FOREARM
LOCATION SIMPLE: LEFT UPPER ARM
LOCATION SIMPLE: LEFT ANKLE

## 2021-07-07 ASSESSMENT — LOCATION DETAILED DESCRIPTION DERM
LOCATION DETAILED: RIGHT DISTAL DORSAL FOREARM
LOCATION DETAILED: LEFT POSTERIOR ANKLE
LOCATION DETAILED: LEFT LATERAL ABDOMEN
LOCATION DETAILED: RIGHT LATERAL POSTERIOR ANKLE
LOCATION DETAILED: LEFT ANTERIOR DISTAL UPPER ARM
LOCATION DETAILED: RIGHT MEDIAL TRAPEZIAL NECK
LOCATION DETAILED: LEFT PROXIMAL DORSAL FOREARM
LOCATION DETAILED: RIGHT ANTERIOR PROXIMAL UPPER ARM
LOCATION DETAILED: RIGHT SUPERIOR LATERAL LOWER BACK

## 2021-07-07 ASSESSMENT — LOCATION ZONE DERM
LOCATION ZONE: LEG
LOCATION ZONE: TRUNK
LOCATION ZONE: NECK
LOCATION ZONE: ARM

## 2021-07-07 NOTE — PROCEDURE: PHOTOTHERAPY TREATMENT
Name Of Supervising Technician: Angelica Briones MA
Protocol For Photochemotherapy: Petrolatum And Broad Band Uvb: The patient received Photochemotherapy: Petrolatum and Broad Band UVB.
Protocol For Puva: The patient received PUVA.
Treatment Number: 8
Protocol For Nbuvb: The patient received NBUVB.
Detail Level: Zone
Skin Type: III
Protocol For Photochemotherapy For Severe Photoresponsive Dermatoses: Puva: The patient received Photochemotherapy for severe photoresponsive dermatoses: PUVA requiring at least 4 to 8 hours of care under direct physician supervision.
Protocol For Photochemotherapy: Mineral Oil And Nbuvb: The patient received Photochemotherapy: Mineral Oil and NBUVB (mineral oil applied to all lesions prior to phototherapy).
Protocol For Uva: The patient received UVA.
Render Post-Care In The Note: no
Protocol For Photochemotherapy: Triamcinolone Ointment And Nbuvb: The patient received Photochemotherapy: Triamcinolone and NBUVB (triamcinolone ointment applied to all lesions prior to phototherapy).
Protocol For Photochemotherapy: Mineral Oil And Broad Band Uvb: The patient received Photochemotherapy: Mineral Oil and Broad Band UVB.
Protocol For Uva1: The patient received UVA1.
Protocol For Photochemotherapy: Tar And Nbuvb (Goeckerman Treatment): The patient received Photochemotherapy: Tar and NBUVB (Goeckerman treatment).
Post-Care Instructions: I reviewed with the patient in detail post-care instructions. Patient is to wear sun protection. Patients may expect sunburn like redness, discomfort and scabbing.
Protocol For Photochemotherapy For Severe Photoresponsive Dermatoses: Tar And Broad Band Uvb (Goeckerman Treatment): The patient received Photochemotherapy for severe photoresponsive dermatoses: Tar and Broad Band UVB (Goeckerman treatment) requiring at least 4 to 8 hours of care under direct physician supervision.
Protocol For Photochemotherapy For Severe Photoresponsive Dermatoses: Tar And Nbuvb (Goeckerman Treatment): The patient received Photochemotherapy for severe photoresponsive dermatoses: Tar and NBUVB (Goeckerman treatment) requiring at least 4 to 8 hours of care under direct physician supervision.
Comments On Previous Treatment: Patient denies any redness or discomfort from previous treatment.
Protocol For Bath Puva: The patient received Bath PUVA.
Protocol For Protocol For Photochemotherapy For Severe Photoresponsive Dermatoses: Bath Puva: The patient received Photochemotherapy for severe photoresponsive dermatoses: Bath PUVA requiring at least 4 to 8 hours of care under direct physician supervision.
Protocol For Photochemotherapy: Petrolatum And Nbuvb: The patient received Photochemotherapy: Petrolatum and NBUVB (petrolatum applied to all lesions prior to phototherapy).
Protocol: NBUVB
Protocol For Broad Band Uvb: The patient received Broad Band UVB.
Changes In Treatment Protocol: Per protocol, held time due to a 7 day gap.
Protocol For Photochemotherapy For Severe Photoresponsive Dermatoses: Petrolatum And Broad Band Uvb: The patient received Photochemotherapyfor severe photoresponsive dermatoses: Petrolatum and Broad Band UVB requiring at least 4 to 8 hours of care under direct physician supervision.
Protocol For Photochemotherapy For Severe Photoresponsive Dermatoses: Petrolatum And Nbuvb: The patient received Photochemotherapy for severe photoresponsive dermatoses: Petrolatum and NBUVB requiring at least 4 to 8 hours of care under direct physician supervision.
Protocol For Nb Uva: The patient received NB UVA.
Consent: Written consent obtained.  The risks were reviewed with the patient including but not limited to: burn, pigmentary changes, pain, blistering, scabbing, redness, increased risk of skin cancers, and the remote possibility of scarring.
Protocol For Photochemotherapy: Tar And Broad Band Uvb (Goeckerman Treatment): The patient received Photochemotherapy: Tar and Broad Band UVB (Goeckerman treatment).
Protocol For Photochemotherapy: Baby Oil And Nbuvb: The patient received Photochemotherapy: Baby Oil and NBUVB (baby oil applied to all lesions prior to phototherapy).
Total Treatment Time: 1 minute 15 seconds

## 2021-07-09 ENCOUNTER — APPOINTMENT (RX ONLY)
Dept: URBAN - METROPOLITAN AREA CLINIC 4 | Facility: CLINIC | Age: 71
Setting detail: DERMATOLOGY
End: 2021-07-09

## 2021-07-09 DIAGNOSIS — L40.0 PSORIASIS VULGARIS: ICD-10-CM

## 2021-07-09 PROCEDURE — 96900 ACTINOTHERAPY UV LIGHT: CPT

## 2021-07-09 PROCEDURE — ? PHOTOTHERAPY TREATMENT

## 2021-07-09 ASSESSMENT — LOCATION DETAILED DESCRIPTION DERM
LOCATION DETAILED: RIGHT ANTERIOR PROXIMAL UPPER ARM
LOCATION DETAILED: LEFT ANTERIOR DISTAL UPPER ARM
LOCATION DETAILED: LEFT POSTERIOR ANKLE
LOCATION DETAILED: LEFT LATERAL ABDOMEN
LOCATION DETAILED: RIGHT LATERAL POSTERIOR ANKLE
LOCATION DETAILED: RIGHT MEDIAL TRAPEZIAL NECK
LOCATION DETAILED: RIGHT DISTAL DORSAL FOREARM
LOCATION DETAILED: RIGHT SUPERIOR LATERAL LOWER BACK
LOCATION DETAILED: LEFT PROXIMAL DORSAL FOREARM

## 2021-07-09 ASSESSMENT — LOCATION SIMPLE DESCRIPTION DERM
LOCATION SIMPLE: POSTERIOR NECK
LOCATION SIMPLE: ABDOMEN
LOCATION SIMPLE: RIGHT LOWER BACK
LOCATION SIMPLE: RIGHT UPPER ARM
LOCATION SIMPLE: RIGHT FOREARM
LOCATION SIMPLE: RIGHT ANKLE
LOCATION SIMPLE: LEFT UPPER ARM
LOCATION SIMPLE: LEFT ANKLE
LOCATION SIMPLE: LEFT FOREARM

## 2021-07-09 ASSESSMENT — LOCATION ZONE DERM
LOCATION ZONE: ARM
LOCATION ZONE: LEG
LOCATION ZONE: TRUNK
LOCATION ZONE: NECK

## 2021-07-09 NOTE — PROCEDURE: PHOTOTHERAPY TREATMENT
Name Of Supervising Technician: Carrol Kramer MA
Protocol For Photochemotherapy: Petrolatum And Broad Band Uvb: The patient received Photochemotherapy: Petrolatum and Broad Band UVB.
Protocol For Puva: The patient received PUVA.
Treatment Number: 9
Protocol For Nbuvb: The patient received NBUVB.
Detail Level: Zone
Skin Type: III
Protocol For Photochemotherapy For Severe Photoresponsive Dermatoses: Puva: The patient received Photochemotherapy for severe photoresponsive dermatoses: PUVA requiring at least 4 to 8 hours of care under direct physician supervision.
Protocol For Photochemotherapy: Mineral Oil And Nbuvb: The patient received Photochemotherapy: Mineral Oil and NBUVB (mineral oil applied to all lesions prior to phototherapy).
Protocol For Uva: The patient received UVA.
Render Post-Care In The Note: no
Protocol For Photochemotherapy: Triamcinolone Ointment And Nbuvb: The patient received Photochemotherapy: Triamcinolone and NBUVB (triamcinolone ointment applied to all lesions prior to phototherapy).
Protocol For Photochemotherapy: Mineral Oil And Broad Band Uvb: The patient received Photochemotherapy: Mineral Oil and Broad Band UVB.
Protocol For Uva1: The patient received UVA1.
Protocol For Photochemotherapy: Tar And Nbuvb (Goeckerman Treatment): The patient received Photochemotherapy: Tar and NBUVB (Goeckerman treatment).
Post-Care Instructions: I reviewed with the patient in detail post-care instructions. Patient is to wear sun protection. Patients may expect sunburn like redness, discomfort and scabbing.
Protocol For Photochemotherapy For Severe Photoresponsive Dermatoses: Tar And Broad Band Uvb (Goeckerman Treatment): The patient received Photochemotherapy for severe photoresponsive dermatoses: Tar and Broad Band UVB (Goeckerman treatment) requiring at least 4 to 8 hours of care under direct physician supervision.
Protocol For Photochemotherapy For Severe Photoresponsive Dermatoses: Tar And Nbuvb (Goeckerman Treatment): The patient received Photochemotherapy for severe photoresponsive dermatoses: Tar and NBUVB (Goeckerman treatment) requiring at least 4 to 8 hours of care under direct physician supervision.
Comments On Previous Treatment: Patient denies any redness or discomfort from previous treatment.
Protocol For Bath Puva: The patient received Bath PUVA.
Protocol For Protocol For Photochemotherapy For Severe Photoresponsive Dermatoses: Bath Puva: The patient received Photochemotherapy for severe photoresponsive dermatoses: Bath PUVA requiring at least 4 to 8 hours of care under direct physician supervision.
Protocol For Photochemotherapy: Petrolatum And Nbuvb: The patient received Photochemotherapy: Petrolatum and NBUVB (petrolatum applied to all lesions prior to phototherapy).
Protocol: NBUVB
Protocol For Broad Band Uvb: The patient received Broad Band UVB.
Changes In Treatment Protocol: Per protocol, increased treatment time by 15 seconds.
Protocol For Photochemotherapy For Severe Photoresponsive Dermatoses: Petrolatum And Broad Band Uvb: The patient received Photochemotherapyfor severe photoresponsive dermatoses: Petrolatum and Broad Band UVB requiring at least 4 to 8 hours of care under direct physician supervision.
Protocol For Photochemotherapy For Severe Photoresponsive Dermatoses: Petrolatum And Nbuvb: The patient received Photochemotherapy for severe photoresponsive dermatoses: Petrolatum and NBUVB requiring at least 4 to 8 hours of care under direct physician supervision.
Protocol For Nb Uva: The patient received NB UVA.
Consent: Written consent obtained.  The risks were reviewed with the patient including but not limited to: burn, pigmentary changes, pain, blistering, scabbing, redness, increased risk of skin cancers, and the remote possibility of scarring.
Protocol For Photochemotherapy: Tar And Broad Band Uvb (Goeckerman Treatment): The patient received Photochemotherapy: Tar and Broad Band UVB (Goeckerman treatment).
Protocol For Photochemotherapy: Baby Oil And Nbuvb: The patient received Photochemotherapy: Baby Oil and NBUVB (baby oil applied to all lesions prior to phototherapy).
Total Treatment Time: 1 minute 30 seconds

## 2021-07-12 ENCOUNTER — APPOINTMENT (RX ONLY)
Dept: URBAN - METROPOLITAN AREA CLINIC 4 | Facility: CLINIC | Age: 71
Setting detail: DERMATOLOGY
End: 2021-07-12

## 2021-07-12 DIAGNOSIS — L40.0 PSORIASIS VULGARIS: ICD-10-CM

## 2021-07-12 PROCEDURE — ? PHOTOTHERAPY TREATMENT

## 2021-07-12 PROCEDURE — 96900 ACTINOTHERAPY UV LIGHT: CPT

## 2021-07-12 ASSESSMENT — LOCATION ZONE DERM
LOCATION ZONE: LEG
LOCATION ZONE: ARM
LOCATION ZONE: NECK
LOCATION ZONE: TRUNK

## 2021-07-12 ASSESSMENT — LOCATION DETAILED DESCRIPTION DERM
LOCATION DETAILED: RIGHT MEDIAL TRAPEZIAL NECK
LOCATION DETAILED: LEFT ANTERIOR DISTAL UPPER ARM
LOCATION DETAILED: LEFT LATERAL ABDOMEN
LOCATION DETAILED: LEFT PROXIMAL DORSAL FOREARM
LOCATION DETAILED: RIGHT DISTAL DORSAL FOREARM
LOCATION DETAILED: RIGHT LATERAL POSTERIOR ANKLE
LOCATION DETAILED: LEFT POSTERIOR ANKLE
LOCATION DETAILED: RIGHT ANTERIOR PROXIMAL UPPER ARM
LOCATION DETAILED: RIGHT SUPERIOR LATERAL LOWER BACK

## 2021-07-12 ASSESSMENT — LOCATION SIMPLE DESCRIPTION DERM
LOCATION SIMPLE: RIGHT UPPER ARM
LOCATION SIMPLE: RIGHT ANKLE
LOCATION SIMPLE: RIGHT FOREARM
LOCATION SIMPLE: LEFT UPPER ARM
LOCATION SIMPLE: POSTERIOR NECK
LOCATION SIMPLE: RIGHT LOWER BACK
LOCATION SIMPLE: ABDOMEN
LOCATION SIMPLE: LEFT ANKLE
LOCATION SIMPLE: LEFT FOREARM

## 2021-07-12 NOTE — PROCEDURE: PHOTOTHERAPY TREATMENT
Name Of Supervising Technician: Angelica Briones MA
Protocol For Photochemotherapy: Petrolatum And Broad Band Uvb: The patient received Photochemotherapy: Petrolatum and Broad Band UVB.
Protocol For Puva: The patient received PUVA.
Treatment Number: 10
Protocol For Nbuvb: The patient received NBUVB.
Detail Level: Zone
Skin Type: III
Protocol For Photochemotherapy For Severe Photoresponsive Dermatoses: Puva: The patient received Photochemotherapy for severe photoresponsive dermatoses: PUVA requiring at least 4 to 8 hours of care under direct physician supervision.
Protocol For Photochemotherapy: Mineral Oil And Nbuvb: The patient received Photochemotherapy: Mineral Oil and NBUVB (mineral oil applied to all lesions prior to phototherapy).
Protocol For Uva: The patient received UVA.
Render Post-Care In The Note: no
Protocol For Photochemotherapy: Triamcinolone Ointment And Nbuvb: The patient received Photochemotherapy: Triamcinolone and NBUVB (triamcinolone ointment applied to all lesions prior to phototherapy).
Protocol For Photochemotherapy: Mineral Oil And Broad Band Uvb: The patient received Photochemotherapy: Mineral Oil and Broad Band UVB.
Protocol For Uva1: The patient received UVA1.
Protocol For Photochemotherapy: Tar And Nbuvb (Goeckerman Treatment): The patient received Photochemotherapy: Tar and NBUVB (Goeckerman treatment).
Post-Care Instructions: I reviewed with the patient in detail post-care instructions. Patient is to wear sun protection. Patients may expect sunburn like redness, discomfort and scabbing.
Protocol For Photochemotherapy For Severe Photoresponsive Dermatoses: Tar And Broad Band Uvb (Goeckerman Treatment): The patient received Photochemotherapy for severe photoresponsive dermatoses: Tar and Broad Band UVB (Goeckerman treatment) requiring at least 4 to 8 hours of care under direct physician supervision.
Protocol For Photochemotherapy For Severe Photoresponsive Dermatoses: Tar And Nbuvb (Goeckerman Treatment): The patient received Photochemotherapy for severe photoresponsive dermatoses: Tar and NBUVB (Goeckerman treatment) requiring at least 4 to 8 hours of care under direct physician supervision.
Comments On Previous Treatment: Patient denies any redness or discomfort from previous treatment.
Protocol For Bath Puva: The patient received Bath PUVA.
Protocol For Protocol For Photochemotherapy For Severe Photoresponsive Dermatoses: Bath Puva: The patient received Photochemotherapy for severe photoresponsive dermatoses: Bath PUVA requiring at least 4 to 8 hours of care under direct physician supervision.
Protocol For Photochemotherapy: Petrolatum And Nbuvb: The patient received Photochemotherapy: Petrolatum and NBUVB (petrolatum applied to all lesions prior to phototherapy).
Protocol: NBUVB
Protocol For Broad Band Uvb: The patient received Broad Band UVB.
Changes In Treatment Protocol: Per protocol, increased treatment time by 15 seconds.
Protocol For Photochemotherapy For Severe Photoresponsive Dermatoses: Petrolatum And Broad Band Uvb: The patient received Photochemotherapyfor severe photoresponsive dermatoses: Petrolatum and Broad Band UVB requiring at least 4 to 8 hours of care under direct physician supervision.
Protocol For Photochemotherapy For Severe Photoresponsive Dermatoses: Petrolatum And Nbuvb: The patient received Photochemotherapy for severe photoresponsive dermatoses: Petrolatum and NBUVB requiring at least 4 to 8 hours of care under direct physician supervision.
Protocol For Nb Uva: The patient received NB UVA.
Consent: Written consent obtained.  The risks were reviewed with the patient including but not limited to: burn, pigmentary changes, pain, blistering, scabbing, redness, increased risk of skin cancers, and the remote possibility of scarring.
Protocol For Photochemotherapy: Tar And Broad Band Uvb (Goeckerman Treatment): The patient received Photochemotherapy: Tar and Broad Band UVB (Goeckerman treatment).
Protocol For Photochemotherapy: Baby Oil And Nbuvb: The patient received Photochemotherapy: Baby Oil and NBUVB (baby oil applied to all lesions prior to phototherapy).
Total Treatment Time: 1 minute 45 seconds

## 2021-07-21 ENCOUNTER — APPOINTMENT (RX ONLY)
Dept: URBAN - METROPOLITAN AREA CLINIC 4 | Facility: CLINIC | Age: 71
Setting detail: DERMATOLOGY
End: 2021-07-21

## 2021-07-21 DIAGNOSIS — L40.0 PSORIASIS VULGARIS: ICD-10-CM

## 2021-07-21 PROCEDURE — 96900 ACTINOTHERAPY UV LIGHT: CPT

## 2021-07-21 PROCEDURE — ? PHOTOTHERAPY TREATMENT

## 2021-07-21 ASSESSMENT — LOCATION DETAILED DESCRIPTION DERM
LOCATION DETAILED: RIGHT MEDIAL TRAPEZIAL NECK
LOCATION DETAILED: LEFT LATERAL ABDOMEN
LOCATION DETAILED: RIGHT LATERAL POSTERIOR ANKLE
LOCATION DETAILED: LEFT ANTERIOR DISTAL UPPER ARM
LOCATION DETAILED: LEFT PROXIMAL DORSAL FOREARM
LOCATION DETAILED: RIGHT SUPERIOR LATERAL LOWER BACK
LOCATION DETAILED: RIGHT ANTERIOR PROXIMAL UPPER ARM
LOCATION DETAILED: RIGHT DISTAL DORSAL FOREARM
LOCATION DETAILED: LEFT POSTERIOR ANKLE

## 2021-07-21 ASSESSMENT — LOCATION SIMPLE DESCRIPTION DERM
LOCATION SIMPLE: ABDOMEN
LOCATION SIMPLE: LEFT FOREARM
LOCATION SIMPLE: RIGHT UPPER ARM
LOCATION SIMPLE: LEFT ANKLE
LOCATION SIMPLE: RIGHT FOREARM
LOCATION SIMPLE: RIGHT LOWER BACK
LOCATION SIMPLE: POSTERIOR NECK
LOCATION SIMPLE: LEFT UPPER ARM
LOCATION SIMPLE: RIGHT ANKLE

## 2021-07-21 ASSESSMENT — LOCATION ZONE DERM
LOCATION ZONE: LEG
LOCATION ZONE: TRUNK
LOCATION ZONE: NECK
LOCATION ZONE: ARM

## 2021-07-21 NOTE — PROCEDURE: PHOTOTHERAPY TREATMENT
Name Of Supervising Technician: Angelica Briones MA
Protocol For Photochemotherapy: Petrolatum And Broad Band Uvb: The patient received Photochemotherapy: Petrolatum and Broad Band UVB.
Protocol For Puva: The patient received PUVA.
Treatment Number: 10
Protocol For Nbuvb: The patient received NBUVB.
Detail Level: Zone
Skin Type: III
Protocol For Photochemotherapy For Severe Photoresponsive Dermatoses: Puva: The patient received Photochemotherapy for severe photoresponsive dermatoses: PUVA requiring at least 4 to 8 hours of care under direct physician supervision.
Protocol For Photochemotherapy: Mineral Oil And Nbuvb: The patient received Photochemotherapy: Mineral Oil and NBUVB (mineral oil applied to all lesions prior to phototherapy).
Protocol For Uva: The patient received UVA.
Render Post-Care In The Note: no
Protocol For Photochemotherapy: Triamcinolone Ointment And Nbuvb: The patient received Photochemotherapy: Triamcinolone and NBUVB (triamcinolone ointment applied to all lesions prior to phototherapy).
Protocol For Photochemotherapy: Mineral Oil And Broad Band Uvb: The patient received Photochemotherapy: Mineral Oil and Broad Band UVB.
Protocol For Uva1: The patient received UVA1.
Protocol For Photochemotherapy: Tar And Nbuvb (Goeckerman Treatment): The patient received Photochemotherapy: Tar and NBUVB (Goeckerman treatment).
Post-Care Instructions: I reviewed with the patient in detail post-care instructions. Patient is to wear sun protection. Patients may expect sunburn like redness, discomfort and scabbing.
Protocol For Photochemotherapy For Severe Photoresponsive Dermatoses: Tar And Broad Band Uvb (Goeckerman Treatment): The patient received Photochemotherapy for severe photoresponsive dermatoses: Tar and Broad Band UVB (Goeckerman treatment) requiring at least 4 to 8 hours of care under direct physician supervision.
Protocol For Photochemotherapy For Severe Photoresponsive Dermatoses: Tar And Nbuvb (Goeckerman Treatment): The patient received Photochemotherapy for severe photoresponsive dermatoses: Tar and NBUVB (Goeckerman treatment) requiring at least 4 to 8 hours of care under direct physician supervision.
Comments On Previous Treatment: Patient denies any redness or discomfort from previous treatment.
Protocol For Bath Puva: The patient received Bath PUVA.
Protocol For Protocol For Photochemotherapy For Severe Photoresponsive Dermatoses: Bath Puva: The patient received Photochemotherapy for severe photoresponsive dermatoses: Bath PUVA requiring at least 4 to 8 hours of care under direct physician supervision.
Protocol For Photochemotherapy: Petrolatum And Nbuvb: The patient received Photochemotherapy: Petrolatum and NBUVB (petrolatum applied to all lesions prior to phototherapy).
Protocol: NBUVB
Protocol For Broad Band Uvb: The patient received Broad Band UVB.
Changes In Treatment Protocol: Per protocol, decreased treatment by 25% due to time gap. .
Protocol For Photochemotherapy For Severe Photoresponsive Dermatoses: Petrolatum And Broad Band Uvb: The patient received Photochemotherapyfor severe photoresponsive dermatoses: Petrolatum and Broad Band UVB requiring at least 4 to 8 hours of care under direct physician supervision.
Protocol For Photochemotherapy For Severe Photoresponsive Dermatoses: Petrolatum And Nbuvb: The patient received Photochemotherapy for severe photoresponsive dermatoses: Petrolatum and NBUVB requiring at least 4 to 8 hours of care under direct physician supervision.
Protocol For Nb Uva: The patient received NB UVA.
Consent: Written consent obtained.  The risks were reviewed with the patient including but not limited to: burn, pigmentary changes, pain, blistering, scabbing, redness, increased risk of skin cancers, and the remote possibility of scarring.
Protocol For Photochemotherapy: Tar And Broad Band Uvb (Goeckerman Treatment): The patient received Photochemotherapy: Tar and Broad Band UVB (Goeckerman treatment).
Protocol For Photochemotherapy: Baby Oil And Nbuvb: The patient received Photochemotherapy: Baby Oil and NBUVB (baby oil applied to all lesions prior to phototherapy).
Total Treatment Time: 1 minute 20 seconds

## 2021-07-23 ENCOUNTER — APPOINTMENT (RX ONLY)
Dept: URBAN - METROPOLITAN AREA CLINIC 4 | Facility: CLINIC | Age: 71
Setting detail: DERMATOLOGY
End: 2021-07-23

## 2021-07-23 DIAGNOSIS — L40.0 PSORIASIS VULGARIS: ICD-10-CM

## 2021-07-23 PROCEDURE — 96900 ACTINOTHERAPY UV LIGHT: CPT

## 2021-07-23 PROCEDURE — ? PHOTOTHERAPY TREATMENT

## 2021-07-23 ASSESSMENT — LOCATION DETAILED DESCRIPTION DERM
LOCATION DETAILED: RIGHT LATERAL POSTERIOR ANKLE
LOCATION DETAILED: RIGHT ANTERIOR PROXIMAL UPPER ARM
LOCATION DETAILED: RIGHT MEDIAL TRAPEZIAL NECK
LOCATION DETAILED: RIGHT SUPERIOR LATERAL LOWER BACK
LOCATION DETAILED: LEFT LATERAL ABDOMEN
LOCATION DETAILED: LEFT ANTERIOR DISTAL UPPER ARM
LOCATION DETAILED: LEFT POSTERIOR ANKLE
LOCATION DETAILED: LEFT PROXIMAL DORSAL FOREARM
LOCATION DETAILED: RIGHT DISTAL DORSAL FOREARM

## 2021-07-23 ASSESSMENT — LOCATION SIMPLE DESCRIPTION DERM
LOCATION SIMPLE: LEFT UPPER ARM
LOCATION SIMPLE: RIGHT LOWER BACK
LOCATION SIMPLE: RIGHT ANKLE
LOCATION SIMPLE: POSTERIOR NECK
LOCATION SIMPLE: RIGHT FOREARM
LOCATION SIMPLE: ABDOMEN
LOCATION SIMPLE: LEFT ANKLE
LOCATION SIMPLE: RIGHT UPPER ARM
LOCATION SIMPLE: LEFT FOREARM

## 2021-07-23 ASSESSMENT — LOCATION ZONE DERM
LOCATION ZONE: ARM
LOCATION ZONE: LEG
LOCATION ZONE: NECK
LOCATION ZONE: TRUNK

## 2021-07-23 NOTE — PROCEDURE: PHOTOTHERAPY TREATMENT
Name Of Supervising Technician: Angelica Briones MA
Protocol For Photochemotherapy: Petrolatum And Broad Band Uvb: The patient received Photochemotherapy: Petrolatum and Broad Band UVB.
Protocol For Puva: The patient received PUVA.
Treatment Number: 11
Protocol For Nbuvb: The patient received NBUVB.
Detail Level: Zone
Skin Type: III
Protocol For Photochemotherapy For Severe Photoresponsive Dermatoses: Puva: The patient received Photochemotherapy for severe photoresponsive dermatoses: PUVA requiring at least 4 to 8 hours of care under direct physician supervision.
Protocol For Photochemotherapy: Mineral Oil And Nbuvb: The patient received Photochemotherapy: Mineral Oil and NBUVB (mineral oil applied to all lesions prior to phototherapy).
Protocol For Uva: The patient received UVA.
Render Post-Care In The Note: no
Protocol For Photochemotherapy: Triamcinolone Ointment And Nbuvb: The patient received Photochemotherapy: Triamcinolone and NBUVB (triamcinolone ointment applied to all lesions prior to phototherapy).
Protocol For Photochemotherapy: Mineral Oil And Broad Band Uvb: The patient received Photochemotherapy: Mineral Oil and Broad Band UVB.
Protocol For Uva1: The patient received UVA1.
Protocol For Photochemotherapy: Tar And Nbuvb (Goeckerman Treatment): The patient received Photochemotherapy: Tar and NBUVB (Goeckerman treatment).
Post-Care Instructions: I reviewed with the patient in detail post-care instructions. Patient is to wear sun protection. Patients may expect sunburn like redness, discomfort and scabbing.
Protocol For Photochemotherapy For Severe Photoresponsive Dermatoses: Tar And Broad Band Uvb (Goeckerman Treatment): The patient received Photochemotherapy for severe photoresponsive dermatoses: Tar and Broad Band UVB (Goeckerman treatment) requiring at least 4 to 8 hours of care under direct physician supervision.
Protocol For Photochemotherapy For Severe Photoresponsive Dermatoses: Tar And Nbuvb (Goeckerman Treatment): The patient received Photochemotherapy for severe photoresponsive dermatoses: Tar and NBUVB (Goeckerman treatment) requiring at least 4 to 8 hours of care under direct physician supervision.
Comments On Previous Treatment: Patient denies any redness or discomfort from previous treatment.
Protocol For Bath Puva: The patient received Bath PUVA.
Protocol For Protocol For Photochemotherapy For Severe Photoresponsive Dermatoses: Bath Puva: The patient received Photochemotherapy for severe photoresponsive dermatoses: Bath PUVA requiring at least 4 to 8 hours of care under direct physician supervision.
Protocol For Photochemotherapy: Petrolatum And Nbuvb: The patient received Photochemotherapy: Petrolatum and NBUVB (petrolatum applied to all lesions prior to phototherapy).
Protocol: NBUVB
Protocol For Broad Band Uvb: The patient received Broad Band UVB.
Changes In Treatment Protocol: Per protocol, increased treatment time by 15 seconds.
Protocol For Photochemotherapy For Severe Photoresponsive Dermatoses: Petrolatum And Broad Band Uvb: The patient received Photochemotherapyfor severe photoresponsive dermatoses: Petrolatum and Broad Band UVB requiring at least 4 to 8 hours of care under direct physician supervision.
Protocol For Photochemotherapy For Severe Photoresponsive Dermatoses: Petrolatum And Nbuvb: The patient received Photochemotherapy for severe photoresponsive dermatoses: Petrolatum and NBUVB requiring at least 4 to 8 hours of care under direct physician supervision.
Protocol For Nb Uva: The patient received NB UVA.
Consent: Written consent obtained.  The risks were reviewed with the patient including but not limited to: burn, pigmentary changes, pain, blistering, scabbing, redness, increased risk of skin cancers, and the remote possibility of scarring.
Protocol For Photochemotherapy: Tar And Broad Band Uvb (Goeckerman Treatment): The patient received Photochemotherapy: Tar and Broad Band UVB (Goeckerman treatment).
Protocol For Photochemotherapy: Baby Oil And Nbuvb: The patient received Photochemotherapy: Baby Oil and NBUVB (baby oil applied to all lesions prior to phototherapy).
Total Treatment Time: 1 minute 45 seconds

## 2021-07-26 ENCOUNTER — APPOINTMENT (RX ONLY)
Dept: URBAN - METROPOLITAN AREA CLINIC 4 | Facility: CLINIC | Age: 71
Setting detail: DERMATOLOGY
End: 2021-07-26

## 2021-07-26 DIAGNOSIS — L40.0 PSORIASIS VULGARIS: ICD-10-CM

## 2021-07-26 PROCEDURE — 96900 ACTINOTHERAPY UV LIGHT: CPT

## 2021-07-26 PROCEDURE — ? PHOTOTHERAPY TREATMENT

## 2021-07-26 ASSESSMENT — LOCATION ZONE DERM
LOCATION ZONE: NECK
LOCATION ZONE: ARM
LOCATION ZONE: LEG
LOCATION ZONE: TRUNK

## 2021-07-26 ASSESSMENT — LOCATION DETAILED DESCRIPTION DERM
LOCATION DETAILED: RIGHT SUPERIOR LATERAL LOWER BACK
LOCATION DETAILED: LEFT POSTERIOR ANKLE
LOCATION DETAILED: LEFT LATERAL ABDOMEN
LOCATION DETAILED: RIGHT DISTAL DORSAL FOREARM
LOCATION DETAILED: RIGHT LATERAL POSTERIOR ANKLE
LOCATION DETAILED: RIGHT MEDIAL TRAPEZIAL NECK
LOCATION DETAILED: LEFT PROXIMAL DORSAL FOREARM
LOCATION DETAILED: RIGHT ANTERIOR PROXIMAL UPPER ARM
LOCATION DETAILED: LEFT ANTERIOR DISTAL UPPER ARM

## 2021-07-26 ASSESSMENT — LOCATION SIMPLE DESCRIPTION DERM
LOCATION SIMPLE: LEFT ANKLE
LOCATION SIMPLE: RIGHT FOREARM
LOCATION SIMPLE: RIGHT ANKLE
LOCATION SIMPLE: LEFT UPPER ARM
LOCATION SIMPLE: ABDOMEN
LOCATION SIMPLE: RIGHT UPPER ARM
LOCATION SIMPLE: RIGHT LOWER BACK
LOCATION SIMPLE: POSTERIOR NECK
LOCATION SIMPLE: LEFT FOREARM

## 2021-07-26 NOTE — PROCEDURE: PHOTOTHERAPY TREATMENT
Name Of Supervising Technician: Angelica Briones MA
Protocol For Photochemotherapy: Petrolatum And Broad Band Uvb: The patient received Photochemotherapy: Petrolatum and Broad Band UVB.
Protocol For Puva: The patient received PUVA.
Treatment Number: 13
Protocol For Nbuvb: The patient received NBUVB.
Detail Level: Zone
Skin Type: III
Protocol For Photochemotherapy For Severe Photoresponsive Dermatoses: Puva: The patient received Photochemotherapy for severe photoresponsive dermatoses: PUVA requiring at least 4 to 8 hours of care under direct physician supervision.
Protocol For Photochemotherapy: Mineral Oil And Nbuvb: The patient received Photochemotherapy: Mineral Oil and NBUVB (mineral oil applied to all lesions prior to phototherapy).
Protocol For Uva: The patient received UVA.
Render Post-Care In The Note: no
Protocol For Photochemotherapy: Triamcinolone Ointment And Nbuvb: The patient received Photochemotherapy: Triamcinolone and NBUVB (triamcinolone ointment applied to all lesions prior to phototherapy).
Protocol For Photochemotherapy: Mineral Oil And Broad Band Uvb: The patient received Photochemotherapy: Mineral Oil and Broad Band UVB.
Protocol For Uva1: The patient received UVA1.
Protocol For Photochemotherapy: Tar And Nbuvb (Goeckerman Treatment): The patient received Photochemotherapy: Tar and NBUVB (Goeckerman treatment).
Post-Care Instructions: I reviewed with the patient in detail post-care instructions. Patient is to wear sun protection. Patients may expect sunburn like redness, discomfort and scabbing.
Protocol For Photochemotherapy For Severe Photoresponsive Dermatoses: Tar And Broad Band Uvb (Goeckerman Treatment): The patient received Photochemotherapy for severe photoresponsive dermatoses: Tar and Broad Band UVB (Goeckerman treatment) requiring at least 4 to 8 hours of care under direct physician supervision.
Protocol For Photochemotherapy For Severe Photoresponsive Dermatoses: Tar And Nbuvb (Goeckerman Treatment): The patient received Photochemotherapy for severe photoresponsive dermatoses: Tar and NBUVB (Goeckerman treatment) requiring at least 4 to 8 hours of care under direct physician supervision.
Comments On Previous Treatment: Patient denies any redness or discomfort from previous treatment.
Protocol For Bath Puva: The patient received Bath PUVA.
Protocol For Protocol For Photochemotherapy For Severe Photoresponsive Dermatoses: Bath Puva: The patient received Photochemotherapy for severe photoresponsive dermatoses: Bath PUVA requiring at least 4 to 8 hours of care under direct physician supervision.
Protocol For Photochemotherapy: Petrolatum And Nbuvb: The patient received Photochemotherapy: Petrolatum and NBUVB (petrolatum applied to all lesions prior to phototherapy).
Protocol: NBUVB
Protocol For Broad Band Uvb: The patient received Broad Band UVB.
Changes In Treatment Protocol: Per protocol, increased treatment time by 15 seconds.
Protocol For Photochemotherapy For Severe Photoresponsive Dermatoses: Petrolatum And Broad Band Uvb: The patient received Photochemotherapyfor severe photoresponsive dermatoses: Petrolatum and Broad Band UVB requiring at least 4 to 8 hours of care under direct physician supervision.
Protocol For Photochemotherapy For Severe Photoresponsive Dermatoses: Petrolatum And Nbuvb: The patient received Photochemotherapy for severe photoresponsive dermatoses: Petrolatum and NBUVB requiring at least 4 to 8 hours of care under direct physician supervision.
Protocol For Nb Uva: The patient received NB UVA.
Consent: Written consent obtained.  The risks were reviewed with the patient including but not limited to: burn, pigmentary changes, pain, blistering, scabbing, redness, increased risk of skin cancers, and the remote possibility of scarring.
Protocol For Photochemotherapy: Tar And Broad Band Uvb (Goeckerman Treatment): The patient received Photochemotherapy: Tar and Broad Band UVB (Goeckerman treatment).
Protocol For Photochemotherapy: Baby Oil And Nbuvb: The patient received Photochemotherapy: Baby Oil and NBUVB (baby oil applied to all lesions prior to phototherapy).
Total Treatment Time: 2 minute 00 seconds

## 2021-07-28 ENCOUNTER — APPOINTMENT (RX ONLY)
Dept: URBAN - METROPOLITAN AREA CLINIC 4 | Facility: CLINIC | Age: 71
Setting detail: DERMATOLOGY
End: 2021-07-28

## 2021-07-28 DIAGNOSIS — L40.0 PSORIASIS VULGARIS: ICD-10-CM

## 2021-07-28 PROCEDURE — 96900 ACTINOTHERAPY UV LIGHT: CPT

## 2021-07-28 PROCEDURE — ? PHOTOTHERAPY TREATMENT

## 2021-07-28 ASSESSMENT — LOCATION ZONE DERM
LOCATION ZONE: NECK
LOCATION ZONE: LEG
LOCATION ZONE: ARM
LOCATION ZONE: TRUNK

## 2021-07-28 ASSESSMENT — LOCATION DETAILED DESCRIPTION DERM
LOCATION DETAILED: RIGHT MEDIAL TRAPEZIAL NECK
LOCATION DETAILED: LEFT PROXIMAL DORSAL FOREARM
LOCATION DETAILED: RIGHT DISTAL DORSAL FOREARM
LOCATION DETAILED: LEFT POSTERIOR ANKLE
LOCATION DETAILED: RIGHT ANTERIOR PROXIMAL UPPER ARM
LOCATION DETAILED: LEFT LATERAL ABDOMEN
LOCATION DETAILED: LEFT ANTERIOR DISTAL UPPER ARM
LOCATION DETAILED: RIGHT LATERAL POSTERIOR ANKLE
LOCATION DETAILED: RIGHT SUPERIOR LATERAL LOWER BACK

## 2021-07-28 ASSESSMENT — LOCATION SIMPLE DESCRIPTION DERM
LOCATION SIMPLE: RIGHT ANKLE
LOCATION SIMPLE: ABDOMEN
LOCATION SIMPLE: POSTERIOR NECK
LOCATION SIMPLE: LEFT UPPER ARM
LOCATION SIMPLE: RIGHT UPPER ARM
LOCATION SIMPLE: RIGHT LOWER BACK
LOCATION SIMPLE: LEFT ANKLE
LOCATION SIMPLE: LEFT FOREARM
LOCATION SIMPLE: RIGHT FOREARM

## 2021-07-28 NOTE — PROCEDURE: PHOTOTHERAPY TREATMENT
Name Of Supervising Technician: Angelica Briones MA
Protocol For Photochemotherapy: Petrolatum And Broad Band Uvb: The patient received Photochemotherapy: Petrolatum and Broad Band UVB.
Protocol For Puva: The patient received PUVA.
Treatment Number: 14
Protocol For Nbuvb: The patient received NBUVB.
Detail Level: Zone
Skin Type: III
Protocol For Photochemotherapy For Severe Photoresponsive Dermatoses: Puva: The patient received Photochemotherapy for severe photoresponsive dermatoses: PUVA requiring at least 4 to 8 hours of care under direct physician supervision.
Protocol For Photochemotherapy: Mineral Oil And Nbuvb: The patient received Photochemotherapy: Mineral Oil and NBUVB (mineral oil applied to all lesions prior to phototherapy).
Protocol For Uva: The patient received UVA.
Render Post-Care In The Note: no
Protocol For Photochemotherapy: Triamcinolone Ointment And Nbuvb: The patient received Photochemotherapy: Triamcinolone and NBUVB (triamcinolone ointment applied to all lesions prior to phototherapy).
Protocol For Photochemotherapy: Mineral Oil And Broad Band Uvb: The patient received Photochemotherapy: Mineral Oil and Broad Band UVB.
Protocol For Uva1: The patient received UVA1.
Protocol For Photochemotherapy: Tar And Nbuvb (Goeckerman Treatment): The patient received Photochemotherapy: Tar and NBUVB (Goeckerman treatment).
Post-Care Instructions: I reviewed with the patient in detail post-care instructions. Patient is to wear sun protection. Patients may expect sunburn like redness, discomfort and scabbing.
Protocol For Photochemotherapy For Severe Photoresponsive Dermatoses: Tar And Broad Band Uvb (Goeckerman Treatment): The patient received Photochemotherapy for severe photoresponsive dermatoses: Tar and Broad Band UVB (Goeckerman treatment) requiring at least 4 to 8 hours of care under direct physician supervision.
Protocol For Photochemotherapy For Severe Photoresponsive Dermatoses: Tar And Nbuvb (Goeckerman Treatment): The patient received Photochemotherapy for severe photoresponsive dermatoses: Tar and NBUVB (Goeckerman treatment) requiring at least 4 to 8 hours of care under direct physician supervision.
Comments On Previous Treatment: Patient denies any redness or discomfort from previous treatment.
Protocol For Bath Puva: The patient received Bath PUVA.
Protocol For Protocol For Photochemotherapy For Severe Photoresponsive Dermatoses: Bath Puva: The patient received Photochemotherapy for severe photoresponsive dermatoses: Bath PUVA requiring at least 4 to 8 hours of care under direct physician supervision.
Protocol For Photochemotherapy: Petrolatum And Nbuvb: The patient received Photochemotherapy: Petrolatum and NBUVB (petrolatum applied to all lesions prior to phototherapy).
Protocol: NBUVB
Protocol For Broad Band Uvb: The patient received Broad Band UVB.
Changes In Treatment Protocol: Per protocol, increased treatment time by 15 seconds.
Protocol For Photochemotherapy For Severe Photoresponsive Dermatoses: Petrolatum And Broad Band Uvb: The patient received Photochemotherapyfor severe photoresponsive dermatoses: Petrolatum and Broad Band UVB requiring at least 4 to 8 hours of care under direct physician supervision.
Protocol For Photochemotherapy For Severe Photoresponsive Dermatoses: Petrolatum And Nbuvb: The patient received Photochemotherapy for severe photoresponsive dermatoses: Petrolatum and NBUVB requiring at least 4 to 8 hours of care under direct physician supervision.
Protocol For Nb Uva: The patient received NB UVA.
Consent: Written consent obtained.  The risks were reviewed with the patient including but not limited to: burn, pigmentary changes, pain, blistering, scabbing, redness, increased risk of skin cancers, and the remote possibility of scarring.
Protocol For Photochemotherapy: Tar And Broad Band Uvb (Goeckerman Treatment): The patient received Photochemotherapy: Tar and Broad Band UVB (Goeckerman treatment).
Protocol For Photochemotherapy: Baby Oil And Nbuvb: The patient received Photochemotherapy: Baby Oil and NBUVB (baby oil applied to all lesions prior to phototherapy).
Total Treatment Time: 2 minute 15 seconds

## 2021-08-02 ENCOUNTER — APPOINTMENT (RX ONLY)
Dept: URBAN - METROPOLITAN AREA CLINIC 4 | Facility: CLINIC | Age: 71
Setting detail: DERMATOLOGY
End: 2021-08-02

## 2021-08-02 DIAGNOSIS — L40.0 PSORIASIS VULGARIS: ICD-10-CM

## 2021-08-02 PROCEDURE — ? PHOTOTHERAPY TREATMENT

## 2021-08-02 PROCEDURE — 96900 ACTINOTHERAPY UV LIGHT: CPT

## 2021-08-02 ASSESSMENT — LOCATION SIMPLE DESCRIPTION DERM
LOCATION SIMPLE: RIGHT ANKLE
LOCATION SIMPLE: ABDOMEN
LOCATION SIMPLE: RIGHT FOREARM
LOCATION SIMPLE: LEFT ANKLE
LOCATION SIMPLE: POSTERIOR NECK
LOCATION SIMPLE: RIGHT UPPER ARM
LOCATION SIMPLE: RIGHT LOWER BACK
LOCATION SIMPLE: LEFT UPPER ARM
LOCATION SIMPLE: LEFT FOREARM

## 2021-08-02 ASSESSMENT — LOCATION DETAILED DESCRIPTION DERM
LOCATION DETAILED: RIGHT SUPERIOR LATERAL LOWER BACK
LOCATION DETAILED: LEFT LATERAL ABDOMEN
LOCATION DETAILED: RIGHT LATERAL POSTERIOR ANKLE
LOCATION DETAILED: LEFT ANTERIOR DISTAL UPPER ARM
LOCATION DETAILED: RIGHT DISTAL DORSAL FOREARM
LOCATION DETAILED: RIGHT MEDIAL TRAPEZIAL NECK
LOCATION DETAILED: RIGHT ANTERIOR PROXIMAL UPPER ARM
LOCATION DETAILED: LEFT POSTERIOR ANKLE
LOCATION DETAILED: LEFT PROXIMAL DORSAL FOREARM

## 2021-08-02 ASSESSMENT — LOCATION ZONE DERM
LOCATION ZONE: NECK
LOCATION ZONE: ARM
LOCATION ZONE: TRUNK
LOCATION ZONE: LEG

## 2021-08-02 NOTE — PROCEDURE: PHOTOTHERAPY TREATMENT
Name Of Supervising Technician: Angelica Briones MA
Protocol For Photochemotherapy: Petrolatum And Broad Band Uvb: The patient received Photochemotherapy: Petrolatum and Broad Band UVB.
Protocol For Puva: The patient received PUVA.
Treatment Number: 15
Protocol For Nbuvb: The patient received NBUVB.
Detail Level: Zone
Skin Type: III
Protocol For Photochemotherapy For Severe Photoresponsive Dermatoses: Puva: The patient received Photochemotherapy for severe photoresponsive dermatoses: PUVA requiring at least 4 to 8 hours of care under direct physician supervision.
Protocol For Photochemotherapy: Mineral Oil And Nbuvb: The patient received Photochemotherapy: Mineral Oil and NBUVB (mineral oil applied to all lesions prior to phototherapy).
Protocol For Uva: The patient received UVA.
Render Post-Care In The Note: no
Protocol For Photochemotherapy: Triamcinolone Ointment And Nbuvb: The patient received Photochemotherapy: Triamcinolone and NBUVB (triamcinolone ointment applied to all lesions prior to phototherapy).
Protocol For Photochemotherapy: Mineral Oil And Broad Band Uvb: The patient received Photochemotherapy: Mineral Oil and Broad Band UVB.
Protocol For Uva1: The patient received UVA1.
Protocol For Photochemotherapy: Tar And Nbuvb (Goeckerman Treatment): The patient received Photochemotherapy: Tar and NBUVB (Goeckerman treatment).
Post-Care Instructions: I reviewed with the patient in detail post-care instructions. Patient is to wear sun protection. Patients may expect sunburn like redness, discomfort and scabbing.
Protocol For Photochemotherapy For Severe Photoresponsive Dermatoses: Tar And Broad Band Uvb (Goeckerman Treatment): The patient received Photochemotherapy for severe photoresponsive dermatoses: Tar and Broad Band UVB (Goeckerman treatment) requiring at least 4 to 8 hours of care under direct physician supervision.
Protocol For Photochemotherapy For Severe Photoresponsive Dermatoses: Tar And Nbuvb (Goeckerman Treatment): The patient received Photochemotherapy for severe photoresponsive dermatoses: Tar and NBUVB (Goeckerman treatment) requiring at least 4 to 8 hours of care under direct physician supervision.
Comments On Previous Treatment: Patient denies any redness or discomfort from previous treatment.
Protocol For Bath Puva: The patient received Bath PUVA.
Protocol For Protocol For Photochemotherapy For Severe Photoresponsive Dermatoses: Bath Puva: The patient received Photochemotherapy for severe photoresponsive dermatoses: Bath PUVA requiring at least 4 to 8 hours of care under direct physician supervision.
Protocol For Photochemotherapy: Petrolatum And Nbuvb: The patient received Photochemotherapy: Petrolatum and NBUVB (petrolatum applied to all lesions prior to phototherapy).
Protocol: NBUVB
Protocol For Broad Band Uvb: The patient received Broad Band UVB.
Changes In Treatment Protocol: Per protocol, increased treatment time by 15 seconds.
Protocol For Photochemotherapy For Severe Photoresponsive Dermatoses: Petrolatum And Broad Band Uvb: The patient received Photochemotherapyfor severe photoresponsive dermatoses: Petrolatum and Broad Band UVB requiring at least 4 to 8 hours of care under direct physician supervision.
Protocol For Photochemotherapy For Severe Photoresponsive Dermatoses: Petrolatum And Nbuvb: The patient received Photochemotherapy for severe photoresponsive dermatoses: Petrolatum and NBUVB requiring at least 4 to 8 hours of care under direct physician supervision.
Protocol For Nb Uva: The patient received NB UVA.
Consent: Written consent obtained.  The risks were reviewed with the patient including but not limited to: burn, pigmentary changes, pain, blistering, scabbing, redness, increased risk of skin cancers, and the remote possibility of scarring.
Protocol For Photochemotherapy: Tar And Broad Band Uvb (Goeckerman Treatment): The patient received Photochemotherapy: Tar and Broad Band UVB (Goeckerman treatment).
Protocol For Photochemotherapy: Baby Oil And Nbuvb: The patient received Photochemotherapy: Baby Oil and NBUVB (baby oil applied to all lesions prior to phototherapy).
Total Treatment Time: 2 minute 30 seconds

## 2021-09-01 DIAGNOSIS — E78.5 DYSLIPIDEMIA: ICD-10-CM

## 2021-09-02 RX ORDER — EZETIMIBE 10 MG/1
10 TABLET ORAL DAILY
Qty: 90 TABLET | Refills: 3 | Status: SHIPPED | OUTPATIENT
Start: 2021-09-02 | End: 2022-08-08

## 2021-09-15 ENCOUNTER — APPOINTMENT (RX ONLY)
Dept: URBAN - METROPOLITAN AREA CLINIC 22 | Facility: CLINIC | Age: 71
Setting detail: DERMATOLOGY
End: 2021-09-15

## 2021-09-15 ENCOUNTER — OFFICE VISIT (OUTPATIENT)
Dept: CARDIOLOGY | Facility: MEDICAL CENTER | Age: 71
End: 2021-09-15
Payer: MEDICARE

## 2021-09-15 VITALS
BODY MASS INDEX: 31.17 KG/M2 | WEIGHT: 187.1 LBS | HEIGHT: 65 IN | SYSTOLIC BLOOD PRESSURE: 112 MMHG | OXYGEN SATURATION: 96 % | HEART RATE: 68 BPM | RESPIRATION RATE: 14 BRPM | DIASTOLIC BLOOD PRESSURE: 86 MMHG

## 2021-09-15 DIAGNOSIS — E78.5 DYSLIPIDEMIA: ICD-10-CM

## 2021-09-15 DIAGNOSIS — Z95.5 STENTED CORONARY ARTERY: ICD-10-CM

## 2021-09-15 DIAGNOSIS — I65.23 ATHEROSCLEROSIS OF BOTH CAROTID ARTERIES: Chronic | ICD-10-CM

## 2021-09-15 DIAGNOSIS — L40.0 PSORIASIS VULGARIS: ICD-10-CM

## 2021-09-15 DIAGNOSIS — I25.10 CORONARY ARTERY DISEASE INVOLVING NATIVE CORONARY ARTERY OF NATIVE HEART WITHOUT ANGINA PECTORIS: Chronic | ICD-10-CM

## 2021-09-15 DIAGNOSIS — I10 ESSENTIAL HYPERTENSION, BENIGN: ICD-10-CM

## 2021-09-15 PROBLEM — Z72.0 TOBACCO ABUSE: Status: RESOLVED | Noted: 2019-02-26 | Resolved: 2021-09-15

## 2021-09-15 PROCEDURE — 99214 OFFICE O/P EST MOD 30 MIN: CPT | Performed by: INTERNAL MEDICINE

## 2021-09-15 PROCEDURE — ? COUNSELING

## 2021-09-15 PROCEDURE — 99213 OFFICE O/P EST LOW 20 MIN: CPT

## 2021-09-15 PROCEDURE — ? PRESCRIPTION

## 2021-09-15 PROCEDURE — ? ADDITIONAL NOTES

## 2021-09-15 RX ORDER — APREMILAST 30 MG/1
1 TABLET, FILM COATED ORAL BID
Qty: 180 | Refills: 0 | Status: ERX

## 2021-09-15 ASSESSMENT — ENCOUNTER SYMPTOMS
NERVOUS/ANXIOUS: 0
BRUISES/BLEEDS EASILY: 0
FEVER: 0
MYALGIAS: 0
PSYCHIATRIC NEGATIVE: 1
WEIGHT LOSS: 0
MUSCULOSKELETAL NEGATIVE: 1
ABDOMINAL PAIN: 0
GASTROINTESTINAL NEGATIVE: 1
EYES NEGATIVE: 1
DOUBLE VISION: 0
CHILLS: 0
SHORTNESS OF BREATH: 0
WEAKNESS: 0
NAUSEA: 0
CLAUDICATION: 0
CARDIOVASCULAR NEGATIVE: 1
DIZZINESS: 1
HEADACHES: 0
COUGH: 0
BLURRED VISION: 0
RESPIRATORY NEGATIVE: 1
PALPITATIONS: 0
FOCAL WEAKNESS: 0
VOMITING: 0
DEPRESSION: 0

## 2021-09-15 ASSESSMENT — LOCATION SIMPLE DESCRIPTION DERM
LOCATION SIMPLE: RIGHT UPPER BACK
LOCATION SIMPLE: LEFT UPPER BACK

## 2021-09-15 ASSESSMENT — FIBROSIS 4 INDEX: FIB4 SCORE: 1.1

## 2021-09-15 ASSESSMENT — LOCATION DETAILED DESCRIPTION DERM
LOCATION DETAILED: RIGHT MID-UPPER BACK
LOCATION DETAILED: LEFT LATERAL UPPER BACK

## 2021-09-15 ASSESSMENT — LOCATION ZONE DERM: LOCATION ZONE: TRUNK

## 2021-09-15 NOTE — PROCEDURE: ADDITIONAL NOTES
Additional Notes: Daavlin ordered 8/4/21. He was advised to continue to use otezla and Taclonex ointment until light box is received. He will use nbuvb unit 3 x weekly until he is seen  again. If responding well will plan to d/c otezla because the patient does not want to be on it if he can control with nbuvb treatment.
Detail Level: Detailed
Render Risk Assessment In Note?: no

## 2021-09-15 NOTE — PROGRESS NOTES
Chief Complaint   Patient presents with   • Coronary Artery Disease   • Hypertension     F/V Dx: Essential hypertension, benign   • Dyslipidemia       Subjective     Cornelio Branch is a 71 y.o. male who presents today for annual follow up of coronary artery disease with prior stent placement.    Since the patient's last visit on 09/17/20, he has been doing well clinically. He admits to occasional dizziness, he has less appetite and energy. He denies chest pain, shortness of breath, palpitations, nausea/vomiting or diaphoresis. He has been less active due to the smoke situation. He stopped  His cigar use in February.     Past Medical History:   Diagnosis Date   • Arthritis     fingers   • ASTHMA     uses inhaler prn   • Back pain    • CAD (coronary artery disease) 6/21/2011   • CAD (coronary artery disease)    • Carotid artery plaque 6/21/2011   • Esophageal reflux 11/3/2009   • Heart burn    • High cholesterol    • History of asthma 11/3/2009   • History of benign prostatic hypertrophy 11/3/2009   • History of cardiac catheterization 6/21/2011   • History of echocardiogram 6/20/2011   • History of neck surgery     Hardware in neck   • HLD (hyperlipidemia) 6/1/2011   • Hypertension    • Indigestion    • Previous back surgery    • Stented coronary artery 2005     Past Surgical History:   Procedure Laterality Date   • GA NJX AA&/STRD TFRML EPI LUMBAR/SACRAL 1 LEVEL Bilateral 9/2/2016    Procedure: INJ-FORAMEN EPI LUM/SAC SNGL - L4-5;  Surgeon: Jesus Rojas M.D.;  Location: [facility] ORS;  Service: Pain Management   • GA NJX AA&/STRD TFRML EPI LUMBAR/SACRAL 1 LEVEL  9/2/2016    Procedure: INJ-FORAMEN EPI LUM/SAC SNGL;  Surgeon: Jesus Rojas M.D.;  Location: SURGERY [facility] ORS;  Service: Pain Management   • PB FLUORSCOPIC GUIDANCE SPINAL INJECTION  9/2/2016    Procedure: FLUOROGUIDE FOR SPINAL INJ;  Surgeon: Jesus Rojas M.D.;  Location: [facility] ORS;  Service: Pain Management   • LUMBAR  LAMINECTOMY DISKECTOMY  2012    Performed by MARLIN CANDELARIA at SURGERY [facility] ORS   • FORAMINOTOMY  2012    Performed by MARLIN CANDELARIA at SURGERY [facility] ORS   • LUMBAR LAMINECTOMY DISKECTOMY  3/9/2011    Performed by MARLIN CANDELARIA at SURGERY [facility] ORS   • FORAMINOTOMY  3/9/2011    Performed by MARLIN CANDELARIA at SURGERY [facility] ORS   • ANGIOGRAM     • CERVICAL DISK AND FUSION ANTERIOR     • OTHER CARDIAC SURGERY   heart stents     Family History   Problem Relation Age of Onset   • Heart Attack Neg Hx      Social History     Socioeconomic History   • Marital status:      Spouse name: Not on file   • Number of children: Not on file   • Years of education: Not on file   • Highest education level: Master's degree (e.g., MA, MS, Sudeep, MEd, MSW, RENEE)   Occupational History   • Not on file   Tobacco Use   • Smoking status: Former Smoker     Years: 18.00     Types: Cigars     Quit date: 1987     Years since quittin.4   • Smokeless tobacco: Never Used   • Tobacco comment:    Substance and Sexual Activity   • Alcohol use: Yes     Comment: 2 cocktails per week   • Drug use: No   • Sexual activity: Not on file   Other Topics Concern   • Not on file   Social History Narrative    ** Merged History Encounter **          Social Determinants of Health     Financial Resource Strain: Low Risk    • Difficulty of Paying Living Expenses: Not hard at all   Food Insecurity: No Food Insecurity   • Worried About Running Out of Food in the Last Year: Never true   • Ran Out of Food in the Last Year: Never true   Transportation Needs: No Transportation Needs   • Lack of Transportation (Medical): No   • Lack of Transportation (Non-Medical): No   Physical Activity: Sufficiently Active   • Days of Exercise per Week: 6 days   • Minutes of Exercise per Session: 50 min   Stress: Stress Concern Present   • Feeling of Stress : To some extent   Social Connections: Moderately Isolated   • Frequency of  Communication with Friends and Family: More than three times a week   • Frequency of Social Gatherings with Friends and Family: Twice a week   • Attends Christianity Services: Never   • Active Member of Clubs or Organizations: No   • Attends Club or Organization Meetings: Never   • Marital Status:    Intimate Partner Violence:    • Fear of Current or Ex-Partner:    • Emotionally Abused:    • Physically Abused:    • Sexually Abused:      No Known Allergies     (Medications reviewed.)  Outpatient Encounter Medications as of 9/15/2021   Medication Sig Dispense Refill   • VITAMIN D PO Take 1,000 mg by mouth every day.     • ezetimibe (ZETIA) 10 MG Tab Take 1 Tablet by mouth every day. 90 Tablet 3   • metoprolol tartrate (LOPRESSOR) 25 MG Tab TAKE 1 TABLET TWICE A DAY 60 tablet 11   • Apremilast (OTEZLA PO) Take 10 mg by mouth 2 Times a Day.     • atorvastatin (LIPITOR) 40 MG Tab Take 1 Tab by mouth every day. 90 Tab 3   • lisinopril (PRINIVIL) 20 MG Tab Take 1 Tab by mouth 2 times a day. 180 Tab 2   • Multiple Vitamin (MULTI-VITAMIN DAILY PO) Take  by mouth.     • Albuterol (PROVENTIL INH) Inhale  by mouth.     • calcipotriene-betamethasone (TACLONEX) ointment Apply  to affected area(s) every day.     • KRILL OIL PO Take  by mouth every day.     • Coenzyme Q10 (COQ10 PO) Take 300 mg by mouth every day.     • GLUCOSAMINE HCL PO Take 1 Cap by mouth every day.     • FLOVENT  MCG/ACT Aerosol 2 Puffs as needed.     • B Complex Vitamins (B-COMPLEX/B-12 PO) Take 1,000 Tabs by mouth every day.     • IRON PO Take 1 Tab by mouth every day.     • aspirin 81 MG tablet Take 81 mg by mouth every day.     • esomeprazole (NEXIUM) 40 MG capsule Take 80 mg by mouth BID 2 DAYS A WEEK. For acid reflux  Every other day     • [DISCONTINUED] ezetimibe (ZETIA) 10 MG Tab Take 1 Tab by mouth every day. (Patient not taking: Reported on 9/15/2021) 60 Tab 0   • [DISCONTINUED] MELATONIN PO Take  by mouth. (Patient not taking: Reported on  "9/15/2021)       No facility-administered encounter medications on file as of 9/15/2021.     Review of Systems   Constitutional: Positive for malaise/fatigue. Negative for chills, fever and weight loss.   HENT: Negative.  Negative for hearing loss.    Eyes: Negative.  Negative for blurred vision and double vision.   Respiratory: Negative.  Negative for cough and shortness of breath.    Cardiovascular: Negative.  Negative for chest pain, palpitations, claudication and leg swelling.   Gastrointestinal: Negative.  Negative for abdominal pain, nausea and vomiting.   Genitourinary: Negative.  Negative for dysuria and urgency.   Musculoskeletal: Negative.  Negative for joint pain and myalgias.   Skin: Negative.  Negative for itching and rash.   Neurological: Positive for dizziness. Negative for focal weakness, weakness and headaches.   Endo/Heme/Allergies: Negative.  Does not bruise/bleed easily.   Psychiatric/Behavioral: Negative.  Negative for depression. The patient is not nervous/anxious.               Objective     /86 (BP Location: Left arm, Patient Position: Sitting, BP Cuff Size: Adult)   Pulse 68   Resp 14   Ht 1.638 m (5' 4.5\")   Wt 84.9 kg (187 lb 1.6 oz)   SpO2 96%   BMI 31.62 kg/m²     Physical Exam  Constitutional:       Appearance: He is well-developed.   HENT:      Head: Normocephalic and atraumatic.   Neck:      Vascular: No JVD.   Cardiovascular:      Rate and Rhythm: Normal rate and regular rhythm.      Heart sounds: Normal heart sounds.   Pulmonary:      Effort: Pulmonary effort is normal.      Breath sounds: Normal breath sounds.   Abdominal:      General: Bowel sounds are normal.      Palpations: Abdomen is soft.      Comments: No hepatosplenomegaly.   Musculoskeletal:         General: Normal range of motion.   Lymphadenopathy:      Cervical: No cervical adenopathy.   Skin:     General: Skin is warm and dry.   Neurological:      Mental Status: He is alert and oriented to person, place, " and time.            CARDIAC STUDIES/PROCEDURES:     CAROTID ULTRASOUND (12/01/17)  No prior study is available for comparison.  Normal bilateral carotid exam.      CAROTID ULTRASOUND (11/18/09)  Carotid ultrasound showing mild plaques of left internal carotid artery.     CARDIAC CATHETERIZATION CONCLUSIONS by Dr. Toledo (04/08/13)   1. Acute inferior wall myocardial infarction secondary to high grade in-stent stenosis in the   middle of a large segment of stented proximal right coronary artery, status post successful   stenting with a 3 mm x 15 mm Xience Xpedition stent.   2. Diffusely diseased circumflex system as described above.   3. Widely patent left anterior descending coronary artery with its proximal 2-3 cm, more diffusely   narrowed and with 2 diagonal branches given off with high-grade disease in the first diagonal   branch, which is quite small and moderate disease in the second diagonal branch.     CARDIAC CATHETERIZATION CONCLUSIONS in [place], [place] (12/05)  Cardiac catheterization showing high grade small vessel first and second diagonal branch   stenosis, non obstructive left circumflex artery stenosis and high grade right coronary artery   stenosis treated with 3.0 x 32 mm and 3.0 x 16 mm Taxus drug eluding stents.      ECHOCARDIOGRAM CONCLUSIONS (12/01/17)  Prior echo 9-17-15. Compared to the report of the study done - there   has been no significant change.   Normal left ventricular systolic function.  Left ventricular ejection fraction is visually estimated to be 65%.  Normal diastolic function.  Mild mitral regurgitation.  Mild tricuspid regurgitation.  Estimated right ventricular systolic pressure is 35 mmHg.     ECHOCARDIOGRAM CONCLUSIONS (09/17/15)  Normal left ventricular size, wall thickness, and systolic function.   Grade I diastolic dysfunction. Ejection fraction is measured to be 63 %   by Martines's biplane 2D analysis.  Trace mitral regurgitation.   Trace aortic insufficiency.  Unable to  estimate pulmonary artery pressure due to an inadequate   tricuspid regurgitant jet.  Normal aortic root diameter 3 cm.  No prior study is available for comparison.      ECHOCARDIOGRAM CONCLUSIONS (06/16/11)  Echocardiogram showing normal left ventricular systolic function, no significant valvular abnormalities.     EKG performed on (09/16/15) EKG shows normal sinus rhythm.     Laboratory results of (06/29/21) were reviewed. Cholesterol profile of 135/96/43/73 mg/dL noted.  Laboratory results of (09/08/20) Cholesterol profile of 127/121/42/61 noted.  Laboratory results of (09/05/19) Cholesterol profile of 128/132/40/62 noted.  Laboratory results of (06/15/18) Cholesterol profile of 108/81/44/48 noted.  Laboratory results of (11/29/17) Cholesterol profile of 170/127/46/99 noted.  Laboratory results of (10/10/16) Cholesterol profile of 162/128/41/95 noted.  Laboratory results of (07/27/16) Cholesterol profile of 160/138/42/90 noted  Laboratory results of (09/16/15) Cholesterol profile of 141/148/38/73 noted.     MPI CONCLUSIONS (09/17/15)  Normal left ventricular perfusion with no fixed or reversible defects.   Normal left ventricular wall motion, with EF of 72%.      RENAL ULTRASOUND (11/18/09)  Unremarkable renal ultrasound with no evidence of renal artery stenosis.  No abdominal aortic aneurysm.     PET SCAN (02/15/19)  ELECTROCARDIOGRAPHIC FINDINGS:   Normal sinus rhythm.    Normal ECG response to dipyridamole infusion.    No ischemic changes noted   SCINTOGRAPHIC FINDINGS:    Normal left ventricular perfusion with stress and rest images.    There is no evidence of ischemia or scar.  GATED WALL MOTION FINDINGS:    The left ventricle wall motion is normal with stress and rest  imagings.    Measured resting ejection fraction is 65 %.       Assessment & Plan     1. Coronary artery disease involving native coronary artery of native heart without angina pectoris     2. Stented coronary artery     3. Essential  hypertension, benign     4. Dyslipidemia     5. Atherosclerosis of both carotid arteries         Medical Decision Making: Today's Assessment/Status/Plan:        1. Coronary artery disease with stent placement: He is clinically doing well without recurrence of his angina.  We will continue with current medical care including aspirin, metoprolol, lisinopril and atorvastatin.  2. Hypertension: Blood pressure is well controlled. We will continue with beta blockade therapy and ace inhibitor therapy.  3. Hyperlipidemia: He is doing well on statin therapy without myalgia symptoms. (Managed by primary care physician)  4. Carotid artery stenosis: Clinically stable on above medical therapy.  5. Additional information: His son graduated medical school at [employer] and is doing his residency. He is now engaged to his academic counselor.      We will follow up the patient in one year.    CC Paz You

## 2021-11-22 DIAGNOSIS — E78.5 DYSLIPIDEMIA: ICD-10-CM

## 2021-11-22 RX ORDER — ATORVASTATIN CALCIUM 40 MG/1
TABLET, FILM COATED ORAL
Qty: 90 TABLET | Refills: 3 | Status: SHIPPED | OUTPATIENT
Start: 2021-11-22 | End: 2022-08-15 | Stop reason: SDUPTHER

## 2022-01-24 ENCOUNTER — HOSPITAL ENCOUNTER (OUTPATIENT)
Dept: LAB | Facility: MEDICAL CENTER | Age: 72
End: 2022-01-24
Attending: FAMILY MEDICINE
Payer: MEDICARE

## 2022-01-24 LAB
ALBUMIN SERPL BCP-MCNC: 4.6 G/DL (ref 3.2–4.9)
ALBUMIN/GLOB SERPL: 1.5 G/DL
ALP SERPL-CCNC: 62 U/L (ref 30–99)
ALT SERPL-CCNC: 43 U/L (ref 2–50)
ANION GAP SERPL CALC-SCNC: 12 MMOL/L (ref 7–16)
APPEARANCE UR: CLEAR
AST SERPL-CCNC: 32 U/L (ref 12–45)
BASOPHILS # BLD AUTO: 0.6 % (ref 0–1.8)
BASOPHILS # BLD: 0.05 K/UL (ref 0–0.12)
BILIRUB SERPL-MCNC: 0.4 MG/DL (ref 0.1–1.5)
BILIRUB UR QL STRIP.AUTO: NEGATIVE
BUN SERPL-MCNC: 21 MG/DL (ref 8–22)
CALCIUM SERPL-MCNC: 9.7 MG/DL (ref 8.5–10.5)
CHLORIDE SERPL-SCNC: 101 MMOL/L (ref 96–112)
CHOLEST SERPL-MCNC: 126 MG/DL (ref 100–199)
CO2 SERPL-SCNC: 23 MMOL/L (ref 20–33)
COLOR UR: YELLOW
CREAT SERPL-MCNC: 0.85 MG/DL (ref 0.5–1.4)
EOSINOPHIL # BLD AUTO: 0.29 K/UL (ref 0–0.51)
EOSINOPHIL NFR BLD: 3.2 % (ref 0–6.9)
ERYTHROCYTE [DISTWIDTH] IN BLOOD BY AUTOMATED COUNT: 47.8 FL (ref 35.9–50)
EST. AVERAGE GLUCOSE BLD GHB EST-MCNC: 126 MG/DL
FASTING STATUS PATIENT QL REPORTED: NORMAL
GLOBULIN SER CALC-MCNC: 3 G/DL (ref 1.9–3.5)
GLUCOSE SERPL-MCNC: 90 MG/DL (ref 65–99)
GLUCOSE UR STRIP.AUTO-MCNC: NEGATIVE MG/DL
HBA1C MFR BLD: 6 % (ref 4–5.6)
HCT VFR BLD AUTO: 49.4 % (ref 42–52)
HDLC SERPL-MCNC: 40 MG/DL
HGB BLD-MCNC: 16.8 G/DL (ref 14–18)
IMM GRANULOCYTES # BLD AUTO: 0.02 K/UL (ref 0–0.11)
IMM GRANULOCYTES NFR BLD AUTO: 0.2 % (ref 0–0.9)
KETONES UR STRIP.AUTO-MCNC: NEGATIVE MG/DL
LDLC SERPL CALC-MCNC: 69 MG/DL
LEUKOCYTE ESTERASE UR QL STRIP.AUTO: NEGATIVE
LYMPHOCYTES # BLD AUTO: 3.52 K/UL (ref 1–4.8)
LYMPHOCYTES NFR BLD: 38.9 % (ref 22–41)
MCH RBC QN AUTO: 32.1 PG (ref 27–33)
MCHC RBC AUTO-ENTMCNC: 34 G/DL (ref 33.7–35.3)
MCV RBC AUTO: 94.3 FL (ref 81.4–97.8)
MICRO URNS: NORMAL
MONOCYTES # BLD AUTO: 0.67 K/UL (ref 0–0.85)
MONOCYTES NFR BLD AUTO: 7.4 % (ref 0–13.4)
NEUTROPHILS # BLD AUTO: 4.49 K/UL (ref 1.82–7.42)
NEUTROPHILS NFR BLD: 49.7 % (ref 44–72)
NITRITE UR QL STRIP.AUTO: NEGATIVE
NRBC # BLD AUTO: 0 K/UL
NRBC BLD-RTO: 0 /100 WBC
PH UR STRIP.AUTO: 6 [PH] (ref 5–8)
PLATELET # BLD AUTO: 303 K/UL (ref 164–446)
PMV BLD AUTO: 9.8 FL (ref 9–12.9)
POTASSIUM SERPL-SCNC: 4.8 MMOL/L (ref 3.6–5.5)
PROT SERPL-MCNC: 7.6 G/DL (ref 6–8.2)
PROT UR QL STRIP: NEGATIVE MG/DL
PSA SERPL-MCNC: 1.8 NG/ML (ref 0–4)
RBC # BLD AUTO: 5.24 M/UL (ref 4.7–6.1)
RBC UR QL AUTO: NEGATIVE
SODIUM SERPL-SCNC: 136 MMOL/L (ref 135–145)
SP GR UR STRIP.AUTO: 1.02
TRIGL SERPL-MCNC: 87 MG/DL (ref 0–149)
TSH SERPL DL<=0.005 MIU/L-ACNC: 1.74 UIU/ML (ref 0.38–5.33)
UROBILINOGEN UR STRIP.AUTO-MCNC: 0.2 MG/DL
WBC # BLD AUTO: 9 K/UL (ref 4.8–10.8)

## 2022-01-24 PROCEDURE — 84153 ASSAY OF PSA TOTAL: CPT

## 2022-01-24 PROCEDURE — 83036 HEMOGLOBIN GLYCOSYLATED A1C: CPT | Mod: GA

## 2022-01-24 PROCEDURE — 81003 URINALYSIS AUTO W/O SCOPE: CPT

## 2022-01-24 PROCEDURE — 80061 LIPID PANEL: CPT

## 2022-01-24 PROCEDURE — 36415 COLL VENOUS BLD VENIPUNCTURE: CPT

## 2022-01-24 PROCEDURE — 84443 ASSAY THYROID STIM HORMONE: CPT

## 2022-01-24 PROCEDURE — 80053 COMPREHEN METABOLIC PANEL: CPT

## 2022-01-24 PROCEDURE — 85025 COMPLETE CBC W/AUTO DIFF WBC: CPT

## 2022-06-02 ENCOUNTER — PATIENT MESSAGE (OUTPATIENT)
Dept: CARDIOLOGY | Facility: MEDICAL CENTER | Age: 72
End: 2022-06-02
Payer: MEDICARE

## 2022-06-15 ENCOUNTER — PATIENT MESSAGE (OUTPATIENT)
Dept: CARDIOLOGY | Facility: MEDICAL CENTER | Age: 72
End: 2022-06-15
Payer: MEDICARE

## 2022-06-16 NOTE — PATIENT COMMUNICATION
Request for stress test and labs sent to [facility] medical records at [phone] and [phone], receipts confirmed.

## 2022-06-22 DIAGNOSIS — I10 ESSENTIAL HYPERTENSION, BENIGN: ICD-10-CM

## 2022-06-27 DIAGNOSIS — I10 ESSENTIAL HYPERTENSION, BENIGN: ICD-10-CM

## 2022-06-27 RX ORDER — LISINOPRIL 20 MG/1
20 TABLET ORAL 2 TIMES DAILY
Qty: 180 TABLET | Refills: 0 | Status: SHIPPED | OUTPATIENT
Start: 2022-06-27 | End: 2022-08-15 | Stop reason: SDUPTHER

## 2022-07-18 ENCOUNTER — APPOINTMENT (RX ONLY)
Dept: URBAN - METROPOLITAN AREA CLINIC 22 | Facility: CLINIC | Age: 72
Setting detail: DERMATOLOGY
End: 2022-07-18

## 2022-07-18 DIAGNOSIS — L40.0 PSORIASIS VULGARIS: ICD-10-CM

## 2022-07-18 DIAGNOSIS — L57.0 ACTINIC KERATOSIS: ICD-10-CM

## 2022-07-18 PROCEDURE — 99213 OFFICE O/P EST LOW 20 MIN: CPT | Mod: 25

## 2022-07-18 PROCEDURE — ? COUNSELING

## 2022-07-18 PROCEDURE — ? ADDITIONAL NOTES

## 2022-07-18 PROCEDURE — 17000 DESTRUCT PREMALG LESION: CPT

## 2022-07-18 PROCEDURE — ? LIQUID NITROGEN

## 2022-07-18 ASSESSMENT — LOCATION DETAILED DESCRIPTION DERM
LOCATION DETAILED: LEFT MEDIAL UPPER BACK
LOCATION DETAILED: LEFT MEDIAL ZYGOMA

## 2022-07-18 ASSESSMENT — LOCATION ZONE DERM
LOCATION ZONE: FACE
LOCATION ZONE: TRUNK

## 2022-07-18 ASSESSMENT — LOCATION SIMPLE DESCRIPTION DERM
LOCATION SIMPLE: LEFT UPPER BACK
LOCATION SIMPLE: LEFT ZYGOMA

## 2022-07-18 ASSESSMENT — BSA PSORIASIS: % BODY COVERED IN PSORIASIS: 2

## 2022-07-18 ASSESSMENT — PGA PSORIASIS: PGA PSORIASIS 2020: CLEAR

## 2022-07-18 NOTE — PROCEDURE: LIQUID NITROGEN
Duration Of Freeze Thaw-Cycle (Seconds): 3
Show Applicator Variable?: Yes
Detail Level: Detailed
Consent: The patient's consent was obtained including but not limited to risks of crusting, scabbing, blistering, scarring, darker or lighter pigmentary change, recurrence, incomplete removal and infection.
Post-Care Instructions: I reviewed with the patient in detail post-care instructions. Patient is to wear sunprotection, and avoid picking at any of the treated lesions. Pt may apply Vaseline to crusted or scabbing areas.
Number Of Freeze-Thaw Cycles: 2 freeze-thaw cycles
Render Post-Care Instructions In Note?: no

## 2022-07-18 NOTE — PROCEDURE: ADDITIONAL NOTES
Render Risk Assessment In Note?: no
Additional Notes: Originally started at 300mj and has increased to 5000mj over the last year.\\nHe has been using the light box 16 minutes three times a week.\\nHe has been under control.  Recommend decreasing to 15 mins twice a week and then if can go to once a week if not breaking through.
Detail Level: Simple

## 2022-07-19 DIAGNOSIS — I10 ESSENTIAL HYPERTENSION, BENIGN: ICD-10-CM

## 2022-07-19 RX ORDER — LISINOPRIL 20 MG/1
20 TABLET ORAL 2 TIMES DAILY
Qty: 180 TABLET | Refills: 0 | OUTPATIENT
Start: 2022-07-19

## 2022-07-19 NOTE — TELEPHONE ENCOUNTER
Paper Rx request     Is the patient due for a refill? No    Was the patient seen the past year? Yes    Date of last office visit: 9/15/21    Does the patient have an upcoming appointment?  Yes   If yes, When? 8/15/22    Provider to refill: ARBEN    Does the patients insurance require a 100 day supply?  No      lisinopril (PRINIVIL) 20 MG Tab 180 Tablet 0/0 6/27/2022     Sig - Route: Take 1 Tablet by mouth 2 times a day. - Oral    Sent to pharmacy as: Lisinopril 20 MG Oral Tablet (PRINIVIL)    E-Prescribing Status: Receipt confirmed by pharmacy (6/27/2022  3:16 PM PDT)

## 2022-08-08 DIAGNOSIS — E78.5 DYSLIPIDEMIA: ICD-10-CM

## 2022-08-08 NOTE — TELEPHONE ENCOUNTER
Is the patient due for a refill? Yes    Was the patient seen the past year? Yes    Date of last office visit: 9/15/21    Does the patient have an upcoming appointment?  Yes   If yes, When? 8/15/22     Provider to refill:ARBEN    Does the patients insurance require a 100 day supply?  No

## 2022-08-09 RX ORDER — EZETIMIBE 10 MG/1
TABLET ORAL
Qty: 90 TABLET | Refills: 0 | Status: SHIPPED | OUTPATIENT
Start: 2022-08-09 | End: 2022-08-15 | Stop reason: SDUPTHER

## 2022-08-15 ENCOUNTER — RESEARCH ENCOUNTER (OUTPATIENT)
Dept: OTHER | Facility: MEDICAL CENTER | Age: 72
End: 2022-08-15

## 2022-08-15 ENCOUNTER — TELEPHONE (OUTPATIENT)
Dept: CARDIOLOGY | Facility: MEDICAL CENTER | Age: 72
End: 2022-08-15

## 2022-08-15 ENCOUNTER — OFFICE VISIT (OUTPATIENT)
Dept: CARDIOLOGY | Facility: MEDICAL CENTER | Age: 72
End: 2022-08-15
Payer: MEDICARE

## 2022-08-15 VITALS
DIASTOLIC BLOOD PRESSURE: 70 MMHG | OXYGEN SATURATION: 94 % | HEIGHT: 65 IN | BODY MASS INDEX: 30.02 KG/M2 | HEART RATE: 76 BPM | SYSTOLIC BLOOD PRESSURE: 110 MMHG | WEIGHT: 180.2 LBS | RESPIRATION RATE: 14 BRPM

## 2022-08-15 VITALS
HEART RATE: 76 BPM | HEIGHT: 64 IN | BODY MASS INDEX: 30.75 KG/M2 | RESPIRATION RATE: 14 BRPM | OXYGEN SATURATION: 94 % | DIASTOLIC BLOOD PRESSURE: 70 MMHG | WEIGHT: 180.12 LBS | SYSTOLIC BLOOD PRESSURE: 110 MMHG

## 2022-08-15 DIAGNOSIS — I25.10 CORONARY ARTERY DISEASE INVOLVING NATIVE CORONARY ARTERY OF NATIVE HEART WITHOUT ANGINA PECTORIS: Chronic | ICD-10-CM

## 2022-08-15 DIAGNOSIS — E78.5 DYSLIPIDEMIA: ICD-10-CM

## 2022-08-15 DIAGNOSIS — I65.23 ATHEROSCLEROSIS OF BOTH CAROTID ARTERIES: Chronic | ICD-10-CM

## 2022-08-15 DIAGNOSIS — Z95.5 STENTED CORONARY ARTERY: ICD-10-CM

## 2022-08-15 DIAGNOSIS — I10 ESSENTIAL HYPERTENSION, BENIGN: ICD-10-CM

## 2022-08-15 PROCEDURE — 99214 OFFICE O/P EST MOD 30 MIN: CPT | Performed by: INTERNAL MEDICINE

## 2022-08-15 RX ORDER — EZETIMIBE 10 MG/1
10 TABLET ORAL DAILY
Qty: 90 TABLET | Refills: 3 | Status: SHIPPED | OUTPATIENT
Start: 2022-08-15 | End: 2022-11-14

## 2022-08-15 RX ORDER — FINASTERIDE 5 MG/1
TABLET, FILM COATED ORAL
COMMUNITY
Start: 2022-07-18 | End: 2022-10-13

## 2022-08-15 RX ORDER — ATORVASTATIN CALCIUM 40 MG/1
40 TABLET, FILM COATED ORAL DAILY
Qty: 90 TABLET | Refills: 3 | Status: SHIPPED | OUTPATIENT
Start: 2022-08-15 | End: 2022-11-14

## 2022-08-15 RX ORDER — LISINOPRIL 20 MG/1
20 TABLET ORAL 2 TIMES DAILY
Qty: 180 TABLET | Refills: 3 | Status: SHIPPED | OUTPATIENT
Start: 2022-08-15 | End: 2022-11-14

## 2022-08-15 ASSESSMENT — ENCOUNTER SYMPTOMS
CHILLS: 0
BRUISES/BLEEDS EASILY: 0
COUGH: 0
GASTROINTESTINAL NEGATIVE: 1
VOMITING: 0
RESPIRATORY NEGATIVE: 1
MYALGIAS: 0
PALPITATIONS: 0
MUSCULOSKELETAL NEGATIVE: 1
WEAKNESS: 0
DIZZINESS: 0
HEADACHES: 0
NEUROLOGICAL NEGATIVE: 1
SHORTNESS OF BREATH: 0
CONSTITUTIONAL NEGATIVE: 1
DEPRESSION: 0
PSYCHIATRIC NEGATIVE: 1
BLURRED VISION: 0
EYES NEGATIVE: 1
WEIGHT LOSS: 0
ABDOMINAL PAIN: 0
FOCAL WEAKNESS: 0
CARDIOVASCULAR NEGATIVE: 1
CLAUDICATION: 0
NERVOUS/ANXIOUS: 0
FEVER: 0
DOUBLE VISION: 0
NAUSEA: 0

## 2022-08-15 ASSESSMENT — FIBROSIS 4 INDEX
FIB4 SCORE: 1.18
FIB4 SCORE: 1.18

## 2022-08-15 NOTE — PROGRESS NOTES
Chief Complaint   Patient presents with    Coronary Artery Disease     F/V Dx: Coronary artery disease involving native coronary artery of native heart without angina pectoris       Subjective     Cornelio Branch is a 72 y.o. male who presents today for annual follow up of coronary artery disease with prior stent placement.    Since the patient's last visit on 09/15/21, he has been doing well clinically. He denies chest pain, shortness of breath, palpitations, nausea/vomiting or diaphoresis. He keeps active walking 2 miles per day. He went to his [place] with his family and looked at homes in [facility].    Past Medical History:   Diagnosis Date    Arthritis     fingers    ASTHMA     uses inhaler prn    Back pain     CAD (coronary artery disease) 6/21/2011    CAD (coronary artery disease)     Carotid artery plaque 6/21/2011    Esophageal reflux 11/3/2009    Heart burn     High cholesterol     History of asthma 11/3/2009    History of benign prostatic hypertrophy 11/3/2009    History of cardiac catheterization 6/21/2011    History of echocardiogram 6/20/2011    History of neck surgery     Hardware in neck    HLD (hyperlipidemia) 6/1/2011    Hypertension     Indigestion     Previous back surgery     Stented coronary artery 2005     Past Surgical History:   Procedure Laterality Date    MO NJX AA&/STRD TFRML EPI LUMBAR/SACRAL 1 LEVEL Bilateral 9/2/2016    Procedure: INJ-FORAMEN EPI LUM/SAC SNGL - L4-5;  Surgeon: Jesus Rojas M.D.;  Location: SURGERY [facility] ORS;  Service: Pain Management    MO NJX AA&/STRD TFRML EPI LUMBAR/SACRAL 1 LEVEL  9/2/2016    Procedure: INJ-FORAMEN EPI LUM/SAC SNGL;  Surgeon: Jesus Rojas M.D.;  Location: SURGERY [facility] ORS;  Service: Pain Management    PB FLUORSCOPIC GUIDANCE SPINAL INJECTION  9/2/2016    Procedure: FLUOROGUIDE FOR SPINAL INJ;  Surgeon: Jesus Rojas M.D.;  Location: SURGERY [facility] OR[facility];  Service: Pain Management    LUMBAR LAMINECTOMY DISKECTOMY  4/7/2012    Performed  by MARLIN CANDELARIA at SURGERY [facility]S    FORAMINOTOMY  2012    Performed by MARLIN CANDELARIA at SURGERY [facility]    LUMBAR LAMINECTOMY DISKECTOMY  3/9/2011    Performed by MARLIN CANDELARIA at SURGERY [facility]    FORAMINOTOMY  3/9/2011    Performed by MARLIN CANDELARIA at SURGERY [facility]    ANGIOGRAM      CERVICAL DISK AND FUSION ANTERIOR      OTHER CARDIAC SURGERY   heart stents     Family History   Problem Relation Age of Onset    Heart Attack Neg Hx      Social History     Socioeconomic History    Marital status:      Spouse name: Not on file    Number of children: Not on file    Years of education: Not on file    Highest education level: Master's degree (e.g., MA, MS, Sudeep, MEd, MSW, RENEE)   Occupational History    Not on file   Tobacco Use    Smoking status: Former     Types: Cigars     Quit date: 1987     Years since quittin.3    Smokeless tobacco: Never    Tobacco comments:        Substance and Sexual Activity    Alcohol use: Yes     Comment: 2 cocktails per week    Drug use: No    Sexual activity: Not on file   Other Topics Concern    Not on file   Social History Narrative    ** Merged History Encounter **          Social Determinants of Health     Financial Resource Strain: Not on file   Food Insecurity: Not on file   Transportation Needs: Not on file   Physical Activity: Not on file   Stress: Not on file   Social Connections: Not on file   Intimate Partner Violence: Not on file   Housing Stability: Not on file     No Known Allergies    (Medications reviewed.)  Outpatient Encounter Medications as of 8/15/2022   Medication Sig Dispense Refill    finasteride (PROSCAR) 5 MG Tab       ezetimibe (ZETIA) 10 MG Tab TAKE 1 TABLET DAILY 90 Tablet 0    lisinopril (PRINIVIL) 20 MG Tab Take 1 Tablet by mouth 2 times a day. 180 Tablet 0    metoprolol tartrate (LOPRESSOR) 25 MG Tab TAKE 1 TABLET TWICE A  Tablet 0    atorvastatin (LIPITOR) 40 MG Tab TAKE 1 TABLET DAILY 90  Tablet 3    VITAMIN D PO Take 1,000 mg by mouth every day.      Multiple Vitamin (MULTI-VITAMIN DAILY PO) Take  by mouth.      Albuterol (PROVENTIL INH) Inhale  by mouth.      calcipotriene-betamethasone (TACLONEX) ointment Apply  to affected area(s) every day.      KRILL OIL PO Take  by mouth every day.      Coenzyme Q10 (COQ10 PO) Take 300 mg by mouth every day.      GLUCOSAMINE HCL PO Take 1 Cap by mouth every day.      FLOVENT  MCG/ACT Aerosol 2 Puffs as needed.      B Complex Vitamins (B-COMPLEX/B-12 PO) Take 1,000 Tabs by mouth every day.      IRON PO Take 1 Tab by mouth every day.      aspirin 81 MG tablet Take 81 mg by mouth every day.      Apremilast (OTEZLA PO) Take 10 mg by mouth 2 Times a Day.      [DISCONTINUED] esomeprazole (NEXIUM) 40 MG delayed-release capsule Take 80 mg by mouth BID 2 DAYS A WEEK. For acid reflux  Every other day (Patient not taking: Reported on 8/15/2022)       No facility-administered encounter medications on file as of 8/15/2022.     Review of Systems   Constitutional: Negative.  Negative for chills, fever, malaise/fatigue and weight loss.   HENT: Negative.  Negative for hearing loss.    Eyes: Negative.  Negative for blurred vision and double vision.   Respiratory: Negative.  Negative for cough and shortness of breath.    Cardiovascular: Negative.  Negative for chest pain, palpitations, claudication and leg swelling.   Gastrointestinal: Negative.  Negative for abdominal pain, nausea and vomiting.   Genitourinary: Negative.  Negative for dysuria and urgency.   Musculoskeletal: Negative.  Negative for joint pain and myalgias.   Skin: Negative.  Negative for itching and rash.   Neurological: Negative.  Negative for dizziness, focal weakness, weakness and headaches.   Endo/Heme/Allergies: Negative.  Does not bruise/bleed easily.   Psychiatric/Behavioral: Negative.  Negative for depression. The patient is not nervous/anxious.             Objective     /70 (BP Location:  "Left arm, Patient Position: Sitting, BP Cuff Size: Adult)   Pulse 76   Resp 14   Ht 1.638 m (5' 4.5\")   Wt 81.7 kg (180 lb 3.2 oz)   SpO2 94%   BMI 30.45 kg/m²     Physical Exam  Constitutional:       Appearance: He is well-developed.   HENT:      Head: Normocephalic and atraumatic.   Neck:      Vascular: No JVD.   Cardiovascular:      Rate and Rhythm: Normal rate and regular rhythm.      Heart sounds: Normal heart sounds.   Pulmonary:      Effort: Pulmonary effort is normal.      Breath sounds: Normal breath sounds.   Abdominal:      General: Bowel sounds are normal.      Palpations: Abdomen is soft.      Comments: No hepatosplenomegaly.   Musculoskeletal:         General: Normal range of motion.   Lymphadenopathy:      Cervical: No cervical adenopathy.   Skin:     General: Skin is warm and dry.   Neurological:      Mental Status: He is alert and oriented to person, place, and time.          CARDIAC STUDIES/PROCEDURES:     CAROTID ULTRASOUND (12/01/17)  No prior study is available for comparison.  Normal bilateral carotid exam.      CAROTID ULTRASOUND (11/18/09)  Carotid ultrasound showing mild plaques of left internal carotid artery.     CARDIAC CATHETERIZATION CONCLUSIONS by Dr. Toledo (04/08/13)   1. Acute inferior wall myocardial infarction secondary to high grade in-stent stenosis in the   middle of a large segment of stented proximal right coronary artery, status post successful   stenting with a 3 mm x 15 mm Xience Xpedition stent.   2. Diffusely diseased circumflex system as described above.   3. Widely patent left anterior descending coronary artery with its proximal 2-3 cm, more diffusely   narrowed and with 2 diagonal branches given off with high-grade disease in the first diagonal   branch, which is quite small and moderate disease in the second diagonal branch.     CARDIAC CATHETERIZATION CONCLUSIONS in [place] (12/05)  Cardiac catheterization showing high grade small vessel first and second " diagonal branch   stenosis, non obstructive left circumflex artery stenosis and high grade right coronary artery   stenosis treated with 3.0 x 32 mm and 3.0 x 16 mm Taxus drug eluding stents.      ECHOCARDIOGRAM CONCLUSIONS (12/01/17)  Prior echo 9-17-15. Compared to the report of the study done - there   has been no significant change.   Normal left ventricular systolic function.  Left ventricular ejection fraction is visually estimated to be 65%.  Normal diastolic function.  Mild mitral regurgitation.  Mild tricuspid regurgitation.  Estimated right ventricular systolic pressure is 35 mmHg.     ECHOCARDIOGRAM CONCLUSIONS (09/17/15)  Normal left ventricular size, wall thickness, and systolic function.   Grade I diastolic dysfunction. Ejection fraction is measured to be 63 %   by Martines's biplane 2D analysis.  Trace mitral regurgitation.   Trace aortic insufficiency.  Unable to estimate pulmonary artery pressure due to an inadequate   tricuspid regurgitant jet.  Normal aortic root diameter 3 cm.  No prior study is available for comparison.      ECHOCARDIOGRAM CONCLUSIONS (06/16/11)  Echocardiogram showing normal left ventricular systolic function, no significant valvular abnormalities.     EKG performed on (09/16/15) EKG shows normal sinus rhythm.     Laboratory results of (01/24/22) were reviewed. Cholesterol profile of 126/87/40/69 mg/dL noted.  Laboratory results of (06/29/21) Cholesterol profile of 135/96/43/73 mg/dL noted.  Laboratory results of (09/08/20) Cholesterol profile of 127/121/42/61 noted.  Laboratory results of (09/05/19) Cholesterol profile of 128/132/40/62 noted.  Laboratory results of (06/15/18) Cholesterol profile of 108/81/44/48 noted.  Laboratory results of (11/29/17) Cholesterol profile of 170/127/46/99 noted.  Laboratory results of (10/10/16) Cholesterol profile of 162/128/41/95 noted.  Laboratory results of (07/27/16) Cholesterol profile of 160/138/42/90 noted  Laboratory results of  (09/16/15) Cholesterol profile of 141/148/38/73 noted.     MPI CONCLUSIONS (09/17/15)  Normal left ventricular perfusion with no fixed or reversible defects.   Normal left ventricular wall motion, with EF of 72%.      RENAL ULTRASOUND (11/18/09)  Unremarkable renal ultrasound with no evidence of renal artery stenosis.  No abdominal aortic aneurysm.     PET SCAN (02/15/19)  ELECTROCARDIOGRAPHIC FINDINGS:   Normal sinus rhythm.    Normal ECG response to dipyridamole infusion.    No ischemic changes noted   SCINTOGRAPHIC FINDINGS:    Normal left ventricular perfusion with stress and rest images.    There is no evidence of ischemia or scar.  GATED WALL MOTION FINDINGS:    The left ventricle wall motion is normal with stress and rest  imagings.    Measured resting ejection fraction is 65 %.       Assessment & Plan     1. Coronary artery disease involving native coronary artery of native heart without angina pectoris        2. Stented coronary artery        3. Essential hypertension, benign        4. Dyslipidemia        5. Atherosclerosis of both carotid arteries            Medical Decision Making: Today's Assessment/Status/Plan:        Coronary artery disease with stent placement: He is clinically doing well without recurrence of his angina.  We will continue with current medical care including aspirin, metoprolol, lisinopril and atorvastatin.  Hypertension: Blood pressure is well controlled. We will continue with beta blockade therapy and ace inhibitor therapy..  Hyperlipidemia: He is doing well on statin therapy without myalgia symptoms.  Carotid artery stenosis: Clinically stable on above medical therapy.  Additional information: His son graduated medical school at [employer] and is doing his residency. He is finishing up soon. He is now engaged to his academic counselor.     We will follow up the patient in one year.    CC Paz You

## 2022-08-15 NOTE — RESEARCH NOTE
"Name: Harman Branch   MRN: [id number]  Participant ID:  [id number]  : 1950  Visit Date/Time: 8/15/2022 2:10 PM  Who is present: Linda Hartley    Study:      [id number] - Lp(a) Phase 0   Status Consented/Enrolled   Start Date 8/15/22   Participant ID [id number]   Coordinator Linda Hinds; Linda Zelaya; Alexandra Bob   NCT [id number]    Michael J Bloch, M.D.       I had the pleasure of speaking with Cornelio today about our LP(a) study. Cornelio consented to phase 0 and for future [facility] study contact. He does need to have his LP(a) drawn, I informed him to have [facility]. All questions were answered,I will follow up with him in one week regarding labs. Inclusion criteria met with no exclusions, W-9 has been sent to billing for stipend.     Vitals:    Vitals:    08/15/22 1404   BP: 110/70   Pulse: 76   Resp: 14   SpO2: 94%   Weight: 81.7 kg (180 lb 1.9 oz)   Height: 1.638 m (5' 4.49\")         "

## 2022-08-15 NOTE — TELEPHONE ENCOUNTER
Per ARBEN , requested stress test results from [facility] P#[phone] Fax# [phone]    Confirmation Recieved   Scanned to [facility]   Pending results

## 2022-10-10 ENCOUNTER — PATIENT MESSAGE (OUTPATIENT)
Dept: CARDIOLOGY | Facility: MEDICAL CENTER | Age: 72
End: 2022-10-10
Payer: MEDICARE

## 2022-10-10 DIAGNOSIS — R07.89 OTHER CHEST PAIN: ICD-10-CM

## 2022-10-10 DIAGNOSIS — I65.23 ATHEROSCLEROSIS OF BOTH CAROTID ARTERIES: ICD-10-CM

## 2022-10-10 DIAGNOSIS — I25.10 CORONARY ARTERY DISEASE INVOLVING NATIVE CORONARY ARTERY OF NATIVE HEART WITHOUT ANGINA PECTORIS: ICD-10-CM

## 2022-10-10 DIAGNOSIS — Z95.5 STENTED CORONARY ARTERY: ICD-10-CM

## 2022-10-12 NOTE — PATIENT COMMUNICATION
FW: Stress Test Results  Received: Today  PALLAVI Deluna R.N.  Order cardiac PET if agreeable. SC   ___________________________________________________________________    Order placed.

## 2022-10-13 ENCOUNTER — HOSPITAL ENCOUNTER (INPATIENT)
Facility: MEDICAL CENTER | Age: 72
LOS: 4 days | DRG: 251 | End: 2022-10-18
Attending: EMERGENCY MEDICINE | Admitting: HOSPITALIST
Payer: MEDICARE

## 2022-10-13 ENCOUNTER — APPOINTMENT (OUTPATIENT)
Dept: CARDIOLOGY | Facility: MEDICAL CENTER | Age: 72
DRG: 251 | End: 2022-10-13
Attending: INTERNAL MEDICINE
Payer: MEDICARE

## 2022-10-13 ENCOUNTER — APPOINTMENT (OUTPATIENT)
Dept: RADIOLOGY | Facility: MEDICAL CENTER | Age: 72
DRG: 251 | End: 2022-10-13
Attending: NURSE PRACTITIONER
Payer: MEDICARE

## 2022-10-13 ENCOUNTER — APPOINTMENT (OUTPATIENT)
Dept: RADIOLOGY | Facility: MEDICAL CENTER | Age: 72
DRG: 251 | End: 2022-10-13
Attending: EMERGENCY MEDICINE
Payer: MEDICARE

## 2022-10-13 DIAGNOSIS — I10 PRIMARY HYPERTENSION: ICD-10-CM

## 2022-10-13 DIAGNOSIS — I20.0 UNSTABLE ANGINA (HCC): ICD-10-CM

## 2022-10-13 DIAGNOSIS — I25.110 CORONARY ARTERY DISEASE INVOLVING NATIVE CORONARY ARTERY OF NATIVE HEART WITH UNSTABLE ANGINA PECTORIS (HCC): Chronic | ICD-10-CM

## 2022-10-13 DIAGNOSIS — I24.9 ACS (ACUTE CORONARY SYNDROME) (HCC): ICD-10-CM

## 2022-10-13 LAB
ACT BLD: 335 SEC (ref 74–137)
ALBUMIN SERPL BCP-MCNC: 4.5 G/DL (ref 3.2–4.9)
ALBUMIN/GLOB SERPL: 1.6 G/DL
ALP SERPL-CCNC: 67 U/L (ref 30–99)
ALT SERPL-CCNC: 28 U/L (ref 2–50)
ANION GAP SERPL CALC-SCNC: 10 MMOL/L (ref 7–16)
APTT PPP: 29.7 SEC (ref 24.7–36)
APTT PPP: >240 SEC (ref 24.7–36)
AST SERPL-CCNC: 19 U/L (ref 12–45)
BASOPHILS # BLD AUTO: 0.5 % (ref 0–1.8)
BASOPHILS # BLD: 0.05 K/UL (ref 0–0.12)
BILIRUB SERPL-MCNC: 0.6 MG/DL (ref 0.1–1.5)
BUN SERPL-MCNC: 21 MG/DL (ref 8–22)
CALCIUM SERPL-MCNC: 9.3 MG/DL (ref 8.5–10.5)
CHLORIDE SERPL-SCNC: 105 MMOL/L (ref 96–112)
CO2 SERPL-SCNC: 26 MMOL/L (ref 20–33)
CREAT SERPL-MCNC: 0.79 MG/DL (ref 0.5–1.4)
EKG IMPRESSION: NORMAL
EOSINOPHIL # BLD AUTO: 0.24 K/UL (ref 0–0.51)
EOSINOPHIL NFR BLD: 2.4 % (ref 0–6.9)
ERYTHROCYTE [DISTWIDTH] IN BLOOD BY AUTOMATED COUNT: 46.8 FL (ref 35.9–50)
GFR SERPLBLD CREATININE-BSD FMLA CKD-EPI: 94 ML/MIN/1.73 M 2
GLOBULIN SER CALC-MCNC: 2.8 G/DL (ref 1.9–3.5)
GLUCOSE SERPL-MCNC: 100 MG/DL (ref 65–99)
HCT VFR BLD AUTO: 48.2 % (ref 42–52)
HGB BLD-MCNC: 16.4 G/DL (ref 14–18)
IMM GRANULOCYTES # BLD AUTO: 0.03 K/UL (ref 0–0.11)
IMM GRANULOCYTES NFR BLD AUTO: 0.3 % (ref 0–0.9)
INR PPP: 0.99 (ref 0.87–1.13)
INR PPP: 1.2 (ref 0.87–1.13)
LYMPHOCYTES # BLD AUTO: 2.95 K/UL (ref 1–4.8)
LYMPHOCYTES NFR BLD: 29.7 % (ref 22–41)
MCH RBC QN AUTO: 31.7 PG (ref 27–33)
MCHC RBC AUTO-ENTMCNC: 34 G/DL (ref 33.7–35.3)
MCV RBC AUTO: 93.2 FL (ref 81.4–97.8)
MONOCYTES # BLD AUTO: 0.76 K/UL (ref 0–0.85)
MONOCYTES NFR BLD AUTO: 7.7 % (ref 0–13.4)
NEUTROPHILS # BLD AUTO: 5.9 K/UL (ref 1.82–7.42)
NEUTROPHILS NFR BLD: 59.4 % (ref 44–72)
NRBC # BLD AUTO: 0 K/UL
NRBC BLD-RTO: 0 /100 WBC
PLATELET # BLD AUTO: 284 K/UL (ref 164–446)
PMV BLD AUTO: 9.3 FL (ref 9–12.9)
POTASSIUM SERPL-SCNC: 4.1 MMOL/L (ref 3.6–5.5)
PROT SERPL-MCNC: 7.3 G/DL (ref 6–8.2)
PROTHROMBIN TIME: 13 SEC (ref 12–14.6)
PROTHROMBIN TIME: 15 SEC (ref 12–14.6)
RBC # BLD AUTO: 5.17 M/UL (ref 4.7–6.1)
SODIUM SERPL-SCNC: 141 MMOL/L (ref 135–145)
TROPONIN T SERPL-MCNC: 9 NG/L (ref 6–19)
UFH PPP CHRO-ACNC: >1.1 IU/ML
WBC # BLD AUTO: 9.9 K/UL (ref 4.8–10.8)

## 2022-10-13 PROCEDURE — 85347 COAGULATION TIME ACTIVATED: CPT

## 2022-10-13 PROCEDURE — 84484 ASSAY OF TROPONIN QUANT: CPT

## 2022-10-13 PROCEDURE — 85025 COMPLETE CBC W/AUTO DIFF WBC: CPT

## 2022-10-13 PROCEDURE — 93005 ELECTROCARDIOGRAM TRACING: CPT | Performed by: INTERNAL MEDICINE

## 2022-10-13 PROCEDURE — B2111ZZ FLUOROSCOPY OF MULTIPLE CORONARY ARTERIES USING LOW OSMOLAR CONTRAST: ICD-10-PCS | Performed by: INTERNAL MEDICINE

## 2022-10-13 PROCEDURE — 700111 HCHG RX REV CODE 636 W/ 250 OVERRIDE (IP)

## 2022-10-13 PROCEDURE — 71045 X-RAY EXAM CHEST 1 VIEW: CPT

## 2022-10-13 PROCEDURE — 99205 OFFICE O/P NEW HI 60 MIN: CPT | Performed by: THORACIC SURGERY (CARDIOTHORACIC VASCULAR SURGERY)

## 2022-10-13 PROCEDURE — B3101ZZ FLUOROSCOPY OF THORACIC AORTA USING LOW OSMOLAR CONTRAST: ICD-10-PCS | Performed by: INTERNAL MEDICINE

## 2022-10-13 PROCEDURE — 80053 COMPREHEN METABOLIC PANEL: CPT

## 2022-10-13 PROCEDURE — 99152 MOD SED SAME PHYS/QHP 5/>YRS: CPT | Performed by: INTERNAL MEDICINE

## 2022-10-13 PROCEDURE — 93970 EXTREMITY STUDY: CPT

## 2022-10-13 PROCEDURE — 36415 COLL VENOUS BLD VENIPUNCTURE: CPT

## 2022-10-13 PROCEDURE — 4A023N7 MEASUREMENT OF CARDIAC SAMPLING AND PRESSURE, LEFT HEART, PERCUTANEOUS APPROACH: ICD-10-PCS | Performed by: INTERNAL MEDICINE

## 2022-10-13 PROCEDURE — 99218 PR INITIAL OBSERVATION CARE,LEVL I: CPT | Performed by: HOSPITALIST

## 2022-10-13 PROCEDURE — 02703ZZ DILATION OF CORONARY ARTERY, ONE ARTERY, PERCUTANEOUS APPROACH: ICD-10-PCS | Performed by: INTERNAL MEDICINE

## 2022-10-13 PROCEDURE — 99285 EMERGENCY DEPT VISIT HI MDM: CPT

## 2022-10-13 PROCEDURE — 700102 HCHG RX REV CODE 250 W/ 637 OVERRIDE(OP): Performed by: INTERNAL MEDICINE

## 2022-10-13 PROCEDURE — A9270 NON-COVERED ITEM OR SERVICE: HCPCS | Performed by: HOSPITALIST

## 2022-10-13 PROCEDURE — 93458 L HRT ARTERY/VENTRICLE ANGIO: CPT | Mod: 26,59 | Performed by: INTERNAL MEDICINE

## 2022-10-13 PROCEDURE — 700102 HCHG RX REV CODE 250 W/ 637 OVERRIDE(OP): Performed by: EMERGENCY MEDICINE

## 2022-10-13 PROCEDURE — G0378 HOSPITAL OBSERVATION PER HR: HCPCS

## 2022-10-13 PROCEDURE — C1769 GUIDE WIRE: HCPCS

## 2022-10-13 PROCEDURE — 99215 OFFICE O/P EST HI 40 MIN: CPT | Mod: 25 | Performed by: INTERNAL MEDICINE

## 2022-10-13 PROCEDURE — B2151ZZ FLUOROSCOPY OF LEFT HEART USING LOW OSMOLAR CONTRAST: ICD-10-PCS | Performed by: INTERNAL MEDICINE

## 2022-10-13 PROCEDURE — 93005 ELECTROCARDIOGRAM TRACING: CPT

## 2022-10-13 PROCEDURE — 93567 NJX CAR CTH SPRVLV AORTGRPHY: CPT | Performed by: INTERNAL MEDICINE

## 2022-10-13 PROCEDURE — 93010 ELECTROCARDIOGRAM REPORT: CPT | Mod: 59,76 | Performed by: INTERNAL MEDICINE

## 2022-10-13 PROCEDURE — 96376 TX/PRO/DX INJ SAME DRUG ADON: CPT

## 2022-10-13 PROCEDURE — 700117 HCHG RX CONTRAST REV CODE 255: Performed by: INTERNAL MEDICINE

## 2022-10-13 PROCEDURE — 85730 THROMBOPLASTIN TIME PARTIAL: CPT

## 2022-10-13 PROCEDURE — 93880 EXTRACRANIAL BILAT STUDY: CPT

## 2022-10-13 PROCEDURE — 93010 ELECTROCARDIOGRAM REPORT: CPT | Mod: 59 | Performed by: INTERNAL MEDICINE

## 2022-10-13 PROCEDURE — 700102 HCHG RX REV CODE 250 W/ 637 OVERRIDE(OP): Performed by: HOSPITALIST

## 2022-10-13 PROCEDURE — 85610 PROTHROMBIN TIME: CPT

## 2022-10-13 PROCEDURE — A9270 NON-COVERED ITEM OR SERVICE: HCPCS | Performed by: EMERGENCY MEDICINE

## 2022-10-13 PROCEDURE — 93005 ELECTROCARDIOGRAM TRACING: CPT | Performed by: EMERGENCY MEDICINE

## 2022-10-13 PROCEDURE — 85520 HEPARIN ASSAY: CPT

## 2022-10-13 PROCEDURE — 700111 HCHG RX REV CODE 636 W/ 250 OVERRIDE (IP): Performed by: INTERNAL MEDICINE

## 2022-10-13 PROCEDURE — 700101 HCHG RX REV CODE 250

## 2022-10-13 PROCEDURE — 700105 HCHG RX REV CODE 258: Performed by: INTERNAL MEDICINE

## 2022-10-13 PROCEDURE — 96374 THER/PROPH/DIAG INJ IV PUSH: CPT

## 2022-10-13 PROCEDURE — A9270 NON-COVERED ITEM OR SERVICE: HCPCS | Performed by: INTERNAL MEDICINE

## 2022-10-13 PROCEDURE — 92920 PRQ TRLUML C ANGIOP 1ART&/BR: CPT | Mod: RC | Performed by: INTERNAL MEDICINE

## 2022-10-13 RX ORDER — HEPARIN SODIUM 200 [USP'U]/100ML
INJECTION, SOLUTION INTRAVENOUS
Status: COMPLETED
Start: 2022-10-13 | End: 2022-10-13

## 2022-10-13 RX ORDER — HEPARIN SODIUM 1000 [USP'U]/ML
30 INJECTION, SOLUTION INTRAVENOUS; SUBCUTANEOUS PRN
Status: DISCONTINUED | OUTPATIENT
Start: 2022-10-13 | End: 2022-10-17

## 2022-10-13 RX ORDER — MIDAZOLAM HYDROCHLORIDE 1 MG/ML
INJECTION INTRAMUSCULAR; INTRAVENOUS
Status: COMPLETED
Start: 2022-10-13 | End: 2022-10-13

## 2022-10-13 RX ORDER — HEPARIN SODIUM 1000 [USP'U]/ML
INJECTION, SOLUTION INTRAVENOUS; SUBCUTANEOUS
Status: COMPLETED
Start: 2022-10-13 | End: 2022-10-13

## 2022-10-13 RX ORDER — HEPARIN SODIUM 5000 [USP'U]/100ML
0-30 INJECTION, SOLUTION INTRAVENOUS CONTINUOUS
Status: DISCONTINUED | OUTPATIENT
Start: 2022-10-13 | End: 2022-10-17

## 2022-10-13 RX ORDER — OXYCODONE HYDROCHLORIDE 10 MG/1
10 TABLET ORAL
Status: DISCONTINUED | OUTPATIENT
Start: 2022-10-13 | End: 2022-10-19 | Stop reason: HOSPADM

## 2022-10-13 RX ORDER — ESOMEPRAZOLE MAGNESIUM 40 MG/1
40 CAPSULE, DELAYED RELEASE ORAL 2 TIMES DAILY
COMMUNITY
End: 2023-02-15

## 2022-10-13 RX ORDER — OXYCODONE HYDROCHLORIDE 5 MG/1
5 TABLET ORAL
Status: DISCONTINUED | OUTPATIENT
Start: 2022-10-13 | End: 2022-10-19 | Stop reason: HOSPADM

## 2022-10-13 RX ORDER — SODIUM CHLORIDE 9 MG/ML
INJECTION, SOLUTION INTRAVENOUS CONTINUOUS
Status: ACTIVE | OUTPATIENT
Start: 2022-10-13 | End: 2022-10-13

## 2022-10-13 RX ORDER — VERAPAMIL HYDROCHLORIDE 2.5 MG/ML
INJECTION, SOLUTION INTRAVENOUS
Status: COMPLETED
Start: 2022-10-13 | End: 2022-10-13

## 2022-10-13 RX ORDER — HYDROMORPHONE HYDROCHLORIDE 1 MG/ML
0.5 INJECTION, SOLUTION INTRAMUSCULAR; INTRAVENOUS; SUBCUTANEOUS
Status: DISCONTINUED | OUTPATIENT
Start: 2022-10-13 | End: 2022-10-19 | Stop reason: HOSPADM

## 2022-10-13 RX ORDER — ACETAMINOPHEN 325 MG/1
650 TABLET ORAL EVERY 6 HOURS PRN
Status: DISCONTINUED | OUTPATIENT
Start: 2022-10-13 | End: 2022-10-19 | Stop reason: HOSPADM

## 2022-10-13 RX ORDER — ONDANSETRON 2 MG/ML
4 INJECTION INTRAMUSCULAR; INTRAVENOUS EVERY 4 HOURS PRN
Status: DISCONTINUED | OUTPATIENT
Start: 2022-10-13 | End: 2022-10-19 | Stop reason: HOSPADM

## 2022-10-13 RX ORDER — LIDOCAINE HYDROCHLORIDE 20 MG/ML
INJECTION, SOLUTION INFILTRATION; PERINEURAL
Status: COMPLETED
Start: 2022-10-13 | End: 2022-10-13

## 2022-10-13 RX ORDER — ONDANSETRON 4 MG/1
4 TABLET, ORALLY DISINTEGRATING ORAL EVERY 4 HOURS PRN
Status: DISCONTINUED | OUTPATIENT
Start: 2022-10-13 | End: 2022-10-19 | Stop reason: HOSPADM

## 2022-10-13 RX ORDER — ASPIRIN 325 MG
325 TABLET ORAL ONCE
Status: COMPLETED | OUTPATIENT
Start: 2022-10-13 | End: 2022-10-13

## 2022-10-13 RX ADMIN — VERAPAMIL HYDROCHLORIDE 2.5 MG: 2.5 INJECTION, SOLUTION INTRAVENOUS at 15:01

## 2022-10-13 RX ADMIN — LIDOCAINE HYDROCHLORIDE: 20 INJECTION, SOLUTION INFILTRATION; PERINEURAL at 15:01

## 2022-10-13 RX ADMIN — HEPARIN SODIUM 12 UNITS/KG/HR: 5000 INJECTION, SOLUTION INTRAVENOUS at 18:18

## 2022-10-13 RX ADMIN — NITROGLYCERIN 10 ML: 20 INJECTION INTRAVENOUS at 15:01

## 2022-10-13 RX ADMIN — ASPIRIN 325 MG: 325 TABLET, FILM COATED ORAL at 10:00

## 2022-10-13 RX ADMIN — HEPARIN SODIUM: 1000 INJECTION, SOLUTION INTRAVENOUS; SUBCUTANEOUS at 15:01

## 2022-10-13 RX ADMIN — ASPIRIN 81 MG: 81 TABLET, COATED ORAL at 16:54

## 2022-10-13 RX ADMIN — FENTANYL CITRATE 100 MCG: 50 INJECTION, SOLUTION INTRAMUSCULAR; INTRAVENOUS at 15:00

## 2022-10-13 RX ADMIN — MIDAZOLAM HYDROCHLORIDE 2 MG: 1 INJECTION, SOLUTION INTRAMUSCULAR; INTRAVENOUS at 15:00

## 2022-10-13 RX ADMIN — HEPARIN SODIUM 2000 UNITS: 200 INJECTION, SOLUTION INTRAVENOUS at 15:01

## 2022-10-13 RX ADMIN — METOPROLOL TARTRATE 25 MG: 25 TABLET, FILM COATED ORAL at 18:27

## 2022-10-13 RX ADMIN — IOHEXOL 95 ML: 350 INJECTION, SOLUTION INTRAVENOUS at 15:10

## 2022-10-13 RX ADMIN — SODIUM CHLORIDE: 9 INJECTION, SOLUTION INTRAVENOUS at 16:56

## 2022-10-13 ASSESSMENT — ENCOUNTER SYMPTOMS
NEUROLOGICAL NEGATIVE: 1
EYES NEGATIVE: 1
GASTROINTESTINAL NEGATIVE: 1
SHORTNESS OF BREATH: 0
PSYCHIATRIC NEGATIVE: 1
RESPIRATORY NEGATIVE: 1

## 2022-10-13 ASSESSMENT — PAIN DESCRIPTION - PAIN TYPE: TYPE: SURGICAL PAIN

## 2022-10-13 ASSESSMENT — LIFESTYLE VARIABLES: DO YOU DRINK ALCOHOL: NO

## 2022-10-13 ASSESSMENT — FIBROSIS 4 INDEX: FIB4 SCORE: 1.18

## 2022-10-13 NOTE — PROCEDURES
"CARDIAC CATHETERIZATION REPORT    Referring Provider: Gabriel Garnica MD    PROCEDURE PHYSICIAN: Dwayne Zepeda MD, [facility], [facility]  ASSISTANT: None    IMPRESSIONS:  1. Unstable angina due to critical stensosis of the RCA  2. Successful restoration of VINITA III flow to the RCA using angioplasty  3. Multivessel obstructive CAD  4. Mild elevation in LVEDP at 21 mmHg  5. Normal LV systolic function    Recommendations:  CABG referral  - consider grafts to the LAD, OM3 and distal RCA, with additional grafts to the diagonal, PDA if feasible.     Pre-procedure diagnosis: Unstable angina  Post-procedure diagnosis: Same    Procedure performed  Selective coronary angiography  Left heart catheterization  PCI- Angioplasty (RCA)    Conscious sedation was supervised by myself and administered by trained personnel using fentanyl and versed between 1451 and 1508. The patient tolerated sedation without complication.     Procedure Description  1. Access: 5/6 Czech right radial artery Micropuncture technique was utilized following local anesthesia with lidocaine.  A radial cocktail containing verapamil and saline was administered in the radial artery sheath    2. Diagnostic description: The catheter was passed to the central circulation with the aide of J tipped 0.35\" wire. A 5F TIG 4.0 and 6F Pigtail were used to inject the coronary circulation and inject the left ventricle during invasive hemodynamic monitoring and inject the ascending aorta.     3. Description of Intervention: After confirming therapeutic anticoagulation intervention proceeded. A 6F Ikari 1.5 Right guiding catheter was used. The lesion in the RCA was crossed with a 0.014\" Run Through wire. The lesion was dilated with a 2.0 x15 compliant balloon which restored VINITA III flow to the vessel. The equipment was then removed.      4. Closing: At completion of the procedure the relevant equipment was removed from the body and hemostasis achieved by Radial band    Findings "   Hemodynamics:   Aorta: 116/78 mmHg  LVEDP: 21 mmHg  No significant pullback gradient across the aortic valve    Coronary Anatomy   Left Main: Normal   LAD:  70% stenosis in the proximal segment. D1 is small and has ostial 80% stenosis, D2 is a bit larger, estimated at 2 mm and has proximal 70% stenosis   LCx:  MLI in the AV groove. The first and second OM are small with proximal 50% stenosis. There is 80% stenosis of the OM3, extending into several of the small sub-branches   RCA: Dominant, 95% mid stenosis- in stent.  70% distal. The PDA has 80% ostial stenosis. The PLB is large and normal     Left Ventriculography:   LVEF= 65%. Normal wall motion No mitral regurgitation Normal caliber ascending aorta    Supravalvular aortogram:  Normal caliber aorta.      Results of intervention to the RCA:  Pre: 95% stenosis and VINITA II flow  Post: 80% residual stenosis and VINITA III flow. No dissection or distal embolization.    Technical Factors  1. Complications: None  2. Estimated Blood Loss: <50 cc  3. Specimens: None  4. Contrast Volume: 65 ml  5. Medications: Radial cocktail (Verapamil 2.5 mg, Nitroglycerin 100 mcg) Heparin 8000 units  6. Radiation (Air Kerma): 200 mGy

## 2022-10-13 NOTE — ED TRIAGE NOTES
"Chief Complaint   Patient presents with    Chest Pain     Tightness in chest, started a few days ago but this morning after a walk he still has tightness. PMH MI with stent x2.        Patient to triage ambulatory with a steady gait, AAOx4, Appropriate precautions in place.     Explained wait time and triage process. Placed back in lobby. Told to notify ED tech or RN of any changes, verbalized understanding.    BP (!) 139/101   Pulse 70   Temp 36.4 °C (97.5 °F) (Temporal)   Resp 16   Ht 1.626 m (5' 4\")   Wt 81.3 kg (179 lb 3.7 oz)   SpO2 98%   BMI 30.77 kg/m²     "

## 2022-10-13 NOTE — ED PROVIDER NOTES
ED Provider Note    Scribed for Alexandre Akbar M.D. by Rian Khan. 10/13/2022, 9:30 AM.    Primary care provider: Paz Casillas M.D.  Means of arrival: Walk-in  History obtained from: Patient  History limited by: None    CHIEF COMPLAINT  Chief Complaint   Patient presents with    Chest Pain     Tightness in chest, started a few days ago but this morning after a walk he still has tightness. PMH MI with stent x2.        HPI  Harman Branch is a 72 y.o. male who presents to the Emergency Department for evaluation of chest pain onset a few days ago. He states that in July, 2022 he began noticing increasing difficulty with physical exertion. He was experiencing chest tightness, shortness of breath, weakness, and fatigue on his normal walks. This would resolve once he stopped walked. He had a stress test done at [facility], however, he was never give the results. Over the weekend he began experiencing these symptoms again and noticed that they were more prominent. He was these symptoms even when taking out the trash and walking short distances. He also reports associated symptoms of palpitations at rest. Today the chest pain became significantly worse, prompting him to present to the ED. He denies any leg swelling or chest pain currently. He took nitro yesterday due to the palpitations, to mild alleviation. He is followed by Dr. Rice (Cardiology) and has presented to him for these symptoms but has not been instructed to change anything. He has a history of two cardiac catheterizations and has two stents in place. He takes Asprin daily, but has not taken it today.       REVIEW OF SYSTEMS  As above otherwise all other systems are negative    PAST MEDICAL HISTORY   has a past medical history of Arthritis, ASTHMA, Back pain, CAD (coronary artery disease) (6/21/2011), CAD (coronary artery disease), Carotid artery plaque (6/21/2011), Esophageal reflux (11/3/2009), Heart burn, High cholesterol, History of asthma  (11/3/2009), History of benign prostatic hypertrophy (11/3/2009), History of cardiac catheterization (2011), History of echocardiogram (2011), History of neck surgery, HLD (hyperlipidemia) (2011), Hypertension, Indigestion, Previous back surgery, and Stented coronary artery ().    SURGICAL HISTORY   has a past surgical history that includes cervical disk and fusion anterior; angiogram; lumbar laminectomy diskectomy (3/9/2011); foraminotomy (3/9/2011); other cardiac surgery ( heart stents); lumbar laminectomy diskectomy (2012); foraminotomy (2012); njx aa&/strd tfrml epi lumbar/sacral 1 level (Bilateral, 2016); njx aa&/strd tfrml epi lumbar/sacral 1 level (2016); and fluorscopic guidance spinal injection (2016).    SOCIAL HISTORY  Social History     Tobacco Use    Smoking status: Former     Types: Cigars     Quit date: 1987     Years since quittin.5    Smokeless tobacco: Never    Tobacco comments:        Substance Use Topics    Alcohol use: Yes     Comment: 2 cocktails per week    Drug use: No      Social History     Substance and Sexual Activity   Drug Use No       FAMILY HISTORY  Family History   Problem Relation Age of Onset    Heart Attack Neg Hx        CURRENT MEDICATIONS  Home Medications       Reviewed by Nila Barksdale R.N. (Registered Nurse) on 10/13/22 at 0850  Med List Status: Partial     Medication Last Dose Status   Albuterol (PROVENTIL INH)  Active   Apremilast (OTEZLA PO)  Active   aspirin 81 MG tablet  Active   atorvastatin (LIPITOR) 40 MG Tab  Active   B Complex Vitamins (B-COMPLEX/B-12 PO)  Active   calcipotriene-betamethasone (TACLONEX) ointment  Active   Coenzyme Q10 (COQ10 PO)  Active   ezetimibe (ZETIA) 10 MG Tab  Active   finasteride (PROSCAR) 5 MG Tab  Active   FLOVENT  MCG/ACT Aerosol  Active   GLUCOSAMINE HCL PO  Active   IRON PO  Active   KRILL OIL PO  Active   lisinopril (PRINIVIL) 20 MG Tab  Active   metoprolol tartrate  "(LOPRESSOR) 25 MG Tab  Active   Multiple Vitamin (MULTI-VITAMIN DAILY PO)  Active   VITAMIN D PO  Active                    ALLERGIES  No Known Allergies    PHYSICAL EXAM  VITAL SIGNS: BP (!) 139/101   Pulse 70   Temp 36.4 °C (97.5 °F) (Temporal)   Resp 16   Ht 1.626 m (5' 4\")   Wt 81.3 kg (179 lb 3.7 oz)   SpO2 98%   BMI 30.77 kg/m²     Constitutional: Well developed, Well nourished, No acute distress, Non-toxic appearance.   HENT: Normocephalic, Atraumatic, Bilateral external ears normal, oropharynx moist, No oral exudates, Nose normal.   Eyes:conjunctiva is normal, there are no signs of exudate.   Neck: Supple, no meningeal signs.  Lymphatic: No lymphadenopathy noted.   Cardiovascular: Regular rate and rhythm without murmurs gallops or rubs.   Thorax & Lungs: No respiratory distress. Breathing comfortably. Lungs are clear to auscultation bilaterally, there are no wheezes no rales. Chest wall is nontender.  Abdomen: Soft, nontender, nondistended. Bowel sounds are present.   Skin: Warm, Dry, No erythema,   Back: No tenderness, No CVA tenderness.  Extremities: No calf tenderness or unilateral leg swelling.  Musculoskeletal: Good range of motion in all major joints. No tenderness to palpation or major deformities noted. Intact distal pulses, no clubbing, no cyanosis, no edema,   Neurologic: Alert & oriented x 3, Moving all extremities. No gross abnormalities.    Psychiatric: Affect normal, Judgment normal, Mood normal.     LABS  Results for orders placed or performed during the hospital encounter of 10/13/22   CBC with Differential   Result Value Ref Range    WBC 9.9 4.8 - 10.8 K/uL    RBC 5.17 4.70 - 6.10 M/uL    Hemoglobin 16.4 14.0 - 18.0 g/dL    Hematocrit 48.2 42.0 - 52.0 %    MCV 93.2 81.4 - 97.8 fL    MCH 31.7 27.0 - 33.0 pg    MCHC 34.0 33.7 - 35.3 g/dL    RDW 46.8 35.9 - 50.0 fL    Platelet Count 284 164 - 446 K/uL    MPV 9.3 9.0 - 12.9 fL    Neutrophils-Polys 59.40 44.00 - 72.00 %    Lymphocytes " 29.70 22.00 - 41.00 %    Monocytes 7.70 0.00 - 13.40 %    Eosinophils 2.40 0.00 - 6.90 %    Basophils 0.50 0.00 - 1.80 %    Immature Granulocytes 0.30 0.00 - 0.90 %    Nucleated RBC 0.00 /100 WBC    Neutrophils (Absolute) 5.90 1.82 - 7.42 K/uL    Lymphs (Absolute) 2.95 1.00 - 4.80 K/uL    Monos (Absolute) 0.76 0.00 - 0.85 K/uL    Eos (Absolute) 0.24 0.00 - 0.51 K/uL    Baso (Absolute) 0.05 0.00 - 0.12 K/uL    Immature Granulocytes (abs) 0.03 0.00 - 0.11 K/uL    NRBC (Absolute) 0.00 K/uL   Complete Metabolic Panel (CMP)   Result Value Ref Range    Sodium 141 135 - 145 mmol/L    Potassium 4.1 3.6 - 5.5 mmol/L    Chloride 105 96 - 112 mmol/L    Co2 26 20 - 33 mmol/L    Anion Gap 10.0 7.0 - 16.0    Glucose 100 (H) 65 - 99 mg/dL    Bun 21 8 - 22 mg/dL    Creatinine 0.79 0.50 - 1.40 mg/dL    Calcium 9.3 8.5 - 10.5 mg/dL    AST(SGOT) 19 12 - 45 U/L    ALT(SGPT) 28 2 - 50 U/L    Alkaline Phosphatase 67 30 - 99 U/L    Total Bilirubin 0.6 0.1 - 1.5 mg/dL    Albumin 4.5 3.2 - 4.9 g/dL    Total Protein 7.3 6.0 - 8.2 g/dL    Globulin 2.8 1.9 - 3.5 g/dL    A-G Ratio 1.6 g/dL   Troponin   Result Value Ref Range    Troponin T 9 6 - 19 ng/L   PT/INR   Result Value Ref Range    PT 13.0 12.0 - 14.6 sec    INR 0.99 0.87 - 1.13   PTT   Result Value Ref Range    APTT 29.7 24.7 - 36.0 sec   ESTIMATED GFR   Result Value Ref Range    GFR (CKD-EPI) 94 >60 mL/min/1.73 m 2   EKG   Result Value Ref Range    Report       [facility] Emergency Dept.    Test Date:  2022-10-13  Pt Name:    LIAM VENTURA             Department: ER  MRN:        [id number]                      Room:  Gender:     Male                         Technician: [id number]  :        1950                   Requested By:ER TRIAGE PROTOCOL  Order #:    [id number]                    Reading MD: BERTA BUTTERFIELD MD    Measurements  Intervals                                Axis  Rate:       69                           P:          85  WA:         217                           QRS:        69  QRSD:       73                           T:          34  QT:         383  QTc:        411    Interpretive Statements  Sinus rhythm  Borderline prolonged WI interval  Consider left atrial enlargement  Compared to ECG 09/16/2015 12:06:34  Sinus bradycardia no longer present  no acute changes  Electronically Signed On 10- 11:27:21 PDT by BERTA BUTTERFIELD MD        All labs reviewed by me.    EKG  Interpreted by me    RADIOLOGY  DX-CHEST-PORTABLE (1 VIEW)   Final Result      No acute cardiopulmonary disease.      CL-LEFT HEART CATHETERIZATION WITH POSSIBLE INTERVENTION    (Results Pending)     The radiologist's interpretation of all radiological studies have been reviewed by me.    COURSE & MEDICAL DECISION MAKING  Pertinent Labs & Imaging studies reviewed. (See chart for details)    9:30 AM - Patient seen and examined at bedside. Patient will be treated with aspirin tablet 325 mg. Ordered labs, EKG, and imaging to evaluate his symptoms. The differential diagnoses include but are not limited to: Stable vs unstable angina, ACS, CAD.      For Coding personnel:    PT/PTT was ordered because the condition that the patient is presenting with is concerning for the possibility of bleeding and is necessary further evaluation of liver functions and bleeding functions, as well as possibility of heparinization.     11:28 AM Paged Cardiology.     11:46 AM I discussed the patient's case and the above findings with Dr. Garnica (Cardiology) who would like the patient to receive a catheterization.    11:49 AM - I reevaluated the patient at bedside. I discussed the patient's diagnostic study results which show are normal as well as the cardiology consult. I informed the patient of my plan to admit today given the patient's current presentation. Patient verbalizes understanding and support with my plan for admission.     11:52 AM Paged Hospitalist.      12:40 PM I discussed the patient's case and  the above findings with Dr. Lamas (Hospitalist) who agrees to evaluate the patient for hospitalization.      Decision Making:  Patient presents for the evaluation.  Clinically the patient is describing what appears to be exertional symptoms and then apparently had nonexertional symptoms while he was sitting down so concerns for unstable angina.  I did speak with cardiology and at this point the patient has been n.p.o. all day and they will take the patient to the cardiac unit for cardiac catheterization angiography for evaluation.  I have also spoken the hospitalist for admission of this patient.  Patient has been stable and will be admitted for further treatment and care.    DISPOSITION:  Patient will be hospitalized by Dr. Lamas in guarded condition.       FINAL IMPRESSION  1. Unstable angina (HCC)    2. ACS (acute coronary syndrome) (HCC)          Rian MORGAN (Scribe), am scribing for, and in the presence of, Alexandre Akbar M.D..    Electronically signed by: Rian Khan (Royal), 10/13/2022    IAlexandre M.D. personally performed the services described in this documentation, as scribed by Rian Khan in my presence, and it is both accurate and complete.    The note accurately reflects work and decisions made by me.  Alexandre Akbar M.D.  10/13/2022  4:05 PM

## 2022-10-13 NOTE — CONSULTS
CARDIOLOGY CONSULTATION NOTE      Date of Consultation: 10/13/2022  Consulting Provider: Alexandre Akbar MD    Patient Name: Harman Branch    YOB: 1950  MRN: [id number]    Reason for Consultation:   Unstable Angina    History of Present Illness:   Harman Branch is a 72-year-old male with history of coronary artery disease with prior PCI  (x2 in 2005 to RCA with 3x32mm and 3x16mm Taxus Raj, and again to RCA in 4/2013 for inf STEMI with 3x18mm Xience SNEHA WITHIN prior stent and known severe D1 / D2 disease), asthma, GERD, carotid artery disease, dyslipidemia, BPH, hypertension last seen in our clinic by Dr. Rice on 8/15/2020 is here with worsening chest pain with minimal exertion over last several days. Recently he started noticing recurrent exertional chest pressure and there was a decision to repeat a stress test with a cardiac PET per chart review however due to worsening symptoms he presented to the ER today. Has not taken his am meds today.    Pain resolves with rest, non-radiating, sometimes associated with SOB. No nausea or diaphoresis or LH/syncope.    Patient on aspirin, metoprolol, lisinopril and atorvastatin as an outpatient.    ECG personally reviewed: SR, borderline ND prolongation (ND 192ms), no STEMI, no q waves, no concerning ischemic changes.    Trop normal x 1.    His son graduated medical school at [employer] and is doing his residency in . Was in the [employer].    He occasionally smoked cigas, once every 1-3 months.    Family History: No fam hx ASCVD    [place]/PCI 12/20025:  [place] (12/05)  Cardiac catheterization showing high grade small vessel first and second diagonal branch stenosis, non obstructive left circumflex artery stenosis and high grade right coronary artery stenosis treated with 3.0 x 32 mm and 3.0 x 16 mm Taxus drug eluding stents.     CAROTID ULTRASOUND (11/18/09)  Carotid ultrasound showing mild plaques of left internal carotid artery.    Medical History:      Past Medical History:   Diagnosis Date    Arthritis     fingers    ASTHMA     uses inhaler prn    Back pain     CAD (coronary artery disease) 6/21/2011    CAD (coronary artery disease)     Carotid artery plaque 6/21/2011    Esophageal reflux 11/3/2009    Heart burn     High cholesterol     History of asthma 11/3/2009    History of benign prostatic hypertrophy 11/3/2009    History of cardiac catheterization 6/21/2011    History of echocardiogram 6/20/2011    History of neck surgery     Hardware in neck    HLD (hyperlipidemia) 6/1/2011    Hypertension     Indigestion     Previous back surgery     Stented coronary artery 2005       Surgical History:     Past Surgical History:   Procedure Laterality Date    DC NJX AA&/STRD TFRML EPI LUMBAR/SACRAL 1 LEVEL Bilateral 9/2/2016    Procedure: INJ-FORAMEN EPI LUM/SAC SNGL - L4-5;  Surgeon: Jesus Rojas M.D.;  Location: SURGERY [facility];  Service: Pain Management    DC NJX AA&/STRD TFRML EPI LUMBAR/SACRAL 1 LEVEL  9/2/2016    Procedure: INJ-FORAMEN EPI LUM/SAC SNGL;  Surgeon: Jesus Rojas M.D.;  Location: SURGERY [facility] ORS;  Service: Pain Management    PB FLUORSCOPIC GUIDANCE SPINAL INJECTION  9/2/2016    Procedure: FLUOROGUIDE FOR SPINAL INJ;  Surgeon: Jesus Rojas M.D.;  Location: [facility];  Service: Pain Management    LUMBAR LAMINECTOMY DISKECTOMY  4/7/2012    Performed by MARLIN CANDELARIA at SURGERY [facility] ORS    FORAMINOTOMY  4/7/2012    Performed by MARLIN CANDELARIA at SURGERY [facility] ORS    LUMBAR LAMINECTOMY DISKECTOMY  3/9/2011    Performed by MARLIN CANDELARIA at SURGERY [facility] ORS    FORAMINOTOMY  3/9/2011    Performed by MARLIN CANDELARIA at SURGERY [facility] ORS    ANGIOGRAM      CERVICAL DISK AND FUSION ANTERIOR      OTHER CARDIAC SURGERY  2007 heart stents       Family History:     Family History   Problem Relation Age of Onset    Heart Attack Neg Hx        Social History:    reports that he quit smoking about 35  "years ago. His smoking use included cigars. He has never used smokeless tobacco. He reports current alcohol use. He reports that he does not use drugs.  The patient is a non smoker but occasional cigan use (1/1-3 months)    Medications and Allergies:     No current facility-administered medications for this encounter.     Current Outpatient Medications   Medication Sig Dispense Refill    finasteride (PROSCAR) 5 MG Tab       ezetimibe (ZETIA) 10 MG Tab Take 1 Tablet by mouth every day. 90 Tablet 3    lisinopril (PRINIVIL) 20 MG Tab Take 1 Tablet by mouth 2 times a day. 180 Tablet 3    metoprolol tartrate (LOPRESSOR) 25 MG Tab Take 1 Tablet by mouth 2 times a day. 180 Tablet 3    atorvastatin (LIPITOR) 40 MG Tab Take 1 Tablet by mouth every day. 90 Tablet 3    VITAMIN D PO Take 1,000 mg by mouth every day.      Apremilast (OTEZLA PO) Take 10 mg by mouth 2 Times a Day.      Multiple Vitamin (MULTI-VITAMIN DAILY PO) Take  by mouth.      Albuterol (PROVENTIL INH) Inhale  by mouth.      calcipotriene-betamethasone (TACLONEX) ointment Apply  to affected area(s) every day.      KRILL OIL PO Take  by mouth every day.      Coenzyme Q10 (COQ10 PO) Take 300 mg by mouth every day.      GLUCOSAMINE HCL PO Take 1 Cap by mouth every day.      FLOVENT  MCG/ACT Aerosol 2 Puffs as needed.      B Complex Vitamins (B-COMPLEX/B-12 PO) Take 1,000 Tabs by mouth every day.      IRON PO Take 1 Tab by mouth every day.      aspirin 81 MG tablet Take 81 mg by mouth every day.         No Known Allergies    Review of Systems:   A pertinent review of systems was performed and was unremarkable except as per HPI above.    Vital Signs:   BP (!) 163/75   Pulse 69   Temp 36.4 °C (97.5 °F) (Temporal)   Resp 14   Ht 1.626 m (5' 4\")   Wt 81.3 kg (179 lb 3.7 oz)   SpO2 96%   BMI 30.77 kg/m²   Vitals:    10/13/22 0836 10/13/22 0847 10/13/22 0948 10/13/22 1004   BP: (!) 139/101  (!) 182/81 (!) 163/75   Pulse: 70  70 69   Resp: 16  20 14   Temp: " "36.4 °C (97.5 °F)      TempSrc: Temporal      SpO2: 98%  91% 96%   Weight:  81.3 kg (179 lb 3.7 oz)     Height:  1.626 m (5' 4\")       Body mass index is 30.77 kg/m².  Oxygen Therapy:  Pulse Oximetry: 96 %  Physical Exam    Physical Examination:     General: Well-appearing. No acute distress.  HEENT: EOM grossly intact, no scleral icterus, no pharyngeal erythema.   Neck:  No JVD, no bruits, trachea midline  Cardiovascular: Regular rate and rhythm. Normal S1, S2. no M/R/G. no LE edema.   2+ radial pulses, 2+ PT pulses  Pulmonary: CTAB. No wheezing or crackles/rhonchi. Normal respiratory effort.  Abdomen: Soft, NT, no jess hepatomegaly.  MSK/Ext: No clubbing or cyanosis.  Skin: Warm and dry, no rashes.  Neurological: CN III-XII grossly intact. No gross focal motor deficits.   Psych: A&O x 3, appropriate affect.    Laboratories:   Estimated Creatinine Clearance: 80.1 mL/min (by C-G formula based on SCr of 0.79 mg/dL).  Recent Labs     10/13/22  0902   CREATININE 0.79   BUN 21   POTASSIUM 4.1   SODIUM 141   CALCIUM 9.3   CO2 26   ALBUMIN 4.5     Recent Labs     10/13/22  0902   GLUCOSE 100*     Recent Labs     10/13/22  0902   ASTSGOT 19   ALTSGPT 28   ALKPHOSPHAT 67   INR 0.99     Recent Labs     10/13/22  0902   WBC 9.9   HEMOGLOBIN 16.4   PLATELETCT 284     Recent Labs     10/13/22  0902   TROPONINT 9     Lab Results   Component Value Date/Time    LDL 69 01/24/2022 1057    LDL 73 06/29/2021 0646    LDL 61 09/08/2020 0745     Lab Results   Component Value Date/Time    HDL 40 01/24/2022 1057    HDL 43 06/29/2021 0646    HDL 42 09/08/2020 0745       Lab Results   Component Value Date/Time    TRIGLYCERIDE 87 01/24/2022 1057    TRIGLYCERIDE 96 06/29/2021 0646    TRIGLYCERIDE 121 09/08/2020 0745       Lab Results   Component Value Date/Time    CHOLSTRLTOT 126 01/24/2022 1057    CHOLSTRLTOT 135 06/29/2021 0646    CHOLSTRLTOT 127 09/08/2020 0745         Assessment and Recommendations:   #Unstable Angina  #CAD with prior " stents (to RCA) and prior severe D1 / D2 CAD. Cath report in 2013 mention patent LAD stent but report from 2005 did not mention LA PCI ?  #HTN  #DL (TG 87 and LDL 69 1/2022, both at goal)    - plan for CA/LHC possible PCI today  - Keep NPO  - resume home meds  - no need for hep gtt given neg troponin thus far  - check another troponin    Discussed with cath lab charge RN and Dr. Zepeda.    Discussed risks, benefits, rationale, appropriateness and alternatives to coronary angiography with IV sedation in great detail with the patient.  Complications including but not limited to death, stroke, MI, urgent bypass surgery, contrast nephropathy, vascular complications, bleeding and infection were explained.  In addition, we discussed that 10% of patients will experience small to moderate bruising at the side of the arterial puncture.  Risks of major complications such as heart attack or stroke caused by the angiogram is less than 1%; the risk of death is approximately 1 in 1000.  The potential outcomes associated with the procedure (possible PCI, possible CABG, possible medical Rx only) were also discussed at length.  Patient voices understanding and with shared decision making, is in agreement to proceed.     Cardiology will continue to follow along. Please contact me with any questions.    Gabriel Garnica MD, MPH [facility]  Interventional Cardiologist  [facility]   of Clinical Internal Medicine - [facility] Marc RAMIREZ

## 2022-10-13 NOTE — ED NOTES
Pharmacy Medication Reconciliation      ~Medication reconciliation updated and complete per patient at bedside  ~Allergies have been verified  ~No oral ABX within the last 30 days  ~Patient home pharmacy: [facility]

## 2022-10-13 NOTE — H&P
Hospital Medicine History & Physical Note    Date of Service  10/13/2022    PCP: Paz Casillas M.D.    CC:  Chest pain    HPI:   This is a 72 y.o. male with progressive chest pain over last day. He has known CAD with prior PCI x2 in 2005 and again in 2013, GERD, Asthma, HLD, carotid artery disease, HTN, BPH. ERP discussed with Cardiology who would like to perform cath later today.     I discussed this patient's case with Dr Bar of the emergency department.     EKG Interpretation-  Sinus rhythm  Borderline prolonged ID interval  Consider left atrial enlargement    Troponin nl    Consultants:   Cardiology    ROS: As above. The remainder of a complete review of systems is negative in all systems except as noted.    PMHx:   has a past medical history of Arthritis, ASTHMA, Back pain, CAD (coronary artery disease) (6/21/2011), CAD (coronary artery disease), Carotid artery plaque (6/21/2011), Esophageal reflux (11/3/2009), Heart burn, High cholesterol, History of asthma (11/3/2009), History of benign prostatic hypertrophy (11/3/2009), History of cardiac catheterization (6/21/2011), History of echocardiogram (6/20/2011), History of neck surgery, HLD (hyperlipidemia) (6/1/2011), Hypertension, Indigestion, Previous back surgery, and Stented coronary artery (2005).    PSHx:   has a past surgical history that includes cervical disk and fusion anterior; angiogram; lumbar laminectomy diskectomy (3/9/2011); foraminotomy (3/9/2011); other cardiac surgery (2007 heart stents); lumbar laminectomy diskectomy (4/7/2012); foraminotomy (4/7/2012); pr njx aa&/strd tfrml epi lumbar/sacral 1 level (Bilateral, 9/2/2016); pr njx aa&/strd tfrml epi lumbar/sacral 1 level (9/2/2016); and pr fluorscopic guidance spinal injection (9/2/2016).    SHx:   reports that he quit smoking about 35 years ago. His smoking use included cigars. He has never used smokeless tobacco. He reports current alcohol use. He reports that he does not use  drugs.    FHx:  Reviewed with patient, no pertinent family history noted.    Allergies:  No Known Allergies    Medications:  No current facility-administered medications on file prior to encounter.     Current Outpatient Medications on File Prior to Encounter   Medication Sig Dispense Refill    esomeprazole (NEXIUM) 40 MG delayed-release capsule Take 40 mg by mouth 2 times a day.      ezetimibe (ZETIA) 10 MG Tab Take 1 Tablet by mouth every day. 90 Tablet 3    lisinopril (PRINIVIL) 20 MG Tab Take 1 Tablet by mouth 2 times a day. 180 Tablet 3    metoprolol tartrate (LOPRESSOR) 25 MG Tab Take 1 Tablet by mouth 2 times a day. 180 Tablet 3    atorvastatin (LIPITOR) 40 MG Tab Take 1 Tablet by mouth every day. 90 Tablet 3    Multiple Vitamin (MULTI-VITAMIN DAILY PO) Take 1 Tablet by mouth every morning.      Albuterol (PROVENTIL INH) Inhale 2 Puffs every four hours as needed.      KRILL OIL PO Take 1 Tablet by mouth every morning.      Coenzyme Q10 (COQ10 PO) Take 1 Tablet by mouth every day.      FLOVENT  MCG/ACT Aerosol Inhale 2 Puffs every four hours as needed. Indications: Asthma      B Complex Vitamins (B-COMPLEX/B-12 PO) Take 1,000 Tabs by mouth every day.      aspirin 81 MG tablet Take 81 mg by mouth every day.         Objective Exam:  Vitals:    10/13/22 1233 10/13/22 1408 10/13/22 1557 10/13/22 1603   BP: (!) 163/86 135/75 124/80 136/87   Pulse: 80 71 72 68   Resp: 20 (!) 25 18 18   Temp:       TempSrc:       SpO2: 95% 93% 99% 97%   Weight:       Height:           Physical Exam  Constitutional:       Appearance: He is not ill-appearing.   Cardiovascular:      Rate and Rhythm: Normal rate.   Pulmonary:      Effort: No respiratory distress.      Breath sounds: No wheezing.   Musculoskeletal:         General: No swelling. Normal range of motion.   Neurological:      Mental Status: He is alert and oriented to person, place, and time.      Cranial Nerves: No cranial nerve deficit.   Psychiatric:         Mood  and Affect: Mood normal.         Thought Content: Thought content normal.       Laboratory--reviewed personally and are as follows:  Lab Results   Component Value Date/Time    WBC 9.9 10/13/2022 09:02 AM    RBC 5.17 10/13/2022 09:02 AM    HEMOGLOBIN 16.4 10/13/2022 09:02 AM    HEMATOCRIT 48.2 10/13/2022 09:02 AM    MCV 93.2 10/13/2022 09:02 AM    MCH 31.7 10/13/2022 09:02 AM    MCHC 34.0 10/13/2022 09:02 AM    MPV 9.3 10/13/2022 09:02 AM    NEUTSPOLYS 59.40 10/13/2022 09:02 AM    LYMPHOCYTES 29.70 10/13/2022 09:02 AM    MONOCYTES 7.70 10/13/2022 09:02 AM    EOSINOPHILS 2.40 10/13/2022 09:02 AM    BASOPHILS 0.50 10/13/2022 09:02 AM      Lab Results   Component Value Date/Time    SODIUM 141 10/13/2022 09:02 AM    POTASSIUM 4.1 10/13/2022 09:02 AM    CHLORIDE 105 10/13/2022 09:02 AM    CO2 26 10/13/2022 09:02 AM    GLUCOSE 100 (H) 10/13/2022 09:02 AM    BUN 21 10/13/2022 09:02 AM    CREATININE 0.79 10/13/2022 09:02 AM      Lab Results   Component Value Date/Time    PROTHROMBTM 13.0 10/13/2022 09:02 AM    INR 0.99 10/13/2022 09:02 AM        Radiology  DX-CHEST-PORTABLE (1 VIEW)   Final Result      No acute cardiopulmonary disease.      CL-LEFT HEART CATHETERIZATION WITH POSSIBLE INTERVENTION    (Results Pending)   EC-ECHOCARDIOGRAM COMPLETE W/O CONT    (Results Pending)   US-CAROTID DOPPLER BILAT    (Results Pending)   US-VEIN MAPPING LOWER EXTREMITY UNILAT LEFT    (Results Pending)   US-VEIN MAPPING LOWER EXTREMITY UNILAT RIGHT    (Results Pending)       Assessment/Plan:  * ACS (acute coronary syndrome) (HCC)- (present on admission)  Assessment & Plan  Patient presenting with progressive chest pain with exertion, has known CAD and previous PCI x 2  ERP discussed with Cardiology who would like to cath patient    CAD (coronary artery disease)- (present on admission)  Assessment & Plan  Noted     Plan  Currently appears comfortable  Continue NPO  Cath pending today with Cardiology  Additional recommendations  pending  Holding HTN meds for now, follow up Cardiology recs    It is not expected patient will stay beyond 2 midnights.    VTE prophylaxis: heparin

## 2022-10-13 NOTE — CONSULTS
REFERRING PHYSICIAN: Gabriel Garnica MD.     CONSULTING PHYSICIAN: Hodan Meng MD.    CHIEF COMPLAINT: Chest pain    HISTORY OF PRESENT ILLNESS: The patient is a 72 y.o. male with CAD with prior PCI x2 in 2005 and again in 2013, GERD, Asthma, HLD, carotid artery disease, HTN, BPH who presented to the ER on 10/13/21 with worsening chest pain with minimal exertion.  He has been followed by cardiology for ongoing angina and was scheduled to get a repeat stress test and PET scan but was in too much pain and presented to the ER.  He has associated shortness of breath.  The pain is alleviated with rest. He denies syncope, dizziness, lower extremity edema, or PND.  Troponins were negative.  Patient currently denies ongoing chest pain or pressure. He does have some dyspnea with lying flat.     PAST MEDICAL HISTORY:   Past Medical History:   Diagnosis Date    Arthritis     fingers    ASTHMA     uses inhaler prn    Back pain     CAD (coronary artery disease) 6/21/2011    CAD (coronary artery disease)     Carotid artery plaque 6/21/2011    Esophageal reflux 11/3/2009    Heart burn     High cholesterol     History of asthma 11/3/2009    History of benign prostatic hypertrophy 11/3/2009    History of cardiac catheterization 6/21/2011    History of echocardiogram 6/20/2011    History of neck surgery     Hardware in neck    HLD (hyperlipidemia) 6/1/2011    Hypertension     Indigestion     Previous back surgery     Stented coronary artery 2005       PAST SURGICAL HISTORY:   Past Surgical History:   Procedure Laterality Date    ID NJX AA&/STRD TFRML EPI LUMBAR/SACRAL 1 LEVEL Bilateral 9/2/2016    Procedure: INJ-FORAMEN EPI LUM/SAC SNGL - L4-5;  Surgeon: Jesus Rojas M.D.;  [facility];  Service: Pain Management    ID NJX AA&/STRD TFRML EPI LUMBAR/SACRAL 1 LEVEL  9/2/2016    Procedure: INJ-FORAMEN EPI LUM/SAC SNGL;  Surgeon: Jesus Rojas M.D.;  Location[facility];  Service: Pain  Management    PB FLUORSCOPIC GUIDANCE SPINAL INJECTION  2016    Procedure: FLUOROGUIDE FOR SPINAL INJ;  Surgeon: Jesus Rojas M.D.;  Location: [facility];  Service: Pain Management    LUMBAR LAMINECTOMY DISKECTOMY  2012    Performed by [facility]DEL[facility]IA at [facility]    FORAMINOTOMY  2012    Performed by MARLIN CANDELARIA at [facility]    LUMBAR LAMINECTOMY DISKECTOMY  3/9/2011    Performed by MARLIN CANDELARIA at SUR[facility]ERY [facility]    FORAMINOTOMY  3/9/2011    Performed by MARLIN CANDELARIA at SURGERY [facility]    ANGIOGRAM      CERVICAL DISK AND FUSION ANTERIOR      OTHER CARDIAC SURGERY  2007 heart stents       FAMILY HISTORY:   Family History   Problem Relation Age of Onset    Heart Attack Neg Hx     Both brothers had CABG but at advanced age    SOCIAL HISTORY:   Social History     Socioeconomic History    Marital status:      Spouse name: Not on file    Number of children: Not on file    Years of education: Not on file    Highest education level: Master's degree (e.g., MA, MS, Sudeep, MEd, MSW, RENEE)   Occupational History    Not on file   Tobacco Use    Smoking status: Former     Types: Cigars     Quit date: 1987     Years since quittin.5    Smokeless tobacco: Never    Tobacco comments:        Substance and Sexual Activity    Alcohol use: Yes     Comment: 2 cocktails per week    Drug use: No    Sexual activity: Not on file   Other Topics Concern    Not on file   Social History Narrative    ** Merged History Encounter **          Social Determinants of Health     Financial Resource Strain: Not on file   Food Insecurity: Not on file   Transportation Needs: Not on file   Physical Activity: Not on file   Stress: Not on file   Social Connections: Not on file   Intimate Partner Violence: Not on file   Housing Stability: Not on file       ALLERGIES:   No Known Allergies     CURRENT MEDICATIONS:     Current Facility-Administered Medications:      metoprolol tartrate (LOPRESSOR) tablet 25 mg, 25 mg, Oral, BID, Allen Lamas M.D.    acetaminophen (Tylenol) tablet 650 mg, 650 mg, Oral, Q6HRS PRN, Allen Lamas M.D.    Notify provider if pain remains uncontrolled, , , CONTINUOUS **AND** Use the Numeric Rating Scale (NRS), Morejon-Baker Faces (WBF), or FLACC on regular floors and Critical-Care Pain Observation Tool (CPOT) on ICUs/Trauma to assess pain, , , CONTINUOUS **AND** Pulse Ox, , , CONTINUOUS **AND** Pharmacy Consult Request ...Pain Management Review 1 Each, 1 Each, Other, PHARMACY TO DOSE **AND** If patient difficult to arouse and/or has respiratory depression (respiratory rate of 10 or less), stop any opiates that are currently infusing and call a Rapid Response., , , CONTINUOUS, Allen Lamas M.D.    oxyCODONE immediate-release (ROXICODONE) tablet 5 mg, 5 mg, Oral, Q3HRS PRN **OR** oxyCODONE immediate-release (ROXICODONE) tablet 10 mg, 10 mg, Oral, Q3HRS PRN **OR** HYDROmorphone (Dilaudid) injection 0.5 mg, 0.5 mg, Intravenous, Q3HRS PRN, Allen Lamas M.D.    ondansetron (ZOFRAN) syringe/vial injection 4 mg, 4 mg, Intravenous, Q4HRS PRN, Allen Lamas M.D.    ondansetron (ZOFRAN ODT) dispertab 4 mg, 4 mg, Oral, Q4HRS PRN, Allen Lmaas M.D.    HEPARIN (PORCINE) IN NACL 2000-0.9 UNIT/L-% IV SOLN, , , , , Stopped at 10/13/22 1600    heparin infusion 25,000 units in 500 mL 0.45% NACL, 0-30 Units/kg/hr (Adjusted), Intravenous, Continuous, Dwayne Zepeda M.D.    heparin injection 2,000 Units, 30 Units/kg (Adjusted), Intravenous, PRN, Dwayne Duane, M.D.    Current Outpatient Medications:     esomeprazole (NEXIUM) 40 MG delayed-release capsule, Take 40 mg by mouth 2 times a day., Disp: , Rfl:     ezetimibe (ZETIA) 10 MG Tab, Take 1 Tablet by mouth every day., Disp: 90 Tablet, Rfl: 3    lisinopril (PRINIVIL) 20 MG Tab, Take 1 Tablet by mouth 2 times a day., Disp: 180 Tablet, Rfl: 3    metoprolol tartrate (LOPRESSOR) 25 MG Tab, Take 1 Tablet  by mouth 2 times a day., Disp: 180 Tablet, Rfl: 3    atorvastatin (LIPITOR) 40 MG Tab, Take 1 Tablet by mouth every day., Disp: 90 Tablet, Rfl: 3    Multiple Vitamin (MULTI-VITAMIN DAILY PO), Take 1 Tablet by mouth every morning., Disp: , Rfl:     Albuterol (PROVENTIL INH), Inhale 2 Puffs every four hours as needed., Disp: , Rfl:     KRILL OIL PO, Take 1 Tablet by mouth every morning., Disp: , Rfl:     Coenzyme Q10 (COQ10 PO), Take 1 Tablet by mouth every day., Disp: , Rfl:     FLOVENT  MCG/ACT Aerosol, Inhale 2 Puffs every four hours as needed. Indications: Asthma, Disp: , Rfl:     B Complex Vitamins (B-COMPLEX/B-12 PO), Take 1,000 Tabs by mouth every day., Disp: , Rfl:     aspirin 81 MG tablet, Take 81 mg by mouth every day., Disp: , Rfl:      LABS REVIEWED:  Lab Results   Component Value Date/Time    SODIUM 141 10/13/2022 09:02 AM    POTASSIUM 4.1 10/13/2022 09:02 AM    CHLORIDE 105 10/13/2022 09:02 AM    CO2 26 10/13/2022 09:02 AM    GLUCOSE 100 (H) 10/13/2022 09:02 AM    BUN 21 10/13/2022 09:02 AM    CREATININE 0.79 10/13/2022 09:02 AM      Lab Results   Component Value Date/Time    PROTHROMBTM 13.0 10/13/2022 09:02 AM    INR 0.99 10/13/2022 09:02 AM      Lab Results   Component Value Date/Time    WBC 9.9 10/13/2022 09:02 AM    RBC 5.17 10/13/2022 09:02 AM    HEMOGLOBIN 16.4 10/13/2022 09:02 AM    HEMATOCRIT 48.2 10/13/2022 09:02 AM    MCV 93.2 10/13/2022 09:02 AM    MCH 31.7 10/13/2022 09:02 AM    MCHC 34.0 10/13/2022 09:02 AM    MPV 9.3 10/13/2022 09:02 AM    NEUTSPOLYS 59.40 10/13/2022 09:02 AM    LYMPHOCYTES 29.70 10/13/2022 09:02 AM    MONOCYTES 7.70 10/13/2022 09:02 AM    EOSINOPHILS 2.40 10/13/2022 09:02 AM    BASOPHILS 0.50 10/13/2022 09:02 AM        IMAGING REVIEWED AND INTERPRETED:    ECHOCARDIOGRAM: official read pending    ANGIOGRAM: 10/13/22 [facility] [place] [place]  Hemodynamics:   Aorta: 116/78 mmHg  LVEDP: 21 mmHg  No significant pullback gradient across the aortic valve     Coronary Anatomy              Left  Main: Normal              LAD:  70% stenosis in the proximal segment. D1 is small and has ostial 80% stenosis, D2 is a bit larger, estimated at 2 mm and has proximal 70% stenosis              LCx:  MLI in the AV groove. The first and second OM are small with proximal 50% stenosis. There is 80% stenosis of the OM3, extending into several of the small sub-branches              RCA: Dominant, 95% mid stenosis- in stent.  70% distal. The PDA has 80% ostial stenosis. The PLB is large and normal                Left Ventriculography:   LVEF= 65%. Normal wall motion No mitral regurgitation Normal caliber ascending aorta     Supravalvular aortogram:  Normal caliber aorta.       Results of intervention to the RCA:  Pre: 95% stenosis and VINITA II flow  Post: 80% residual stenosis and VINITA III flow. No dissection or distal embolization    REVIEW OF SYSTEMS:   Review of Systems   Constitutional:  Positive for malaise/fatigue. Negative for fever.   HENT: Negative.     Eyes: Negative.    Respiratory: Negative.  Negative for shortness of breath.    Cardiovascular:  Positive for chest pain (with exertion; relieved with rest, last episode was more intense and longer than previous) and PND. Negative for claudication.   Gastrointestinal: Negative.    Genitourinary: Negative.         BPH   Skin: Negative.    Neurological: Negative.    Endo/Heme/Allergies: Negative.    Psychiatric/Behavioral: Negative.         PHYSICAL EXAMINATION:   Physical Exam  Vitals reviewed.   Constitutional:       General: He is not in acute distress.     Appearance: Normal appearance.   HENT:      Head: Normocephalic and atraumatic.      Right Ear: External ear normal.      Left Ear: External ear normal.      Nose: Nose normal. No congestion.      Mouth/Throat:      Mouth: Mucous membranes are moist.   Eyes:      General: No scleral icterus.     Extraocular Movements: Extraocular movements intact.      Conjunctiva/sclera: Conjunctivae normal.   Cardiovascular:     "  Rate and Rhythm: Normal rate and regular rhythm.      Pulses: Normal pulses.   Pulmonary:      Effort: Pulmonary effort is normal. No respiratory distress.   Abdominal:      General: Abdomen is flat. There is no distension.      Palpations: Abdomen is soft.   Musculoskeletal:         General: Normal range of motion.      Cervical back: Normal range of motion.   Skin:     General: Skin is warm and dry.      Coloration: Skin is not jaundiced.      Findings: No rash.   Neurological:      General: No focal deficit present.      Mental Status: He is alert and oriented to person, place, and time.      Cranial Nerves: No cranial nerve deficit.   Psychiatric:         Mood and Affect: Mood normal.         Behavior: Behavior normal.     /75   Pulse 71   Temp 36.4 °C (97.5 °F) (Temporal)   Resp (!) 25   Ht 1.626 m (5' 4\")   Wt 81.3 kg (179 lb 3.7 oz)   SpO2 93%   BMI 30.77 kg/m²        IMPRESSION:  71 yo gentleman with known conditions of CAD, GERD, Asthma, HLD, HTN, BPH and previous history of PCI x2 in 2005 and again in 2013 now with unstable angina and multivessel CAD s/p PCI proximal RCA culprit in-stent lesion.      PLAN:  I recommend CABG this admission.  The procedure, its risks, benefits, potential complications and alternative treatments were discussed with the patient in detail including the risks should he decide not to undergo my recommended treatment. All of his questions were answered to his satisfaction and he is willing to proceed with the operation. The risks include death, stroke,  infection: to include a rare bacterial infection related to the use of the heart/lung machine, blaire-operative myocardial infarction, dysrhythmias, diaphragmatic paralysis, chest wall paresthesia, tracheostomy, kidney or other organ failure, possible return to the operating room for bleeding, bleeding requiring transfusion with its attendant risks including AIDS or hepatitis, dehiscence of surgical incisions, " respiratory complications including the need for prolonged ventilator support, Protamine or other drug reaction, peripheral neuropathy, loss of limb, and miscount of surgical items. The operative mortality risk is approximately 1-2%. The STS mortality risk score is 1.5% and the morbidity and mortality risk score is 10-15%. The scores were discussed with patient.    The operation,CABG x3 is scheduled for Monday at 7:30 AM at [facility].    Findings and recommendations have been discussed with the patient’s cardiologist Gabriel Garnica MD.  Thank you for this very challenging consultation and participation in the patient’s care.  I will keep you apprised of all future developments.          Sincerely,       Hodan Meng MD.

## 2022-10-13 NOTE — ASSESSMENT & PLAN NOTE
Patient presenting with progressive chest pain with exertion, has known CAD and previous PCI x 2  [facility] 10/13: showed multivessel CAD  Initial plan was to perform CABG on October 17, 2022.  Patient transferred to ICU.  This morning he developed a rash on his right upper extremity and cardiothoracic surgery discussed it with hematology and intensivist and thought that rash could be secondary to heparin and CABG procedure was canceled.    Cardiology and cardiothoracic surgery okay with transfer to [facility] or [facility].  Await open bed at either facility  10/13 Carotid U/S showing normal carotids bilateral  10/13 Vein Mapping:   FINDINGS   Right.    The great saphenous vein is patent throughout its length.   The great saphenous vein is too minute at the distal thigh, knee, and    proximal calf.    Diameter measurements are listed above.    There is a more superficial tributary that measures 0.23 cm (distal thigh)    and 0.22 cm (knee).   Left.    The great saphenous vein is patent throughout its length.   The great saphenous vein is small in caliber from the mid thigh to proximal    calf.    Diameter measurements are listed above.    There is a more superficial tributary that measures 0.21 cm (distal thigh)    and 0.19 cm (knee).

## 2022-10-14 ENCOUNTER — APPOINTMENT (OUTPATIENT)
Dept: CARDIOLOGY | Facility: MEDICAL CENTER | Age: 72
DRG: 251 | End: 2022-10-14
Attending: NURSE PRACTITIONER
Payer: MEDICARE

## 2022-10-14 LAB
ALBUMIN SERPL BCP-MCNC: 4 G/DL (ref 3.2–4.9)
ALBUMIN/GLOB SERPL: 1.6 G/DL
ALP SERPL-CCNC: 64 U/L (ref 30–99)
ALT SERPL-CCNC: 25 U/L (ref 2–50)
ANION GAP SERPL CALC-SCNC: 10 MMOL/L (ref 7–16)
AST SERPL-CCNC: 19 U/L (ref 12–45)
BILIRUB SERPL-MCNC: 0.5 MG/DL (ref 0.1–1.5)
BUN SERPL-MCNC: 17 MG/DL (ref 8–22)
CALCIUM SERPL-MCNC: 8.9 MG/DL (ref 8.5–10.5)
CHLORIDE SERPL-SCNC: 104 MMOL/L (ref 96–112)
CO2 SERPL-SCNC: 24 MMOL/L (ref 20–33)
CREAT SERPL-MCNC: 0.86 MG/DL (ref 0.5–1.4)
ERYTHROCYTE [DISTWIDTH] IN BLOOD BY AUTOMATED COUNT: 45.9 FL (ref 35.9–50)
GFR SERPLBLD CREATININE-BSD FMLA CKD-EPI: 91 ML/MIN/1.73 M 2
GLOBULIN SER CALC-MCNC: 2.5 G/DL (ref 1.9–3.5)
GLUCOSE SERPL-MCNC: 100 MG/DL (ref 65–99)
HCT VFR BLD AUTO: 44.2 % (ref 42–52)
HGB BLD-MCNC: 15.5 G/DL (ref 14–18)
LV EJECT FRACT  99904: 60
LV EJECT FRACT MOD 2C 99903: 55.92
LV EJECT FRACT MOD 4C 99902: 65.94
LV EJECT FRACT MOD BP 99901: 60.26
MCH RBC QN AUTO: 32 PG (ref 27–33)
MCHC RBC AUTO-ENTMCNC: 35.1 G/DL (ref 33.7–35.3)
MCV RBC AUTO: 91.1 FL (ref 81.4–97.8)
PLATELET # BLD AUTO: 278 K/UL (ref 164–446)
PMV BLD AUTO: 9.5 FL (ref 9–12.9)
POTASSIUM SERPL-SCNC: 4 MMOL/L (ref 3.6–5.5)
PROT SERPL-MCNC: 6.5 G/DL (ref 6–8.2)
RBC # BLD AUTO: 4.85 M/UL (ref 4.7–6.1)
SODIUM SERPL-SCNC: 138 MMOL/L (ref 135–145)
UFH PPP CHRO-ACNC: 0.3 IU/ML
UFH PPP CHRO-ACNC: 0.45 IU/ML
WBC # BLD AUTO: 10.2 K/UL (ref 4.8–10.8)

## 2022-10-14 PROCEDURE — 700102 HCHG RX REV CODE 250 W/ 637 OVERRIDE(OP): Performed by: NURSE PRACTITIONER

## 2022-10-14 PROCEDURE — 96376 TX/PRO/DX INJ SAME DRUG ADON: CPT

## 2022-10-14 PROCEDURE — 700102 HCHG RX REV CODE 250 W/ 637 OVERRIDE(OP): Performed by: INTERNAL MEDICINE

## 2022-10-14 PROCEDURE — 99233 SBSQ HOSP IP/OBS HIGH 50: CPT | Performed by: INTERNAL MEDICINE

## 2022-10-14 PROCEDURE — 85027 COMPLETE CBC AUTOMATED: CPT

## 2022-10-14 PROCEDURE — A9270 NON-COVERED ITEM OR SERVICE: HCPCS | Performed by: HOSPITALIST

## 2022-10-14 PROCEDURE — 93306 TTE W/DOPPLER COMPLETE: CPT

## 2022-10-14 PROCEDURE — A9270 NON-COVERED ITEM OR SERVICE: HCPCS | Performed by: NURSE PRACTITIONER

## 2022-10-14 PROCEDURE — 93306 TTE W/DOPPLER COMPLETE: CPT | Mod: 26 | Performed by: INTERNAL MEDICINE

## 2022-10-14 PROCEDURE — 80053 COMPREHEN METABOLIC PANEL: CPT

## 2022-10-14 PROCEDURE — 36415 COLL VENOUS BLD VENIPUNCTURE: CPT

## 2022-10-14 PROCEDURE — A9270 NON-COVERED ITEM OR SERVICE: HCPCS | Performed by: INTERNAL MEDICINE

## 2022-10-14 PROCEDURE — 700111 HCHG RX REV CODE 636 W/ 250 OVERRIDE (IP): Performed by: INTERNAL MEDICINE

## 2022-10-14 PROCEDURE — 700102 HCHG RX REV CODE 250 W/ 637 OVERRIDE(OP): Performed by: HOSPITALIST

## 2022-10-14 PROCEDURE — 85520 HEPARIN ASSAY: CPT

## 2022-10-14 PROCEDURE — 770020 HCHG ROOM/CARE - TELE (206)

## 2022-10-14 RX ORDER — ATORVASTATIN CALCIUM 80 MG/1
80 TABLET, FILM COATED ORAL DAILY
Status: DISCONTINUED | OUTPATIENT
Start: 2022-10-14 | End: 2022-10-19 | Stop reason: HOSPADM

## 2022-10-14 RX ADMIN — ATORVASTATIN CALCIUM 80 MG: 80 TABLET, FILM COATED ORAL at 08:24

## 2022-10-14 RX ADMIN — ASPIRIN 81 MG: 81 TABLET, COATED ORAL at 04:02

## 2022-10-14 RX ADMIN — METOPROLOL TARTRATE 25 MG: 25 TABLET, FILM COATED ORAL at 04:02

## 2022-10-14 RX ADMIN — METOPROLOL TARTRATE 25 MG: 25 TABLET, FILM COATED ORAL at 17:13

## 2022-10-14 RX ADMIN — HEPARIN SODIUM 12 UNITS/KG/HR: 5000 INJECTION, SOLUTION INTRAVENOUS at 22:08

## 2022-10-14 RX ADMIN — OXYCODONE 5 MG: 5 TABLET ORAL at 20:16

## 2022-10-14 ASSESSMENT — ENCOUNTER SYMPTOMS
ORTHOPNEA: 1
DIZZINESS: 0
ABDOMINAL PAIN: 0
PALPITATIONS: 0
GASTROINTESTINAL NEGATIVE: 1
CHILLS: 0
MUSCULOSKELETAL NEGATIVE: 1
CARDIOVASCULAR NEGATIVE: 1
PSYCHIATRIC NEGATIVE: 1
FALLS: 0
CONSTITUTIONAL NEGATIVE: 1
EYE REDNESS: 0
VOMITING: 0
NEUROLOGICAL NEGATIVE: 1
HEADACHES: 1
NAUSEA: 0
RESPIRATORY NEGATIVE: 1
COUGH: 0
EYES NEGATIVE: 1
CLAUDICATION: 0
NERVOUS/ANXIOUS: 0
FEVER: 0
MYALGIAS: 0
SHORTNESS OF BREATH: 0
PND: 1
WEAKNESS: 0
CONSTIPATION: 1
BRUISES/BLEEDS EASILY: 0
HEADACHES: 0

## 2022-10-14 ASSESSMENT — PAIN DESCRIPTION - PAIN TYPE
TYPE: ACUTE PAIN
TYPE: SURGICAL PAIN
TYPE: ACUTE PAIN

## 2022-10-14 NOTE — PROGRESS NOTES
Bedside report completed with night nurseLiliana RN. Assumed pt care. Pt is resting in bed. No complaints of pain. Radial site clean dry and intact. Heparin running at 12 units/hr. Verified with night nurse. Family at bedside. Bed in low and locked position. Call light within reach. Will continue to monitor.

## 2022-10-14 NOTE — PROGRESS NOTES
Cardiology Follow Up Progress Note    Date of Service  10/14/2022    Attending Physician  Nicky Haley M.D.    Chief Complaint   1. Unstable angina, multivessel coronary artery disease.    MARCIN Branch is a 72 y.o. male admitted 10/13/2022 with above..    Interim Events  He underwent unremarkable cardiac studies as described.     Review of Systems  Review of Systems   Constitutional: Negative.  Negative for chills and fever.   HENT: Negative.  Negative for hearing loss.    Eyes: Negative.    Respiratory: Negative.  Negative for cough and shortness of breath.    Cardiovascular: Negative.  Negative for chest pain, palpitations and leg swelling.   Gastrointestinal: Negative.  Negative for abdominal pain, nausea and vomiting.   Genitourinary: Negative.  Negative for dysuria and urgency.   Musculoskeletal: Negative.  Negative for myalgias.   Skin: Negative.  Negative for rash.   Neurological: Negative.  Negative for dizziness, weakness and headaches.   Hematological:  Does not bruise/bleed easily.   Psychiatric/Behavioral: Negative.  The patient is not nervous/anxious.      Vital signs in last 24 hours  Temp:  [36.1 °C (97 °F)-36.4 °C (97.5 °F)] 36.1 °C (97 °F)  Pulse:  [65-80] 66  Resp:  [14-25] 18  BP: (124-182)/() 126/97  SpO2:  [91 %-99 %] 95 %    Physical Exam  Physical Exam  Constitutional:       Appearance: Normal appearance. He is well-developed and normal weight.   HENT:      Head: Normocephalic and atraumatic.      Mouth/Throat:      Mouth: Mucous membranes are moist.   Eyes:      Extraocular Movements: Extraocular movements intact.      Conjunctiva/sclera: Conjunctivae normal.   Cardiovascular:      Rate and Rhythm: Normal rate and regular rhythm.      Pulses: Normal pulses.      Heart sounds: Normal heart sounds.   Pulmonary:      Effort: Pulmonary effort is normal.      Breath sounds: Normal breath sounds.   Abdominal:      General: Bowel sounds are normal.      Palpations: Abdomen is soft.    Musculoskeletal:         General: Normal range of motion.      Cervical back: Normal range of motion and neck supple.   Skin:     General: Skin is warm and dry.   Neurological:      General: No focal deficit present.      Mental Status: He is alert and oriented to person, place, and time. Mental status is at baseline.   Psychiatric:         Mood and Affect: Mood normal.         Behavior: Behavior normal.         Thought Content: Thought content normal.         Judgment: Judgment normal.       Lab Review  Lab Results   Component Value Date/Time    WBC 10.2 10/14/2022 12:14 AM    RBC 4.85 10/14/2022 12:14 AM    HEMOGLOBIN 15.5 10/14/2022 12:14 AM    HEMATOCRIT 44.2 10/14/2022 12:14 AM    MCV 91.1 10/14/2022 12:14 AM    MCH 32.0 10/14/2022 12:14 AM    MCHC 35.1 10/14/2022 12:14 AM    MPV 9.5 10/14/2022 12:14 AM      Lab Results   Component Value Date/Time    SODIUM 138 10/14/2022 12:14 AM    POTASSIUM 4.0 10/14/2022 12:14 AM    CHLORIDE 104 10/14/2022 12:14 AM    CO2 24 10/14/2022 12:14 AM    GLUCOSE 100 (H) 10/14/2022 12:14 AM    BUN 17 10/14/2022 12:14 AM    CREATININE 0.86 10/14/2022 12:14 AM      Lab Results   Component Value Date/Time    ASTSGOT 19 10/14/2022 12:14 AM    ALTSGPT 25 10/14/2022 12:14 AM     Lab Results   Component Value Date/Time    CHOLSTRLTOT 126 01/24/2022 10:57 AM    LDL 69 01/24/2022 10:57 AM    HDL 40 01/24/2022 10:57 AM    TRIGLYCERIDE 87 01/24/2022 10:57 AM    TROPONINT 9 10/13/2022 09:02 AM       No results for input(s): NTPROBNP in the last 72 hours.  (Above labs reviewed.)       Current Facility-Administered Medications:     atorvastatin (LIPITOR) tablet 80 mg, 80 mg, Oral, DAILY, STACEY VeronicaPSarmadRSarmadNSarmad    metoprolol tartrate (LOPRESSOR) tablet 25 mg, 25 mg, Oral, BID, Allen Lamas M.D., 25 mg at 10/14/22 0402    acetaminophen (Tylenol) tablet 650 mg, 650 mg, Oral, Q6HRS PRN, Allen Lamas M.D.    Notify provider if pain remains uncontrolled, , , CONTINUOUS **AND** Use the  Numeric Rating Scale (NRS), Morejon-Baker Faces (WBF), or FLACC on regular floors and Critical-Care Pain Observation Tool (CPOT) on ICUs/Trauma to assess pain, , , CONTINUOUS **AND** Pulse Ox, , , CONTINUOUS **AND** Pharmacy Consult Request ...Pain Management Review 1 Each, 1 Each, Other, PHARMACY TO DOSE **AND** If patient difficult to arouse and/or has respiratory depression (respiratory rate of 10 or less), stop any opiates that are currently infusing and call a Rapid Response., , , CONTINUOUS, Allen Lamas M.D.    oxyCODONE immediate-release (ROXICODONE) tablet 5 mg, 5 mg, Oral, Q3HRS PRN **OR** oxyCODONE immediate release (ROXICODONE) tablet 10 mg, 10 mg, Oral, Q3HRS PRN **OR** HYDROmorphone (Dilaudid) injection 0.5 mg, 0.5 mg, Intravenous, Q3HRS PRN, Allen Lamas M.D.    ondansetron (ZOFRAN) syringe/vial injection 4 mg, 4 mg, Intravenous, Q4HRS PRN, Allen Lamas M.D.    ondansetron (ZOFRAN ODT) dispertab 4 mg, 4 mg, Oral, Q4HRS PRN, Allen Lamas M.D.    aspirin EC (ECOTRIN) tablet 81 mg, 81 mg, Oral, DAILY, Dwayne Zepeda M.D., 81 mg at 10/14/22 0402    heparin infusion 25,000 units in 500 mL 0.45% NACL, 0-30 Units/kg/hr (Adjusted), Intravenous, Continuous, Dwayne Zepeda M.D., Last Rate: 16.3 mL/hr at 10/14/22 0800, 12 Units/kg/hr at 10/14/22 0800    heparin injection 2,000 Units, 30 Units/kg (Adjusted), Intravenous, PRN, Dwayne Zepeda M.D.  (Medications reviewed.)    Cardiac Imaging and Procedures Review  CARDIAC STUDIES/PROCEDURES:     CARDIAC CATHETERIZATION CONCLUSIONS by Dwayne Branham (10/13.22)  1. Unstable angina due to critical stensosis of the RCA  2. Successful restoration of VINITA III flow to the RCA using angioplasty  3. Multivessel obstructive CAD  4. Mild elevation in LVEDP at 21 mmHg  5. Normal LV systolic function  (study result reviewed)    CAROTID ULTRASOUND (12/01/17)  No prior study is available for comparison.  Normal bilateral carotid exam.      CAROTID ULTRASOUND  (11/18/09)  Carotid ultrasound showing mild plaques of left internal carotid artery.     CARDIAC CATHETERIZATION CONCLUSIONS by Dr. Toledo (04/08/13)   1. Acute inferior wall myocardial infarction secondary to high grade in-stent stenosis in the   middle of a large segment of stented proximal right coronary artery, status post successful   stenting with a 3 mm x 15 mm Xience Xpedition stent.   2. Diffusely diseased circumflex system as described above.   3. Widely patent left anterior descending coronary artery with its proximal 2-3 cm, more diffusely   narrowed and with 2 diagonal branches given off with high-grade disease in the first diagonal   branch, which is quite small and moderate disease in the second diagonal branch.     CARDIAC CATHETERIZATION CONCLUSIONS in [place] (12/05)  Cardiac catheterization showing high grade small vessel first and second diagonal branch   stenosis, non obstructive left circumflex artery stenosis and high grade right coronary artery   stenosis treated with 3.0 x 32 mm and 3.0 x 16 mm Taxus drug eluding stents.     ECHOCARDIOGRAM CONCLUSIONS (10/14/22)  Results pending.   (study result reviewed)     ECHOCARDIOGRAM CONCLUSIONS (12/01/17)  Prior echo 9-17-15. Compared to the report of the study done - there   has been no significant change.   Normal left ventricular systolic function.  Left ventricular ejection fraction is visually estimated to be 65%.  Normal diastolic function.  Mild mitral regurgitation.  Mild tricuspid regurgitation.  Estimated right ventricular systolic pressure is 35 mmHg.     ECHOCARDIOGRAM CONCLUSIONS (09/17/15)  Normal left ventricular size, wall thickness, and systolic function.   Grade I diastolic dysfunction. Ejection fraction is measured to be 63 %   by Martines's biplane 2D analysis.  Trace mitral regurgitation.   Trace aortic insufficiency.  Unable to estimate pulmonary artery pressure due to an inadequate   tricuspid regurgitant jet.  Normal  aortic root diameter 3 cm.  No prior study is available for comparison.      ECHOCARDIOGRAM CONCLUSIONS (06/16/11)  Echocardiogram showing normal left ventricular systolic function, no significant valvular abnormalities.     EKG performed on (10/13/22) was reviewed: EKG personally interpreted shows sinus rhythm.  EKG performed on (09/16/15) EKG shows normal sinus rhythm.     Laboratory results of (01/24/22) were reviewed. Cholesterol profile of 126/87/40/69 mg/dL noted.  Laboratory results of (06/29/21) Cholesterol profile of 135/96/43/73 mg/dL noted.  Laboratory results of (09/08/20) Cholesterol profile of 127/121/42/61 noted.  Laboratory results of (09/05/19) Cholesterol profile of 128/132/40/62 noted.  Laboratory results of (06/15/18) Cholesterol profile of 108/81/44/48 noted.  Laboratory results of (11/29/17) Cholesterol profile of 170/127/46/99 noted.  Laboratory results of (10/10/16) Cholesterol profile of 162/128/41/95 noted.  Laboratory results of (07/27/16) Cholesterol profile of 160/138/42/90 noted  Laboratory results of (09/16/15) Cholesterol profile of 141/148/38/73 noted.     MPI CONCLUSIONS (09/17/15)  Normal left ventricular perfusion with no fixed or reversible defects.   Normal left ventricular wall motion, with EF of 72%.      RENAL ULTRASOUND (11/18/09)  Unremarkable renal ultrasound with no evidence of renal artery stenosis.  No abdominal aortic aneurysm.     PET SCAN (02/15/19)  ELECTROCARDIOGRAPHIC FINDINGS:   Normal sinus rhythm.    Normal ECG response to dipyridamole infusion.    No ischemic changes noted   SCINTOGRAPHIC FINDINGS:    Normal left ventricular perfusion with stress and rest images.    There is no evidence of ischemia or scar.  GATED WALL MOTION FINDINGS:    The left ventricle wall motion is normal with stress and rest  imagings.    Measured resting ejection fraction is 65 %.       Assessment/Plan    Coronary artery disease with prior stent placement, pending coronary bypass  graft: He is clinically doing well. Plans per surgery.   Hypertension and hyperlipidemia, carotid artery stenosis: Stable.  Additional information: His son graduated medical school at [employer] and is doing his residency. He is finishing up soon. He is now engaged to his academic counselor.      Thank you for allowing me to participate in the care of this patient.  Cardiology will sign off on this patient    Please contact me with any questions.    Moisés Rice M.D.   Cardiologist, [facility]  [phone]

## 2022-10-14 NOTE — THERAPY
Physical Therapy Contact Note    PT cardiac consult received, pt pending CABG evaluation; will follow up post decision for accurate assessment of PT/dc needs;    Shauna ROSE, PT,  [phone]

## 2022-10-14 NOTE — CARE PLAN
The patient is Stable - Low risk of patient condition declining or worsening    Shift Goals  Clinical Goals: Remove TR band, Heparin gtt  Patient Goals: remove tr band  Family Goals: n/a    Progress made toward(s) clinical / shift goals:  Pt is tolerating TR band removal at this time (9 mL left), VS are WNL, pt does not complain of pain, Respiratory status at baseline (RA), Xa scheduled for 0030    Patient is not progressing towards the following goals: NA      Problem: Hemodynamics  Goal: Patient's hemodynamics, fluid balance and neurologic status will be stable or improve  Outcome: Progressing     Problem: Respiratory  Goal: Patient will achieve/maintain optimum respiratory ventilation and gas exchange  Outcome: Progressing

## 2022-10-14 NOTE — PROGRESS NOTES
3 mL taken out of R radial TR band at this time. Pulse ox on right hand is 93%. Pt was recently started on heparin and per report, AM RN had trouble removing TR band due to anticoagulation.

## 2022-10-14 NOTE — PROGRESS NOTES
Hospital Medicine Daily Progress Note    Date of Service  10/14/2022    Chief Complaint  Harman Branch is a 72 y.o. male admitted 10/13/2022 with chest pain    Hospital Course  72-year-old male with a history of CAD with prior PCI x2 in 2005 and again in 2013, GERD, asthma, hypertension, BPH admitted with chest pain.  He underwent left heart catheterization on 10/13 which showed multivessel CAD.  CT surgery was consulted who recommended CABG.    Interval Problem Update  -On heparin drip  -No chest pain  -Does report some orthopnea  -On room air  -Plan for CABG on Monday, 10/17/2022    I have discussed this patient's plan of care and discharge plan at IDT rounds today with Case Management, Nursing, Nursing leadership, and other members of the IDT team.    Consultants/Specialty  cardiology and cardiovascular surgeon    Code Status  Full Code    Disposition  Patient is not medically cleared for discharge.   Anticipate discharge to  TBD .  I have placed the appropriate orders for post-discharge needs.    Review of Systems  Review of Systems   Constitutional:  Negative for chills and fever.   HENT: Negative.     Eyes:  Negative for redness.   Respiratory:  Negative for shortness of breath.    Cardiovascular:  Positive for orthopnea. Negative for chest pain and leg swelling.   Gastrointestinal:  Positive for constipation. Negative for nausea and vomiting.   Genitourinary: Negative.    Musculoskeletal:  Negative for falls.   Skin:  Negative for rash.   Neurological:  Positive for headaches. Negative for dizziness.      Physical Exam  Temp:  [36.1 °C (97 °F)-36.8 °C (98.2 °F)] 36.7 °C (98.1 °F)  Pulse:  [60-80] 60  Resp:  [16-25] 16  BP: (124-163)/() 151/95  SpO2:  [91 %-99 %] 94 %    Physical Exam  Constitutional:       General: He is not in acute distress.     Appearance: He is not toxic-appearing.   HENT:      Head: Normocephalic and atraumatic.      Nose: Nose normal.      Mouth/Throat:      Mouth: Mucous membranes  are moist.   Eyes:      General: No scleral icterus.     Conjunctiva/sclera: Conjunctivae normal.   Cardiovascular:      Rate and Rhythm: Normal rate and regular rhythm.      Heart sounds: No murmur heard.    No friction rub. No gallop.   Pulmonary:      Effort: Pulmonary effort is normal.      Breath sounds: Normal breath sounds.   Abdominal:      General: Bowel sounds are normal. There is no distension.      Palpations: Abdomen is soft.      Tenderness: There is no abdominal tenderness.   Musculoskeletal:      Cervical back: Normal range of motion.      Right lower leg: No edema.      Left lower leg: No edema.   Skin:     Coloration: Skin is not jaundiced.      Findings: No rash.   Neurological:      Mental Status: He is alert and oriented to person, place, and time. Mental status is at baseline.   Psychiatric:         Mood and Affect: Mood normal.         Behavior: Behavior normal.       Fluids    Intake/Output Summary (Last 24 hours) at 10/14/2022 1214  Last data filed at 10/13/2022 2100  Gross per 24 hour   Intake 240 ml   Output 600 ml   Net -360 ml       Laboratory  Recent Labs     10/13/22  0902 10/14/22  0014   WBC 9.9 10.2   RBC 5.17 4.85   HEMOGLOBIN 16.4 15.5   HEMATOCRIT 48.2 44.2   MCV 93.2 91.1   MCH 31.7 32.0   MCHC 34.0 35.1   RDW 46.8 45.9   PLATELETCT 284 278   MPV 9.3 9.5     Recent Labs     10/13/22  0902 10/14/22  0014   SODIUM 141 138   POTASSIUM 4.1 4.0   CHLORIDE 105 104   CO2 26 24   GLUCOSE 100* 100*   BUN 21 17   CREATININE 0.79 0.86   CALCIUM 9.3 8.9     Recent Labs     10/13/22  0902 10/13/22  1554   APTT 29.7 >240.0*   INR 0.99 1.20*               Imaging  EC-ECHOCARDIOGRAM COMPLETE W/O CONT   Final Result      US-VEIN MAPPING LOWER EXTREMITY BILAT   Final Result      US-CAROTID DOPPLER BILAT   Final Result      DX-CHEST-PORTABLE (1 VIEW)   Final Result      No acute cardiopulmonary disease.      CL-LEFT HEART CATHETERIZATION WITH POSSIBLE INTERVENTION    (Results Pending)         Assessment/Plan  * ACS (acute coronary syndrome) (HCC)- (present on admission)  Assessment & Plan  Patient presenting with progressive chest pain with exertion, has known CAD and previous PCI x 2  ERP discussed with Cardiology who would like to cath patient  [facility] [place] 10/13 reviewed: showed multivessel CAD  CT surgery consulted  CABG 10/17/2022  Continue heparin drip  Patient currently chest pain-free    Hypertension  Assessment & Plan  Home regimen: Metoprolol tartrate 25 mg twice daily, Lisinopril 20 mg twice daily  Inpatient regimen: Metoprolol tartrate 25 mg twice daily    CAD (coronary artery disease)- (present on admission)  Assessment & Plan  Noted       VTE prophylaxis: therapeutic anticoagulation with heparin gtt    I have performed a physical exam and reviewed and updated ROS and Plan today (10/14/2022). In review of yesterday's note (10/13/2022), there are no changes except as documented above.

## 2022-10-14 NOTE — DISCHARGE PLANNING
Case Management Discharge Planning    Admission Date: 10/13/2022  GMLOS:    ALOS: 0    6-Clicks ADL Score:    6-Clicks Mobility Score:        Anticipated Discharge Dispo: Discharge Disposition: Discharged to home/self care (01)    DME Needed: No    Action(s) Taken: Pt pending medical clearance, pt had cath 10/13, pt on room air, pt lives local with spouse and has a PCP. Cardiology recommending CABG. Case management needs pending clinical course.    Escalations Completed: None    Medically Clear: No    Next Steps: f/u with medical team and pt to discuss dc needs and barriers.    Barriers to Discharge: Medical clearance

## 2022-10-14 NOTE — ASSESSMENT & PLAN NOTE
Home regimen: Metoprolol tartrate 25 mg twice daily, Lisinopril 20 mg twice daily  Inpatient regimen: Metoprolol tartrate 25 mg twice daily and losartain  Monitor vitals

## 2022-10-14 NOTE — PROGRESS NOTES
Monitor Summary    Rhythm: SR 67-70 1st degree AV block    Ectopy: R pvc, coup    Intervals: 25/08/38

## 2022-10-14 NOTE — CARE PLAN
The patient is Stable - Low risk of patient condition declining or worsening    Shift Goals  Clinical Goals: Monitor hemodynamics, consult with CT surgery  Patient Goals: Consult CT surgery  Family Goals: Consult CT surgery    Progress made toward(s) clinical / shift goals:      Problem: Knowledge Deficit - Standard  Goal: Patient and family/care givers will demonstrate understanding of plan of care, disease process/condition, diagnostic tests and medications  Outcome: Progressing     Problem: Psychosocial  Goal: Patient's level of anxiety will decrease  Outcome: Progressing  Goal: Patient's ability to verbalize feelings about condition will improve  Outcome: Progressing  Goal: Patient's ability to re-evaluate and adapt role responsibilities will improve  Outcome: Progressing  Goal: Patient and family will demonstrate ability to cope with life altering diagnosis and/or procedure  Outcome: Progressing  Goal: Spiritual and cultural needs incorporated into hospitalization  Outcome: Progressing     Problem: Hemodynamics  Goal: Patient's hemodynamics, fluid balance and neurologic status will be stable or improve  Outcome: Progressing     Problem: Respiratory  Goal: Patient will achieve/maintain optimum respiratory ventilation and gas exchange  Outcome: Progressing     Problem: Mechanical Ventilation  Goal: Safe management of artificial airway and ventilation  Outcome: Progressing  Goal: Successful weaning off mechanical ventilator, spontaneously maintains adequate gas exchange  Outcome: Progressing  Goal: Patient will be able to express needs and understand communication  Outcome: Progressing     Problem: Risk for Aspiration  Goal: Patient's risk for aspiration will be absent or decrease  Outcome: Progressing     Problem: Urinary - Renal Perfusion  Goal: Ability to achieve and maintain adequate renal perfusion and functioning will improve  Outcome: Progressing     Problem: Venous Thromboembolism (VTE) Prevention  Goal:  The patient will remain free from venous thromboembolism (VTE)  Outcome: Progressing     Problem: Nutrition  Goal: Patient's nutritional and fluid intake will be adequate or improve  Outcome: Progressing  Goal: Enteral nutrition will be maintained or improve  Outcome: Progressing  Goal: Enteral nutrition will be maintained or improve  Outcome: Progressing     Problem: Urinary Elimination  Goal: Establish and maintain regular urinary output  Outcome: Progressing     Problem: Bowel Elimination  Goal: Establish and maintain regular bowel function  Outcome: Progressing

## 2022-10-15 PROBLEM — K59.00 CONSTIPATION: Status: ACTIVE | Noted: 2022-10-15

## 2022-10-15 LAB — UFH PPP CHRO-ACNC: 0.49 IU/ML

## 2022-10-15 PROCEDURE — 99232 SBSQ HOSP IP/OBS MODERATE 35: CPT | Performed by: INTERNAL MEDICINE

## 2022-10-15 PROCEDURE — 700102 HCHG RX REV CODE 250 W/ 637 OVERRIDE(OP): Performed by: HOSPITALIST

## 2022-10-15 PROCEDURE — 700102 HCHG RX REV CODE 250 W/ 637 OVERRIDE(OP): Performed by: NURSE PRACTITIONER

## 2022-10-15 PROCEDURE — 36415 COLL VENOUS BLD VENIPUNCTURE: CPT

## 2022-10-15 PROCEDURE — 700102 HCHG RX REV CODE 250 W/ 637 OVERRIDE(OP): Performed by: INTERNAL MEDICINE

## 2022-10-15 PROCEDURE — A9270 NON-COVERED ITEM OR SERVICE: HCPCS | Performed by: INTERNAL MEDICINE

## 2022-10-15 PROCEDURE — 770020 HCHG ROOM/CARE - TELE (206)

## 2022-10-15 PROCEDURE — 85520 HEPARIN ASSAY: CPT

## 2022-10-15 PROCEDURE — A9270 NON-COVERED ITEM OR SERVICE: HCPCS | Performed by: HOSPITALIST

## 2022-10-15 PROCEDURE — A9270 NON-COVERED ITEM OR SERVICE: HCPCS | Performed by: NURSE PRACTITIONER

## 2022-10-15 RX ORDER — BISACODYL 10 MG
10 SUPPOSITORY, RECTAL RECTAL
Status: DISCONTINUED | OUTPATIENT
Start: 2022-10-15 | End: 2022-10-19 | Stop reason: HOSPADM

## 2022-10-15 RX ORDER — POLYETHYLENE GLYCOL 3350 17 G/17G
1 POWDER, FOR SOLUTION ORAL DAILY
Status: DISCONTINUED | OUTPATIENT
Start: 2022-10-15 | End: 2022-10-19 | Stop reason: HOSPADM

## 2022-10-15 RX ORDER — AMOXICILLIN 250 MG
2 CAPSULE ORAL 2 TIMES DAILY
Status: DISCONTINUED | OUTPATIENT
Start: 2022-10-15 | End: 2022-10-19 | Stop reason: HOSPADM

## 2022-10-15 RX ADMIN — ATORVASTATIN CALCIUM 80 MG: 80 TABLET, FILM COATED ORAL at 05:07

## 2022-10-15 RX ADMIN — ASPIRIN 81 MG: 81 TABLET, COATED ORAL at 05:07

## 2022-10-15 RX ADMIN — SENNOSIDES AND DOCUSATE SODIUM 2 TABLET: 50; 8.6 TABLET ORAL at 13:12

## 2022-10-15 RX ADMIN — POLYETHYLENE GLYCOL 3350 1 PACKET: 17 POWDER, FOR SOLUTION ORAL at 13:12

## 2022-10-15 RX ADMIN — METOPROLOL TARTRATE 25 MG: 25 TABLET, FILM COATED ORAL at 05:08

## 2022-10-15 RX ADMIN — METOPROLOL TARTRATE 25 MG: 25 TABLET, FILM COATED ORAL at 17:21

## 2022-10-15 RX ADMIN — ACETAMINOPHEN 650 MG: 325 TABLET, FILM COATED ORAL at 08:32

## 2022-10-15 ASSESSMENT — ENCOUNTER SYMPTOMS
ORTHOPNEA: 0
EYE REDNESS: 0
NEUROLOGICAL NEGATIVE: 1
HEADACHES: 0
MYALGIAS: 0
RESPIRATORY NEGATIVE: 1
PSYCHIATRIC NEGATIVE: 1
BRUISES/BLEEDS EASILY: 0
FEVER: 0
FALLS: 0
SHORTNESS OF BREATH: 0
CARDIOVASCULAR NEGATIVE: 1
PALPITATIONS: 0
WEAKNESS: 0
MUSCULOSKELETAL NEGATIVE: 1
ABDOMINAL PAIN: 0
CONSTITUTIONAL NEGATIVE: 1
CONSTIPATION: 1
GASTROINTESTINAL NEGATIVE: 1
NAUSEA: 0
VOMITING: 0
COUGH: 0
DIZZINESS: 0
EYES NEGATIVE: 1
CHILLS: 0
NERVOUS/ANXIOUS: 0

## 2022-10-15 ASSESSMENT — PATIENT HEALTH QUESTIONNAIRE - PHQ9
1. LITTLE INTEREST OR PLEASURE IN DOING THINGS: NOT AT ALL
SUM OF ALL RESPONSES TO PHQ9 QUESTIONS 1 AND 2: 0
2. FEELING DOWN, DEPRESSED, IRRITABLE, OR HOPELESS: NOT AT ALL

## 2022-10-15 ASSESSMENT — COGNITIVE AND FUNCTIONAL STATUS - GENERAL
DRESSING REGULAR LOWER BODY CLOTHING: A LITTLE
DAILY ACTIVITIY SCORE: 22
SUGGESTED CMS G CODE MODIFIER DAILY ACTIVITY: CJ
WALKING IN HOSPITAL ROOM: A LITTLE
SUGGESTED CMS G CODE MODIFIER MOBILITY: CJ
CLIMB 3 TO 5 STEPS WITH RAILING: A LITTLE
TOILETING: A LITTLE
MOBILITY SCORE: 22

## 2022-10-15 ASSESSMENT — LIFESTYLE VARIABLES
HAVE PEOPLE ANNOYED YOU BY CRITICIZING YOUR DRINKING: NO
CONSUMPTION TOTAL: NEGATIVE
TOTAL SCORE: 0
TOTAL SCORE: 0
ON A TYPICAL DAY WHEN YOU DRINK ALCOHOL HOW MANY DRINKS DO YOU HAVE: 0
HOW MANY TIMES IN THE PAST YEAR HAVE YOU HAD 5 OR MORE DRINKS IN A DAY: 0
TOTAL SCORE: 0
HAVE YOU EVER FELT YOU SHOULD CUT DOWN ON YOUR DRINKING: NO
ALCOHOL_USE: NO
EVER FELT BAD OR GUILTY ABOUT YOUR DRINKING: NO
AVERAGE NUMBER OF DAYS PER WEEK YOU HAVE A DRINK CONTAINING ALCOHOL: 0
EVER HAD A DRINK FIRST THING IN THE MORNING TO STEADY YOUR NERVES TO GET RID OF A HANGOVER: NO

## 2022-10-15 ASSESSMENT — PAIN DESCRIPTION - PAIN TYPE: TYPE: ACUTE PAIN

## 2022-10-15 NOTE — DIETARY
Nutrition services: Day 1 of admit.  Harman Branch is a 72 y.o. male with admitting DX of ACS (acute coronary syndrome) (HCC).    Consult received for Cardiac Rehad Diet Education.    +CAD, DLD, HTN  Jan 2022: HA1c 6.0%, lipids WNL  Plan CABG 10/17    Attempted diet education, but RN was in room with pt and family members answering questions. RD left packet of pertinent handouts outside of pt's room. Please provide to pt. RD will plan to check-in with pt post-op to address any nutrition-related questions prior to D/c.

## 2022-10-15 NOTE — PROGRESS NOTES
Bedside report received. Patient A&O x 4. Complains of pain 4/10 in head. Heparin gtt rate verified. POC discussed with patient. Patient verbalizes understanding. Call light and belongings within reach. Bed locked in lowest position, alarm and fall precautions in place.

## 2022-10-15 NOTE — PROGRESS NOTES
Cardiology Follow Up Progress Note    Date of Service  10/15/2022    Attending Physician  Nicky Haley M.D.    Chief Complaint   1. Unstable angina, multivessel coronary artery disease.    MARCIN Branch is a 72 y.o. male admitted 10/13/2022 with above.    Interim Events  No significant changes noted from cardiac standpoint within the past 24 hours.     Review of Systems  Review of Systems   Constitutional: Negative.  Negative for chills and fever.   HENT: Negative.  Negative for hearing loss.    Eyes: Negative.    Respiratory: Negative.  Negative for cough and shortness of breath.    Cardiovascular: Negative.  Negative for chest pain, palpitations and leg swelling.   Gastrointestinal: Negative.  Negative for abdominal pain, nausea and vomiting.   Genitourinary: Negative.  Negative for dysuria and urgency.   Musculoskeletal: Negative.  Negative for myalgias.   Skin: Negative.  Negative for rash.   Neurological: Negative.  Negative for dizziness, weakness and headaches.   Hematological:  Does not bruise/bleed easily.   Psychiatric/Behavioral: Negative.  The patient is not nervous/anxious.      Vital signs in last 24 hours  Temp:  [36 °C (96.8 °F)-36.8 °C (98.2 °F)] 36.7 °C (98 °F)  Pulse:  [60-91] 62  Resp:  [16-17] 16  BP: (127-160)/() 154/94  SpO2:  [92 %-94 %] 94 %    Physical Exam  Physical Exam  Constitutional:       Appearance: Normal appearance. He is well-developed and normal weight.   HENT:      Head: Normocephalic and atraumatic.      Mouth/Throat:      Mouth: Mucous membranes are moist.   Eyes:      Extraocular Movements: Extraocular movements intact.      Conjunctiva/sclera: Conjunctivae normal.   Cardiovascular:      Rate and Rhythm: Normal rate and regular rhythm.      Pulses: Normal pulses.      Heart sounds: Normal heart sounds.   Pulmonary:      Effort: Pulmonary effort is normal.      Breath sounds: Normal breath sounds.   Abdominal:      General: Bowel sounds are normal.      Palpations:  Abdomen is soft.   Musculoskeletal:         General: Normal range of motion.      Cervical back: Normal range of motion and neck supple.   Skin:     General: Skin is warm and dry.   Neurological:      General: No focal deficit present.      Mental Status: He is alert and oriented to person, place, and time. Mental status is at baseline.   Psychiatric:         Mood and Affect: Mood normal.         Behavior: Behavior normal.         Thought Content: Thought content normal.         Judgment: Judgment normal.       Lab Review  Lab Results   Component Value Date/Time    WBC 10.2 10/14/2022 12:14 AM    RBC 4.85 10/14/2022 12:14 AM    HEMOGLOBIN 15.5 10/14/2022 12:14 AM    HEMATOCRIT 44.2 10/14/2022 12:14 AM    MCV 91.1 10/14/2022 12:14 AM    MCH 32.0 10/14/2022 12:14 AM    MCHC 35.1 10/14/2022 12:14 AM    MPV 9.5 10/14/2022 12:14 AM      Lab Results   Component Value Date/Time    SODIUM 138 10/14/2022 12:14 AM    POTASSIUM 4.0 10/14/2022 12:14 AM    CHLORIDE 104 10/14/2022 12:14 AM    CO2 24 10/14/2022 12:14 AM    GLUCOSE 100 (H) 10/14/2022 12:14 AM    BUN 17 10/14/2022 12:14 AM    CREATININE 0.86 10/14/2022 12:14 AM      Lab Results   Component Value Date/Time    ASTSGOT 19 10/14/2022 12:14 AM    ALTSGPT 25 10/14/2022 12:14 AM     Lab Results   Component Value Date/Time    CHOLSTRLTOT 126 01/24/2022 10:57 AM    LDL 69 01/24/2022 10:57 AM    HDL 40 01/24/2022 10:57 AM    TRIGLYCERIDE 87 01/24/2022 10:57 AM    TROPONINT 9 10/13/2022 09:02 AM       No results for input(s): NTPROBNP in the last 72 hours.  (Above labs reviewed.)        Current Facility-Administered Medications:     atorvastatin (LIPITOR) tablet 80 mg, 80 mg, Oral, DAILY, Nilay Avendano, A.P.R.N., 80 mg at 10/15/22 0507    metoprolol tartrate (LOPRESSOR) tablet 25 mg, 25 mg, Oral, BID, Allen Lamas M.D., 25 mg at 10/15/22 0508    acetaminophen (Tylenol) tablet 650 mg, 650 mg, Oral, Q6HRS PRN, Allen Lamas M.D.    Notify provider if pain remains  uncontrolled, , , CONTINUOUS **AND** Use the Numeric Rating Scale (NRS), Morejon-Baker Faces (WBF), or FLACC on regular floors and Critical-Care Pain Observation Tool (CPOT) on ICUs/Trauma to assess pain, , , CONTINUOUS **AND** Pulse Ox, , , CONTINUOUS **AND** Pharmacy Consult Request ...Pain Management Review 1 Each, 1 Each, Other, PHARMACY TO DOSE **AND** If patient difficult to arouse and/or has respiratory depression (respiratory rate of 10 or less), stop any opiates that are currently infusing and call a Rapid Response., , , CONTINUOUS, Allen Lamas M.D.    oxyCODONE immediate-release (ROXICODONE) tablet 5 mg, 5 mg, Oral, Q3HRS PRN, 5 mg at 10/14/22 2016 **OR** oxyCODONE immediate release (ROXICODONE) tablet 10 mg, 10 mg, Oral, Q3HRS PRN **OR** HYDROmorphone (Dilaudid) injection 0.5 mg, 0.5 mg, Intravenous, Q3HRS PRN, Allen Lamas M.D.    ondansetron (ZOFRAN) syringe/vial injection 4 mg, 4 mg, Intravenous, Q4HRS PRN, Allen Lamas M.D.    ondansetron (ZOFRAN ODT) dispertab 4 mg, 4 mg, Oral, Q4HRS PRN, Allen Lamas M.D.    aspirin EC (ECOTRIN) tablet 81 mg, 81 mg, Oral, DAILY, Dwayne Zepeda M.D., 81 mg at 10/15/22 0507    heparin infusion 25,000 units in 500 mL 0.45% NACL, 0-30 Units/kg/hr (Adjusted), Intravenous, Continuous, Dwayne Zepeda M.D., Last Rate: 16.3 mL/hr at 10/14/22 2208, 12 Units/kg/hr at 10/14/22 2208    heparin injection 2,000 Units, 30 Units/kg (Adjusted), Intravenous, PRN, Dwayne Zepeda M.D.  (Medications reviewed.)    Cardiac Imaging and Procedures Review  CARDIAC STUDIES/PROCEDURES:     CARDIAC CATHETERIZATION CONCLUSIONS by Dwayne Branham (10/13.22)  1. Unstable angina due to critical stensosis of the RCA  2. Successful restoration of VINITA III flow to the RCA using angioplasty  3. Multivessel obstructive CAD  4. Mild elevation in LVEDP at 21 mmHg  5. Normal LV systolic function     CAROTID ULTRASOUND (12/01/17)  No prior study is available for comparison.  Normal  bilateral carotid exam.      CAROTID ULTRASOUND (11/18/09)  Carotid ultrasound showing mild plaques of left internal carotid artery.     CARDIAC CATHETERIZATION CONCLUSIONS by Dr. Toledo (04/08/13)   1. Acute inferior wall myocardial infarction secondary to high grade in-stent stenosis in the   middle of a large segment of stented proximal right coronary artery, status post successful   stenting with a 3 mm x 15 mm Xience Xpedition stent.   2. Diffusely diseased circumflex system as described above.   3. Widely patent left anterior descending coronary artery with its proximal 2-3 cm, more diffusely   narrowed and with 2 diagonal branches given off with high-grade disease in the first diagonal   branch, which is quite small and moderate disease in the second diagonal branch.     CARDIAC CATHETERIZATION CONCLUSIONS in [place], [place] (12/05)  Cardiac catheterization showing high grade small vessel first and second diagonal branch   stenosis, non obstructive left circumflex artery stenosis and high grade right coronary artery   stenosis treated with 3.0 x 32 mm and 3.0 x 16 mm Taxus drug eluding stents.      ECHOCARDIOGRAM CONCLUSIONS (10/14/22)  Normal left ventricular systolic function.  The left ventricular ejection fraction is visually estimated to be 55-60%.  Mild concentric left ventricular hypertrophy.  Grade I diastolic function.  The right ventricle is normal in size and systolic function.  Unable to estimate right ventricular systolic pressure due to an   inadequate tricuspid regurgitant jet.  The left atrium is normal in size.  Mild aortic and mitral calcification without hemodynamic significance.  Normal inferior vena cava size and inspiratory collapse.  (study result reviewed)     ECHOCARDIOGRAM CONCLUSIONS (12/01/17)  Prior echo 9-17-15. Compared to the report of the study done - there   has been no significant change.   Normal left ventricular systolic function.  Left ventricular ejection fraction is  visually estimated to be 65%.  Normal diastolic function.  Mild mitral regurgitation.  Mild tricuspid regurgitation.  Estimated right ventricular systolic pressure is 35 mmHg.     ECHOCARDIOGRAM CONCLUSIONS (09/17/15)  Normal left ventricular size, wall thickness, and systolic function.   Grade I diastolic dysfunction. Ejection fraction is measured to be 63 %   by Martines's biplane 2D analysis.  Trace mitral regurgitation.   Trace aortic insufficiency.  Unable to estimate pulmonary artery pressure due to an inadequate   tricuspid regurgitant jet.  Normal aortic root diameter 3 cm.  No prior study is available for comparison.      ECHOCARDIOGRAM CONCLUSIONS (06/16/11)  Echocardiogram showing normal left ventricular systolic function, no significant valvular abnormalities.     EKG performed on (10/13/22) EKG shows sinus rhythm.  EKG performed on (09/16/15) EKG shows normal sinus rhythm.     Laboratory results of (01/24/22) Cholesterol profile of 126/87/40/69 mg/dL noted.  Laboratory results of (06/29/21) Cholesterol profile of 135/96/43/73 mg/dL noted.  Laboratory results of (09/08/20) Cholesterol profile of 127/121/42/61 noted.  Laboratory results of (09/05/19) Cholesterol profile of 128/132/40/62 noted.  Laboratory results of (06/15/18) Cholesterol profile of 108/81/44/48 noted.  Laboratory results of (11/29/17) Cholesterol profile of 170/127/46/99 noted.  Laboratory results of (10/10/16) Cholesterol profile of 162/128/41/95 noted.  Laboratory results of (07/27/16) Cholesterol profile of 160/138/42/90 noted  Laboratory results of (09/16/15) Cholesterol profile of 141/148/38/73 noted.     MPI CONCLUSIONS (09/17/15)  Normal left ventricular perfusion with no fixed or reversible defects.   Normal left ventricular wall motion, with EF of 72%.      RENAL ULTRASOUND (11/18/09)  Unremarkable renal ultrasound with no evidence of renal artery stenosis.  No abdominal aortic aneurysm.     PET SCAN  (02/15/19)  ELECTROCARDIOGRAPHIC FINDINGS:   Normal sinus rhythm.    Normal ECG response to dipyridamole infusion.    No ischemic changes noted   SCINTOGRAPHIC FINDINGS:    Normal left ventricular perfusion with stress and rest images.    There is no evidence of ischemia or scar.  GATED WALL MOTION FINDINGS:    The left ventricle wall motion is normal with stress and rest  imagings.    Measured resting ejection fraction is 65 %.       Assessment/Plan  Coronary artery disease pending coronary bypass graft: He is clinically doing well. Plans per surgery for coronary artery bypass graft surgery on 10/17/22.  Hypertension and hyperlipidemia, carotid artery stenosis: Stable.  Additional information: His son graduated medical school at [employer] and is doing his residency. He is finishing up soon. He is now engaged to his academic counselor.    Thank you for allowing me to participate in the care of this patient.  I will continue to follow this patient    Please contact me with any questions.    Moisés Rice M.D.   Cardiologist, [facility]  [phone]

## 2022-10-15 NOTE — PROGRESS NOTES
Hospital Medicine Daily Progress Note    Date of Service  10/15/2022    Chief Complaint  Harman Branch is a 72 y.o. male admitted 10/13/2022 with chest pain    Hospital Course  72-year-old male with a history of CAD with prior PCI x2 in 2005 and again in 2013, GERD, asthma, hypertension, BPH admitted with chest pain.  He underwent left heart catheterization on 10/13 which showed multivessel CAD.  CT surgery was consulted who recommended CABG.    Interval Problem Update  -On heparin drip  -No chest pain  -On room air  -Still has not a bowel movement  -Plan for CABG on Monday, 10/17/2022    I have discussed this patient's plan of care and discharge plan at IDT rounds today with Case Management, Nursing, Nursing leadership, and other members of the IDT team.    Consultants/Specialty  cardiology and cardiovascular surgeon    Code Status  Full Code    Disposition  Patient is not medically cleared for discharge.   Anticipate discharge to  TBD .  I have placed the appropriate orders for post-discharge needs.    Review of Systems  Review of Systems   Constitutional:  Negative for chills and fever.   HENT: Negative.     Eyes:  Negative for redness.   Respiratory:  Negative for shortness of breath.    Cardiovascular:  Negative for chest pain, orthopnea and leg swelling.   Gastrointestinal:  Positive for constipation. Negative for nausea and vomiting.   Genitourinary: Negative.    Musculoskeletal:  Negative for falls.   Skin:  Negative for rash.   Neurological:  Negative for dizziness and headaches.      Physical Exam  Temp:  [36 °C (96.8 °F)-36.7 °C (98 °F)] 36.7 °C (98 °F)  Pulse:  [60-91] 74  Resp:  [16] 16  BP: (127-154)/() 137/94  SpO2:  [92 %-94 %] 92 %    Physical Exam  Constitutional:       General: He is not in acute distress.     Appearance: He is not toxic-appearing.   HENT:      Head: Normocephalic and atraumatic.      Nose: Nose normal.      Mouth/Throat:      Mouth: Mucous membranes are moist.   Eyes:       General: No scleral icterus.     Conjunctiva/sclera: Conjunctivae normal.   Cardiovascular:      Rate and Rhythm: Normal rate and regular rhythm.      Heart sounds: No murmur heard.    No friction rub. No gallop.   Pulmonary:      Effort: Pulmonary effort is normal.      Breath sounds: Normal breath sounds.   Abdominal:      General: Bowel sounds are normal. There is no distension.      Palpations: Abdomen is soft.      Tenderness: There is no abdominal tenderness.   Musculoskeletal:      Cervical back: Normal range of motion.      Right lower leg: No edema.      Left lower leg: No edema.   Skin:     Coloration: Skin is not jaundiced.      Findings: No rash.   Neurological:      Mental Status: He is alert and oriented to person, place, and time. Mental status is at baseline.   Psychiatric:         Mood and Affect: Mood normal.         Behavior: Behavior normal.       Fluids    Intake/Output Summary (Last 24 hours) at 10/15/2022 1233  Last data filed at 10/14/2022 2100  Gross per 24 hour   Intake 680 ml   Output 1000 ml   Net -320 ml         Laboratory  Recent Labs     10/13/22  0902 10/14/22  0014   WBC 9.9 10.2   RBC 5.17 4.85   HEMOGLOBIN 16.4 15.5   HEMATOCRIT 48.2 44.2   MCV 93.2 91.1   MCH 31.7 32.0   MCHC 34.0 35.1   RDW 46.8 45.9   PLATELETCT 284 278   MPV 9.3 9.5       Recent Labs     10/13/22  0902 10/14/22  0014   SODIUM 141 138   POTASSIUM 4.1 4.0   CHLORIDE 105 104   CO2 26 24   GLUCOSE 100* 100*   BUN 21 17   CREATININE 0.79 0.86   CALCIUM 9.3 8.9       Recent Labs     10/13/22  0902 10/13/22  1554   APTT 29.7 >240.0*   INR 0.99 1.20*                 Imaging  EC-ECHOCARDIOGRAM COMPLETE W/O CONT   Final Result      US-VEIN MAPPING LOWER EXTREMITY BILAT   Final Result      US-CAROTID DOPPLER BILAT   Final Result      DX-CHEST-PORTABLE (1 VIEW)   Final Result      No acute cardiopulmonary disease.      CL-LEFT HEART CATHETERIZATION WITH POSSIBLE INTERVENTION    (Results Pending)          Assessment/Plan  *  ACS (acute coronary syndrome) (HCC)- (present on admission)  Assessment & Plan  Patient presenting with progressive chest pain with exertion, has known CAD and previous PCI x 2  ERP discussed with Cardiology who would like to cath patient  [facility] 10/13 reviewed: showed multivessel CAD  CT surgery consulted  CABG 10/17/2022  Continue heparin drip  Patient currently chest pain-free    Constipation  Assessment & Plan  Bowel regimen    Hypertension  Assessment & Plan  Home regimen: Metoprolol tartrate 25 mg twice daily, Lisinopril 20 mg twice daily  Inpatient regimen: Metoprolol tartrate 25 mg twice daily    CAD (coronary artery disease)- (present on admission)  Assessment & Plan  Noted       VTE prophylaxis: therapeutic anticoagulation with heparin gtt    I have performed a physical exam and reviewed and updated ROS and Plan today (10/15/2022). In review of yesterday's note (10/14/2022), there are no changes except as documented above.

## 2022-10-15 NOTE — PROGRESS NOTES
Monitor Summary    Rhythm: SR 61-93 with BBB    Ectopy: R pac, R pvc    Intervals: 23/06/42

## 2022-10-15 NOTE — CARE PLAN
The patient is Stable - Low risk of patient condition declining or worsening    Shift Goals  Clinical Goals: Heparin gtt, no CP, no SOB, Xa  Patient Goals: CABG monday  Family Goals: safety    Progress made toward(s) clinical / shift goals:  Pt has not reported CP this shift, pt does not report SOB at this time, pt's pain has been controlled by prescribed medication, pt's heparin gtt is running, pt's next Xa is scheduled, pt's last two Xa levels have been therapeutic    Patient is not progressing towards the following goals:      Problem: Hemodynamics  Goal: Patient's hemodynamics, fluid balance and neurologic status will be stable or improve  Outcome: Progressing     Problem: Respiratory  Goal: Patient will achieve/maintain optimum respiratory ventilation and gas exchange  Outcome: Progressing     Problem: Pain - Standard  Goal: Alleviation of pain or a reduction in pain to the patient’s comfort goal  Outcome: Progressing

## 2022-10-16 LAB
ABO GROUP BLD: NORMAL
ALBUMIN SERPL BCP-MCNC: 3.8 G/DL (ref 3.2–4.9)
ALBUMIN/GLOB SERPL: 1.3 G/DL
ALP SERPL-CCNC: 72 U/L (ref 30–99)
ALT SERPL-CCNC: 60 U/L (ref 2–50)
ANION GAP SERPL CALC-SCNC: 13 MMOL/L (ref 7–16)
APTT PPP: 93.8 SEC (ref 24.7–36)
AST SERPL-CCNC: 50 U/L (ref 12–45)
BASOPHILS # BLD AUTO: 0.6 % (ref 0–1.8)
BASOPHILS # BLD: 0.06 K/UL (ref 0–0.12)
BILIRUB SERPL-MCNC: 0.4 MG/DL (ref 0.1–1.5)
BLD GP AB SCN SERPL QL: NORMAL
BUN SERPL-MCNC: 21 MG/DL (ref 8–22)
CALCIUM SERPL-MCNC: 9.1 MG/DL (ref 8.5–10.5)
CHLORIDE SERPL-SCNC: 101 MMOL/L (ref 96–112)
CO2 SERPL-SCNC: 23 MMOL/L (ref 20–33)
CREAT SERPL-MCNC: 1.01 MG/DL (ref 0.5–1.4)
EOSINOPHIL # BLD AUTO: 0.29 K/UL (ref 0–0.51)
EOSINOPHIL NFR BLD: 3 % (ref 0–6.9)
ERYTHROCYTE [DISTWIDTH] IN BLOOD BY AUTOMATED COUNT: 46.1 FL (ref 35.9–50)
EST. AVERAGE GLUCOSE BLD GHB EST-MCNC: 123 MG/DL
GFR SERPLBLD CREATININE-BSD FMLA CKD-EPI: 78 ML/MIN/1.73 M 2
GLOBULIN SER CALC-MCNC: 2.9 G/DL (ref 1.9–3.5)
GLUCOSE SERPL-MCNC: 133 MG/DL (ref 65–99)
HBA1C MFR BLD: 5.9 % (ref 4–5.6)
HCT VFR BLD AUTO: 46.3 % (ref 42–52)
HGB BLD-MCNC: 16 G/DL (ref 14–18)
IMM GRANULOCYTES # BLD AUTO: 0.03 K/UL (ref 0–0.11)
IMM GRANULOCYTES NFR BLD AUTO: 0.3 % (ref 0–0.9)
INR PPP: 0.98 (ref 0.87–1.13)
LYMPHOCYTES # BLD AUTO: 3.16 K/UL (ref 1–4.8)
LYMPHOCYTES NFR BLD: 32.3 % (ref 22–41)
MCH RBC QN AUTO: 31.9 PG (ref 27–33)
MCHC RBC AUTO-ENTMCNC: 34.6 G/DL (ref 33.7–35.3)
MCV RBC AUTO: 92.4 FL (ref 81.4–97.8)
MONOCYTES # BLD AUTO: 0.9 K/UL (ref 0–0.85)
MONOCYTES NFR BLD AUTO: 9.2 % (ref 0–13.4)
NEUTROPHILS # BLD AUTO: 5.35 K/UL (ref 1.82–7.42)
NEUTROPHILS NFR BLD: 54.6 % (ref 44–72)
NRBC # BLD AUTO: 0 K/UL
NRBC BLD-RTO: 0 /100 WBC
PLATELET # BLD AUTO: 272 K/UL (ref 164–446)
PMV BLD AUTO: 9.3 FL (ref 9–12.9)
POTASSIUM SERPL-SCNC: 4 MMOL/L (ref 3.6–5.5)
PROT SERPL-MCNC: 6.7 G/DL (ref 6–8.2)
PROTHROMBIN TIME: 12.9 SEC (ref 12–14.6)
RBC # BLD AUTO: 5.01 M/UL (ref 4.7–6.1)
RH BLD: NORMAL
SODIUM SERPL-SCNC: 137 MMOL/L (ref 135–145)
UFH PPP CHRO-ACNC: 0.42 IU/ML
WBC # BLD AUTO: 9.8 K/UL (ref 4.8–10.8)

## 2022-10-16 PROCEDURE — A9270 NON-COVERED ITEM OR SERVICE: HCPCS | Performed by: NURSE PRACTITIONER

## 2022-10-16 PROCEDURE — 86850 RBC ANTIBODY SCREEN: CPT

## 2022-10-16 PROCEDURE — 86901 BLOOD TYPING SEROLOGIC RH(D): CPT

## 2022-10-16 PROCEDURE — 99232 SBSQ HOSP IP/OBS MODERATE 35: CPT | Performed by: INTERNAL MEDICINE

## 2022-10-16 PROCEDURE — 83036 HEMOGLOBIN GLYCOSYLATED A1C: CPT

## 2022-10-16 PROCEDURE — 700102 HCHG RX REV CODE 250 W/ 637 OVERRIDE(OP): Performed by: NURSE PRACTITIONER

## 2022-10-16 PROCEDURE — 700102 HCHG RX REV CODE 250 W/ 637 OVERRIDE(OP): Performed by: INTERNAL MEDICINE

## 2022-10-16 PROCEDURE — 80053 COMPREHEN METABOLIC PANEL: CPT

## 2022-10-16 PROCEDURE — 81003 URINALYSIS AUTO W/O SCOPE: CPT

## 2022-10-16 PROCEDURE — 93005 ELECTROCARDIOGRAM TRACING: CPT | Performed by: INTERNAL MEDICINE

## 2022-10-16 PROCEDURE — 85730 THROMBOPLASTIN TIME PARTIAL: CPT

## 2022-10-16 PROCEDURE — A9270 NON-COVERED ITEM OR SERVICE: HCPCS | Performed by: INTERNAL MEDICINE

## 2022-10-16 PROCEDURE — 85610 PROTHROMBIN TIME: CPT

## 2022-10-16 PROCEDURE — 85520 HEPARIN ASSAY: CPT

## 2022-10-16 PROCEDURE — 85025 COMPLETE CBC W/AUTO DIFF WBC: CPT

## 2022-10-16 PROCEDURE — 86900 BLOOD TYPING SEROLOGIC ABO: CPT

## 2022-10-16 PROCEDURE — 700102 HCHG RX REV CODE 250 W/ 637 OVERRIDE(OP): Performed by: HOSPITALIST

## 2022-10-16 PROCEDURE — 93005 ELECTROCARDIOGRAM TRACING: CPT | Performed by: NURSE PRACTITIONER

## 2022-10-16 PROCEDURE — A9270 NON-COVERED ITEM OR SERVICE: HCPCS | Performed by: HOSPITALIST

## 2022-10-16 PROCEDURE — 700111 HCHG RX REV CODE 636 W/ 250 OVERRIDE (IP): Performed by: INTERNAL MEDICINE

## 2022-10-16 PROCEDURE — 770022 HCHG ROOM/CARE - ICU (200)

## 2022-10-16 RX ORDER — EPINEPHRINE HCL IN 0.9 % NACL 4MG/250ML
0-.2 PLASTIC BAG, INJECTION (ML) INTRAVENOUS CONTINUOUS
Status: DISCONTINUED | OUTPATIENT
Start: 2022-10-17 | End: 2022-10-17

## 2022-10-16 RX ORDER — CHOLECALCIFEROL (VITAMIN D3) 125 MCG
5 CAPSULE ORAL NIGHTLY
Status: DISCONTINUED | OUTPATIENT
Start: 2022-10-16 | End: 2022-10-19 | Stop reason: HOSPADM

## 2022-10-16 RX ORDER — ALPRAZOLAM 0.5 MG/1
0.5 TABLET ORAL 4 TIMES DAILY PRN
Status: DISCONTINUED | OUTPATIENT
Start: 2022-10-16 | End: 2022-10-19 | Stop reason: HOSPADM

## 2022-10-16 RX ORDER — ACETAMINOPHEN 500 MG
1000 TABLET ORAL ONCE
Status: DISCONTINUED | OUTPATIENT
Start: 2022-10-17 | End: 2022-10-17

## 2022-10-16 RX ORDER — DEXMEDETOMIDINE HYDROCHLORIDE 4 UG/ML
0-1.5 INJECTION, SOLUTION INTRAVENOUS CONTINUOUS
Status: DISCONTINUED | OUTPATIENT
Start: 2022-10-17 | End: 2022-10-17

## 2022-10-16 RX ORDER — METHADONE HYDROCHLORIDE 10 MG/ML
20 INJECTION, SOLUTION INTRAMUSCULAR; INTRAVENOUS; SUBCUTANEOUS ONCE
Status: CANCELLED | OUTPATIENT
Start: 2022-10-17 | End: 2022-10-17

## 2022-10-16 RX ORDER — NOREPINEPHRINE BITARTRATE 0.03 MG/ML
0-30 INJECTION, SOLUTION INTRAVENOUS CONTINUOUS
Status: DISCONTINUED | OUTPATIENT
Start: 2022-10-17 | End: 2022-10-17

## 2022-10-16 RX ADMIN — ALPRAZOLAM 0.5 MG: 0.5 TABLET ORAL at 21:00

## 2022-10-16 RX ADMIN — ALPRAZOLAM 0.5 MG: 0.5 TABLET ORAL at 08:44

## 2022-10-16 RX ADMIN — POLYETHYLENE GLYCOL 3350 1 PACKET: 17 POWDER, FOR SOLUTION ORAL at 04:21

## 2022-10-16 RX ADMIN — SENNOSIDES AND DOCUSATE SODIUM 2 TABLET: 50; 8.6 TABLET ORAL at 04:21

## 2022-10-16 RX ADMIN — HEPARIN SODIUM 12 UNITS/KG/HR: 5000 INJECTION, SOLUTION INTRAVENOUS at 04:15

## 2022-10-16 RX ADMIN — METOPROLOL TARTRATE 25 MG: 25 TABLET, FILM COATED ORAL at 17:41

## 2022-10-16 RX ADMIN — ASPIRIN 81 MG: 81 TABLET, COATED ORAL at 04:21

## 2022-10-16 RX ADMIN — METOPROLOL TARTRATE 25 MG: 25 TABLET, FILM COATED ORAL at 04:21

## 2022-10-16 RX ADMIN — Medication 5 MG: at 21:00

## 2022-10-16 RX ADMIN — ATORVASTATIN CALCIUM 80 MG: 80 TABLET, FILM COATED ORAL at 04:21

## 2022-10-16 ASSESSMENT — ENCOUNTER SYMPTOMS
CONSTITUTIONAL NEGATIVE: 1
MYALGIAS: 0
MUSCULOSKELETAL NEGATIVE: 1
GASTROINTESTINAL NEGATIVE: 1
VOMITING: 0
NAUSEA: 0
CHILLS: 0
SHORTNESS OF BREATH: 0
EYE REDNESS: 0
EYES NEGATIVE: 1
PSYCHIATRIC NEGATIVE: 1
COUGH: 0
RESPIRATORY NEGATIVE: 1
PALPITATIONS: 0
NEUROLOGICAL NEGATIVE: 1
WEAKNESS: 0
NERVOUS/ANXIOUS: 1
CONSTIPATION: 0
FEVER: 0
ORTHOPNEA: 0
NERVOUS/ANXIOUS: 0
DIZZINESS: 0
FALLS: 0
HEADACHES: 0
ABDOMINAL PAIN: 0
BRUISES/BLEEDS EASILY: 0
CARDIOVASCULAR NEGATIVE: 1

## 2022-10-16 ASSESSMENT — PAIN DESCRIPTION - PAIN TYPE
TYPE: ACUTE PAIN

## 2022-10-16 NOTE — PROGRESS NOTES
Cardiology Follow Up Progress Note    Date of Service  10/16/2022    Attending Physician  Nicky Haley M.D.    Chief Complaint     Unstable angina, multivessel coronary artery disease.  MARCIN Branch is a 72 y.o. male admitted 10/13/2022 with above.    Interim Events  No significant changes noted from cardiac standpoint within the past 24 hours.    Review of Systems  Review of Systems   Constitutional: Negative.  Negative for chills and fever.   HENT: Negative.  Negative for hearing loss.    Eyes: Negative.    Respiratory: Negative.  Negative for cough and shortness of breath.    Cardiovascular: Negative.  Negative for chest pain, palpitations and leg swelling.   Gastrointestinal: Negative.  Negative for abdominal pain, nausea and vomiting.   Genitourinary: Negative.  Negative for dysuria and urgency.   Musculoskeletal: Negative.  Negative for myalgias.   Skin: Negative.  Negative for rash.   Neurological: Negative.  Negative for dizziness, weakness and headaches.   Hematological:  Does not bruise/bleed easily.   Psychiatric/Behavioral: Negative.  The patient is not nervous/anxious.      Vital signs in last 24 hours  Temp:  [36.1 °C (96.9 °F)-36.8 °C (98.2 °F)] 36.8 °C (98.2 °F)  Pulse:  [61-76] 63  Resp:  [16-20] 17  BP: (113-143)/() 133/85  SpO2:  [92 %-95 %] 95 %    Physical Exam  Physical Exam  Constitutional:       Appearance: Normal appearance. He is well-developed and normal weight.   HENT:      Head: Normocephalic and atraumatic.      Mouth/Throat:      Mouth: Mucous membranes are moist.   Eyes:      Extraocular Movements: Extraocular movements intact.      Conjunctiva/sclera: Conjunctivae normal.   Cardiovascular:      Rate and Rhythm: Normal rate and regular rhythm.      Pulses: Normal pulses.      Heart sounds: Normal heart sounds.   Pulmonary:      Effort: Pulmonary effort is normal.      Breath sounds: Normal breath sounds.   Abdominal:      General: Bowel sounds are normal.      Palpations:  Abdomen is soft.   Musculoskeletal:         General: Normal range of motion.      Cervical back: Normal range of motion and neck supple.   Skin:     General: Skin is warm and dry.   Neurological:      General: No focal deficit present.      Mental Status: He is alert and oriented to person, place, and time. Mental status is at baseline.   Psychiatric:         Mood and Affect: Mood normal.         Behavior: Behavior normal.         Thought Content: Thought content normal.         Judgment: Judgment normal.       Lab Review  Lab Results   Component Value Date/Time    WBC 10.2 10/14/2022 12:14 AM    RBC 4.85 10/14/2022 12:14 AM    HEMOGLOBIN 15.5 10/14/2022 12:14 AM    HEMATOCRIT 44.2 10/14/2022 12:14 AM    MCV 91.1 10/14/2022 12:14 AM    MCH 32.0 10/14/2022 12:14 AM    MCHC 35.1 10/14/2022 12:14 AM    MPV 9.5 10/14/2022 12:14 AM      Lab Results   Component Value Date/Time    SODIUM 138 10/14/2022 12:14 AM    POTASSIUM 4.0 10/14/2022 12:14 AM    CHLORIDE 104 10/14/2022 12:14 AM    CO2 24 10/14/2022 12:14 AM    GLUCOSE 100 (H) 10/14/2022 12:14 AM    BUN 17 10/14/2022 12:14 AM    CREATININE 0.86 10/14/2022 12:14 AM      Lab Results   Component Value Date/Time    ASTSGOT 19 10/14/2022 12:14 AM    ALTSGPT 25 10/14/2022 12:14 AM     Lab Results   Component Value Date/Time    CHOLSTRLTOT 126 01/24/2022 10:57 AM    LDL 69 01/24/2022 10:57 AM    HDL 40 01/24/2022 10:57 AM    TRIGLYCERIDE 87 01/24/2022 10:57 AM    TROPONINT 9 10/13/2022 09:02 AM       No results for input(s): NTPROBNP in the last 72 hours.    Cardiac Imaging and Procedures Review  CARDIAC STUDIES/PROCEDURES:     CARDIAC CATHETERIZATION CONCLUSIONS by Dwayne Branham (10/13.22)  1. Unstable angina due to critical stensosis of the RCA  2. Successful restoration of VINITA III flow to the RCA using angioplasty  3. Multivessel obstructive CAD  4. Mild elevation in LVEDP at 21 mmHg  5. Normal LV systolic function     CAROTID ULTRASOUND (12/01/17)  No prior study  is available for comparison.  Normal bilateral carotid exam.      CAROTID ULTRASOUND (11/18/09)  Carotid ultrasound showing mild plaques of left internal carotid artery.     CARDIAC CATHETERIZATION CONCLUSIONS by Dr. Toledo (04/08/13)   1. Acute inferior wall myocardial infarction secondary to high grade in-stent stenosis in the   middle of a large segment of stented proximal right coronary artery, status post successful   stenting with a 3 mm x 15 mm Xience Xpedition stent.   2. Diffusely diseased circumflex system as described above.   3. Widely patent left anterior descending coronary artery with its proximal 2-3 cm, more diffusely   narrowed and with 2 diagonal branches given off with high-grade disease in the first diagonal   branch, which is quite small and moderate disease in the second diagonal branch.     CARDIAC CATHETERIZATION CONCLUSIONS in [place], [place] (12/05)  Cardiac catheterization showing high grade small vessel first and second diagonal branch   stenosis, non obstructive left circumflex artery stenosis and high grade right coronary artery   stenosis treated with 3.0 x 32 mm and 3.0 x 16 mm Taxus drug eluding stents.      ECHOCARDIOGRAM CONCLUSIONS (10/14/22)  Normal left ventricular systolic function.  The left ventricular ejection fraction is visually estimated to be 55-60%.  Mild concentric left ventricular hypertrophy.  Grade I diastolic function.  The right ventricle is normal in size and systolic function.  Unable to estimate right ventricular systolic pressure due to an   inadequate tricuspid regurgitant jet.  The left atrium is normal in size.  Mild aortic and mitral calcification without hemodynamic significance.  Normal inferior vena cava size and inspiratory collapse.  (study result reviewed)     ECHOCARDIOGRAM CONCLUSIONS (12/01/17)  Prior echo 9-17-15. Compared to the report of the study done - there   has been no significant change.   Normal left ventricular systolic function.  Left  ventricular ejection fraction is visually estimated to be 65%.  Normal diastolic function.  Mild mitral regurgitation.  Mild tricuspid regurgitation.  Estimated right ventricular systolic pressure is 35 mmHg.     ECHOCARDIOGRAM CONCLUSIONS (09/17/15)  Normal left ventricular size, wall thickness, and systolic function.   Grade I diastolic dysfunction. Ejection fraction is measured to be 63 %   by Martines's biplane 2D analysis.  Trace mitral regurgitation.   Trace aortic insufficiency.  Unable to estimate pulmonary artery pressure due to an inadequate   tricuspid regurgitant jet.  Normal aortic root diameter 3 cm.  No prior study is available for comparison.      ECHOCARDIOGRAM CONCLUSIONS (06/16/11)  Echocardiogram showing normal left ventricular systolic function, no significant valvular abnormalities.     EKG performed on (10/13/22) EKG shows sinus rhythm.  EKG performed on (09/16/15) EKG shows normal sinus rhythm.     Laboratory results of (01/24/22) Cholesterol profile of 126/87/40/69 mg/dL noted.  Laboratory results of (06/29/21) Cholesterol profile of 135/96/43/73 mg/dL noted.  Laboratory results of (09/08/20) Cholesterol profile of 127/121/42/61 noted.  Laboratory results of (09/05/19) Cholesterol profile of 128/132/40/62 noted.  Laboratory results of (06/15/18) Cholesterol profile of 108/81/44/48 noted.  Laboratory results of (11/29/17) Cholesterol profile of 170/127/46/99 noted.  Laboratory results of (10/10/16) Cholesterol profile of 162/128/41/95 noted.  Laboratory results of (07/27/16) Cholesterol profile of 160/138/42/90 noted  Laboratory results of (09/16/15) Cholesterol profile of 141/148/38/73 noted.     MPI CONCLUSIONS (09/17/15)  Normal left ventricular perfusion with no fixed or reversible defects.   Normal left ventricular wall motion, with EF of 72%.      RENAL ULTRASOUND (11/18/09)  Unremarkable renal ultrasound with no evidence of renal artery stenosis.  No abdominal aortic aneurysm.     PET  SCAN (02/15/19)  ELECTROCARDIOGRAPHIC FINDINGS:   Normal sinus rhythm.    Normal ECG response to dipyridamole infusion.    No ischemic changes noted   SCINTOGRAPHIC FINDINGS:    Normal left ventricular perfusion with stress and rest images.    There is no evidence of ischemia or scar.  GATED WALL MOTION FINDINGS:    The left ventricle wall motion is normal with stress and rest  imagings.    Measured resting ejection fraction is 65 %.          Assessment/Plan  Coronary artery disease pending coronary bypass graft: He is clinically doing well. Plans for coronary artery bypass graft surgery tomorrow per surgery.  Hypertension and hyperlipidemia, carotid artery stenosis: Stable.  Additional information: His son graduated medical school at [employer] and is doing his residency. He is finishing up soon. He is now engaged to his academic counselor.    Thank you for allowing me to participate in the care of this patient.  I will continue to follow this patient    Please contact me with any questions.    Moisés Rice M.D.   Cardiologist, [facility]  [phone]

## 2022-10-16 NOTE — PROGRESS NOTES
Hospital Medicine Daily Progress Note    Date of Service  10/16/2022    Chief Complaint  Harman Branch is a 72 y.o. male admitted 10/13/2022 with chest pain    Hospital Course  72-year-old male with a history of CAD with prior PCI x2 in 2005 and again in 2013, GERD, asthma, hypertension, BPH admitted with chest pain.  He underwent left heart catheterization on 10/13 which showed multivessel CAD.  CT surgery was consulted who recommended CABG.    Interval Problem Update  -On heparin drip  -On a bowel movement yesterday  -Mild chest pain and shortness of breath this morning, improved with Xanax, repeat EKG unchanged  -Plan for CABG on Monday, 10/17/2022    I have discussed this patient's plan of care and discharge plan at IDT rounds today with Case Management, Nursing, Nursing leadership, and other members of the IDT team.    Consultants/Specialty  cardiology and cardiovascular surgeon    Code Status  Full Code    Disposition  Patient is not medically cleared for discharge.   Anticipate discharge to  TBD .  I have placed the appropriate orders for post-discharge needs.    Review of Systems  Review of Systems   Constitutional:  Negative for chills and fever.   HENT: Negative.     Eyes:  Negative for redness.   Respiratory:  Negative for shortness of breath.    Cardiovascular:  Positive for chest pain. Negative for orthopnea and leg swelling.   Gastrointestinal:  Negative for constipation, nausea and vomiting.   Genitourinary: Negative.    Musculoskeletal:  Negative for falls.   Skin:  Negative for rash.   Neurological:  Negative for dizziness and headaches.   Psychiatric/Behavioral:  The patient is nervous/anxious.       Physical Exam  Temp:  [36.1 °C (96.9 °F)-36.8 °C (98.2 °F)] 36.8 °C (98.2 °F)  Pulse:  [61-76] 63  Resp:  [16-20] 17  BP: (113-143)/() 133/85  SpO2:  [92 %-95 %] 95 %    Physical Exam  Constitutional:       General: He is not in acute distress.     Appearance: He is not toxic-appearing.   HENT:       Head: Normocephalic and atraumatic.      Nose: Nose normal.      Mouth/Throat:      Mouth: Mucous membranes are moist.   Eyes:      General: No scleral icterus.     Conjunctiva/sclera: Conjunctivae normal.   Cardiovascular:      Rate and Rhythm: Normal rate and regular rhythm.      Heart sounds: No murmur heard.    No friction rub. No gallop.   Pulmonary:      Effort: Pulmonary effort is normal.      Breath sounds: Normal breath sounds.   Abdominal:      General: Bowel sounds are normal. There is no distension.      Palpations: Abdomen is soft.      Tenderness: There is no abdominal tenderness.   Musculoskeletal:      Cervical back: Normal range of motion.      Right lower leg: No edema.      Left lower leg: No edema.   Skin:     Coloration: Skin is not jaundiced.      Findings: No rash.   Neurological:      Mental Status: He is alert and oriented to person, place, and time. Mental status is at baseline.   Psychiatric:         Mood and Affect: Mood normal.         Behavior: Behavior normal.       Fluids  No intake or output data in the 24 hours ending 10/16/22 1351      Laboratory  Recent Labs     10/14/22  0014   WBC 10.2   RBC 4.85   HEMOGLOBIN 15.5   HEMATOCRIT 44.2   MCV 91.1   MCH 32.0   MCHC 35.1   RDW 45.9   PLATELETCT 278   MPV 9.5       Recent Labs     10/14/22  0014   SODIUM 138   POTASSIUM 4.0   CHLORIDE 104   CO2 24   GLUCOSE 100*   BUN 17   CREATININE 0.86   CALCIUM 8.9       Recent Labs     10/13/22  1554   APTT >240.0*   INR 1.20*                 Imaging  EC-ECHOCARDIOGRAM COMPLETE W/O CONT   Final Result      US-VEIN MAPPING LOWER EXTREMITY BILAT   Final Result      US-CAROTID DOPPLER BILAT   Final Result      DX-CHEST-PORTABLE (1 VIEW)   Final Result      No acute cardiopulmonary disease.      CL-LEFT HEART CATHETERIZATION WITH POSSIBLE INTERVENTION    (Results Pending)          Assessment/Plan  * ACS (acute coronary syndrome) (HCC)- (present on admission)  Assessment & Plan  Patient presenting  with progressive chest pain with exertion, has known CAD and previous PCI x 2  ERP discussed with Cardiology who would like to cath patient  [facility] 10/13 reviewed: showed multivessel CAD  CT surgery consulted  CABG 10/17/2022  Continue heparin drip  Did have some mild chest pain this morning and shortness of breath, improved with Xanax    Constipation  Assessment & Plan  Bowel regimen    Hypertension  Assessment & Plan  Home regimen: Metoprolol tartrate 25 mg twice daily, Lisinopril 20 mg twice daily  Inpatient regimen: Metoprolol tartrate 25 mg twice daily    CAD (coronary artery disease)- (present on admission)  Assessment & Plan  Noted       VTE prophylaxis: therapeutic anticoagulation with heparin gtt    I have performed a physical exam and reviewed and updated ROS and Plan today (10/16/2022). In review of yesterday's note (10/15/2022), there are no changes except as documented above.

## 2022-10-16 NOTE — THERAPY
Missed Therapy     Patient Name: Harman Branch  Age:  72 y.o., Sex:  male  Medical Record #: [id number]  Today's Date: 10/16/2022    PT consult for cardiac rehab received. Pt pending CABG 10/17. PT will follow-up POD#2 as able/appropriate to assess mobility, DC needs, and to initiate phase I cardiac rehab.

## 2022-10-16 NOTE — PROGRESS NOTES
Resume PM care, Pt is resting comfortably in bed, denied CP, SOB and palpitation. Pt verbalized understanding of POC. Fall precaution is in placed, bed in low position, bed alarm on and call light within reach. Will continue monitor pt throughout this shift.

## 2022-10-16 NOTE — CARE PLAN
The patient is Stable - Low risk of patient condition declining or worsening    Shift Goals  Clinical Goals: Hemodynamic stability  Patient Goals: CABG monday  Family Goals: safety    Progress made toward(s) clinical / shift goals:  pt denied pain, continue with hep gtt, Fall precaution in placed.       Problem: Knowledge Deficit - Standard  Goal: Patient and family/care givers will demonstrate understanding of plan of care, disease process/condition, diagnostic tests and medications  Outcome: Progressing     Problem: Psychosocial  Goal: Patient's level of anxiety will decrease  Outcome: Progressing  Goal: Patient's ability to verbalize feelings about condition will improve  Outcome: Progressing  Goal: Patient's ability to re-evaluate and adapt role responsibilities will improve  Outcome: Progressing  Goal: Patient and family will demonstrate ability to cope with life altering diagnosis and/or procedure  Outcome: Progressing  Goal: Spiritual and cultural needs incorporated into hospitalization  Outcome: Progressing     Problem: Respiratory  Goal: Patient will achieve/maintain optimum respiratory ventilation and gas exchange  Outcome: Progressing     Problem: Urinary Elimination  Goal: Establish and maintain regular urinary output  Outcome: Progressing     Problem: Pain - Standard  Goal: Alleviation of pain or a reduction in pain to the patient’s comfort goal  Outcome: Progressing

## 2022-10-17 ENCOUNTER — APPOINTMENT (OUTPATIENT)
Dept: RADIOLOGY | Facility: MEDICAL CENTER | Age: 72
DRG: 251 | End: 2022-10-17
Attending: THORACIC SURGERY (CARDIOTHORACIC VASCULAR SURGERY)
Payer: MEDICARE

## 2022-10-17 LAB
APPEARANCE UR: CLEAR
BILIRUB UR QL STRIP.AUTO: NEGATIVE
COLOR UR: YELLOW
EKG IMPRESSION: NORMAL
EKG IMPRESSION: NORMAL
GLUCOSE BLD STRIP.AUTO-MCNC: 94 MG/DL (ref 65–99)
GLUCOSE UR STRIP.AUTO-MCNC: NEGATIVE MG/DL
KETONES UR STRIP.AUTO-MCNC: NEGATIVE MG/DL
LEUKOCYTE ESTERASE UR QL STRIP.AUTO: NEGATIVE
MICRO URNS: NORMAL
NITRITE UR QL STRIP.AUTO: NEGATIVE
PH UR STRIP.AUTO: 5.5 [PH] (ref 5–8)
PROT UR QL STRIP: NEGATIVE MG/DL
RBC UR QL AUTO: NEGATIVE
SP GR UR STRIP.AUTO: 1.02
UFH PPP CHRO-ACNC: 0.4 IU/ML
UROBILINOGEN UR STRIP.AUTO-MCNC: 0.2 MG/DL

## 2022-10-17 PROCEDURE — A9270 NON-COVERED ITEM OR SERVICE: HCPCS | Performed by: NURSE PRACTITIONER

## 2022-10-17 PROCEDURE — 82962 GLUCOSE BLOOD TEST: CPT

## 2022-10-17 PROCEDURE — 770020 HCHG ROOM/CARE - TELE (206)

## 2022-10-17 PROCEDURE — 99233 SBSQ HOSP IP/OBS HIGH 50: CPT | Performed by: INTERNAL MEDICINE

## 2022-10-17 PROCEDURE — 700102 HCHG RX REV CODE 250 W/ 637 OVERRIDE(OP): Performed by: INTERNAL MEDICINE

## 2022-10-17 PROCEDURE — 85520 HEPARIN ASSAY: CPT

## 2022-10-17 PROCEDURE — A9270 NON-COVERED ITEM OR SERVICE: HCPCS | Performed by: HOSPITALIST

## 2022-10-17 PROCEDURE — 700102 HCHG RX REV CODE 250 W/ 637 OVERRIDE(OP): Performed by: NURSE PRACTITIONER

## 2022-10-17 PROCEDURE — 93010 ELECTROCARDIOGRAM REPORT: CPT | Performed by: INTERNAL MEDICINE

## 2022-10-17 PROCEDURE — 93010 ELECTROCARDIOGRAM REPORT: CPT | Mod: 76 | Performed by: INTERNAL MEDICINE

## 2022-10-17 PROCEDURE — A9270 NON-COVERED ITEM OR SERVICE: HCPCS | Performed by: INTERNAL MEDICINE

## 2022-10-17 PROCEDURE — 700102 HCHG RX REV CODE 250 W/ 637 OVERRIDE(OP): Performed by: HOSPITALIST

## 2022-10-17 RX ORDER — LOSARTAN POTASSIUM 25 MG/1
25 TABLET ORAL
Status: DISCONTINUED | OUTPATIENT
Start: 2022-10-17 | End: 2022-10-19 | Stop reason: HOSPADM

## 2022-10-17 RX ADMIN — METOPROLOL TARTRATE 25 MG: 25 TABLET, FILM COATED ORAL at 16:34

## 2022-10-17 RX ADMIN — ALPRAZOLAM 0.5 MG: 0.5 TABLET ORAL at 01:04

## 2022-10-17 RX ADMIN — SENNOSIDES AND DOCUSATE SODIUM 2 TABLET: 50; 8.6 TABLET ORAL at 16:59

## 2022-10-17 RX ADMIN — METOPROLOL TARTRATE 12.5 MG: 25 TABLET, FILM COATED ORAL at 11:09

## 2022-10-17 RX ADMIN — LOSARTAN POTASSIUM 25 MG: 25 TABLET, FILM COATED ORAL at 16:34

## 2022-10-17 RX ADMIN — OXYCODONE 5 MG: 5 TABLET ORAL at 16:57

## 2022-10-17 RX ADMIN — Medication 5 MG: at 21:24

## 2022-10-17 ASSESSMENT — ENCOUNTER SYMPTOMS
HALLUCINATIONS: 0
SENSORY CHANGE: 0
CHILLS: 0
FEVER: 0
DIZZINESS: 0
TINGLING: 0
HEADACHES: 0
TREMORS: 0
WEIGHT LOSS: 0
BACK PAIN: 0
DIARRHEA: 0
FOCAL WEAKNESS: 0
VOMITING: 0
SHORTNESS OF BREATH: 0
PALPITATIONS: 0
COUGH: 0
MYALGIAS: 0
CONSTIPATION: 0
SPEECH CHANGE: 0
DOUBLE VISION: 0
SPUTUM PRODUCTION: 0
NAUSEA: 0
NECK PAIN: 0
PHOTOPHOBIA: 0
ORTHOPNEA: 0
BLURRED VISION: 0
EYE PAIN: 0
ABDOMINAL PAIN: 0

## 2022-10-17 ASSESSMENT — PAIN DESCRIPTION - PAIN TYPE
TYPE: ACUTE PAIN

## 2022-10-17 ASSESSMENT — LIFESTYLE VARIABLES: SUBSTANCE_ABUSE: 0

## 2022-10-17 ASSESSMENT — FIBROSIS 4 INDEX: FIB4 SCORE: 1.73

## 2022-10-17 NOTE — CARE PLAN
The patient is Stable - Low risk of patient condition declining or worsening    Shift Goals  Clinical Goals: Hemodynamic stability  Patient Goals: CABG monday  Family Goals: safety    Progress made toward(s) clinical / shift goals:    Problem: Nutrition - Advanced  Goal: Patient will display progressive weight gain toward goal have adequate food and fluid intake  Outcome: Progressing  Note: Educated on diet     Problem: Self Care  Goal: Patient will have the ability to perform ADLs independently or with assistance (bathe, groom, dress, toilet and feed)  Outcome: Progressing  Note: Shower goals     Problem: Bowel Elimination - Post Surgical  Goal: Patient will resume regular bowel sounds and function with no discomfort or distention  Outcome: Progressing  Note: Educated on bowl regimen     Problem: Pre Op  Goal: Optimal preparation for CABG/Heart Valve surgery  Intervention: Pre Op education to patient/significant other. Provide patient [facility] Patient Guideline for Cardiac Surgery (See Pt. Ed.)  Note: Completed  Intervention: Consents obtained for Surgery, Anesthesia and Transfusion/Bloodless Program Participant  Note: Completed  Intervention: Pre op checklist completed.  Sign and held orders released. Admit profile completed. Advanced directive verified.  Note: Completed  Intervention: Pre op labs per MD order: CBC, CMP, INR, PTT, COD and UA if not done in past 72 hours.  HbA1C if diabetic.  Note: Completed  Intervention: Pre op diagnostics per MD order (EKG, echo, cath, CXR, Bilateral carotid doppler study and vein mapping)  Note: Completed  Intervention: Baseline assessment documented to include IS volume, weight, bilateral BP and peripheral pulses.  Note: Completed 2500 IS  Intervention: NPO at midnight except cardiac medications and PowerAde drink 2 hours prior to surgery (NO ASA, Coumadin, Plavix, Lovenox, or ACE/ARB)  Note: Completed  Intervention: Shower with Chlorhexidine x 2 (12 hours apart), night before  surgery and morning of surgery  Note: Completed  Intervention: Prep with clippers - chin to toes bedline  Note: Completed  Intervention: Remove dentures, valuables, jewelry, piercings, contacts, dentures and hearing aids  Note: Completed  Intervention: CABG patients - Determine if patient is taking beta blockers  Note: Completed  Intervention: Beta blocker, gabapentin and Tylenol given at least 2 hours prior to surgery  Note: Completed  Intervention: If diabetic, administer insulin night before surgery  Note: Completed  Intervention: If diabetic, FSBS in am and follow sliding scale if indicated  Note: Completed  Intervention: Pre op antibiotics ordered and on MAR  Note: Completed  Intervention: Chart back and consents sent to surgery with patient  Note: Completed  Intervention: Transport to surgery with cardiac monitor, oxygen, and ensure hand off report is given to pre op RN  Note: Completed       Patient is not progressing towards the following goals:

## 2022-10-17 NOTE — OR NURSING
Patient cancelled for surgery per Dr. Meng before the patient arrived in preop. Medical clearance needed.

## 2022-10-17 NOTE — PROGRESS NOTES
Notified Dr. Vazquez, Dr. Meng and pharmacist regarding patients black R arm skin lesions. Patient states that he noticed lesions 10/15. Plan of care being discussed and hematology consulted.

## 2022-10-17 NOTE — THERAPY
Missed Therapy     Patient Name: Harman Branch  Age:  72 y.o., Sex:  male  Medical Record #: [id number]  Today's Date: 10/17/2022    Discussed missed therapy with    10/17/22 1015   Initial Contact Note    Initial Contact Note Order Received and Verified, Physical Therapy Evaluation in Progress with Full Report to Follow.   Interdisciplinary Plan of Care Collaboration   IDT Collaboration with  Nursing   Collaboration Comments PT eval deferred, pt pending CABG today. Will f/u post operatively, RN notified. Will need new orders post operatively RN notified.   Session Information   Date / Session Number  10/17- (EVAL) defer pending CABG today       Alem Thomas, PT,DPT

## 2022-10-17 NOTE — PROGRESS NOTES
Patient planned for CABG today.  No further cardiac work up.    Will sign off at this time, please call us with further questions.  Patient will be followed in the outpatient cardiology clinic for further cardiac care.    Thank you for referring this patient to our cardiology service.    Van Childress MD.   [facility].

## 2022-10-18 VITALS
WEIGHT: 175.04 LBS | HEIGHT: 64 IN | BODY MASS INDEX: 29.88 KG/M2 | HEART RATE: 59 BPM | TEMPERATURE: 97.5 F | SYSTOLIC BLOOD PRESSURE: 127 MMHG | RESPIRATION RATE: 16 BRPM | OXYGEN SATURATION: 92 % | DIASTOLIC BLOOD PRESSURE: 90 MMHG

## 2022-10-18 LAB
ANION GAP SERPL CALC-SCNC: 12 MMOL/L (ref 7–16)
BUN SERPL-MCNC: 18 MG/DL (ref 8–22)
CALCIUM SERPL-MCNC: 9.3 MG/DL (ref 8.5–10.5)
CHLORIDE SERPL-SCNC: 100 MMOL/L (ref 96–112)
CO2 SERPL-SCNC: 23 MMOL/L (ref 20–33)
CREAT SERPL-MCNC: 0.82 MG/DL (ref 0.5–1.4)
ERYTHROCYTE [DISTWIDTH] IN BLOOD BY AUTOMATED COUNT: 45 FL (ref 35.9–50)
GFR SERPLBLD CREATININE-BSD FMLA CKD-EPI: 92 ML/MIN/1.73 M 2
GLUCOSE SERPL-MCNC: 105 MG/DL (ref 65–99)
HCT VFR BLD AUTO: 46.6 % (ref 42–52)
HGB BLD-MCNC: 16.4 G/DL (ref 14–18)
MAGNESIUM SERPL-MCNC: 1.9 MG/DL (ref 1.5–2.5)
MCH RBC QN AUTO: 31.8 PG (ref 27–33)
MCHC RBC AUTO-ENTMCNC: 35.2 G/DL (ref 33.7–35.3)
MCV RBC AUTO: 90.5 FL (ref 81.4–97.8)
PLATELET # BLD AUTO: 254 K/UL (ref 164–446)
PMV BLD AUTO: 9 FL (ref 9–12.9)
POTASSIUM SERPL-SCNC: 4.2 MMOL/L (ref 3.6–5.5)
RBC # BLD AUTO: 5.15 M/UL (ref 4.7–6.1)
SODIUM SERPL-SCNC: 135 MMOL/L (ref 135–145)
WBC # BLD AUTO: 13.9 K/UL (ref 4.8–10.8)

## 2022-10-18 PROCEDURE — 99232 SBSQ HOSP IP/OBS MODERATE 35: CPT | Performed by: HOSPITALIST

## 2022-10-18 PROCEDURE — 80048 BASIC METABOLIC PNL TOTAL CA: CPT

## 2022-10-18 PROCEDURE — 700102 HCHG RX REV CODE 250 W/ 637 OVERRIDE(OP): Performed by: NURSE PRACTITIONER

## 2022-10-18 PROCEDURE — 85027 COMPLETE CBC AUTOMATED: CPT

## 2022-10-18 PROCEDURE — 83735 ASSAY OF MAGNESIUM: CPT

## 2022-10-18 PROCEDURE — 700102 HCHG RX REV CODE 250 W/ 637 OVERRIDE(OP): Performed by: INTERNAL MEDICINE

## 2022-10-18 PROCEDURE — A9270 NON-COVERED ITEM OR SERVICE: HCPCS | Performed by: INTERNAL MEDICINE

## 2022-10-18 PROCEDURE — 700102 HCHG RX REV CODE 250 W/ 637 OVERRIDE(OP): Performed by: HOSPITALIST

## 2022-10-18 PROCEDURE — A9270 NON-COVERED ITEM OR SERVICE: HCPCS | Performed by: NURSE PRACTITIONER

## 2022-10-18 PROCEDURE — A9270 NON-COVERED ITEM OR SERVICE: HCPCS | Performed by: HOSPITALIST

## 2022-10-18 PROCEDURE — 99233 SBSQ HOSP IP/OBS HIGH 50: CPT | Mod: FS | Performed by: INTERNAL MEDICINE

## 2022-10-18 RX ADMIN — ACETAMINOPHEN 650 MG: 325 TABLET, FILM COATED ORAL at 14:48

## 2022-10-18 RX ADMIN — LOSARTAN POTASSIUM 25 MG: 25 TABLET, FILM COATED ORAL at 05:21

## 2022-10-18 RX ADMIN — ASPIRIN 81 MG: 81 TABLET, COATED ORAL at 05:21

## 2022-10-18 RX ADMIN — SENNOSIDES AND DOCUSATE SODIUM 2 TABLET: 50; 8.6 TABLET ORAL at 17:04

## 2022-10-18 RX ADMIN — ATORVASTATIN CALCIUM 80 MG: 80 TABLET, FILM COATED ORAL at 05:21

## 2022-10-18 RX ADMIN — METOPROLOL TARTRATE 25 MG: 25 TABLET, FILM COATED ORAL at 17:04

## 2022-10-18 RX ADMIN — OXYCODONE 5 MG: 5 TABLET ORAL at 20:33

## 2022-10-18 RX ADMIN — SENNOSIDES AND DOCUSATE SODIUM 2 TABLET: 50; 8.6 TABLET ORAL at 05:22

## 2022-10-18 RX ADMIN — METOPROLOL TARTRATE 25 MG: 25 TABLET, FILM COATED ORAL at 05:21

## 2022-10-18 RX ADMIN — ACETAMINOPHEN 650 MG: 325 TABLET, FILM COATED ORAL at 05:25

## 2022-10-18 RX ADMIN — OXYCODONE 5 MG: 5 TABLET ORAL at 17:04

## 2022-10-18 ASSESSMENT — ENCOUNTER SYMPTOMS
LIGHT-HEADEDNESS: 0
COUGH: 0
WEAKNESS: 0
ABDOMINAL PAIN: 0
VOMITING: 0
DEPRESSION: 0
HEADACHES: 0
SENSORY CHANGE: 0
BACK PAIN: 0
EYE DISCHARGE: 0
BLOOD IN STOOL: 0
FEVER: 0
DIARRHEA: 0
NAUSEA: 0
CHILLS: 0
ABDOMINAL DISTENTION: 0
CHEST TIGHTNESS: 0
FATIGUE: 0
STRIDOR: 0
NERVOUS/ANXIOUS: 0
SHORTNESS OF BREATH: 0
SPEECH CHANGE: 0
DIZZINESS: 0
PALPITATIONS: 0

## 2022-10-18 ASSESSMENT — PAIN DESCRIPTION - PAIN TYPE
TYPE: ACUTE PAIN

## 2022-10-18 NOTE — CARE PLAN
The patient is Stable - Low risk of patient condition declining or worsening    Shift Goals  Clinical Goals: Stable vital signs, transfer for CABG  Patient Goals: Transfer  Family Goals: NARCISA    Progress made toward(s) clinical / shift goals:    Problem: Knowledge Deficit - Standard  Goal: Patient and family/care givers will demonstrate understanding of plan of care, disease process/condition, diagnostic tests and medications  Outcome: Progressing     Problem: Psychosocial  Goal: Patient's level of anxiety will decrease  Outcome: Progressing  Goal: Patient's ability to verbalize feelings about condition will improve  Outcome: Progressing  Goal: Patient's ability to re-evaluate and adapt role responsibilities will improve  Outcome: Progressing  Goal: Patient and family will demonstrate ability to cope with life altering diagnosis and/or procedure  Outcome: Progressing  Goal: Spiritual and cultural needs incorporated into hospitalization  Outcome: Progressing     Problem: Hemodynamics  Goal: Patient's hemodynamics, fluid balance and neurologic status will be stable or improve  Outcome: Progressing     Problem: Respiratory  Goal: Patient will achieve/maintain optimum respiratory ventilation and gas exchange  Outcome: Progressing     Problem: Urinary - Renal Perfusion  Goal: Ability to achieve and maintain adequate renal perfusion and functioning will improve  Outcome: Progressing     Problem: Venous Thromboembolism (VTE) Prevention  Goal: The patient will remain free from venous thromboembolism (VTE)  Outcome: Progressing     Problem: Nutrition  Goal: Patient's nutritional and fluid intake will be adequate or improve  Outcome: Progressing  Goal: Enteral nutrition will be maintained or improve  Outcome: Progressing  Goal: Enteral nutrition will be maintained or improve  Outcome: Progressing     Problem: Urinary Elimination  Goal: Establish and maintain regular urinary output  Outcome: Progressing     Problem: Bowel  Elimination  Goal: Establish and maintain regular bowel function  Outcome: Progressing     Problem: Nutrition - Advanced  Goal: Patient will display progressive weight gain toward goal have adequate food and fluid intake  Outcome: Progressing     Problem: Self Care  Goal: Patient will have the ability to perform ADLs independently or with assistance (bathe, groom, dress, toilet and feed)  Outcome: Progressing       Patient is not progressing towards the following goals:

## 2022-10-18 NOTE — DISCHARGE PLANNING
Agency/Facility Name: [facility]   Outcome: BRIEN faxed signature sheet for transfer agreement to [place] at [phone] (# number provided on sheet).     1222-  Agency/Facility Name: [facility]  Outcome: [facility] faxed additional signature sheet required for transfer agreement.

## 2022-10-18 NOTE — PROGRESS NOTES
Hospital Medicine Daily Progress Note    Date of Service  10/18/2022    Chief Complaint  Chest pain    Hospital Course  72-year-old male with a history of CAD with prior PCI x2 in 2005 and again in 2013, GERD, asthma, hypertension, BPH admitted with chest pain.  He underwent left heart catheterization on 10/13 which showed multivessel CAD.  CT surgery was consulted who recommended CABG.      Interval Problem Update  10/18/22: Alert, oriented, pleasant and has clear fluent full sentenced speech.  No current chest pain.  He states he is bored, apprehensive, about future surgery.  I consoled him.  Alerted him that he has possible transfer to [facility] tomorrow.    I have discussed this patient's plan of care and discharge plan at IDT rounds today with Case Management, Nursing, transfer center, Nursing leadership, and other members of the IDT team.    Consultants/Specialty  cardiology and cardiovascular surgeon    Code Status  Full Code    Disposition  Patient is not medically cleared for discharge.   Anticipate discharge to to a critical access hospital.  I have placed the appropriate orders for post-discharge needs.    Review of Systems  Review of Systems   Constitutional:  Negative for chills and fever.   Eyes:  Negative for discharge.   Respiratory:  Negative for cough, shortness of breath and stridor.    Cardiovascular:  Negative for chest pain, palpitations and leg swelling.   Gastrointestinal:  Negative for abdominal pain, diarrhea, nausea and vomiting.   Genitourinary:  Negative for dysuria and hematuria.   Musculoskeletal:  Negative for back pain and joint pain.   Skin:  Negative for rash.   Neurological:  Negative for dizziness, sensory change, speech change and headaches.   Psychiatric/Behavioral:  Negative for depression. The patient is not nervous/anxious.       Physical Exam  Temp:  [36.6 °C (97.9 °F)-36.8 °C (98.2 °F)] 36.6 °C (97.9 °F)  Pulse:  [59-90] 59  BP: ()/(61-92) 149/92  SpO2:   [89 %-97 %] 92 %    Physical Exam  Vitals reviewed.   Constitutional:       Appearance: Normal appearance. He is not diaphoretic.   HENT:      Head: Normocephalic and atraumatic.      Nose: Nose normal.      Mouth/Throat:      Mouth: Mucous membranes are moist.      Pharynx: No oropharyngeal exudate.   Eyes:      General: No scleral icterus.        Right eye: No discharge.         Left eye: No discharge.      Extraocular Movements: Extraocular movements intact.      Conjunctiva/sclera: Conjunctivae normal.      Pupils: Pupils are equal, round, and reactive to light.   Neck:      Vascular: No carotid bruit.   Cardiovascular:      Rate and Rhythm: Normal rate and regular rhythm.      Pulses:           Radial pulses are 2+ on the right side and 2+ on the left side.        Dorsalis pedis pulses are 2+ on the right side and 2+ on the left side.      Heart sounds: No murmur heard.  Pulmonary:      Effort: Pulmonary effort is normal. No respiratory distress.      Breath sounds: Normal breath sounds. No wheezing or rales.   Abdominal:      General: Bowel sounds are normal. There is no distension.      Palpations: Abdomen is soft.   Musculoskeletal:         General: No swelling or tenderness.      Cervical back: No muscular tenderness.   Lymphadenopathy:      Cervical: No cervical adenopathy.   Skin:     Coloration: Skin is not jaundiced or pale.   Neurological:      General: No focal deficit present.      Mental Status: He is alert and oriented to person, place, and time. Mental status is at baseline.      Cranial Nerves: No cranial nerve deficit.   Psychiatric:         Mood and Affect: Mood normal.         Behavior: Behavior normal.       Fluids    Intake/Output Summary (Last 24 hours) at 10/18/2022 1952  Last data filed at 10/18/2022 1330  Gross per 24 hour   Intake 700 ml   Output 250 ml   Net 450 ml       Laboratory  Recent Labs     10/16/22  2100 10/18/22  0410   WBC 9.8 13.9*   RBC 5.01 5.15   HEMOGLOBIN 16.0 16.4    HEMATOCRIT 46.3 46.6   MCV 92.4 90.5   MCH 31.9 31.8   MCHC 34.6 35.2   RDW 46.1 45.0   PLATELETCT 272 254   MPV 9.3 9.0     Recent Labs     10/16/22  2100 10/18/22  0410   SODIUM 137 135   POTASSIUM 4.0 4.2   CHLORIDE 101 100   CO2 23 23   GLUCOSE 133* 105*   BUN 21 18   CREATININE 1.01 0.82   CALCIUM 9.1 9.3     Recent Labs     10/16/22  2100   APTT 93.8*   INR 0.98               Imaging  EC-ECHOCARDIOGRAM COMPLETE W/O CONT   Final Result      US-VEIN MAPPING LOWER EXTREMITY BILAT   Final Result      US-CAROTID DOPPLER BILAT   Final Result      DX-CHEST-PORTABLE (1 VIEW)   Final Result      No acute cardiopulmonary disease.      CL-LEFT HEART CATHETERIZATION WITH POSSIBLE INTERVENTION    (Results Pending)        Assessment/Plan  * ACS (acute coronary syndrome) (HCC)- (present on admission)  Assessment & Plan  Patient presenting with progressive chest pain with exertion, has known CAD and previous PCI x 2  [facility] 10/13: showed multivessel CAD  Initial plan was to perform CABG on October 17, 2022.  Patient transferred to ICU.  This morning he developed a rash on his right upper extremity and cardiothoracic surgery discussed it with hematology and intensivist and thought that rash could be secondary to heparin and CABG procedure was canceled.    Cardiology and cardiothoracic surgery okay with transfer to [facility] or [place].  Await open bed at either facility  10/13 Carotid U/S showing normal carotids bilateral  10/13 Vein Mapping:   FINDINGS   Right.    The great saphenous vein is patent throughout its length.   The great saphenous vein is too minute at the distal thigh, knee, and    proximal calf.    Diameter measurements are listed above.    There is a more superficial tributary that measures 0.23 cm (distal thigh)    and 0.22 cm (knee).   Left.    The great saphenous vein is patent throughout its length.   The great saphenous vein is small in caliber from the mid thigh to proximal    calf.    Diameter  measurements are listed above.    There is a more superficial tributary that measures 0.21 cm (distal thigh)    and 0.19 cm (knee).    Constipation  Assessment & Plan  Bowel regimen    Hypertension  Assessment & Plan  Home regimen: Metoprolol tartrate 25 mg twice daily, Lisinopril 20 mg twice daily  Inpatient regimen: Metoprolol tartrate 25 mg twice daily and losartain  Monitor vitals    CAD (coronary artery disease)- (present on admission)  Assessment & Plan  Medical management  Atorvastatin, losartan, metoprolol, aspirin       VTE prophylaxis: SCDs/TEDs    I have performed a physical exam and reviewed and updated ROS and Plan today (10/18/2022). In review of yesterday's note (10/17/2022), there are no changes except as documented above.

## 2022-10-18 NOTE — WOUND TEAM
[facility]  Inpatient Services  Wound and Skin Care Brief Evaluation    Admission Date: 10/13/2022     Last order of IP CONSULT TO WOUND CARE was found on 10/17/2022 from Hospital Encounter on 10/13/2022     HPI, PMH, SH: Reviewed    Chief Complaint   Patient presents with    Chest Pain     Tightness in chest, started a few days ago but this morning after a walk he still has tightness. PMH MI with stent x2.      Diagnosis: ACS (acute coronary syndrome) (HCC) [I24.9]    Unit where seen by Wound Team: T627/00     Wound consult placed regarding Right arm. Chart and images reviewed. This discussed with bedside RN. This RN in to assess patient. Pt pleasant and agreeable. Pt has small dark raised spots to RUE. Pt states the area is related to IV Heparin. The area is not increasing, no pain associated. Left open to air. No pressure injuries or advanced wound care needs identified. Wound consult completed. No further follow up unless indicated and consulted.           RSKIN:   CURRENTLY IN PLACE (X), APPLIED THIS VISIT (A), ORDERED (O):   Q shift Paul:  X  Q shift pressure point assessments:  X    Surface/Positioning   Pressure redistribution mattress            Low Airloss       ICU EDSON  X     Bariatric foam      Bariatric EDSON     Waffle cushion        Waffle Overlay          Reposition q 2 hours      TAPs Turning system     Z Jd Pillow     Offloading/Redistribution NARCISA  Sacral Mepilex (Silicone dressing)     Heel Mepilex (Silicone dressing)         Heel float boots (Prevalon boot)             Float Heels off Bed with Pillows           Respiratory Room Air  Silicone O2 tubing         Gray Foam Ear protectors     Cannula fixation Device (Tender )          High flow offloading Clip    Elastic head band offloading device      Anchorfast                                                         Trach with Optifoam split foam             Containment/Moisture Prevention Continent    Rectal tube or BMS     Purwick/Condom Cath        Montague Catheter    Barrier wipes           Barrier paste       Antifungal tx      Interdry        Mobilization       Up to chair   X     Ambulate    X  PT/OT      Nutrition       Dietician        Diabetes Education      PO   X  TF     TPN     NPO   # days     Other

## 2022-10-18 NOTE — CARE PLAN
The patient is Stable - Low risk of patient condition declining or worsening    Shift Goals  Clinical Goals: Monitor for Alergic reactions and acute change in condition  Patient Goals: Transfer  Family Goals: NARCISA    Progress made toward(s) clinical / shift goals: no allergic reaction symptoms noted     Patient is not progressing towards the following goals:      Problem: Knowledge Deficit - Standard  Goal: Patient and family/care givers will demonstrate understanding of plan of care, disease process/condition, diagnostic tests and medications  Outcome: Progressing

## 2022-10-18 NOTE — PROGRESS NOTES
Cardiology Progress Note:    Van Childress M.D.  Date & Time note created:    10/17/2022   3:43 PM     Referring MD:  Dr. Dukes    Patient ID:   Name:             Harman Branch     YOB: 1950  Age:                 72 y.o.  male   MRN:               [id number]            Of note, this progress note is reflects my interaction and evaluation with patient yesterday on October 17, 2022.  Cardiology was called back due to cancellation of his CABG due to possible heparin-induced skin necrosis.                                                     Chief Complaint / Reason for consult:  Chest pain    History of Present Illness:    This is a 72-year-old man with hypertension and now with multivessel disease and possible heparin-induced skin necrosis.    Overnight events:  Exertional chest pain is gone away.  Feeling well overall.    Review of Systems:      Constitutional: Denies fevers, Denies weight changes  Eyes: Denies changes in vision, no eye pain  Ears/Nose/Throat/Mouth: Denies nasal congestion or sore throat   Cardiovascular: no chest pain, no palpitations   Respiratory: no shortness of breath , Denies cough  Gastrointestinal/Hepatic: Denies abdominal pain, nausea, vomiting, diarrhea, constipation or GI bleeding   Genitourinary: Denies dysuria or frequency  Musculoskeletal/Rheum: Denies  joint pain and swelling   Skin: + black lesions concerned for skin necrosis.  Neurological: Denies headache, confusion, memory loss or focal weakness/parasthesias  Psychiatric: denies mood disorder   Endocrine: Eileen thyroid problems  Heme/Oncology/Lymph Nodes: Denies enlarged lymph nodes, denies brusing or known bleeding disorder  All other systems were reviewed and are negative (AMA/CMS criteria)                Past Medical History:   Past Medical History:   Diagnosis Date    Arthritis     fingers    ASTHMA     uses inhaler prn    Back pain     CAD (coronary artery disease) 6/21/2011    CAD (coronary artery  disease)     Carotid artery plaque 6/21/2011    Esophageal reflux 11/3/2009    Heart burn     High cholesterol     History of asthma 11/3/2009    History of benign prostatic hypertrophy 11/3/2009    History of cardiac catheterization 6/21/2011    History of echocardiogram 6/20/2011    History of neck surgery     Hardware in neck    HLD (hyperlipidemia) 6/1/2011    Hypertension     Indigestion     Previous back surgery     Stented coronary artery 2005     Active Hospital Problems    Diagnosis     Constipation [K59.00]     ACS (acute coronary syndrome) (HCC) [I24.9]     Hypertension [I10]     CAD (coronary artery disease) [I25.10]        Past Surgical History:  Past Surgical History:   Procedure Laterality Date    OK NJX AA&/STRD TFRML EPI LUMBAR/SACRAL 1 LEVEL Bilateral 9/2/2016    Procedure: INJ-FORAMEN EPI LUM/SAC SNGL - L4-5;  Surgeon: Jesus Rojas M.D.;  Location: [facility];  Service: Pain Management    OK NJX AA&/STRD TFRML EPI LUMBAR/SACRAL 1 LEVEL  9/2/2016    Procedure: INJ-FORAMEN EPI LUM/SAC SNGL;  Surgeon: Jesus Rojas M.D.;  Location: SURGERY [facility];  Service: Pain Management    PB FLUORSCOPIC GUIDANCE SPINAL INJECTION  9/2/2016    Procedure: FLUOROGUIDE FOR SPINAL INJ;  Surgeon: Jesus Rojas M.D.;  Location: [facility];  Service: Pain Management    LUMBAR LAMINECTOMY DISKECTOMY  4/7/2012    Performed by MARLIN CANDELARIA at SURGERY [facility]    FORAMINOTOMY  4/7/2012    Performed by MARLIN CANDELARIA at SURGERY [facility]    LUMBAR LAMINECTOMY DISKECTOMY  3/9/2011    Performed by MARLIN CANDELARIA at SURGERY [facility]    FORAMINOTOMY  3/9/2011    Performed by MARLIN CANDELARIA at SURGERY [facility] ORS    ANGIOGRAM      CERVICAL DISK AND FUSION ANTERIOR      OTHER CARDIAC SURGERY  2007 heart [facility]       Hospital Medications:    Current Facility-Administered Medications:     metoprolol tartrate (LOPRESSOR) tablet 25 mg, 25 mg, Oral, TWICE DAILY,  Van Childress M.D., 25 mg at 10/18/22 0521    losartan (COZAAR) tablet 25 mg, 25 mg, Oral, Q DAY, Van Childress M.D., 25 mg at 10/18/22 0521    ALPRAZolam (XANAX) tablet 0.5 mg, 0.5 mg, Oral, 4X/DAY PRN, Nicky Lozano, A.P.N., 0.5 mg at 10/17/22 0104    melatonin tablet 5 mg, 5 mg, Oral, Nightly, Nicky Lozano, A.P.N., 5 mg at 10/17/22 2124    senna-docusate (PERICOLACE or SENOKOT S) 8.6-50 MG per tablet 2 Tablet, 2 Tablet, Oral, BID, 2 Tablet at 10/18/22 0522 **AND** polyethylene glycol/lytes (MIRALAX) PACKET 1 Packet, 1 Packet, Oral, DAILY, 1 Packet at 10/16/22 0421 **AND** magnesium hydroxide (MILK OF MAGNESIA) suspension 30 mL, 30 mL, Oral, QDAY PRN **AND** bisacodyl (DULCOLAX) suppository 10 mg, 10 mg, Rectal, QDAY PRN, Nicky Haley M.D.    atorvastatin (LIPITOR) tablet 80 mg, 80 mg, Oral, DAILY, Redet Juan Alberto, A.P.R.N., 80 mg at 10/18/22 0521    acetaminophen (Tylenol) tablet 650 mg, 650 mg, Oral, Q6HRS PRN, Allen Lamas M.D., 650 mg at 10/18/22 1448    Notify provider if pain remains uncontrolled, , , CONTINUOUS **AND** Use the Numeric Rating Scale (NRS), Morejon-Baker Faces (WBF), or FLACC on regular floors and Critical-Care Pain Observation Tool (CPOT) on ICUs/Trauma to assess pain, , , CONTINUOUS **AND** Pulse Ox, , , CONTINUOUS **AND** Pharmacy Consult Request ...Pain Management Review 1 Each, 1 Each, Other, PHARMACY TO DOSE **AND** If patient difficult to arouse and/or has respiratory depression (respiratory rate of 10 or less), stop any opiates that are currently infusing and call a Rapid Response., , , CONTINUOUS, Allen Lamas M.D.    oxyCODONE immediate-release (ROXICODONE) tablet 5 mg, 5 mg, Oral, Q3HRS PRN, 5 mg at 10/17/22 1657 **OR** oxyCODONE immediate release (ROXICODONE) tablet 10 mg, 10 mg, Oral, Q3HRS PRN **OR** HYDROmorphone (Dilaudid) injection 0.5 mg, 0.5 mg, Intravenous, Q3HRS PRN, Allen Lamas M.D.    ondansetron (ZOFRAN) syringe/vial injection 4 mg, 4 mg,  Intravenous, Q4HRS PRN, Allen Lamas M.D.    ondansetron (ZOFRAN ODT) dispertab 4 mg, 4 mg, Oral, Q4HRS PRN, Allen Lamas M.D.    aspirin EC (ECOTRIN) tablet 81 mg, 81 mg, Oral, DAILY, Dwayne Zepeda M.D., 81 mg at 10/18/22 0521    Current Outpatient Medications:  Medications Prior to Admission   Medication Sig Dispense Refill Last Dose    esomeprazole (NEXIUM) 40 MG delayed-release capsule Take 40 mg by mouth 2 times a day.   10/12/2022 at PM    ezetimibe (ZETIA) 10 MG Tab Take 1 Tablet by mouth every day. 90 Tablet 3 10/12/2022 at AM    lisinopril (PRINIVIL) 20 MG Tab Take 1 Tablet by mouth 2 times a day. 180 Tablet 3 10/12/2022 at AM    metoprolol tartrate (LOPRESSOR) 25 MG Tab Take 1 Tablet by mouth 2 times a day. 180 Tablet 3 10/12/2022 at PM    atorvastatin (LIPITOR) 40 MG Tab Take 1 Tablet by mouth every day. 90 Tablet 3 10/12/2022 at AM    Multiple Vitamin (MULTI-VITAMIN DAILY PO) Take 1 Tablet by mouth every morning.   10/12/2022 at AM    Albuterol (PROVENTIL INH) Inhale 2 Puffs every four hours as needed.   10/13/2022 at AM    KRILL OIL PO Take 1 Tablet by mouth every morning.   10/12/2022 at AM    Coenzyme Q10 (COQ10 PO) Take 1 Tablet by mouth every day.   10/12/2022 at AM    FLOVENT  MCG/ACT Aerosol Inhale 2 Puffs every four hours as needed. Indications: Asthma   10/12/2022 at PRN    B Complex Vitamins (B-COMPLEX/B-12 PO) Take 1,000 Tabs by mouth every day.   10/12/2022 at AM    aspirin 81 MG tablet Take 81 mg by mouth every day.   10/12/2022 at AM       Medication Allergy:  Allergies   Allergen Reactions    Heparin Rash     Concern for development of possible  heparin induced skin necrosis following heparin administration 10/17/22       Family History:  Family History   Problem Relation Age of Onset    Heart Attack Neg Hx        Social History:  Social History     Socioeconomic History    Marital status:      Spouse name: Not on file    Number of children: Not on file    Years of  "education: Not on file    Highest education level: Master's degree (e.g., MA, MS, Sudeep, MEd, MSW, RENEE)   Occupational History    Not on file   Tobacco Use    Smoking status: Former     Types: Cigars     Quit date: 1987     Years since quittin.5    Smokeless tobacco: Never    Tobacco comments:        Substance and Sexual Activity    Alcohol use: Yes     Comment: 2 cocktails per week    Drug use: No    Sexual activity: Not on file   Other Topics Concern    Not on file   Social History Narrative    ** Merged History Encounter **          Social Determinants of Health     Financial Resource Strain: Not on file   Food Insecurity: Not on file   Transportation Needs: Not on file   Physical Activity: Not on file   Stress: Not on file   Social Connections: Not on file   Intimate Partner Violence: Not on file   Housing Stability: Not on file         Physical Exam:  Vitals/ General Appearance:   Weight/BMI: Body mass index is 30.05 kg/m².  /72   Pulse 79   Temp 36.8 °C (98.2 °F) (Temporal)   Resp 15   Ht 1.626 m (5' 4\")   Wt 79.4 kg (175 lb 0.7 oz)   SpO2 95%   Vitals:    10/18/22 0500 10/18/22 0600 10/18/22 0800 10/18/22 1200   BP: 118/68  101/62 119/72   Pulse: 80 75 (!) 59 79   Resp:       Temp:   36.6 °C (97.9 °F) 36.8 °C (98.2 °F)   TempSrc:   Temporal Temporal   SpO2: 91% 97% 95% 95%   Weight:       Height:         Oxygen Therapy:  Pulse Oximetry: 95 %, O2 (LPM): 1, O2 Delivery Device: Room air w/o2 available    Constitutional:   Well developed, Well nourished, No acute distress  HENMT:  Normocephalic, Atraumatic, Oropharynx moist mucous membranes, No oral exudates, Nose normal.  No thyromegaly.  Eyes:  EOMI, Conjunctiva normal, No discharge.  Neck:  Normal range of motion, No cervical tenderness,  no JVD.  Cardiovascular:  Normal heart rate, Normal rhythm, No murmurs, No rubs, No gallops.   Extremitites with intact distal pulses, no cyanosis, or edema.  Lungs:  Normal breath sounds, breath " sounds clear to auscultation bilaterally,  no rales, no rhonchi, no wheezing.   Abdomen: Bowel sounds normal, Soft, No tenderness, No guarding, No rebound, No masses, No hepatosplenomegaly.  Skin: Warm, Dry, No erythema, No rash, no induration.  Neurologic: Alert & oriented x 3, No focal deficits noted, cranial nerves II through X are intact.  Psychiatric: Affect normal, Judgment normal, Mood normal.      MDM (Data Review):     Records reviewed and summarized in current documentation    Lab Data Review:  Recent Results (from the past 24 hour(s))   CBC WITHOUT DIFFERENTIAL    Collection Time: 10/18/22  4:10 AM   Result Value Ref Range    WBC 13.9 (H) 4.8 - 10.8 K/uL    RBC 5.15 4.70 - 6.10 M/uL    Hemoglobin 16.4 14.0 - 18.0 g/dL    Hematocrit 46.6 42.0 - 52.0 %    MCV 90.5 81.4 - 97.8 fL    MCH 31.8 27.0 - 33.0 pg    MCHC 35.2 33.7 - 35.3 g/dL    RDW 45.0 35.9 - 50.0 fL    Platelet Count 254 164 - 446 K/uL    MPV 9.0 9.0 - 12.9 fL   Basic Metabolic Panel    Collection Time: 10/18/22  4:10 AM   Result Value Ref Range    Sodium 135 135 - 145 mmol/L    Potassium 4.2 3.6 - 5.5 mmol/L    Chloride 100 96 - 112 mmol/L    Co2 23 20 - 33 mmol/L    Glucose 105 (H) 65 - 99 mg/dL    Bun 18 8 - 22 mg/dL    Creatinine 0.82 0.50 - 1.40 mg/dL    Calcium 9.3 8.5 - 10.5 mg/dL    Anion Gap 12.0 7.0 - 16.0   MAGNESIUM    Collection Time: 10/18/22  4:10 AM   Result Value Ref Range    Magnesium 1.9 1.5 - 2.5 mg/dL   ESTIMATED GFR    Collection Time: 10/18/22  4:10 AM   Result Value Ref Range    GFR (CKD-EPI) 92 >60 mL/min/1.73 m 2       Imaging/Procedures Review:    Chest Xray:  Reviewed    EKG:   As in HPI.     MDM (Assessment and Plan):     Active Hospital Problems    Diagnosis     Constipation [K59.00]     ACS (acute coronary syndrome) (HCC) [I24.9]     Hypertension [I10]     CAD (coronary artery disease) [I25.10]      I have independently interpreted and reviewed echocardiogram's actual images with patient which showed normal left  ventricular systolic function. No wall motion abnormality. No evidence of pulmonary hypertension. No significant valvular disease.      I had a long conversation with patient about potential treatment.  At this time, we will place patient on waiting list at [facility].  However, in the event that [facility] does not have bed availability, we will need to consider other centers either in [place] or [facility].    In the meantime, we will optimize medical therapy.  I will start losartan 25 mg p.o. once a day.  Continue Metroprolol, atorvastatin and aspirin at current dosage.    Patient does have preserved LV function.      Thank you for referring this patient to our cardiology service.  We will follow patient with you.      Van Childress MD.   Cardiology Inpatient Service.  [facility].  [phone].  Urvashi Tran.

## 2022-10-18 NOTE — DISCHARGE PLANNING
Case Management Discharge Planning    Admission Date: 10/13/2022  GMLOS: 2  ALOS: 4    6-Clicks ADL Score: 22  6-Clicks Mobility Score: 22      Anticipated Discharge Dispo: Discharge Disposition: Discharged to home/self care (01)    DME Needed: No    Action(s) Taken: DC Assessment Complete (See below) Patient is needing a CABG without heparin he is going to be transferred to another facility possibly [facility] address is [address] [place] [address].  Patient transfer is being worked on. Family at bedside, patient will transfer once accepted and a bed is available. Family all in agreement.  Cardiology working on the transfer as well.     Escalations Completed: None    Medically Clear: No    Next Steps:     Barriers to Discharge: None    Is the patient up for discharge tomorrow: No

## 2022-10-18 NOTE — PROGRESS NOTES
"Cardiology Follow Up Progress Note    Date of Service  10/18/2022    Attending Physician  Chaz James D.O.    Chief Complaint   Chief Complaint   Patient presents with    Chest Pain     Tightness in chest, started a few days ago but this morning after a walk he still has tightness. PMH MI with stent x2.        MARCIN Branch is a 72 y.o. male admitted 10/13/2022 with per Dr. Garnica, \"with history of coronary artery disease with prior PCI  (x2 in 2005 to RCA with 3x32mm and 3x16mm Taxus Raj, and again to RCA in 4/2013 for inf STEMI with 3x18mm Xience SNEHA WITHIN prior stent and known severe D1 / D2 disease), asthma, GERD, carotid artery disease, dyslipidemia, BPH, hypertension last seen in our clinic by Dr. Rice on 8/15/2020 is here with worsening chest pain with minimal exertion over last several days. Recently he started noticing recurrent exertional chest pressure and there was a decision to repeat a stress test with a cardiac PET per chart review however due to worsening symptoms he presented to the ER today.\"     Interim Events  10/18/2022: No overnight cardiac events. Tele monitoring personally interpreted by me shows SR. VSS; RA. Denies chest pain, rash improved after stopping heparin.    CABG scheduled for yesterday was postponed d/t rash where CT surgery and heme associate to heparin. [facility]. Patient accepted to [facility] which currently has no beds available. We discussed exploring additional quatern[facility] centers while waiting for a bed at [facility], patient agreeable but would prefer a [facility] center. List provided to family; awaiting decision for possible additional referral.     Review of Systems  Review of Systems   Constitutional:  Negative for fatigue and fever.   Respiratory:  Negative for chest tightness and shortness of breath.    Cardiovascular:  Negative for chest pain, palpitations and leg swelling.   Gastrointestinal:  Negative for " abdominal distention, abdominal pain and blood in stool.   Genitourinary:  Negative for dysuria.   Skin:  Positive for rash.   Neurological:  Negative for dizziness, syncope, weakness and light-headedness.     Vital signs in last 24 hours  Temp:  [36.3 °C (97.4 °F)-36.8 °C (98.2 °F)] 36.8 °C (98.2 °F)  Pulse:  [72-90] 75  BP: ()/(61-94) 118/68  SpO2:  [89 %-97 %] 97 %    Physical Exam  Physical Exam  Vitals and nursing note reviewed.   Constitutional:       General: He is not in acute distress.  Cardiovascular:      Rate and Rhythm: Normal rate and regular rhythm.      Pulses: Normal pulses.      Heart sounds: Normal heart sounds.   Pulmonary:      Effort: Pulmonary effort is normal. No respiratory distress.      Breath sounds: Normal breath sounds.   Abdominal:      General: Bowel sounds are normal. There is no distension.      Palpations: Abdomen is soft.   Musculoskeletal:         General: No swelling.      Cervical back: Normal range of motion.      Right lower leg: No edema.      Left lower leg: No edema.   Skin:     General: Skin is warm and dry.   Neurological:      General: No focal deficit present.      Mental Status: He is alert and oriented to person, place, and time. Mental status is at baseline.   Psychiatric:         Mood and Affect: Mood normal.         Behavior: Behavior normal.       Lab Review  Lab Results   Component Value Date/Time    WBC 13.9 (H) 10/18/2022 04:10 AM    RBC 5.15 10/18/2022 04:10 AM    HEMOGLOBIN 16.4 10/18/2022 04:10 AM    HEMATOCRIT 46.6 10/18/2022 04:10 AM    MCV 90.5 10/18/2022 04:10 AM    MCH 31.8 10/18/2022 04:10 AM    MCHC 35.2 10/18/2022 04:10 AM    MPV 9.0 10/18/2022 04:10 AM      Lab Results   Component Value Date/Time    SODIUM 135 10/18/2022 04:10 AM    POTASSIUM 4.2 10/18/2022 04:10 AM    CHLORIDE 100 10/18/2022 04:10 AM    CO2 23 10/18/2022 04:10 AM    GLUCOSE 105 (H) 10/18/2022 04:10 AM    BUN 18 10/18/2022 04:10 AM    CREATININE 0.82 10/18/2022 04:10 AM       Lab Results   Component Value Date/Time    ASTSGOT 50 (H) 10/16/2022 09:00 PM    ALTSGPT 60 (H) 10/16/2022 09:00 PM     Lab Results   Component Value Date/Time    CHOLSTRLTOT 126 01/24/2022 10:57 AM    LDL 69 01/24/2022 10:57 AM    HDL 40 01/24/2022 10:57 AM    TRIGLYCERIDE 87 01/24/2022 10:57 AM    TROPONINT 9 10/13/2022 09:02 AM       No results for input(s): NTPROBNP in the last 72 hours.    Cardiac Imaging and Procedures Review  CARDIAC CATHETERIZATION CONCLUSIONS in [place], [place] (12/05)  Cardiac catheterization showing high grade small vessel first and second diagonal branch   stenosis, non obstructive left circumflex artery stenosis and high grade right coronary artery   stenosis treated with 3.0 x 32 mm and 3.0 x 16 mm Taxus drug eluding stents.      ECHOCARDIOGRAM CONCLUSIONS (10/14/22)  Normal left ventricular systolic function.  The left ventricular ejection fraction is visually estimated to be 55-60%.  Mild concentric left ventricular hypertrophy.  Grade I diastolic function.  The right ventricle is normal in size and systolic function.  Unable to estimate right ventricular systolic pressure due to an   inadequate tricuspid regurgitant jet.  The left atrium is normal in size.  Mild aortic and mitral calcification without hemodynamic significance.  Normal inferior vena cava size and inspiratory collapse.  (study result reviewed)    CARDIAC CATHETERIZATION CONCLUSIONS by Dwayne Branham (10/13.22)  1. Unstable angina due to critical stensosis of the RCA  2. Successful restoration of VINITA III flow to the RCA using angioplasty  3. Multivessel obstructive CAD  4. Mild elevation in LVEDP at 21 mmHg  5. Normal LV systolic function     CAROTID ULTRASOUND (12/01/17)  No prior study is available for comparison.  Normal bilateral carotid exam.     Assessment/Plan  Multivessel CAD; HLD; Carotid artery stenosis; HTN  -awaiting transfer to CABG without heparin  -rash and SOB have improved since stopping  heparin  -BP controlled  -Awaiting bed availability at [facility], considering alternative centers    Thank you for allowing me to participate in the care of this patient.  I will continue to follow this patient    Please contact me with any questions.    Please see Dr. Childress's attestation for further details and MDM.     I personally spent a total of 14 minutes which includes face-to-face time and non-face-to-face time spent on preparing to see the patient, reviewing hospital notes and tests, obtaining history from the patient, performing a medically appropriate exam, counseling and educating the patient, ordering medications/tests/procedures/referrals as clinically indicated, and documenting information in the electronic medical record.    [email or web address] [email or web address].   [facility]  [phone]

## 2022-10-18 NOTE — CARE PLAN
The patient is Stable - Low risk of patient condition declining or worsening    Shift Goals  Clinical Goals: Hemodynamic stability, cardiac surgery education  Patient Goals: safe surgery  Family Goals: safe recovery    Progress made toward(s) clinical / shift goals:  Pt assessed for pain regularly and medicated PRN per MAR.

## 2022-10-19 NOTE — CARE PLAN
The patient is Stable - Low risk of patient condition declining or worsening    Shift Goals  Clinical Goals: maintain hemodynamic stability, transfer patient to new hospital  Patient Goals: transfer to new hospital  Family Goals: NARCISA    Progress made toward(s) clinical / shift goals:    Problem: Psychosocial  Goal: Patient's level of anxiety will decrease  Outcome: Progressing     Problem: Hemodynamics  Goal: Patient's hemodynamics, fluid balance and neurologic status will be stable or improve  Outcome: Progressing     Problem: Respiratory  Goal: Patient will achieve/maintain optimum respiratory ventilation and gas exchange  Outcome: Progressing     Problem: Pain - Standard  Goal: Alleviation of pain or a reduction in pain to the patient’s comfort goal  Outcome: Progressing     Problem: Self Care  Goal: Patient will have the ability to perform ADLs independently or with assistance (bathe, groom, dress, toilet and feed)  Outcome: Progressing

## 2022-10-19 NOTE — DISCHARGE INSTRUCTIONS
Discharge Instructions    Discharged to other by medical transportation with self. Discharged via ambulance, hospital escort: Refused.  Special equipment needed: Not Applicable    Be sure to schedule a follow-up appointment with your primary care doctor or any specialists as instructed.     Discharge Plan:   Diet Plan: Discussed  Activity Level: Discussed  Confirmed Follow up Appointment: No (Comments)  Confirmed Symptoms Management: Discussed  Medication Reconciliation Updated: Yes  Influenza Vaccine Indication: Not indicated: Previously immunized this influenza season and > 8 years of age    I understand that a diet low in cholesterol, fat, and sodium is recommended for good health. Unless I have been given specific instructions below for another diet, I accept this instruction as my diet prescription.   Other diet: Cardiac    Special Instructions: Diagnosis:  Acute Coronary Syndrome (ACS) is a diagnosis that encompasses cardiac-related chest pain and heart attack. ACS occurs when the blood flow to the heart muscle is severely reduced or cut off completely due to a slow process called atherosclerosis.  Atherosclerosis is a disease in which the coronary arteries become narrow from a buildup of fat, cholesterol, and other substances that combine to form plaque. If the plaque breaks, a blood clot will form and block the blood flow to the heart muscle. This lack of blood flow can cause damage or death to the heart muscle which is called a heart attack or Myocardial Infarction (MI). There are two different types of MIs:  ST Elevation Myocardial Infarction or STEMI (the most severe type of heart attack) and Non-ST Elevation Myocardial Infarction or NSTEMI.    Treatment Plan:  Cardiac Diet  - Low fat, low salt, low cholesterol   Cardiac Rehab  - Your doctor has ordered you a referral to [facility].  Call [phone] to schedule an appointment.  Attend my follow-up appointment with my Cardiologist.  Take my medications as  prescribed by my doctor  Exercise daily  Lower my bad cholesterol and raise my good cholesterol, lower my blood pressure, and Reduce stress    Medications:  Certain medications are used to treat ACS.  Remember to always take medications as prescribed and never stop talking medications unless told by your doctor.    You have been prescribed the following medicatons:    Aspirin - Aspirin is used as a blood thinning medication and you will require this medication indefinitely.  Beta-Blocker - Beta-Blocker Metoprolol is used to lower blood pressure and heart rate, and/or helps your heart heal after a heart attack.  Statin - Statin atorvasatin is used to lower cholesterol.    -Is this patient being discharged with medication to prevent blood clots?  Yes, Aspirin   Aspirin, ASA oral tablets  What is this medicine?  ASPIRIN (AS pir in) is a pain reliever. It is used to treat mild pain and fever. This medicine is also used as directed by a doctor to prevent and to treat heart attacks, to prevent strokes and blood clots, and to treat arthritis or inflammation.  This medicine may be used for other purposes; ask your health care provider or pharmacist if you have questions.  COMMON BRAND NAME(S): Aspir-Low, Aspir-Thuy, Aspirtab, Analisa Advanced Aspirin, Analisa Aspirin, Analisa Aspirin Extra Strength, Analisa Aspirin Plus, Analisa Extra Strength, Analisa Extra Strength Plus, Analisa Genuine Aspirin, Analisa Womens Aspirin, Bufferin, Bufferin Extra Strength, Bufferin Low Dose  What should I tell my health care provider before I take this medicine?  They need to know if you have any of these conditions:  anemia  asthma  bleeding problems  child with chickenpox, the flu, or other viral infection  diabetes  gout  if you frequently drink alcohol containing drinks  kidney disease  liver disease  low level of vitamin K  lupus  smoke tobacco  stomach ulcers or other problems  an unusual or allergic reaction to aspirin, tartrazine dye, other  medicines, dyes, or preservatives  pregnant or trying to get pregnant  breast-feeding  How should I use this medicine?  Take this medicine by mouth with a glass of water. Follow the directions on the package or prescription label. You can take this medicine with or without food. If it upsets your stomach, take it with food. Do not take your medicine more often than directed.  Talk to your pediatrician regarding the use of this medicine in children. While this drug may be prescribed for children as young as 12 years of age for selected conditions, precautions do apply. Children and teenagers should not use this medicine to treat chicken pox or flu symptoms unless directed by a doctor.  Patients over 65 years old may have a stronger reaction and need a smaller dose.  Overdosage: If you think you have taken too much of this medicine contact a poison control center or emergency room at once.  NOTE: This medicine is only for you. Do not share this medicine with others.  What if I miss a dose?  If you are taking this medicine on a regular schedule and miss a dose, take it as soon as you can. If it is almost time for your next dose, take only that dose. Do not take double or extra doses.  What may interact with this medicine?  Do not take this medicine with any of the following medications:  cidofovir  ketorolac  probenecid  This medicine may also interact with the following medications:  alcohol  alendronate  bismuth subsalicylate  flavocoxid  herbal supplements like feverfew, garlic, mima, ginkgo biloba, horse chestnut  medicines for diabetes or glaucoma like acetazolamide, methazolamide  medicines for gout  medicines that treat or prevent blood clots like enoxaparin, heparin, ticlopidine, warfarin  other aspirin and aspirin-like medicines  NSAIDs, medicines for pain and inflammation, like ibuprofen or naproxen  pemetrexed  sulfinpyrazone  varicella live vaccine  This list may not describe all possible interactions.  Give your health care provider a list of all the medicines, herbs, non-prescription drugs, or dietary supplements you use. Also tell them if you smoke, drink alcohol, or use illegal drugs. Some items may interact with your medicine.  What should I watch for while using this medicine?  If you are treating yourself for pain, tell your doctor or health care professional if the pain lasts more than 10 days, if it gets worse, or if there is a new or different kind of pain. Tell your doctor if you see redness or swelling. Also, check with your doctor if you have a fever that lasts for more than 3 days. Only take this medicine to prevent heart attacks or blood clotting if prescribed by your doctor or health care professional.  Do not take aspirin or aspirin-like medicines with this medicine. Too much aspirin can be dangerous. Always read the labels carefully.  This medicine can irritate your stomach or cause bleeding problems. Do not smoke cigarettes or drink alcohol while taking this medicine. Do not lie down for 30 minutes after taking this medicine to prevent irritation to your throat.  If you are scheduled for any medical or dental procedure, tell your healthcare provider that you are taking this medicine. You may need to stop taking this medicine before the procedure.  This medicine may be used to treat migraines. If you take migraine medicines for 10 or more days a month, your migraines may get worse. Keep a diary of headache days and medicine use. Contact your healthcare professional if your migraine attacks occur more frequently.  What side effects may I notice from receiving this medicine?  Side effects that you should report to your doctor or health care professional as soon as possible:  allergic reactions like skin rash, itching or hives, swelling of the face, lips, or tongue  breathing problems  changes in hearing, ringing in the ears  confusion  general ill feeling or flu-like symptoms  pain on  swallowing  redness, blistering, peeling or loosening of the skin, including inside the mouth or nose  signs and symptoms of bleeding such as bloody or black, tarry stools; red or dark-brown urine; spitting up blood or brown material that looks like coffee grounds; red spots on the skin; unusual bruising or bleeding from the eye, gums, or nose  trouble passing urine or change in the amount of urine  unusually weak or tired  yellowing of the eyes or skin  Side effects that usually do not require medical attention (report to your doctor or health care professional if they continue or are bothersome):  diarrhea or constipation  headache  nausea, vomiting  stomach gas, heartburn  This list may not describe all possible side effects. Call your doctor for medical advice about side effects. You may report side effects to FDA at 2-933-FDA-0774.  Where should I keep my medicine?  Keep out of the reach of children.  Store at room temperature between 15 and 30 degrees C (59 and 86 degrees F). Protect from heat and moisture. Do not use this medicine if it has a strong vinegar smell. Throw away any unused medicine after the expiration date.  NOTE: This sheet is a summary. It may not cover all possible information. If you have questions about this medicine, talk to your doctor, pharmacist, or health care provider.  © 2020 Elsevier/Gold Standard (2018-01-30 10:42:13)    Is patient discharged on Warfarin / Coumadin?   No

## 2022-10-19 NOTE — DISCHARGE SUMMARY
Discharge Summary    CHIEF COMPLAINT ON ADMISSION  Chief Complaint   Patient presents with    Chest Pain     Tightness in chest, started a few days ago but this morning after a walk he still has tightness. PMH MI with stent x2.        Reason for Admission  Chest pain    Admission Date  10/13/2022     CODE STATUS  Full Code    HPI & HOSPITAL COURSE    Mr. Cornelio Branch is a 72-year-old male with a history of CAD with prior PCI x2 in 2005 and again in 2013, GERD, asthma, hypertension, and BPH who was admitted with exertional chest pain.  He underwent left heart catheterization on 10/13 which showed multivessel CAD.  CT surgery was consulted who recommended CABG. The patient was scheduled for CABG procedure on 10/17/2022, however the procedure was canceled due to the development of a rash to his right upper extremity thought to be secondary to heparin infusion. CT surgery, hematology, and critical care were in agreement that heparin should be stopped. It was determined that the patient would need transfer to another facility for CABG without heparin infusion. The patient's rash did improvement with the discontinuation of heparin. The [facility] accepted the patient on the evening of 10/18/2022, and the patient was transferred in stable, but guarded condition.     Thus the patient was transferred to outlying facility for emergent CABG without heparin.     DISCHARGE DIAGNOSES  Principal Problem:    ACS (acute coronary syndrome) (HCC) POA: Yes  Active Problems:    CAD (coronary artery disease) (Chronic) POA: Yes    Hypertension POA: Unknown    Constipation POA: Unknown  Resolved Problems:    * No resolved hospital problems. *      FOLLOW UP  Future Appointments   Date Time Provider Department Center   10/25/2022  8:30 AM LAB [facility] None   12/2/2022  8:30 AM [address]     [facility]  [address]  [address]  [facility]  [id number] [phone]  Call  Your doctor has referred you for Cardiac Rehab which is important in your recovery. Please call to make an appointment.      MEDICATIONS ON DISCHARGE     Medication List        ASK your doctor about these medications        Instructions   aspirin 81 MG tablet   Take 81 mg by mouth every day.  Dose: 81 mg     atorvastatin 40 MG Tabs  Commonly known as: LIPITOR   Take 1 Tablet by mouth every day.  Dose: 40 mg     B-COMPLEX/B-12 PO   Take 1,000 Tabs by mouth every day.  Dose: 1,000 Tablet     COQ10 PO   Take 1 Tablet by mouth every day.  Dose: 1 Tablet     ezetimibe 10 MG Tabs  Commonly known as: ZETIA   Take 1 Tablet by mouth every day.  Dose: 10 mg     Flovent  MCG/ACT Aero  Generic drug: fluticasone   Inhale 2 Puffs every four hours as needed. Indications: Asthma  Dose: 2 Puff     KRILL OIL PO   Take 1 Tablet by mouth every morning.  Dose: 1 Tablet     lisinopril 20 MG Tabs  Commonly known as: PRINIVIL   Take 1 Tablet by mouth 2 times a day.  Dose: 20 mg     metoprolol tartrate 25 MG Tabs  Commonly known as: LOPRESSOR   Take 1 Tablet by mouth 2 times a day.  Dose: 25 mg     MULTI-VITAMIN DAILY PO   Take 1 Tablet by mouth every morning.  Dose: 1 Tablet     NexIUM 40 MG delayed-release capsule  Generic drug: esomeprazole   Take 40 mg by mouth 2 times a day.  Dose: 40 mg     PROVENTIL INH   Inhale 2 Puffs every four hours as needed.  Dose: 2 Puff              Allergies  Allergies   Allergen Reactions    Heparin Rash     Concern for development of possible  heparin induced skin necrosis following heparin administration 10/17/22       DIET  Orders Placed This Encounter   Procedures    Diet Order Diet: Cardiac     Standing Status:   Standing     Number of Occurrences:   1     Order Specific Question:   Diet:     Answer:   Cardiac [6]       LINES, DRAINS, AND WOUNDS  This is an automated list. Peripheral IVs will be removed prior to discharge.  Peripheral IV 10/13/22 20 G Right  Antecubital (Active)   Site Assessment Clean;Dry;Intact 10/18/22 0800   Dressing Type Transparent 10/18/22 0800   Line Status Saline locked 10/18/22 0800   Dressing Status Clean;Dry;Intact 10/18/22 0800   Dressing Intervention N/A 10/18/22 0800   Infiltration Grading ([facility], CV) 0 10/18/22 0800   Phlebitis Scale ([facility] Only) 0 10/18/22 0800          Peripheral IV 10/13/22 20 G Right Antecubital (Active)   Site Assessment Clean;Dry;Intact 10/18/22 0800   Dressing Type Transparent 10/18/22 0800   Line Status Saline locked 10/18/22 0800   Dressing Status Clean;Dry;Intact 10/18/22 0800   Dressing Intervention N/A 10/18/22 0800   Infiltration Grading ([facility], CV) 0 10/18/22 0800   Phlebitis Scale ([facility] Only) 0 10/18/22 0800               MENTAL STATUS ON TRANSFER  Alert and oriented x 4, baseline    CONSULTATIONS  Cardiothoracic Surgery   Cardiology     PROCEDURES  None    LABORATORY  Lab Results   Component Value Date    SODIUM 135 10/18/2022    POTASSIUM 4.2 10/18/2022    CHLORIDE 100 10/18/2022    CO2 23 10/18/2022    GLUCOSE 105 (H) 10/18/2022    BUN 18 10/18/2022    CREATININE 0.82 10/18/2022        Lab Results   Component Value Date    WBC 13.9 (H) 10/18/2022    HEMOGLOBIN 16.4 10/18/2022    HEMATOCRIT 46.6 10/18/2022    PLATELETCT 254 10/18/2022        Total time of the discharge process exceeds 35 minutes.

## 2022-10-19 NOTE — DISCHARGE PLANNING
Received request from [facility] to assembly a transfer packet for pt transferring to:  [address] [address]  Accepting: Dr Hilario  Room Assignment: D60 2110  Report: [phone]  PCS previously faxed to [facility]. Called film room to put imaging on disc.   Transfer packet includes: facesheet x2, COBRA, encounter summary, Imaging disc and PCS. Bedside RN aware to finish filling out COBRA (highlighted in yellow) and to add DC summary once it appears in Epic. All transfer questions will be referred to [facility] at [phone].

## 2022-11-02 ENCOUNTER — PATIENT MESSAGE (OUTPATIENT)
Dept: HEALTH INFORMATION MANAGEMENT | Facility: OTHER | Age: 72
End: 2022-11-02

## 2022-11-03 ENCOUNTER — TELEPHONE (OUTPATIENT)
Dept: CARDIOLOGY | Facility: MEDICAL CENTER | Age: 72
End: 2022-11-03
Payer: MEDICARE

## 2022-11-03 DIAGNOSIS — Z95.1 HX OF CABG: ICD-10-CM

## 2022-11-03 DIAGNOSIS — I25.10 CORONARY ARTERY DISEASE INVOLVING NATIVE CORONARY ARTERY OF NATIVE HEART WITHOUT ANGINA PECTORIS: ICD-10-CM

## 2022-11-03 NOTE — TELEPHONE ENCOUNTER
ARBEN    Caller: Alberta alatorre/ [facility]    Topic/issue: They are asking to referral to be sent to them For Home Health and Occupational therapy. They received one from [facility] but need it from an [facility] Doctor.    Callback Number: Alberta  PH: [phone]  Fax: [phone]    Thank you    Nyasia ALONZO

## 2022-11-03 NOTE — TELEPHONE ENCOUNTER
To ARBEN: Ok to place home health order. Patient underwent CABG at [facility].

## 2022-11-04 NOTE — TELEPHONE ENCOUNTER
Spoke to Alberta with [facility]. Order placed for Home Health matching referral from [facility]. Alberta will check with patient PCP to see if they will follow.

## 2022-11-08 ENCOUNTER — TELEPHONE (OUTPATIENT)
Dept: CARDIOLOGY | Facility: MEDICAL CENTER | Age: 72
End: 2022-11-08
Payer: MEDICARE

## 2022-11-08 NOTE — TELEPHONE ENCOUNTER
ARBEN        Caller: Eileen with [facility] health    Topic/issue: Their office was calling needed a verbal order providing an ok for home health orders. She was asking for a call back to discuss it    Callback Number: [phone]      Thank you      -David AGUIRRE

## 2022-11-09 ENCOUNTER — HOSPITAL ENCOUNTER (OUTPATIENT)
Dept: LAB | Facility: MEDICAL CENTER | Age: 72
End: 2022-11-09
Attending: FAMILY MEDICINE
Payer: MEDICARE

## 2022-11-09 ENCOUNTER — HOSPITAL ENCOUNTER (OUTPATIENT)
Dept: RADIOLOGY | Facility: MEDICAL CENTER | Age: 72
End: 2022-11-09
Attending: FAMILY MEDICINE
Payer: MEDICARE

## 2022-11-09 DIAGNOSIS — Z95.1 S/P CABG X 4: ICD-10-CM

## 2022-11-09 LAB
ANION GAP SERPL CALC-SCNC: 8 MMOL/L (ref 7–16)
BASOPHILS # BLD AUTO: 0.6 % (ref 0–1.8)
BASOPHILS # BLD: 0.06 K/UL (ref 0–0.12)
BUN SERPL-MCNC: 26 MG/DL (ref 8–22)
CALCIUM SERPL-MCNC: 9 MG/DL (ref 8.5–10.5)
CHLORIDE SERPL-SCNC: 101 MMOL/L (ref 96–112)
CO2 SERPL-SCNC: 26 MMOL/L (ref 20–33)
CREAT SERPL-MCNC: 1.07 MG/DL (ref 0.5–1.4)
EOSINOPHIL # BLD AUTO: 0.32 K/UL (ref 0–0.51)
EOSINOPHIL NFR BLD: 3.5 % (ref 0–6.9)
ERYTHROCYTE [DISTWIDTH] IN BLOOD BY AUTOMATED COUNT: 57.5 FL (ref 35.9–50)
GFR SERPLBLD CREATININE-BSD FMLA CKD-EPI: 73 ML/MIN/1.73 M 2
GLUCOSE SERPL-MCNC: 108 MG/DL (ref 65–99)
HCT VFR BLD AUTO: 36.4 % (ref 42–52)
HGB BLD-MCNC: 11.8 G/DL (ref 14–18)
IMM GRANULOCYTES # BLD AUTO: 0.04 K/UL (ref 0–0.11)
IMM GRANULOCYTES NFR BLD AUTO: 0.4 % (ref 0–0.9)
LYMPHOCYTES # BLD AUTO: 2.4 K/UL (ref 1–4.8)
LYMPHOCYTES NFR BLD: 26 % (ref 22–41)
MCH RBC QN AUTO: 31.8 PG (ref 27–33)
MCHC RBC AUTO-ENTMCNC: 32.4 G/DL (ref 33.7–35.3)
MCV RBC AUTO: 98.1 FL (ref 81.4–97.8)
MONOCYTES # BLD AUTO: 0.7 K/UL (ref 0–0.85)
MONOCYTES NFR BLD AUTO: 7.6 % (ref 0–13.4)
NEUTROPHILS # BLD AUTO: 5.72 K/UL (ref 1.82–7.42)
NEUTROPHILS NFR BLD: 61.9 % (ref 44–72)
NRBC # BLD AUTO: 0 K/UL
NRBC BLD-RTO: 0 /100 WBC
PLATELET # BLD AUTO: 692 K/UL (ref 164–446)
PMV BLD AUTO: 8.3 FL (ref 9–12.9)
POTASSIUM SERPL-SCNC: 4.5 MMOL/L (ref 3.6–5.5)
RBC # BLD AUTO: 3.71 M/UL (ref 4.7–6.1)
SODIUM SERPL-SCNC: 135 MMOL/L (ref 135–145)
WBC # BLD AUTO: 9.2 K/UL (ref 4.8–10.8)

## 2022-11-09 PROCEDURE — 80048 BASIC METABOLIC PNL TOTAL CA: CPT

## 2022-11-09 PROCEDURE — 85025 COMPLETE CBC W/AUTO DIFF WBC: CPT | Mod: GA

## 2022-11-09 PROCEDURE — 36415 COLL VENOUS BLD VENIPUNCTURE: CPT

## 2022-11-09 PROCEDURE — 71046 X-RAY EXAM CHEST 2 VIEWS: CPT

## 2022-11-10 NOTE — TELEPHONE ENCOUNTER
Called back Eileen and she confirmed they received the HH referral from 11/04/22 and it's all set to initiate care. However, their protocol requires a verbal order from our office for continuity of care. Advised referral to home health is per [place], confirmed this RN's name and credentials. She verbalized understanding and had no other questions or concerns. She was appreciative of call back.

## 2022-11-14 ENCOUNTER — OFFICE VISIT (OUTPATIENT)
Dept: CARDIOLOGY | Facility: MEDICAL CENTER | Age: 72
End: 2022-11-14
Payer: MEDICARE

## 2022-11-14 ENCOUNTER — PATIENT MESSAGE (OUTPATIENT)
Dept: CARDIOLOGY | Facility: MEDICAL CENTER | Age: 72
End: 2022-11-14

## 2022-11-14 VITALS
OXYGEN SATURATION: 96 % | HEIGHT: 64 IN | SYSTOLIC BLOOD PRESSURE: 106 MMHG | WEIGHT: 168 LBS | RESPIRATION RATE: 16 BRPM | HEART RATE: 72 BPM | BODY MASS INDEX: 28.68 KG/M2 | DIASTOLIC BLOOD PRESSURE: 72 MMHG

## 2022-11-14 DIAGNOSIS — E78.2 MIXED HYPERLIPIDEMIA: ICD-10-CM

## 2022-11-14 DIAGNOSIS — I10 HTN (HYPERTENSION), MALIGNANT: ICD-10-CM

## 2022-11-14 DIAGNOSIS — Z95.5 STENTED CORONARY ARTERY: ICD-10-CM

## 2022-11-14 DIAGNOSIS — I65.23 ATHEROSCLEROSIS OF BOTH CAROTID ARTERIES: ICD-10-CM

## 2022-11-14 DIAGNOSIS — Z79.899 HIGH RISK MEDICATION USE: ICD-10-CM

## 2022-11-14 DIAGNOSIS — I10 ESSENTIAL HYPERTENSION, BENIGN: ICD-10-CM

## 2022-11-14 DIAGNOSIS — I25.10 CORONARY ARTERY DISEASE INVOLVING NATIVE CORONARY ARTERY OF NATIVE HEART WITHOUT ANGINA PECTORIS: ICD-10-CM

## 2022-11-14 DIAGNOSIS — Z95.1 HX OF CABG: ICD-10-CM

## 2022-11-14 PROCEDURE — 99214 OFFICE O/P EST MOD 30 MIN: CPT | Performed by: INTERNAL MEDICINE

## 2022-11-14 RX ORDER — ROSUVASTATIN CALCIUM 40 MG/1
40 TABLET, COATED ORAL DAILY
Qty: 90 TABLET | Refills: 4 | Status: SHIPPED | OUTPATIENT
Start: 2022-11-14 | End: 2023-02-15 | Stop reason: SDUPTHER

## 2022-11-14 ASSESSMENT — FIBROSIS 4 INDEX: FIB4 SCORE: 0.68

## 2022-11-14 ASSESSMENT — ENCOUNTER SYMPTOMS
SORE THROAT: 0
SHORTNESS OF BREATH: 0
DIZZINESS: 0
ORTHOPNEA: 0
BLOOD IN STOOL: 0
CLAUDICATION: 0
FALLS: 0
EYE REDNESS: 0
PND: 0
DEPRESSION: 0
COUGH: 0
PALPITATIONS: 0
BRUISES/BLEEDS EASILY: 0

## 2022-11-14 NOTE — PROGRESS NOTES
Chief Complaint   Patient presents with    Coronary Artery Disease     F/V Dx: Coronary artery disease involving native coronary artery of native heart without angina pectoris    Hypertension    Dyslipidemia       Subjective     Cornelio Branch is a 72 y.o. male who presents today for cardiac care due to established coronary artery disease with prior coronary intervention, hypertension, hyperlipidemia.  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  On October 23, 2022, patient underwent successful CABG at [facility].    Doing well after surgery. NO chest pain.     Patient is feeling better these days. Does get winded upon walking up inclines or for distance. No symptoms at rest or with daily living activities.      Past Medical History:   Diagnosis Date    Arthritis     fingers    ASTHMA     uses inhaler prn    Back pain     CAD (coronary artery disease) 6/21/2011    CAD (coronary artery disease)     Carotid artery plaque 6/21/2011    Esophageal reflux 11/3/2009    Heart burn     High cholesterol     History of asthma 11/3/2009    History of benign prostatic hypertrophy 11/3/2009    History of cardiac catheterization 6/21/2011    History of echocardiogram 6/20/2011    History of neck surgery     Hardware in neck    HLD (hyperlipidemia) 6/1/2011    Hypertension     Indigestion     Previous back surgery     Stented coronary artery 2005     Past Surgical History:   Procedure Laterality Date    WA NJX AA&/STRD TFRML EPI LUMBAR/SACRAL 1 LEVEL Bilateral 9/2/2016    Procedure: INJ-FORAMEN EPI LUM/SAC SNGL - L4-5;  Surgeon: Jesus Rojas M.D.;  [facility];  Service: Pain Management    WA NJX AA&/STRD TFRML EPI LUMBAR/SACRAL 1 LEVEL  9/2/2016    Procedure: INJ-FORAMEN EPI LUM/SAC SNGL;  Surgeon: Jesus Rojas M.D.;  Location[facility];  Service: Pain Management    PB  FLUORSCOPIC GUIDANCE SPINAL INJECTION  2016    Procedure: FLUOROGUIDE FOR SPINAL INJ;  Surgeon: Jesus Rojas M.D.;  Location: [facility];  Service: Pain Management    LUMBAR LAMINECTOMY DISKECTOMY  2012    Performed by MARLIN CANDELARIA at SURGERY [facility]    FORAMINOTOMY  2012    Performed by MARLIN CANDELARIA at [facility]    LUMBAR LAMINECTOMY DISKECTOMY  3/9/2011    Performed by MARLIN CANDELARIA at SURGERY [facility]    FORAMINOTOMY  3/9/2011    Performed by MARLIN CANDELARIA at SURGERY [facility]    ANGIOGRAM      CERVICAL DISK AND FUSION ANTERIOR      OTHER CARDIAC SURGERY   heart stents     Family History   Problem Relation Age of Onset    Heart Attack Neg Hx      Social History     Socioeconomic History    Marital status:      Spouse name: Not on file    Number of children: Not on file    Years of education: Not on file    Highest education level: Master's degree (e.g., MA, MS, Sudeep, MEd, MSW, RENEE)   Occupational History    Not on file   Tobacco Use    Smoking status: Former     Types: Cigars     Quit date: 1987     Years since quittin.6    Smokeless tobacco: Never    Tobacco comments:     Cigars socially   Substance and Sexual Activity    Alcohol use: Yes     Alcohol/week: 0.6 oz     Types: 1 Standard drinks or equivalent per week     Comment: socially    Drug use: No    Sexual activity: Not on file   Other Topics Concern    Not on file   Social History Narrative    ** Merged History Encounter **          Social Determinants of Health     Financial Resource Strain: Not on file   Food Insecurity: Not on file   Transportation Needs: Not on file   Physical Activity: Not on file   Stress: Not on file   Social Connections: Not on file   Intimate Partner Violence: Not on file   Housing Stability: Not on file     Allergies   Allergen Reactions    Heparin Rash     Concern for development of possible  heparin induced skin necrosis following heparin  administration 10/17/22     Outpatient Encounter Medications as of 11/14/2022   Medication Sig Dispense Refill    metoprolol tartrate (LOPRESSOR) 25 MG Tab Take 1 Tablet by mouth 2 times a day. 180 Tablet 3    rosuvastatin (CRESTOR) 40 MG tablet Take 1 Tablet by mouth every day. 90 Tablet 4    esomeprazole (NEXIUM) 40 MG delayed-release capsule Take 1 Capsule by mouth 2 times a day.      Multiple Vitamin (MULTI-VITAMIN DAILY PO) Take 1 Tablet by mouth every morning.      Albuterol (PROVENTIL INH) Inhale 2 Puffs every four hours as needed.      KRILL OIL PO Take 1 Tablet by mouth every morning.      Coenzyme Q10 (COQ10 PO) Take 1 Tablet by mouth every day.      FLOVENT  MCG/ACT Aerosol Inhale 2 Puffs every four hours as needed. Indications: Asthma      B Complex Vitamins (B-COMPLEX/B-12 PO) Take 1,000 Tabs by mouth every day.      aspirin 81 MG tablet Take 1 Tablet by mouth every day.      [DISCONTINUED] ezetimibe (ZETIA) 10 MG Tab Take 1 Tablet by mouth every day. 90 Tablet 3    [DISCONTINUED] lisinopril (PRINIVIL) 20 MG Tab Take 1 Tablet by mouth 2 times a day. (Patient not taking: Reported on 11/14/2022) 180 Tablet 3    [DISCONTINUED] metoprolol tartrate (LOPRESSOR) 25 MG Tab Take 1 Tablet by mouth 2 times a day. (Patient taking differently: Take 0.5 Tablets by mouth 2 times a day.) 180 Tablet 3    [DISCONTINUED] atorvastatin (LIPITOR) 40 MG Tab Take 1 Tablet by mouth every day. 90 Tablet 3     No facility-administered encounter medications on file as of 11/14/2022.     Review of Systems   Constitutional:  Negative for malaise/fatigue.   HENT:  Negative for sore throat.    Eyes:  Negative for redness.   Respiratory:  Negative for cough and shortness of breath.    Cardiovascular:  Negative for chest pain, palpitations, orthopnea, claudication, leg swelling and PND.   Gastrointestinal:  Negative for blood in stool and melena.   Musculoskeletal:  Negative for falls.   Skin:  Negative for rash.   Neurological:   "Negative for dizziness.   Endo/Heme/Allergies:  Does not bruise/bleed easily.   Psychiatric/Behavioral:  Negative for depression.             Objective     /72 (BP Location: Left arm, Patient Position: Sitting, BP Cuff Size: Adult)   Pulse 72   Resp 16   Ht 1.626 m (5' 4\")   Wt 76.2 kg (168 lb)   SpO2 96%   BMI 28.84 kg/m²     Physical Exam  Vitals and nursing note reviewed.   Constitutional:       General: He is not in acute distress.     Appearance: He is not diaphoretic.   HENT:      Head: Normocephalic and atraumatic.      Right Ear: External ear normal.      Left Ear: External ear normal.      Nose: No congestion or rhinorrhea.   Eyes:      General:         Right eye: No discharge.         Left eye: No discharge.   Neck:      Thyroid: No thyromegaly.      Vascular: No JVD.   Cardiovascular:      Rate and Rhythm: Normal rate and regular rhythm.      Pulses: Normal pulses.   Pulmonary:      Effort: No respiratory distress.   Abdominal:      General: There is no distension.      Tenderness: There is no abdominal tenderness.   Musculoskeletal:         General: No swelling or tenderness.      Right lower leg: No edema.      Left lower leg: No edema.   Skin:     General: Skin is warm and dry.   Neurological:      Mental Status: He is alert and oriented to person, place, and time.      Cranial Nerves: No cranial nerve deficit.   Psychiatric:         Behavior: Behavior normal.              Assessment & Plan     1. Coronary artery disease involving native coronary artery of native heart without angina pectoris        2. Hx of CABG        3. Stented coronary artery        4. Atherosclerosis of both carotid arteries        5. HTN (hypertension), malignant        6. Mixed hyperlipidemia        7. High risk medication use        8. Essential hypertension, benign  metoprolol tartrate (LOPRESSOR) 25 MG Tab          Medical Decision Making: Today's Assessment/Status/Plan:   Coronary arterial disease s/p CABG x 4 in " 10/2022:    Will stop Zetia.    Will stop Atorvastatin.    Will start Rosuvastatin 40 mg daily.    Will increase Metoprolol to 25 mg twice a day.     Hypertension:  Optimize control with BB, as permitted above in conjunction with CAD treatment.     Hyperlipidemia:  Optimize statin as within guidelines of CAD treatment as above.    Overall, based on prior history of obstructive coronary arterial disease, patient is at at high risk for recurrent events and will require consistent ongoing guidelines directed medical therapy to reduce his cardiovascular mortality and associated complications.    I will see patient back in clinic with lab tests and studies results in 3 months.    I thank you for referring patient to our Cardiology Clinic today.      Van Childress MD.   [facility].  [phone]  [place].

## 2022-11-14 NOTE — PATIENT INSTRUCTIONS
Will stop Zetia.    Will stop Atorvastatin.    Will start Rosuvastatin 40 mg daily.    Will increase Metoprolol to 25 mg twice a day.

## 2022-11-22 ENCOUNTER — TELEPHONE (OUTPATIENT)
Dept: CARDIOLOGY | Facility: MEDICAL CENTER | Age: 72
End: 2022-11-22
Payer: MEDICARE

## 2022-11-22 NOTE — TELEPHONE ENCOUNTER
TT    Caller: Lucy from [facility]     Topic/issue: [facility] calling on behalf of patient asking for a prescription refill of ELIQUIS 5mg twice daily, originally prescribed from [facility]. Patient is needing 60 days worth of medication. Please reach out to advise.     Callback Number: [phone] (home)      Thank you,   Yamini ROSE

## 2022-11-26 ENCOUNTER — PATIENT MESSAGE (OUTPATIENT)
Dept: CARDIOLOGY | Facility: MEDICAL CENTER | Age: 72
End: 2022-11-26
Payer: MEDICARE

## 2023-01-02 ENCOUNTER — PATIENT MESSAGE (OUTPATIENT)
Dept: CARDIOLOGY | Facility: MEDICAL CENTER | Age: 73
End: 2023-01-02
Payer: MEDICARE

## 2023-01-04 ENCOUNTER — HOSPITAL ENCOUNTER (OUTPATIENT)
Dept: LAB | Facility: MEDICAL CENTER | Age: 73
End: 2023-01-04
Attending: FAMILY MEDICINE
Payer: MEDICARE

## 2023-01-04 ENCOUNTER — HOSPITAL ENCOUNTER (OUTPATIENT)
Dept: LAB | Facility: MEDICAL CENTER | Age: 73
End: 2023-01-04
Attending: INTERNAL MEDICINE
Payer: MEDICARE

## 2023-01-04 LAB
ALBUMIN SERPL BCP-MCNC: 4.1 G/DL (ref 3.2–4.9)
ALBUMIN/GLOB SERPL: 1.3 G/DL
ALP SERPL-CCNC: 67 U/L (ref 30–99)
ALT SERPL-CCNC: 28 U/L (ref 2–50)
ANION GAP SERPL CALC-SCNC: 10 MMOL/L (ref 7–16)
AST SERPL-CCNC: 23 U/L (ref 12–45)
BASOPHILS # BLD AUTO: 0.6 % (ref 0–1.8)
BASOPHILS # BLD: 0.05 K/UL (ref 0–0.12)
BILIRUB SERPL-MCNC: 0.3 MG/DL (ref 0.1–1.5)
BUN SERPL-MCNC: 20 MG/DL (ref 8–22)
CALCIUM ALBUM COR SERPL-MCNC: 9.4 MG/DL (ref 8.5–10.5)
CALCIUM SERPL-MCNC: 9.5 MG/DL (ref 8.5–10.5)
CHLORIDE SERPL-SCNC: 101 MMOL/L (ref 96–112)
CHOLEST SERPL-MCNC: 154 MG/DL (ref 100–199)
CO2 SERPL-SCNC: 26 MMOL/L (ref 20–33)
CREAT SERPL-MCNC: 0.85 MG/DL (ref 0.5–1.4)
EOSINOPHIL # BLD AUTO: 0.4 K/UL (ref 0–0.51)
EOSINOPHIL NFR BLD: 5 % (ref 0–6.9)
ERYTHROCYTE [DISTWIDTH] IN BLOOD BY AUTOMATED COUNT: 49.6 FL (ref 35.9–50)
FASTING STATUS PATIENT QL REPORTED: NORMAL
GFR SERPLBLD CREATININE-BSD FMLA CKD-EPI: 91 ML/MIN/1.73 M 2
GLOBULIN SER CALC-MCNC: 3.2 G/DL (ref 1.9–3.5)
GLUCOSE SERPL-MCNC: 97 MG/DL (ref 65–99)
HCT VFR BLD AUTO: 47.1 % (ref 42–52)
HDLC SERPL-MCNC: 39 MG/DL
HGB BLD-MCNC: 15.3 G/DL (ref 14–18)
IMM GRANULOCYTES # BLD AUTO: 0.02 K/UL (ref 0–0.11)
IMM GRANULOCYTES NFR BLD AUTO: 0.3 % (ref 0–0.9)
LDLC SERPL CALC-MCNC: 85 MG/DL
LYMPHOCYTES # BLD AUTO: 3.03 K/UL (ref 1–4.8)
LYMPHOCYTES NFR BLD: 38.1 % (ref 22–41)
MCH RBC QN AUTO: 30.4 PG (ref 27–33)
MCHC RBC AUTO-ENTMCNC: 32.5 G/DL (ref 33.7–35.3)
MCV RBC AUTO: 93.5 FL (ref 81.4–97.8)
MONOCYTES # BLD AUTO: 0.67 K/UL (ref 0–0.85)
MONOCYTES NFR BLD AUTO: 8.4 % (ref 0–13.4)
NEUTROPHILS # BLD AUTO: 3.78 K/UL (ref 1.82–7.42)
NEUTROPHILS NFR BLD: 47.6 % (ref 44–72)
NRBC # BLD AUTO: 0 K/UL
NRBC BLD-RTO: 0 /100 WBC
PLATELET # BLD AUTO: 301 K/UL (ref 164–446)
PMV BLD AUTO: 9.9 FL (ref 9–12.9)
POTASSIUM SERPL-SCNC: 4 MMOL/L (ref 3.6–5.5)
PROT SERPL-MCNC: 7.3 G/DL (ref 6–8.2)
PSA SERPL-MCNC: 0.87 NG/ML (ref 0–4)
RBC # BLD AUTO: 5.04 M/UL (ref 4.7–6.1)
SODIUM SERPL-SCNC: 137 MMOL/L (ref 135–145)
TRIGL SERPL-MCNC: 148 MG/DL (ref 0–149)
TSH SERPL DL<=0.005 MIU/L-ACNC: 2.75 UIU/ML (ref 0.38–5.33)
WBC # BLD AUTO: 8 K/UL (ref 4.8–10.8)

## 2023-01-04 PROCEDURE — 85025 COMPLETE CBC W/AUTO DIFF WBC: CPT

## 2023-01-04 PROCEDURE — 84443 ASSAY THYROID STIM HORMONE: CPT

## 2023-01-04 PROCEDURE — 80061 LIPID PANEL: CPT

## 2023-01-04 PROCEDURE — 84153 ASSAY OF PSA TOTAL: CPT

## 2023-01-04 PROCEDURE — 80053 COMPREHEN METABOLIC PANEL: CPT

## 2023-01-06 ENCOUNTER — NON-PROVIDER VISIT (OUTPATIENT)
Dept: CARDIOLOGY | Facility: MEDICAL CENTER | Age: 73
End: 2023-01-06
Payer: MEDICARE

## 2023-01-06 DIAGNOSIS — Z95.1 HX OF CABG: Primary | ICD-10-CM

## 2023-01-06 PROCEDURE — G0423 INTENS CARDIAC REHAB NO EXER: HCPCS | Mod: 59 | Performed by: INTERNAL MEDICINE

## 2023-01-06 PROCEDURE — G0422 INTENS CARDIAC REHAB W/EXERC: HCPCS | Performed by: INTERNAL MEDICINE

## 2023-01-06 NOTE — PROGRESS NOTES
Patient arrived for initial 1:1 Intensive Cardiac Rehabilitation Consultation and Education session with the Registered Nurse.  A total of 60 minutes was spent with the patient during which time a focused cardiovascular assessment was completed and musculoskeletal limitations were addressed in preparation to safely start the exercise portion of the program.  Education regarding the program was explained including exercise goals, precautions, and target heart rate during exercise.  Nutrition goals were reviewed and patient was introduced to the [facility] Program.  Stress management goals were dicussed and patient concerns and questions were answered at this time. Patient arrived for education at 1300 and visit was concluded at 1400. Exercise time was from 1400 to 1500.

## 2023-01-10 ENCOUNTER — NON-PROVIDER VISIT (OUTPATIENT)
Dept: CARDIOLOGY | Facility: MEDICAL CENTER | Age: 73
End: 2023-01-10
Payer: MEDICARE

## 2023-01-10 DIAGNOSIS — Z95.1 HX OF CABG: ICD-10-CM

## 2023-01-10 PROCEDURE — G0423 INTENS CARDIAC REHAB NO EXER: HCPCS | Mod: 59 | Performed by: INTERNAL MEDICINE

## 2023-01-10 PROCEDURE — G0422 INTENS CARDIAC REHAB W/EXERC: HCPCS | Performed by: INTERNAL MEDICINE

## 2023-01-10 NOTE — PROGRESS NOTES
Harman Branch attended Intensive Cardiac Rehab today from 1000 to 1200. During his time he exercised and attended class.   His education today was a video titled: Yoga. Patient received handouts and class discussion pertaining to the topic.

## 2023-01-11 ENCOUNTER — NON-PROVIDER VISIT (OUTPATIENT)
Dept: CARDIOLOGY | Facility: MEDICAL CENTER | Age: 73
End: 2023-01-11
Payer: MEDICARE

## 2023-01-11 DIAGNOSIS — Z95.1 HX OF CABG: ICD-10-CM

## 2023-01-11 PROCEDURE — G0422 INTENS CARDIAC REHAB W/EXERC: HCPCS | Performed by: INTERNAL MEDICINE

## 2023-01-11 PROCEDURE — G0423 INTENS CARDIAC REHAB NO EXER: HCPCS | Mod: 59 | Performed by: INTERNAL MEDICINE

## 2023-01-11 NOTE — PROGRESS NOTES
Harman Branch attended Intensive Cardiac Rehab today from 1000 to 1200. During his time he exercised and attended class.   His education today was a cooking class titled: Fast Evening Meals. Patient received handouts and class discussion pertaining to the topic.

## 2023-01-12 ENCOUNTER — NON-PROVIDER VISIT (OUTPATIENT)
Dept: CARDIOLOGY | Facility: MEDICAL CENTER | Age: 73
End: 2023-01-12
Payer: MEDICARE

## 2023-01-12 DIAGNOSIS — Z95.1 HX OF CABG: ICD-10-CM

## 2023-01-12 PROCEDURE — G0423 INTENS CARDIAC REHAB NO EXER: HCPCS | Mod: 59 | Performed by: INTERNAL MEDICINE

## 2023-01-12 PROCEDURE — G0422 INTENS CARDIAC REHAB W/EXERC: HCPCS | Performed by: INTERNAL MEDICINE

## 2023-01-12 NOTE — PROGRESS NOTES
Harman Branch attended Intensive Cardiac Rehab today from 1000 to 1200. During his time he exercised and attended class.   His education today was a lecture titled: Healthy Sleep for a Healthy Heart. Patient received handouts and class discussion pertaining to the topic.

## 2023-01-16 NOTE — PATIENT COMMUNICATION
To TT, pt's BP is still running high, > 140/90. Would you like him to restart Lisinopril 20 mg BID? He was previously on this but it looks like he reported not taking it at last appt in 11/2022, however, pt does not recall this or remember having issues with it.

## 2023-01-17 ENCOUNTER — NON-PROVIDER VISIT (OUTPATIENT)
Dept: CARDIOLOGY | Facility: MEDICAL CENTER | Age: 73
End: 2023-01-17
Payer: MEDICARE

## 2023-01-17 DIAGNOSIS — Z95.1 HX OF CABG: ICD-10-CM

## 2023-01-17 PROCEDURE — G0422 INTENS CARDIAC REHAB W/EXERC: HCPCS | Performed by: INTERNAL MEDICINE

## 2023-01-17 PROCEDURE — G0423 INTENS CARDIAC REHAB NO EXER: HCPCS | Mod: 59 | Performed by: INTERNAL MEDICINE

## 2023-01-17 RX ORDER — LISINOPRIL 20 MG/1
20 TABLET ORAL 2 TIMES DAILY
Qty: 180 TABLET | Refills: 3 | Status: SHIPPED | OUTPATIENT
Start: 2023-01-17 | End: 2023-02-15 | Stop reason: SDUPTHER

## 2023-01-17 NOTE — PATIENT COMMUNICATION
Van Childress M.D.  You 11 minutes ago (2:21 PM)     Yes ok to restart Lisinopril 20 mg BID.     Van Childress M.D.

## 2023-01-17 NOTE — NON-PROVIDER
Harman Branch attended Intensive Cardiac Rehab today from 10:00 to 12:00. During his time he exercised and attended class.   His education today was a video titled: Dining Out Part 1. Patient received handouts and class discussion pertaining to the topic.

## 2023-01-18 ENCOUNTER — NON-PROVIDER VISIT (OUTPATIENT)
Dept: CARDIOLOGY | Facility: MEDICAL CENTER | Age: 73
End: 2023-01-18
Payer: MEDICARE

## 2023-01-18 DIAGNOSIS — Z95.1 HX OF CABG: ICD-10-CM

## 2023-01-18 DIAGNOSIS — I10 ESSENTIAL HYPERTENSION, BENIGN: ICD-10-CM

## 2023-01-18 PROCEDURE — G0422 INTENS CARDIAC REHAB W/EXERC: HCPCS | Performed by: INTERNAL MEDICINE

## 2023-01-18 PROCEDURE — G0423 INTENS CARDIAC REHAB NO EXER: HCPCS | Mod: 59 | Performed by: INTERNAL MEDICINE

## 2023-01-18 RX ORDER — METOPROLOL TARTRATE 50 MG/1
50 TABLET, FILM COATED ORAL 2 TIMES DAILY
Qty: 180 TABLET | Refills: 3 | COMMUNITY
Start: 2023-01-18 | End: 2023-02-15

## 2023-01-19 ENCOUNTER — NON-PROVIDER VISIT (OUTPATIENT)
Dept: CARDIOLOGY | Facility: MEDICAL CENTER | Age: 73
End: 2023-01-19
Payer: MEDICARE

## 2023-01-19 DIAGNOSIS — Z95.1 HX OF CABG: ICD-10-CM

## 2023-01-19 PROCEDURE — G0423 INTENS CARDIAC REHAB NO EXER: HCPCS | Mod: 59 | Performed by: INTERNAL MEDICINE

## 2023-01-19 PROCEDURE — G0422 INTENS CARDIAC REHAB W/EXERC: HCPCS | Performed by: INTERNAL MEDICINE

## 2023-01-19 NOTE — PROGRESS NOTES
Harman Branch attended Intensive Cardiac Rehab today from 1000 to 1200. During his time he exercised and attended class.   His education today was a lecture titled: Menus Dining Out. Patient received handouts and class discussion pertaining to the topic.

## 2023-01-19 NOTE — PROGRESS NOTES
Harman Branch attended Intensive Cardiac Rehab today from 1000 to 1200. During his time he exercised and attended class.   His education today was a COOKING CLASS titled: COMFORTING BREAKFASTS. Patient received handouts and class discussion pertaining to the topic.

## 2023-01-24 ENCOUNTER — NON-PROVIDER VISIT (OUTPATIENT)
Dept: CARDIOLOGY | Facility: MEDICAL CENTER | Age: 73
End: 2023-01-24
Payer: MEDICARE

## 2023-01-24 DIAGNOSIS — Z95.1 HX OF CABG: ICD-10-CM

## 2023-01-24 PROCEDURE — G0422 INTENS CARDIAC REHAB W/EXERC: HCPCS | Performed by: INTERNAL MEDICINE

## 2023-01-24 PROCEDURE — G0423 INTENS CARDIAC REHAB NO EXER: HCPCS | Mod: 59 | Performed by: INTERNAL MEDICINE

## 2023-01-24 NOTE — PROGRESS NOTES
Harman Branch attended Intensive Cardiac Rehab today from 1000 to 1200. During his time he exercised and attended class.   His education today was a workshop titled: New Thoughts New Behaviors. Patient received handouts and class discussion pertaining to the topic.

## 2023-01-25 ENCOUNTER — NON-PROVIDER VISIT (OUTPATIENT)
Dept: CARDIOLOGY | Facility: MEDICAL CENTER | Age: 73
End: 2023-01-25
Payer: MEDICARE

## 2023-01-25 DIAGNOSIS — Z95.1 HX OF CABG: ICD-10-CM

## 2023-01-25 PROCEDURE — G0423 INTENS CARDIAC REHAB NO EXER: HCPCS | Mod: 59 | Performed by: INTERNAL MEDICINE

## 2023-01-25 PROCEDURE — G0422 INTENS CARDIAC REHAB W/EXERC: HCPCS | Performed by: INTERNAL MEDICINE

## 2023-01-25 NOTE — PROGRESS NOTES
Harman Branch attended Intensive Cardiac Rehab today from 1000 to 1200. During his time he exercised and attended class.   His education today was a cooking school titled: Meals in a Snap. Patient received handouts and class discussion pertaining to the topic.

## 2023-01-26 ENCOUNTER — NON-PROVIDER VISIT (OUTPATIENT)
Dept: CARDIOLOGY | Facility: MEDICAL CENTER | Age: 73
End: 2023-01-26
Payer: MEDICARE

## 2023-01-26 DIAGNOSIS — Z95.1 HX OF CABG: ICD-10-CM

## 2023-01-26 PROCEDURE — G0422 INTENS CARDIAC REHAB W/EXERC: HCPCS | Performed by: INTERNAL MEDICINE

## 2023-01-26 PROCEDURE — G0423 INTENS CARDIAC REHAB NO EXER: HCPCS | Mod: 59 | Performed by: INTERNAL MEDICINE

## 2023-01-27 NOTE — NON-PROVIDER
Harman Branch attended Intensive Cardiac Rehab today from 1000 to 1200. During his time he exercised and attended class.   His education today was a video titled: Healthy Minds Bodies & Hearts. Patient received handouts and class discussion pertaining to the topic.

## 2023-01-31 ENCOUNTER — NON-PROVIDER VISIT (OUTPATIENT)
Dept: CARDIOLOGY | Facility: MEDICAL CENTER | Age: 73
End: 2023-01-31
Payer: MEDICARE

## 2023-01-31 DIAGNOSIS — Z95.1 HX OF CABG: ICD-10-CM

## 2023-01-31 PROCEDURE — G0422 INTENS CARDIAC REHAB W/EXERC: HCPCS | Performed by: INTERNAL MEDICINE

## 2023-01-31 PROCEDURE — G0423 INTENS CARDIAC REHAB NO EXER: HCPCS | Mod: 59 | Performed by: INTERNAL MEDICINE

## 2023-01-31 NOTE — PROGRESS NOTES
Harman Branch attended Intensive Cardiac Rehab today from 1000 to 1200. During her time she exercised and attended class. Her education today was a VIDEO titled: DECODING LABS. Patient received handouts and class discussion pertaining to the topic.

## 2023-02-01 ENCOUNTER — NON-PROVIDER VISIT (OUTPATIENT)
Dept: CARDIOLOGY | Facility: MEDICAL CENTER | Age: 73
End: 2023-02-01
Payer: MEDICARE

## 2023-02-01 DIAGNOSIS — Z95.1 HX OF CABG: ICD-10-CM

## 2023-02-01 PROCEDURE — G0423 INTENS CARDIAC REHAB NO EXER: HCPCS | Mod: 59 | Performed by: INTERNAL MEDICINE

## 2023-02-01 PROCEDURE — G0422 INTENS CARDIAC REHAB W/EXERC: HCPCS | Performed by: INTERNAL MEDICINE

## 2023-02-02 ENCOUNTER — NON-PROVIDER VISIT (OUTPATIENT)
Dept: CARDIOLOGY | Facility: MEDICAL CENTER | Age: 73
End: 2023-02-02
Payer: MEDICARE

## 2023-02-02 DIAGNOSIS — Z95.1 HX OF CABG: ICD-10-CM

## 2023-02-02 PROCEDURE — G0422 INTENS CARDIAC REHAB W/EXERC: HCPCS | Performed by: INTERNAL MEDICINE

## 2023-02-02 PROCEDURE — G0423 INTENS CARDIAC REHAB NO EXER: HCPCS | Mod: 59 | Performed by: INTERNAL MEDICINE

## 2023-02-02 NOTE — PROGRESS NOTES
Harman Branch attended Intensive Cardiac Rehab today from 1000 to 1200. During his time he exercised and attended class.   His education today was a lecture titled: Mindful Eating. Patient received handouts and class discussion pertaining to the topic.

## 2023-02-02 NOTE — PROGRESS NOTES
Harman Branch attended Intensive Cardiac Rehab today from 1000 to 1200. During his time he exercised and attended class.   His education today was a cooking school titled: One Pot Wonders. Patient received handouts and class discussion pertaining to the topic.

## 2023-02-07 ENCOUNTER — NON-PROVIDER VISIT (OUTPATIENT)
Dept: CARDIOLOGY | Facility: MEDICAL CENTER | Age: 73
End: 2023-02-07
Payer: MEDICARE

## 2023-02-07 DIAGNOSIS — Z95.1 HX OF CABG: ICD-10-CM

## 2023-02-07 PROCEDURE — G0422 INTENS CARDIAC REHAB W/EXERC: HCPCS | Performed by: INTERNAL MEDICINE

## 2023-02-07 PROCEDURE — G0423 INTENS CARDIAC REHAB NO EXER: HCPCS | Mod: 59 | Performed by: INTERNAL MEDICINE

## 2023-02-07 NOTE — PROGRESS NOTES
Harman Branch attended Intensive Cardiac Rehab today from 1000 to 1200. During his time he exercised and attended class.   His education today was a video titled: Improved Performance. Patient received handouts and class discussion pertaining to the topic.

## 2023-02-08 ENCOUNTER — NON-PROVIDER VISIT (OUTPATIENT)
Dept: CARDIOLOGY | Facility: MEDICAL CENTER | Age: 73
End: 2023-02-08
Payer: MEDICARE

## 2023-02-08 DIAGNOSIS — Z95.1 HX OF CABG: ICD-10-CM

## 2023-02-08 PROCEDURE — G0422 INTENS CARDIAC REHAB W/EXERC: HCPCS | Performed by: INTERNAL MEDICINE

## 2023-02-08 PROCEDURE — G0423 INTENS CARDIAC REHAB NO EXER: HCPCS | Mod: 59 | Performed by: INTERNAL MEDICINE

## 2023-02-08 NOTE — PROGRESS NOTES
Harman Branch attended Intensive Cardiac Rehab today from 1000 to 1200. During his time he exercised and attended class.   His education today was a cooking school titled: Adding Flavor: Sodium Free. Patient received handouts and class discussion pertaining to the topic.

## 2023-02-09 ENCOUNTER — NON-PROVIDER VISIT (OUTPATIENT)
Dept: CARDIOLOGY | Facility: MEDICAL CENTER | Age: 73
End: 2023-02-09
Payer: MEDICARE

## 2023-02-09 DIAGNOSIS — Z95.1 HX OF CABG: ICD-10-CM

## 2023-02-09 PROCEDURE — G0423 INTENS CARDIAC REHAB NO EXER: HCPCS | Mod: 59 | Performed by: INTERNAL MEDICINE

## 2023-02-09 PROCEDURE — G0422 INTENS CARDIAC REHAB W/EXERC: HCPCS | Performed by: INTERNAL MEDICINE

## 2023-02-09 NOTE — PROGRESS NOTES
Harman Branch attended Intensive Cardiac Rehab today from 1000 to 1200. During his time he exercised and attended class.   His education today was a WORKSHOP titled: BUILDING YOUR EXERCISE ACTION PLAN. Patient received handouts and class discussion pertaining to the topic.

## 2023-02-14 ENCOUNTER — NON-PROVIDER VISIT (OUTPATIENT)
Dept: CARDIOLOGY | Facility: MEDICAL CENTER | Age: 73
End: 2023-02-14
Payer: MEDICARE

## 2023-02-14 DIAGNOSIS — Z95.1 HX OF CABG: ICD-10-CM

## 2023-02-14 PROCEDURE — G0422 INTENS CARDIAC REHAB W/EXERC: HCPCS | Performed by: INTERNAL MEDICINE

## 2023-02-14 PROCEDURE — G0423 INTENS CARDIAC REHAB NO EXER: HCPCS | Mod: 59 | Performed by: INTERNAL MEDICINE

## 2023-02-14 NOTE — PROGRESS NOTES
Harman Branch attended Intensive Cardiac Rehab today from 1000 to 1200. During his time he exercised and attended class.   His education today was a video titled: Metabolic Syndrome and Belly Fat. Patient received handouts and class discussion pertaining to the topic.

## 2023-02-15 ENCOUNTER — NON-PROVIDER VISIT (OUTPATIENT)
Dept: CARDIOLOGY | Facility: MEDICAL CENTER | Age: 73
End: 2023-02-15
Payer: MEDICARE

## 2023-02-15 ENCOUNTER — OFFICE VISIT (OUTPATIENT)
Dept: CARDIOLOGY | Facility: MEDICAL CENTER | Age: 73
End: 2023-02-15
Payer: MEDICARE

## 2023-02-15 ENCOUNTER — TELEPHONE (OUTPATIENT)
Dept: CARDIOLOGY | Facility: MEDICAL CENTER | Age: 73
End: 2023-02-15

## 2023-02-15 VITALS
BODY MASS INDEX: 29.19 KG/M2 | RESPIRATION RATE: 14 BRPM | SYSTOLIC BLOOD PRESSURE: 112 MMHG | OXYGEN SATURATION: 96 % | WEIGHT: 171 LBS | HEIGHT: 64 IN | HEART RATE: 55 BPM | DIASTOLIC BLOOD PRESSURE: 82 MMHG

## 2023-02-15 DIAGNOSIS — E78.2 MIXED HYPERLIPIDEMIA: ICD-10-CM

## 2023-02-15 DIAGNOSIS — I10 HTN (HYPERTENSION), MALIGNANT: ICD-10-CM

## 2023-02-15 DIAGNOSIS — Z95.1 HX OF CABG: ICD-10-CM

## 2023-02-15 DIAGNOSIS — Z79.899 HIGH RISK MEDICATION USE: ICD-10-CM

## 2023-02-15 DIAGNOSIS — I25.10 CORONARY ARTERY DISEASE INVOLVING NATIVE CORONARY ARTERY OF NATIVE HEART WITHOUT ANGINA PECTORIS: ICD-10-CM

## 2023-02-15 DIAGNOSIS — Z95.5 STENTED CORONARY ARTERY: ICD-10-CM

## 2023-02-15 PROCEDURE — 99214 OFFICE O/P EST MOD 30 MIN: CPT | Performed by: INTERNAL MEDICINE

## 2023-02-15 PROCEDURE — G0423 INTENS CARDIAC REHAB NO EXER: HCPCS | Mod: 59 | Performed by: INTERNAL MEDICINE

## 2023-02-15 PROCEDURE — G0422 INTENS CARDIAC REHAB W/EXERC: HCPCS | Performed by: INTERNAL MEDICINE

## 2023-02-15 RX ORDER — METOPROLOL TARTRATE 50 MG/1
50 TABLET, FILM COATED ORAL 2 TIMES DAILY
Qty: 180 TABLET | Refills: 4 | Status: SHIPPED | OUTPATIENT
Start: 2023-02-15 | End: 2024-03-04

## 2023-02-15 RX ORDER — LISINOPRIL 20 MG/1
20 TABLET ORAL 2 TIMES DAILY
Qty: 180 TABLET | Refills: 4 | Status: SHIPPED | OUTPATIENT
Start: 2023-02-15 | End: 2023-08-14

## 2023-02-15 RX ORDER — ROSUVASTATIN CALCIUM 40 MG/1
40 TABLET, COATED ORAL DAILY
Qty: 90 TABLET | Refills: 4 | Status: SHIPPED | OUTPATIENT
Start: 2023-02-15 | End: 2024-03-07 | Stop reason: SDUPTHER

## 2023-02-15 RX ORDER — FINASTERIDE 5 MG/1
5 TABLET, FILM COATED ORAL DAILY
COMMUNITY

## 2023-02-15 RX ORDER — METOPROLOL TARTRATE 50 MG/1
50 TABLET, FILM COATED ORAL 2 TIMES DAILY
Qty: 180 TABLET | Refills: 3 | Status: SHIPPED | OUTPATIENT
Start: 2023-02-15 | End: 2023-02-15 | Stop reason: SDUPTHER

## 2023-02-15 ASSESSMENT — ENCOUNTER SYMPTOMS
DEPRESSION: 0
COUGH: 0
ORTHOPNEA: 0
EYE REDNESS: 0
BLOOD IN STOOL: 0
FALLS: 0
DIZZINESS: 0
PALPITATIONS: 0
PND: 0
SORE THROAT: 0
BRUISES/BLEEDS EASILY: 0
CLAUDICATION: 0
SHORTNESS OF BREATH: 0

## 2023-02-15 ASSESSMENT — FIBROSIS 4 INDEX: FIB4 SCORE: 1.05

## 2023-02-15 NOTE — PROGRESS NOTES
Chief Complaint   Patient presents with    Coronary Artery Disease    Dyslipidemia    Hypertension       Subjective     Cornelio Branch is a 72 y.o. male who presents today for cardiac care due to established coronary artery disease with prior coronary intervention, hypertension, hyperlipidemia.  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 for high risk CABG.  On October 23, 2022, patient underwent successful CABG at [facility].    Doing well after surgery. NO chest pain.     Patient is feeling better these days. Does get winded upon walking up inclines or for distance. No symptoms at rest or with daily living activities.    I have independently interpreted and reviewed blood tests results with patient in clinic which shows normal LDL level, triglycerides, renal and liver function.       Past Medical History:   Diagnosis Date    Arthritis     fingers    ASTHMA     uses inhaler prn    Back pain     CAD (coronary artery disease) 6/21/2011    CAD (coronary artery disease)     Carotid artery plaque 6/21/2011    Esophageal reflux 11/3/2009    Heart burn     High cholesterol     History of asthma 11/3/2009    History of benign prostatic hypertrophy 11/3/2009    History of cardiac catheterization 6/21/2011    History of echocardiogram 6/20/2011    History of neck surgery     Hardware in neck    HLD (hyperlipidemia) 6/1/2011    Hypertension     Indigestion     Previous back surgery     Stented coronary artery 2005     Past Surgical History:   Procedure Laterality Date    WI NJX AA&/STRD TFRML EPI LUMBAR/SACRAL 1 LEVEL Bilateral 9/2/2016    Procedure: INJ-FORAMEN EPI LUM/SAC SNGL - L4-5;  Surgeon: Jesus Rojas M.D.;  Location: SURGERY SURGICAL [facility] ORS;  Service: Pain Management    WI NJX AA&/STRD TFRML EPI LUMBAR/SACRAL 1 LEVEL  9/2/2016    Procedure: INJ-FORAMEN EPI LUM/SAC SNGL;  Surgeon: Jesus Rojas M.D.;  Location: SURGERY  [facility];  Service: Pain Management    PB FLUORSCOPIC GUIDANCE SPINAL INJECTION  2016    Procedure: FLUOROGUIDE FOR SPINAL INJ;  Surgeon: Jesus Rojas M.D.;  Location: SURGERY [facility];  Service: Pain Management    LUMBAR LAMINECTOMY DISKECTOMY  2012    Performed by MARLIN CANDELARIA at SURGERY [facility]    FORAMINOTOMY  2012    Performed by MARLIN CANDELARIA at SURGERY [facility] ORS    LUMBAR LAMINECTOMY DISKECTOMY  3/9/2011    Performed by MARLIN CANDELARIA at SURGERY [facility] ORS    FORAMINOTOMY  3/9/2011    Performed by MARLIN CANDELARIA at SURGERY [facility] ORS    ANGIOGRAM      CERVICAL DISK AND FUSION ANTERIOR      OTHER CARDIAC SURGERY   heart stents     Family History   Problem Relation Age of Onset    Heart Attack Neg Hx      Social History     Socioeconomic History    Marital status:      Spouse name: Not on file    Number of children: Not on file    Years of education: Not on file    Highest education level: Master's degree (e.g., MA, MS, Sudeep, MEd, MSW, RENEE)   Occupational History    Not on file   Tobacco Use    Smoking status: Former     Types: Cigars     Quit date: 1987     Years since quittin.8    Smokeless tobacco: Never    Tobacco comments:     Cigars socially   Substance and Sexual Activity    Alcohol use: Yes     Alcohol/week: 0.6 oz     Types: 1 Standard drinks or equivalent per week     Comment: socially    Drug use: No    Sexual activity: Not on file   Other Topics Concern    Not on file   Social History Narrative    ** Merged History Encounter **          Social Determinants of Health     Financial Resource Strain: Not on file   Food Insecurity: Not on file   Transportation Needs: Not on file   Physical Activity: Not on file   Stress: Not on file   Social Connections: Not on file   Intimate Partner Violence: Not on file   Housing Stability: Not on file     Allergies   Allergen Reactions    Heparin Rash     Concern for development of possible   "heparin induced skin necrosis following heparin administration 10/17/22     Outpatient Encounter Medications as of 2/15/2023   Medication Sig Dispense Refill    finasteride (PROSCAR) 5 MG Tab Take 5 mg by mouth every day.      metoprolol tartrate (LOPRESSOR) 50 MG Tab Take 1 Tablet by mouth 2 times a day. 180 Tablet 3    lisinopril (PRINIVIL) 20 MG Tab Take 1 Tablet by mouth 2 times a day. 180 Tablet 3    rosuvastatin (CRESTOR) 40 MG tablet Take 1 Tablet by mouth every day. 90 Tablet 4    Multiple Vitamin (MULTI-VITAMIN DAILY PO) Take 1 Tablet by mouth every morning.      Albuterol (PROVENTIL INH) Inhale 2 Puffs every four hours as needed.      KRILL OIL PO Take 1 Tablet by mouth every morning.      Coenzyme Q10 (COQ10 PO) Take 1 Tablet by mouth every day.      FLOVENT  MCG/ACT Aerosol Inhale 2 Puffs every four hours as needed. Indications: Asthma      B Complex Vitamins (B-COMPLEX/B-12 PO) Take 1,000 Tabs by mouth every day.      aspirin 81 MG tablet Take 1 Tablet by mouth every day.      esomeprazole (NEXIUM) 40 MG delayed-release capsule Take 1 Capsule by mouth 2 times a day.       No facility-administered encounter medications on file as of 2/15/2023.     Review of Systems   Constitutional:  Negative for malaise/fatigue.   HENT:  Negative for sore throat.    Eyes:  Negative for redness.   Respiratory:  Negative for cough and shortness of breath.    Cardiovascular:  Negative for chest pain, palpitations, orthopnea, claudication, leg swelling and PND.   Gastrointestinal:  Negative for blood in stool and melena.   Musculoskeletal:  Negative for falls.   Skin:  Negative for rash.   Neurological:  Negative for dizziness.   Endo/Heme/Allergies:  Does not bruise/bleed easily.   Psychiatric/Behavioral:  Negative for depression.             Objective     /82 (BP Location: Left arm, Patient Position: Sitting, BP Cuff Size: Adult)   Pulse (!) 55   Resp 14   Ht 1.626 m (5' 4\")   Wt 77.6 kg (171 lb)   SpO2 " 96%   BMI 29.35 kg/m²     Physical Exam  Vitals and nursing note reviewed.   Constitutional:       General: He is not in acute distress.     Appearance: He is not diaphoretic.   HENT:      Head: Normocephalic and atraumatic.      Right Ear: External ear normal.      Left Ear: External ear normal.      Nose: No congestion or rhinorrhea.   Eyes:      General:         Right eye: No discharge.         Left eye: No discharge.   Neck:      Thyroid: No thyromegaly.      Vascular: No JVD.   Cardiovascular:      Rate and Rhythm: Normal rate and regular rhythm.      Pulses: Normal pulses.   Pulmonary:      Effort: No respiratory distress.   Abdominal:      General: There is no distension.      Tenderness: There is no abdominal tenderness.   Musculoskeletal:         General: No swelling or tenderness.      Right lower leg: No edema.      Left lower leg: No edema.   Skin:     General: Skin is warm and dry.   Neurological:      Mental Status: He is alert and oriented to person, place, and time.      Cranial Nerves: No cranial nerve deficit.   Psychiatric:         Behavior: Behavior normal.              Assessment & Plan     1. Coronary artery disease involving native coronary artery of native heart without angina pectoris        2. Hx of CABG        3. Stented coronary artery        4. HTN (hypertension), malignant        5. Mixed hyperlipidemia        6. High risk medication use            Medical Decision Making: Today's Assessment/Status/Plan:   Coronary arterial disease s/p CABG x 4 in 10/2022:    Will continue Rosuvastatin 40 mg daily, Metoprolol at 50 mg twice a day, Lisinopril 20 mg BID.     Hypertension:  Optimize control with BB, Lisinopril 20 mg BID, as permitted above in conjunction with CAD treatment.     Hyperlipidemia:  Optimize statin as within guidelines of CAD treatment as above.    Overall, based on prior history of obstructive coronary arterial disease, patient is at at high risk for recurrent events and will  require consistent ongoing guidelines directed medical therapy to reduce his cardiovascular mortality and associated complications.    I will see patient back in clinic with lab tests and studies results in 12 months.    I thank you for referring patient to our Cardiology Clinic today.      Van Childress MD.   [facility].  [phone]  [place], [place].

## 2023-02-15 NOTE — TELEPHONE ENCOUNTER
PA approved through 2/15/2024.    Harman Branch Berrios: [id number] - PA Case ID: [id number] help? Call us at [phone]  Outcome  Approvedtoday  CaseId:[id number];Status:Approved;Review Type:Prior Auth;Coverage Start Date:01/16/2023;Coverage End Date:02/15/2024;  Drug  Xarelto 2.5MG tablets  Form  [employer] Electronic PA Form (2017 NCPDP)

## 2023-02-15 NOTE — PROGRESS NOTES
Harman Branch attended Intensive Cardiac Rehab today from 1300 to 1500. During his time he exercised and attended class.   His education today was a lecture titled: Jaja. Patient received handouts and class discussion pertaining to the topic.

## 2023-02-15 NOTE — TELEPHONE ENCOUNTER
PA started.    Harman Branch Berrios: [id number] - PA Case ID: [id number] help? Call us at [phone]  Status  Additional Information Required  Drug  Xarelto 2.5MG tablets  Form  [employer] Electronic PA Form (2017 NCPDP)

## 2023-02-15 NOTE — TELEPHONE ENCOUNTER
PA sent to pili Branch Berrios: [id number] - PA Case ID: [id number] help? Call us at [phone]  Status  Sent to [employer]  Drug  Xarelto 2.5MG tablets  Form  [employer] Electronic PA Form (2017 NCPDP)

## 2023-02-16 ENCOUNTER — NON-PROVIDER VISIT (OUTPATIENT)
Dept: CARDIOLOGY | Facility: MEDICAL CENTER | Age: 73
End: 2023-02-16
Payer: MEDICARE

## 2023-02-16 DIAGNOSIS — Z95.1 HX OF CABG: ICD-10-CM

## 2023-02-16 PROCEDURE — G0423 INTENS CARDIAC REHAB NO EXER: HCPCS | Mod: 59 | Performed by: INTERNAL MEDICINE

## 2023-02-16 PROCEDURE — G0422 INTENS CARDIAC REHAB W/EXERC: HCPCS | Performed by: INTERNAL MEDICINE

## 2023-02-16 NOTE — PROGRESS NOTES
Harman Branch attended Intensive Cardiac Rehab today from 1000 to 1200. During his time he exercised and attended class.   His education today was a lecture titled: Recognizing and Reducing Stress. Patient received handouts and class discussion pertaining to the topic.

## 2023-02-17 ENCOUNTER — TELEPHONE (OUTPATIENT)
Dept: CARDIOLOGY | Facility: MEDICAL CENTER | Age: 73
End: 2023-02-17
Payer: MEDICARE

## 2023-02-17 NOTE — TELEPHONE ENCOUNTER
TT    Caller: Harman Branch    Topic/issue: The patient is calling to inquire if there is another medication he can take in place of rivaroxaban (XARELTO) 2.5 MG tablet [[id number] Patient states XARELTO is too expensive and would like to know if PRADAXA is a medication he could be prescribed as an alternative. Please advise.      Callback Number: [phone] (home)     Thank you,   Yamini ROSE

## 2023-02-21 ENCOUNTER — NON-PROVIDER VISIT (OUTPATIENT)
Dept: CARDIOLOGY | Facility: MEDICAL CENTER | Age: 73
End: 2023-02-21
Payer: MEDICARE

## 2023-02-21 DIAGNOSIS — Z95.1 HX OF CABG: ICD-10-CM

## 2023-02-21 PROCEDURE — G0423 INTENS CARDIAC REHAB NO EXER: HCPCS | Mod: 59 | Performed by: INTERNAL MEDICINE

## 2023-02-21 PROCEDURE — G0422 INTENS CARDIAC REHAB W/EXERC: HCPCS | Performed by: INTERNAL MEDICINE

## 2023-02-21 NOTE — PROGRESS NOTES
Harman Branch attended Intensive Cardiac Rehab today from 1000 to 1200. During his time he exercised and attended class.   His education today was a video titled: How Our Thoughts Heal Our Hearts. Patient received handouts and class discussion pertaining to the topic.

## 2023-02-22 ENCOUNTER — NON-PROVIDER VISIT (OUTPATIENT)
Dept: CARDIOLOGY | Facility: MEDICAL CENTER | Age: 73
End: 2023-02-22
Payer: MEDICARE

## 2023-02-22 DIAGNOSIS — Z95.1 HX OF CABG: ICD-10-CM

## 2023-02-22 PROCEDURE — G0422 INTENS CARDIAC REHAB W/EXERC: HCPCS | Performed by: INTERNAL MEDICINE

## 2023-02-22 PROCEDURE — G0423 INTENS CARDIAC REHAB NO EXER: HCPCS | Mod: 59 | Performed by: INTERNAL MEDICINE

## 2023-02-22 NOTE — TELEPHONE ENCOUNTER
Pradaxa is not an alternative as patient is taking a lower dose for CAD. Does the patient need a PA, Should Preeti look into this?

## 2023-02-22 NOTE — PROGRESS NOTES
Harman Branch attended Intensive Cardiac Rehab today from 1000 to 1200. During his time he exercised and attended class.   His education today was a cooking school titled: [employer] Breakfast[employer]. Patient received handouts and class discussion pertaining to the topic.

## 2023-02-23 ENCOUNTER — NON-PROVIDER VISIT (OUTPATIENT)
Dept: CARDIOLOGY | Facility: MEDICAL CENTER | Age: 73
End: 2023-02-23
Payer: MEDICARE

## 2023-02-23 DIAGNOSIS — Z95.1 HX OF CABG: ICD-10-CM

## 2023-02-23 PROCEDURE — G0422 INTENS CARDIAC REHAB W/EXERC: HCPCS | Performed by: INTERNAL MEDICINE

## 2023-02-23 PROCEDURE — G0423 INTENS CARDIAC REHAB NO EXER: HCPCS | Mod: 59 | Performed by: INTERNAL MEDICINE

## 2023-02-24 NOTE — PROGRESS NOTES
Harman Branch attended Intensive Cardiac Rehab today from 1000 to 1200. During his time he exercised and attended class.   His education today was a COOKING CLASS titled: SALADS AND DRESSINGS. Patient received handouts and class discussion pertaining to the topic.

## 2023-02-28 ENCOUNTER — NON-PROVIDER VISIT (OUTPATIENT)
Dept: CARDIOLOGY | Facility: MEDICAL CENTER | Age: 73
End: 2023-02-28
Payer: MEDICARE

## 2023-02-28 DIAGNOSIS — Z95.1 HX OF CABG: ICD-10-CM

## 2023-02-28 PROCEDURE — G0423 INTENS CARDIAC REHAB NO EXER: HCPCS | Mod: 59 | Performed by: INTERNAL MEDICINE

## 2023-02-28 PROCEDURE — G0422 INTENS CARDIAC REHAB W/EXERC: HCPCS | Performed by: INTERNAL MEDICINE

## 2023-03-01 ENCOUNTER — NON-PROVIDER VISIT (OUTPATIENT)
Dept: CARDIOLOGY | Facility: MEDICAL CENTER | Age: 73
End: 2023-03-01
Payer: MEDICARE

## 2023-03-01 DIAGNOSIS — Z95.1 HX OF CABG: ICD-10-CM

## 2023-03-01 PROCEDURE — G0423 INTENS CARDIAC REHAB NO EXER: HCPCS | Mod: 59 | Performed by: INTERNAL MEDICINE

## 2023-03-01 PROCEDURE — G0422 INTENS CARDIAC REHAB W/EXERC: HCPCS | Performed by: INTERNAL MEDICINE

## 2023-03-01 NOTE — PROGRESS NOTES
Harman Branch attended Intensive Cardiac Rehab today from 1000 to 1200. During his time he exercised and attended class.   His education today was a cooking school titled: [employer]. Patient received handouts and class discussion pertaining to the topic.

## 2023-03-02 ENCOUNTER — NON-PROVIDER VISIT (OUTPATIENT)
Dept: CARDIOLOGY | Facility: MEDICAL CENTER | Age: 73
End: 2023-03-02
Payer: MEDICARE

## 2023-03-02 VITALS — BODY MASS INDEX: 29.24 KG/M2 | WEIGHT: 171.3 LBS | HEIGHT: 64 IN

## 2023-03-02 DIAGNOSIS — Z95.1 HX OF CABG: ICD-10-CM

## 2023-03-02 PROCEDURE — G0422 INTENS CARDIAC REHAB W/EXERC: HCPCS | Performed by: INTERNAL MEDICINE

## 2023-03-02 PROCEDURE — G0423 INTENS CARDIAC REHAB NO EXER: HCPCS | Mod: 59 | Performed by: INTERNAL MEDICINE

## 2023-03-02 ASSESSMENT — FIBROSIS 4 INDEX: FIB4 SCORE: 1.05

## 2023-03-02 NOTE — PROGRESS NOTES
Harman Branch attended Intensive Cardiac Rehab today from 1000 to 1200. During his time he exercised and attended class.   His education today was a lecture titled: Targeting Nutritional Priorities. Patient received handouts and class discussion pertaining to the topic.

## 2023-03-03 NOTE — PROGRESS NOTES
Nutrition Consult Note by:    Belkys Molina RD, LDN  Patient name: Harman Branch  Date of visit:     23    Start Time: 1000  End Time: 1055  Referring MD:   Dr. Childress    Referring diagnosis: CABG          Diagnosis code: Z95.1    Harman Branch is here today for Intensive Cardiac Rehab. Today the patient had their one-on-one RD appointment. This program includes Nutrition education and counseling following the Pritikin guidelines, which promote whole foods-fruits, vegetables, beans/legumes, whole grains, lean animal protein and not fat dairy-while avoiding added salt, refined/salty/sugary/fatty processed foods, trans/saturated fat, added sugar, tropical oils and heavy use of added oils.     Past Medical History:   CAD, ASTHMA, GERD, HLD, PREDIABETES     Nutrition-related Medications/Supplements:  Metoprolol, Crestor, Lisinopril, aspirin, xarelto, finasteride  MV, Krill oil, Coq10, B-complex, Glucosamine, D3    NUTRITION ASSESSMENT    Anthropometrics:  Height: 64 in     Male/Female:  male  Weight: 171.3#     Age: 72   BMI: 29.4                BMI Class: overweight  Goal weight: 160#    Protein Needs 72-75g day    BMI is NOT an accurate assessment tool for health    Nutrition-Related Labs:    Date: 23  Glucose: 97    Date: 10/22/22  HbA1c: 5.9, prev 6.0, 6.1, 6.2                                                                                                                    Date: 10/22/22   Total Cholesterol 154                                                                                        Date:    LDL: 39L                                                                                         Date:                       HDL: 148                                                                                          Date:    T                                                                                                    Date:  23         eGFR:    91    Calcium:    9.4        Vit D:      PO4:       B12:       Potassium:  4.0    Other      Digestive s/s:    Takes nexium twice week for GERD, Hx of Constipation     Social History:    Current tobacco use (Y/N): N , Former, quit 1987   ETOH (Y/N):    Y     If yes, frequency & variety: socially, scotch   Typical hours of sleep per night: 6+   Primary Food  and/or preparer in the household: spouse and pt   Level of social/familial support? Very good     Diet Recall and Relevant Hx:    Breakfast: eggs, teo, ww toast, pbj, pancakes w/oat bran and flax, coffee    Lunch: veggies or dinner leftovers     Dinner: lg salad with poached chicken, beans, homemade soup    Snacks: berries, asian pear, homemade cookies     Beverages: oat milk, coffee, water      Current Exercise:  45-60mins at [facility] x3 days per week   OTHER: walking 1.5 miles daily    Physical Activity:             Moderate (4-5x/wk)         Positive Changes Since Beginning the Program:   1. Positive energy    Barriers to Change/Biggest Struggles:  1. Short of breath walking uphill     Nutrition SMART Goals:  1. Add 2 servings of Omega-3 rich fish per week  2. Bear grains/beans/starchy vegetable 5 servings daiy      Exercise SMART Goals:  1. Join a gym after [facility]     Educational Handouts Provide  Pritikin Eating Plan w/ Healthy Plate method     Benefits of drinking water   Food Sources High in Omega 3 FA          Food Sources high in Fiber         Heart healthy snack ideas 20 ways to increase fruit & vegetable intake    Protein Content of common foods     Nutrition Diagnosis:   Problem:  Altered nutrition-related lab values    Related to: Liver Functrion      As Evidenced by: HDL <40    Follow Up PRN

## 2023-03-07 ENCOUNTER — NON-PROVIDER VISIT (OUTPATIENT)
Dept: CARDIOLOGY | Facility: MEDICAL CENTER | Age: 73
End: 2023-03-07
Payer: MEDICARE

## 2023-03-07 DIAGNOSIS — Z95.1 HX OF CABG: ICD-10-CM

## 2023-03-07 PROCEDURE — G0423 INTENS CARDIAC REHAB NO EXER: HCPCS | Mod: 59 | Performed by: INTERNAL MEDICINE

## 2023-03-07 PROCEDURE — G0422 INTENS CARDIAC REHAB W/EXERC: HCPCS | Performed by: INTERNAL MEDICINE

## 2023-03-07 NOTE — PROGRESS NOTES
Harman Branch attended Intensive Cardiac Rehab today from 1000 to 1200. During his time he exercised and attended class.   His education today was a video titled: Biomechanical Limitations. Patient received handouts and class discussion pertaining to the topic.

## 2023-03-09 ENCOUNTER — NON-PROVIDER VISIT (OUTPATIENT)
Dept: CARDIOLOGY | Facility: MEDICAL CENTER | Age: 73
End: 2023-03-09
Payer: MEDICARE

## 2023-03-09 DIAGNOSIS — Z95.1 HX OF CABG: ICD-10-CM

## 2023-03-09 PROCEDURE — G0422 INTENS CARDIAC REHAB W/EXERC: HCPCS | Performed by: INTERNAL MEDICINE

## 2023-03-09 PROCEDURE — G0423 INTENS CARDIAC REHAB NO EXER: HCPCS | Mod: 59 | Performed by: INTERNAL MEDICINE

## 2023-03-09 NOTE — PROGRESS NOTES
Harman Branch attended Intensive Cardiac Rehab today from 1000 to 1200. During his time he exercised and attended class.   His education today was a workshop titled: Biomechanics and Fall Prevention. Patient received handouts and class discussion pertaining to the topic.

## 2023-03-14 ENCOUNTER — NON-PROVIDER VISIT (OUTPATIENT)
Dept: CARDIOLOGY | Facility: MEDICAL CENTER | Age: 73
End: 2023-03-14
Payer: MEDICARE

## 2023-03-14 DIAGNOSIS — Z95.1 HX OF CABG: ICD-10-CM

## 2023-03-14 PROCEDURE — G0422 INTENS CARDIAC REHAB W/EXERC: HCPCS | Performed by: INTERNAL MEDICINE

## 2023-03-14 PROCEDURE — G0423 INTENS CARDIAC REHAB NO EXER: HCPCS | Mod: 59 | Performed by: INTERNAL MEDICINE

## 2023-03-14 NOTE — PROGRESS NOTES
Harman Branch attended Intensive Cardiac Rehab today from 1000 to 1200. During his time he exercised and attended class.   His education today was a video titled: Fueling a Healthy Body. Patient received handouts and class discussion pertaining to the topic.

## 2023-03-15 ENCOUNTER — NON-PROVIDER VISIT (OUTPATIENT)
Dept: CARDIOLOGY | Facility: MEDICAL CENTER | Age: 73
End: 2023-03-15
Payer: MEDICARE

## 2023-03-15 DIAGNOSIS — Z95.1 HX OF CABG: ICD-10-CM

## 2023-03-15 PROCEDURE — G0423 INTENS CARDIAC REHAB NO EXER: HCPCS | Mod: 59 | Performed by: INTERNAL MEDICINE

## 2023-03-15 PROCEDURE — G0422 INTENS CARDIAC REHAB W/EXERC: HCPCS | Performed by: INTERNAL MEDICINE

## 2023-03-15 NOTE — PROGRESS NOTES
Harman Branch attended Intensive Cardiac Rehab today from 1000 to 1200. During his time he exercised and attended class.   His education today was a COOKING SCHOOL titled: TASTY APPS AND SNACKS. Patient received handouts and class discussion pertaining to the topic.

## 2023-03-16 ENCOUNTER — NON-PROVIDER VISIT (OUTPATIENT)
Dept: CARDIOLOGY | Facility: MEDICAL CENTER | Age: 73
End: 2023-03-16
Payer: MEDICARE

## 2023-03-16 ENCOUNTER — TELEPHONE (OUTPATIENT)
Dept: CARDIOLOGY | Facility: MEDICAL CENTER | Age: 73
End: 2023-03-16

## 2023-03-16 DIAGNOSIS — Z95.1 HX OF CABG: ICD-10-CM

## 2023-03-16 PROCEDURE — G0422 INTENS CARDIAC REHAB W/EXERC: HCPCS | Performed by: INTERNAL MEDICINE

## 2023-03-16 PROCEDURE — G0423 INTENS CARDIAC REHAB NO EXER: HCPCS | Mod: 59 | Performed by: INTERNAL MEDICINE

## 2023-03-16 NOTE — PROGRESS NOTES
Harman Branch attended Intensive Cardiac Rehab today from 1000 to 1200. During his time he exercised and attended class.   His education today was a workshop titled: Fueling a Healthy Body. Patient received handouts and class discussion pertaining to the topic.

## 2023-03-21 ENCOUNTER — NON-PROVIDER VISIT (OUTPATIENT)
Dept: CARDIOLOGY | Facility: MEDICAL CENTER | Age: 73
End: 2023-03-21
Payer: MEDICARE

## 2023-03-21 DIAGNOSIS — Z95.1 HX OF CABG: ICD-10-CM

## 2023-03-21 PROCEDURE — G0422 INTENS CARDIAC REHAB W/EXERC: HCPCS | Performed by: INTERNAL MEDICINE

## 2023-03-21 PROCEDURE — G0423 INTENS CARDIAC REHAB NO EXER: HCPCS | Mod: 59 | Performed by: INTERNAL MEDICINE

## 2023-03-21 NOTE — PROGRESS NOTES
Harman Branch attended Intensive Cardiac Rehab today from 1000 to 1200. During his time he exercised and attended class.   His education today was a video titled: Hypertension and Heart Disease. Patient received handouts and class discussion pertaining to the topic.

## 2023-03-22 ENCOUNTER — NON-PROVIDER VISIT (OUTPATIENT)
Dept: CARDIOLOGY | Facility: MEDICAL CENTER | Age: 73
End: 2023-03-22
Payer: MEDICARE

## 2023-03-22 DIAGNOSIS — Z95.1 HX OF CABG: ICD-10-CM

## 2023-03-22 PROCEDURE — G0423 INTENS CARDIAC REHAB NO EXER: HCPCS | Mod: 59 | Performed by: INTERNAL MEDICINE

## 2023-03-22 PROCEDURE — G0422 INTENS CARDIAC REHAB W/EXERC: HCPCS | Performed by: INTERNAL MEDICINE

## 2023-03-22 NOTE — PROGRESS NOTES
Harman Branch attended Intensive Cardiac Rehab today from 1000 to 1200. During his time he exercised and attended class.   His education today was a COOKING SCHOOL titled: DELICIOUS DESSERTS. Patient received handouts and class discussion pertaining to the topic.

## 2023-03-23 ENCOUNTER — NON-PROVIDER VISIT (OUTPATIENT)
Dept: CARDIOLOGY | Facility: MEDICAL CENTER | Age: 73
End: 2023-03-23
Payer: MEDICARE

## 2023-03-23 DIAGNOSIS — Z95.1 HX OF CABG: ICD-10-CM

## 2023-03-23 PROCEDURE — G0423 INTENS CARDIAC REHAB NO EXER: HCPCS | Mod: 59 | Performed by: INTERNAL MEDICINE

## 2023-03-23 PROCEDURE — G0422 INTENS CARDIAC REHAB W/EXERC: HCPCS | Performed by: INTERNAL MEDICINE

## 2023-03-23 NOTE — PROGRESS NOTES
Harman Branch attended Intensive Cardiac Rehab today from 1000 to 1200. During his time he exercised and attended class.   His education today was a workshop titled: Managing Heart Disease. Patient received handouts and class discussion pertaining to the topic.

## 2023-03-28 ENCOUNTER — NON-PROVIDER VISIT (OUTPATIENT)
Dept: CARDIOLOGY | Facility: MEDICAL CENTER | Age: 73
End: 2023-03-28
Payer: MEDICARE

## 2023-03-28 DIAGNOSIS — Z95.1 HX OF CABG: ICD-10-CM

## 2023-03-28 PROCEDURE — G0423 INTENS CARDIAC REHAB NO EXER: HCPCS | Mod: 59 | Performed by: INTERNAL MEDICINE

## 2023-03-28 PROCEDURE — G0422 INTENS CARDIAC REHAB W/EXERC: HCPCS | Performed by: INTERNAL MEDICINE

## 2023-03-28 NOTE — PROGRESS NOTES
Harman Branch attended Intensive Cardiac Rehab today from 1000 to 1200. During his time he exercised and attended class.   His education today was a video titled: Label Reading. Patient received handouts and class discussion pertaining to the topic.

## 2023-03-29 ENCOUNTER — NON-PROVIDER VISIT (OUTPATIENT)
Dept: CARDIOLOGY | Facility: MEDICAL CENTER | Age: 73
End: 2023-03-29
Payer: MEDICARE

## 2023-03-29 DIAGNOSIS — Z95.1 HX OF CABG: ICD-10-CM

## 2023-03-29 PROCEDURE — G0423 INTENS CARDIAC REHAB NO EXER: HCPCS | Mod: 59 | Performed by: INTERNAL MEDICINE

## 2023-03-29 PROCEDURE — G0422 INTENS CARDIAC REHAB W/EXERC: HCPCS | Performed by: INTERNAL MEDICINE

## 2023-03-29 NOTE — PROGRESS NOTES
Harman Branch attended Intensive Cardiac Rehab today from 1000 to 1200. During his time he exercised and attended class.   His education today was a COOKING SCHOOL titled: [employer] POWER Pulse 8. Patient received handouts and class discussion pertaining to the topic.

## 2023-03-30 ENCOUNTER — NON-PROVIDER VISIT (OUTPATIENT)
Dept: CARDIOLOGY | Facility: MEDICAL CENTER | Age: 73
End: 2023-03-30
Payer: MEDICARE

## 2023-03-30 DIAGNOSIS — Z95.1 HX OF CABG: ICD-10-CM

## 2023-03-30 PROCEDURE — G0423 INTENS CARDIAC REHAB NO EXER: HCPCS | Mod: 59 | Performed by: INTERNAL MEDICINE

## 2023-03-30 PROCEDURE — G0422 INTENS CARDIAC REHAB W/EXERC: HCPCS | Performed by: INTERNAL MEDICINE

## 2023-03-30 NOTE — PROGRESS NOTES
Harman Branch attended Intensive Cardiac Rehab today from 1000 to 1200. During his time he exercised and attended class.   His education today was a workshop titled: label reading. Patient received handouts and class discussion pertaining to the topic.

## 2023-05-17 ENCOUNTER — RESEARCH ENCOUNTER (OUTPATIENT)
Dept: VASCULAR LAB | Facility: MEDICAL CENTER | Age: 73
End: 2023-05-17
Payer: MEDICARE

## 2023-05-17 NOTE — RESEARCH NOTE
Name: Harman Branch   MRN: [id number]  Participant ID:  [id number]  : 1950  Visit Date/Time: 2023 9:36 AM  Who is present: Linda Sherman LP(a) phase 0 study    Patient was consented 8/15/2022 to our phase 0 LP(a) study. Study required LP(a) to be drawn to complete all study procedures. Due to the patient being out of town timeline for lab was extended. He did not have his LP(a) drawn when he returned so patient was entered as a screen failed 2022.

## 2023-06-26 ENCOUNTER — HOSPITAL ENCOUNTER (OUTPATIENT)
Dept: RADIOLOGY | Facility: MEDICAL CENTER | Age: 73
End: 2023-06-26
Attending: NURSE PRACTITIONER
Payer: MEDICARE

## 2023-06-26 DIAGNOSIS — M51.36 DEGENERATION OF LUMBAR INTERVERTEBRAL DISC: ICD-10-CM

## 2023-06-26 PROCEDURE — 72131 CT LUMBAR SPINE W/O DYE: CPT

## 2023-07-19 ENCOUNTER — TELEPHONE (OUTPATIENT)
Dept: CARDIOLOGY | Facility: MEDICAL CENTER | Age: 73
End: 2023-07-19
Payer: MEDICARE

## 2023-07-19 NOTE — TELEPHONE ENCOUNTER
Last OV: 02/15/2023  Proposed Surgery: Transforaminal epidural steroid injection   Surgery Date: TBD   Requesting Office Name: [facility]   Fax Number: [phone]  Preference of Location (default is surgery center unless specified by Cardiologist or [facility])  Prior Clearance Addressed: No      Anticoags/Antiplatelets: Xarelto  Outstanding Cardiac Imaging : No  Stent, Cardiac Devices, or Catheterization: Yes  Date : 10/13/2022   Ablation, TAVR/Valve (including open heart), Cardioversion: No  Recent Cardiac Hospitalization: No            When: Greater than 6 months since hospitalization.   History (cardiac history):   Past Medical History:   Diagnosis Date    Arthritis     fingers    ASTHMA     uses inhaler prn    Back pain     CAD (coronary artery disease) 6/21/2011    CAD (coronary artery disease)     Carotid artery plaque 6/21/2011    Esophageal reflux 11/3/2009    Heart burn     High cholesterol     History of asthma 11/3/2009    History of benign prostatic hypertrophy 11/3/2009    History of cardiac catheterization 6/21/2011    History of echocardiogram 6/20/2011    History of neck surgery     Hardware in neck    HLD (hyperlipidemia) 6/1/2011    Hypertension     Indigestion     Previous back surgery     Stented coronary artery 2005             Surgical Clearance Letter Sent: YES   **Scan clearance request letter into [facility].**

## 2023-07-19 NOTE — LETTER
PROCEDURE/SURGERY CLEARANCE FORM      Encounter Date: 7/19/2023    Patient: Harman Branch  YOB: 1950    CARDIOLOGIST:  Van Childress M.D.    REFERRING DOCTOR:  No ref. provider found  PROCEDURE/SURGERY CLEARANCE FORM    Date: 7/19/2023   Patient Name: Harman Branch    Dear Surgeon or Proceduralist,      Thank you for your request for cardiac stratification of our mutual patient Harman Branch 1950. We have reviewed their [facility] records; and to the best of our understanding this patient has not had stenting, ablation, cardiothoracic surgery or hospitalization for cardiovascular reasons in the past 6 months.  Harman Branch has been seen within the past 18 months and is considered to have non-modifiable cardiac risk for this low-risk procedure/surgery. They may proceed from a cardiovascular standpoint and may hold their antiplatelet/anticoagulation as briefly as possible. Please have patient resume this medication when hemodynamically stable to do so.     Aspirin or Prasugrel   - hold 7 days prior to procedure/surgery, resume when hemodynamically stable      Clopidrogrel or Ticagrelor  - hold 7 days for all neurological procedures, hold 5 days prior to all other procedure/surgery,  resume when hemodynamically stable     Warfarin - hold 7 days for all neurological procedures, hold 5 days prior to all other procedure/surgery and coordinate with [facility] Anticoagulation Clinic ([phone]) INR testing and dose management.      Pradaxa/Xarelto/Eliquis/Savesya - hold 1 day prior to procedure for low bleeding risk procedure, 2 days for high bleeding risk procedure, or consider holding 3 days or longer for patients with reduced kidney function (CrCl <30mL/min) or spinal/cranial surgeries/procedures.      If they have a mechanical heart valve, please coordinate with [facility] Anticoagulation Service ([phone]) the proper management of their anticoagulant in the periprocedural or perioperative period.       Some patients have higher risk for cardiovascular complications or holding medication. If our patient has had prior complications of holding antiplatelet or anticoagulants in the past and we have seen them after these events, we have addressed these concerns with the patient. They are at an unknown degree of increased risk for recurrent complication.  You may hold anticoagulation/antiplatelets for the procedure or surgery if the benefits of the procedure or surgery outweigh this nonmodifiable risk.      If Harman Branch 1950 has new symptoms of heart failure decompensation, unstable arrythmia, or angina please reach out and we will assess the patient.      If you have other patient-specific concerns, please feel free to reach out to the patient's cardiologist directly at [phone].     Thank you,       [facility]                                           MD Vonnie Childress M.D.

## 2023-08-14 ENCOUNTER — OFFICE VISIT (OUTPATIENT)
Dept: CARDIOLOGY | Facility: MEDICAL CENTER | Age: 73
End: 2023-08-14
Attending: NURSE PRACTITIONER
Payer: MEDICARE

## 2023-08-14 VITALS
HEIGHT: 64 IN | RESPIRATION RATE: 14 BRPM | HEART RATE: 73 BPM | DIASTOLIC BLOOD PRESSURE: 70 MMHG | SYSTOLIC BLOOD PRESSURE: 110 MMHG | BODY MASS INDEX: 29.37 KG/M2 | WEIGHT: 172 LBS | OXYGEN SATURATION: 94 %

## 2023-08-14 DIAGNOSIS — I10 HYPERTENSION, UNSPECIFIED TYPE: ICD-10-CM

## 2023-08-14 DIAGNOSIS — I25.110 CORONARY ARTERY DISEASE INVOLVING NATIVE CORONARY ARTERY OF NATIVE HEART WITH UNSTABLE ANGINA PECTORIS (HCC): Chronic | ICD-10-CM

## 2023-08-14 DIAGNOSIS — E78.2 MIXED HYPERLIPIDEMIA: ICD-10-CM

## 2023-08-14 DIAGNOSIS — Z95.1 S/P CABG X 4: ICD-10-CM

## 2023-08-14 DIAGNOSIS — I10 HTN (HYPERTENSION), MALIGNANT: ICD-10-CM

## 2023-08-14 DIAGNOSIS — Z95.5 STENTED CORONARY ARTERY: ICD-10-CM

## 2023-08-14 DIAGNOSIS — I65.23 ATHEROSCLEROSIS OF BOTH CAROTID ARTERIES: Chronic | ICD-10-CM

## 2023-08-14 PROBLEM — R07.89 OTHER CHEST PAIN: Status: RESOLVED | Noted: 2019-02-06 | Resolved: 2023-08-14

## 2023-08-14 PROBLEM — I24.9 ACS (ACUTE CORONARY SYNDROME) (HCC): Status: RESOLVED | Noted: 2022-10-13 | Resolved: 2023-08-14

## 2023-08-14 PROCEDURE — 3078F DIAST BP <80 MM HG: CPT | Performed by: NURSE PRACTITIONER

## 2023-08-14 PROCEDURE — 99213 OFFICE O/P EST LOW 20 MIN: CPT | Performed by: NURSE PRACTITIONER

## 2023-08-14 PROCEDURE — 99214 OFFICE O/P EST MOD 30 MIN: CPT | Performed by: NURSE PRACTITIONER

## 2023-08-14 PROCEDURE — 3074F SYST BP LT 130 MM HG: CPT | Performed by: NURSE PRACTITIONER

## 2023-08-14 RX ORDER — FINASTERIDE 5 MG/1
TABLET, FILM COATED ORAL
COMMUNITY
Start: 2020-05-06 | End: 2023-08-14

## 2023-08-14 RX ORDER — LANOLIN ALCOHOL/MO/W.PET/CERES
1000 CREAM (GRAM) TOPICAL DAILY
COMMUNITY

## 2023-08-14 RX ORDER — EZETIMIBE 10 MG/1
TABLET ORAL
COMMUNITY
End: 2023-08-14

## 2023-08-14 RX ORDER — GABAPENTIN 100 MG/1
CAPSULE ORAL
COMMUNITY
Start: 2023-07-28 | End: 2024-01-19

## 2023-08-14 RX ORDER — EZETIMIBE 10 MG/1
TABLET ORAL
COMMUNITY
Start: 2023-06-12

## 2023-08-14 RX ORDER — MELATONIN
500
COMMUNITY

## 2023-08-14 RX ORDER — UBIDECARENONE 200 MG
1 CAPSULE ORAL DAILY
COMMUNITY

## 2023-08-14 RX ORDER — LISINOPRIL 20 MG/1
20 TABLET ORAL DAILY
Qty: 100 TABLET | Refills: 4
Start: 2023-08-14

## 2023-08-14 ASSESSMENT — ENCOUNTER SYMPTOMS
DIZZINESS: 0
FEVER: 0
ORTHOPNEA: 0
PALPITATIONS: 0
PND: 0
MYALGIAS: 0
HEADACHES: 0
SHORTNESS OF BREATH: 0
LOSS OF CONSCIOUSNESS: 0
BRUISES/BLEEDS EASILY: 0
CHILLS: 0
NAUSEA: 0
COUGH: 0
INSOMNIA: 0
ABDOMINAL PAIN: 0

## 2023-08-14 ASSESSMENT — FIBROSIS 4 INDEX: FIB4 SCORE: 1.07

## 2023-08-14 NOTE — PROGRESS NOTES
"Chief Complaint   Patient presents with    Follow-Up    Coronary Artery Disease    Coronary Artery Bypass Graft (CABG)    HTN (Controlled)    Hyperlipidemia       Subjective     Cornelio Branch is a 73 y.o. male who presents today for six month follow-up of CAD, CABG x 4, HTN, hyperlipidemia and GERD.    Cornelio is a 73 year old male with history of CAD, status post stents to the LCx and RCA in 2005, and again to the RCA in 2013, and more recently CABG x 4 in October 2022 at [facility]. He also has hypertension, hyperlipidemia, and GERD, as was last seen by Dr. Childress in February 2023.    He is here today for six month follow-up. He and his wife are getting ready to travel to [facility] in October 2023, and he has just wanted to \"make sure everything was OK.\" He has completed ICR and is still exercising, and tolerating well, without any problems or symptoms: no chest pain, pressure or discomfort; no palpitations; no shortness of breath, orthopnea or PND; no dizziness or syncope; no LE edema. BP Is well controlled.    Past Medical History:   Diagnosis Date    Arthritis     Fingers    ASTHMA     Uses inhaler prn    Back pain     CAD (coronary artery disease)     April 2013: PCI/SNEHA (Xience Xpedition 3.0 x 15mm) the proximal RCA. December 2005: PCI/SNEHA x 2 to the LCx (Taxus 3.0 x 32mm) and RCA (Taxus 3.0 x 16mm).    CAD (coronary artery disease)     Carotid artery plaque 10/2022    Carotid US with mild plaque bilaterally.    Esophageal reflux     History of asthma     History of benign prostatic hypertrophy     History of neck surgery     Hardware in neck    HLD (hyperlipidemia)     Hx of CABG 10/2022    CABG x 4 (LIMA to LAD, rSVG to 4th marginal, rSVG to PDA, rSVG to PL of RCA) at [facility]    Hypertension     Indigestion     Previous back surgery      Past Surgical History:   Procedure Laterality Date    UT NJX AA&/STRD TFRML EPI LUMBAR/SACRAL 1 LEVEL Bilateral 9/2/2016    Procedure: INJ-FORAMEN EPI LUM/SAC SNGL - L4-5;  " Surgeon: Jesus Rojas M.D.;  Location: SURGERY [facility];  Service: Pain Management    ND NJX AA&/STRD TFRML EPI LUMBAR/SACRAL 1 LEVEL  2016    Procedure: INJ-FORAMEN EPI LUM/SAC SNGL;  Surgeon: Jesus Rojas M.D.;  Location: SURGERY [facility];  Service: Pain Management    PB FLUORSCOPIC GUIDANCE SPINAL INJECTION  2016    Procedure: FLUOROGUIDE FOR SPINAL INJ;  Surgeon: Jesus Rojas M.D.;  Location: SURGERY [facility];  Service: Pain Management    LUMBAR LAMINECTOMY DISKECTOMY  2012    Performed by MARLIN CANDELARIA at SURGERY [facility]    FORAMINOTOMY  2012    Performed by MARLIN CANDELARIA at SURGERY [facility]    LUMBAR LAMINECTOMY DISKECTOMY  3/9/2011    Performed by MARLIN CANDELARIA at SURGERY [facility]    FORAMINOTOMY  3/9/2011    Performed by MARLIN CANDELARIA at SURGERY [facility]    ANGIOGRAM      CERVICAL DISK AND FUSION ANTERIOR      OTHER CARDIAC SURGERY   heart stents     Family History   Problem Relation Age of Onset    Heart Attack Neg Hx      Social History     Socioeconomic History    Marital status:      Spouse name: Not on file    Number of children: Not on file    Years of education: Not on file    Highest education level: Master's degree (e.g., MA, MS, Sudeep, MEd, MSW, RENEE)   Occupational History    Not on file   Tobacco Use    Smoking status: Former     Types: Cigars     Quit date: 1987     Years since quittin.3    Smokeless tobacco: Never    Tobacco comments:     Cigars socially   Vaping Use    Vaping Use: Never used   Substance and Sexual Activity    Alcohol use: Yes     Alcohol/week: 0.6 oz     Types: 1 Standard drinks or equivalent per week     Comment: socially    Drug use: No    Sexual activity: Not on file   Other Topics Concern    Not on file   Social History Narrative    ** Merged History Encounter **          Social Determinants of Health     Financial Resource Strain: Low Risk  (1/15/2021)    Overall Financial  Resource Strain (CARDIA)     Difficulty of Paying Living Expenses: Not hard at all   Food Insecurity: No Food Insecurity (1/15/2021)    Hunger Vital Sign     Worried About Running Out of Food in the Last Year: Never true     Ran Out of Food in the Last Year: Never true   Transportation Needs: No Transportation Needs (1/15/2021)    PRAPARE - Transportation     Lack of Transportation (Medical): No     Lack of Transportation (Non-Medical): No   Physical Activity: Sufficiently Active (1/15/2021)    Exercise Vital Sign     Days of Exercise per Week: 6 days     Minutes of Exercise per Session: 50 min   Stress: Stress Concern Present (1/15/2021)    [employer] - Occupational Stress Questionnaire     Feeling of Stress : To some extent   Social Connections: Moderately Isolated (1/15/2021)    Social Connection and Isolation Panel [NHANES]     Frequency of Communication with Friends and Family: More than three times a week     Frequency of Social Gatherings with Friends and Family: Twice a week     Attends Rastafari Services: Never     Active Member of Clubs or Organizations: No     Attends Club or Organization Meetings: Never     Marital Status:    Intimate Partner Violence: Not on file   Housing Stability: High Risk (1/15/2021)    Housing Stability Vital Sign     Unable to Pay for Housing in the Last Year: No     Number of Places Lived in the Last Year: 11     Unstable Housing in the Last Year: No     Allergies   Allergen Reactions    Heparin Rash     Concern for development of possible  heparin induced skin necrosis following heparin administration 10/17/22     Outpatient Encounter Medications as of 8/14/2023   Medication Sig Dispense Refill    gabapentin (NEURONTIN) 100 MG Cap       ezetimibe (ZETIA) 10 MG Tab       Cyanocobalamin (VITAMIN B-12) 1000 MCG Tab Take 1,000 mcg by mouth every day.      Coenzyme Q10 200 MG Cap Take 1 Capsule by mouth every day.      vitamin D (CHOLECALCIFEROL)  1000 Unit Tab Take 500 Units by mouth.      NUTRITIONAL SUPPLEMENTS PO Take  by mouth every day.      KRILL OIL PO Take 1 Capsule by mouth every day.      finasteride (PROSCAR) 5 MG Tab Take 5 mg by mouth every day.      rivaroxaban (XARELTO) 2.5 MG tablet Take 1 Tablet by mouth 2 times a day. 180 Tablet 3    rosuvastatin (CRESTOR) 40 MG tablet Take 1 Tablet by mouth every day. 90 Tablet 4    lisinopril (PRINIVIL) 20 MG Tab Take 1 Tablet by mouth 2 times a day. (Patient taking differently: Take 20 mg by mouth every day.) 180 Tablet 4    metoprolol tartrate (LOPRESSOR) 50 MG Tab Take 1 Tablet by mouth 2 times a day. 180 Tablet 4    Albuterol (PROVENTIL INH) Inhale 2 Puffs every four hours as needed.      FLOVENT  MCG/ACT Aerosol Inhale 2 Puffs every four hours as needed. Indications: Asthma      B Complex Vitamins (B-COMPLEX/B-12 PO) Take 1,000 Tabs by mouth every day.      aspirin 81 MG tablet Take 1 Tablet by mouth every day.      [DISCONTINUED] rivaroxaban (XARELTO) 2.5 MG tablet  (Patient not taking: Reported on 8/14/2023)      [DISCONTINUED] metoprolol tartrate (LOPRESSOR) 25 MG Tab  (Patient not taking: Reported on 8/14/2023)      [DISCONTINUED] finasteride (PROSCAR) 5 MG Tab  (Patient not taking: Reported on 8/14/2023)      [DISCONTINUED] ezetimibe (ZETIA) 10 MG Tab  (Patient not taking: Reported on 8/14/2023)      [DISCONTINUED] Multiple Vitamin (MULTI-VITAMIN DAILY PO) Take 1 Tablet by mouth every morning.      [DISCONTINUED] KRILL OIL PO Take 1 Tablet by mouth every morning. (Patient not taking: Reported on 8/14/2023)      [DISCONTINUED] Coenzyme Q10 (COQ10 PO) Take 1 Tablet by mouth every day. (Patient not taking: Reported on 8/14/2023)       No facility-administered encounter medications on file as of 8/14/2023.     Review of Systems   Constitutional:  Negative for chills and fever.   HENT:  Negative for congestion.    Respiratory:  Negative for cough and shortness of breath.    Cardiovascular:   "Negative for chest pain, palpitations, orthopnea, leg swelling and PND.   Gastrointestinal:  Negative for abdominal pain and nausea.   Musculoskeletal:  Negative for myalgias.   Skin:  Negative for rash.   Neurological:  Negative for dizziness, loss of consciousness and headaches.   Endo/Heme/Allergies:  Does not bruise/bleed easily.   Psychiatric/Behavioral:  The patient does not have insomnia.               Objective     /70 (BP Location: Left arm, Patient Position: Sitting, BP Cuff Size: Adult)   Pulse 73   Resp 14   Ht 1.626 m (5' 4\")   Wt 78 kg (172 lb)   SpO2 94%   BMI 29.52 kg/m²     Physical Exam  Constitutional:       Appearance: He is well-developed.   HENT:      Head: Normocephalic.   Neck:      Vascular: No JVD.   Cardiovascular:      Rate and Rhythm: Normal rate and regular rhythm.      Heart sounds: Normal heart sounds.      Comments: Well healed sternotomy scar.  Pulmonary:      Effort: Pulmonary effort is normal. No respiratory distress.      Breath sounds: Normal breath sounds. No wheezing or rales.   Abdominal:      General: Bowel sounds are normal. There is no distension.      Palpations: Abdomen is soft.      Tenderness: There is no abdominal tenderness.   Musculoskeletal:         General: Normal range of motion.      Cervical back: Normal range of motion and neck supple.   Skin:     General: Skin is warm and dry.      Findings: No rash.   Neurological:      Mental Status: He is alert and oriented to person, place, and time.   Psychiatric:         Mood and Affect: Mood normal.         Behavior: Behavior normal.       Procedures Performed of 10/19/2022:   Urgent coronary artery bypass grafting times 4 with placement of left internal mammary artery to left anterior descending coronary artery, reverse greater saphenous vein bypass graft from the ascending aorta to the fourth marginal branch of the circumflex coronary artery, reverse greater saphenous vein bypass graft from the ascending " aorta to the posterior descending and posterolateral branches of the right coronary artery, 4 distal anastomoses, endoscopic greater saphenous vein harvest from left lower extremity, platelet rich plasma placement over sternum to enhance healing.      CARDIAC CATHETERIZATION CONCLUSIONS by Dr. Toledo (04/08/13)   1. Acute inferior wall myocardial infarction secondary to high grade in-stent stenosis in the   middle of a large segment of stented proximal right coronary artery, status post successful   stenting with a 3 mm x 15 mm Xience Xpedition stent.   2. Diffusely diseased circumflex system as described above.   3. Widely patent left anterior descending coronary artery with its proximal 2-3 cm, more diffusely   narrowed and with 2 diagonal branches given off with high-grade disease in the first diagonal   branch, which is quite small and moderate disease in the second diagonal branch.     CARDIAC CATHETERIZATION CONCLUSIONS in [place], [place] (12/05)  Cardiac catheterization showing high grade small vessel first and second diagonal branch   stenosis, non obstructive left circumflex artery stenosis and high grade right coronary artery   stenosis treated with 3.0 x 32 mm and 3.0 x 16 mm Taxus drug eluding stents.     SUMMARY OF TTE OF 10/24/2022:  1. Normal left ventricular systolic function, with an ejection fraction of 63%.   2. Left ventricular segmental wall motion is normal.   3. Concentric, mild left ventricular hypertrophy.   4. Grade I diastolic dysfunction.   5. Normal right ventricular systolic function.   6. No significant valvular abnormalities.   7. Compared to prior echocardiogram 10/19/2022, there are no significant changes.     CONCLUSIONS OF CAROTID US OF 10/13/2022:  Normal bilateral carotid exam.     Lab Results   Component Value Date/Time    ASTSGOT 23 01/04/2023 07:57 AM    ALTSGPT 28 01/04/2023 07:57 AM       Lab Results   Component Value Date/Time    CHOLSTRLTOT 154 01/04/2023 07:57 AM    LDL 85  01/04/2023 07:57 AM    HDL 39 (A) 01/04/2023 07:57 AM    TRIGLYCERIDE 148 01/04/2023 07:57 AM          Assessment & Plan     1. Coronary artery disease involving native coronary artery of native heart with unstable angina pectoris (HCC)  EC-ECHOCARDIOGRAM COMPLETE W/O CONT      2. S/P CABG x 4        3. Stented coronary artery        4. Hypertension, unspecified type        5. Mixed hyperlipidemia  Comp Metabolic Panel    Lipid Profile      6. Atherosclerosis of both carotid arteries        7. HTN (hypertension), malignant            Medical Decision Making: Today's Assessment/Status/Plan:      1. CAD, status post stents in 2005 and 2013, and more recently CABG x 4 at [facility]. We will repeat echo, and obtain labs to assess LDL. He has completed ICR, and is exercising regularly. No angina or shortness of breath. Continue:  ASA 81mg once daily  Metoprolol 50mg twice daily  Crestor 40mg once daily  Lisinopril 20mg once daily  Zetia 10mg once daily    2. Hypertension, treated with Lisinopril 20mg once daily. BP Is well controlled.    3. Hyperlipidemia, treated with Crestor 40mg and Zetia 10mg once daily. To obtain fasting CMP and lipid panel.    4. Mild asthma, treated with inhalers, stable.     He remains stable at this time. Same medications for now; labs and echo as above. Follow-up with Dr. Childress in 6 months.

## 2023-08-29 ENCOUNTER — HOSPITAL ENCOUNTER (OUTPATIENT)
Dept: LAB | Facility: MEDICAL CENTER | Age: 73
End: 2023-08-29
Attending: NURSE PRACTITIONER
Payer: MEDICARE

## 2023-08-29 DIAGNOSIS — E78.2 MIXED HYPERLIPIDEMIA: ICD-10-CM

## 2023-08-29 LAB
ALBUMIN SERPL BCP-MCNC: 4.1 G/DL (ref 3.2–4.9)
ALBUMIN/GLOB SERPL: 1.5 G/DL
ALP SERPL-CCNC: 60 U/L (ref 30–99)
ALT SERPL-CCNC: 57 U/L (ref 2–50)
ANION GAP SERPL CALC-SCNC: 10 MMOL/L (ref 7–16)
AST SERPL-CCNC: 31 U/L (ref 12–45)
BILIRUB SERPL-MCNC: 0.5 MG/DL (ref 0.1–1.5)
BUN SERPL-MCNC: 18 MG/DL (ref 8–22)
CALCIUM ALBUM COR SERPL-MCNC: 9.5 MG/DL (ref 8.5–10.5)
CALCIUM SERPL-MCNC: 9.6 MG/DL (ref 8.5–10.5)
CHLORIDE SERPL-SCNC: 104 MMOL/L (ref 96–112)
CHOLEST SERPL-MCNC: 122 MG/DL (ref 100–199)
CO2 SERPL-SCNC: 24 MMOL/L (ref 20–33)
CREAT SERPL-MCNC: 0.92 MG/DL (ref 0.5–1.4)
FASTING STATUS PATIENT QL REPORTED: NORMAL
GFR SERPLBLD CREATININE-BSD FMLA CKD-EPI: 87 ML/MIN/1.73 M 2
GLOBULIN SER CALC-MCNC: 2.7 G/DL (ref 1.9–3.5)
GLUCOSE SERPL-MCNC: 89 MG/DL (ref 65–99)
HDLC SERPL-MCNC: 39 MG/DL
LDLC SERPL CALC-MCNC: 60 MG/DL
POTASSIUM SERPL-SCNC: 4.3 MMOL/L (ref 3.6–5.5)
PROT SERPL-MCNC: 6.8 G/DL (ref 6–8.2)
SODIUM SERPL-SCNC: 138 MMOL/L (ref 135–145)
TRIGL SERPL-MCNC: 115 MG/DL (ref 0–149)

## 2023-08-29 PROCEDURE — 36415 COLL VENOUS BLD VENIPUNCTURE: CPT

## 2023-08-29 PROCEDURE — 80053 COMPREHEN METABOLIC PANEL: CPT

## 2023-08-29 PROCEDURE — 80061 LIPID PANEL: CPT

## 2023-08-30 DIAGNOSIS — E78.5 DYSLIPIDEMIA: ICD-10-CM

## 2023-09-07 ENCOUNTER — ANCILLARY PROCEDURE (OUTPATIENT)
Dept: CARDIOLOGY | Facility: MEDICAL CENTER | Age: 73
End: 2023-09-07
Attending: NURSE PRACTITIONER
Payer: MEDICARE

## 2023-09-07 DIAGNOSIS — I25.110 CORONARY ARTERY DISEASE INVOLVING NATIVE CORONARY ARTERY OF NATIVE HEART WITH UNSTABLE ANGINA PECTORIS (HCC): Chronic | ICD-10-CM

## 2023-09-07 LAB
LV EJECT FRACT  99904: 55
LV EJECT FRACT MOD 2C 99903: 64.68
LV EJECT FRACT MOD 4C 99902: 43.91
LV EJECT FRACT MOD BP 99901: 56.59

## 2023-09-07 PROCEDURE — 93306 TTE W/DOPPLER COMPLETE: CPT

## 2023-09-07 PROCEDURE — 93306 TTE W/DOPPLER COMPLETE: CPT | Mod: 26 | Performed by: INTERNAL MEDICINE

## 2023-11-29 ENCOUNTER — PATIENT MESSAGE (OUTPATIENT)
Dept: HEALTH INFORMATION MANAGEMENT | Facility: OTHER | Age: 73
End: 2023-11-29

## 2023-12-28 DIAGNOSIS — I25.10 CORONARY ARTERY DISEASE INVOLVING NATIVE CORONARY ARTERY OF NATIVE HEART WITHOUT ANGINA PECTORIS: ICD-10-CM

## 2023-12-28 DIAGNOSIS — E78.2 MIXED HYPERLIPIDEMIA: ICD-10-CM

## 2023-12-28 DIAGNOSIS — Z95.1 HX OF CABG: ICD-10-CM

## 2023-12-29 RX ORDER — ROSUVASTATIN CALCIUM 40 MG/1
40 TABLET, COATED ORAL DAILY
Qty: 90 TABLET | Refills: 3 | OUTPATIENT
Start: 2023-12-29

## 2024-01-04 NOTE — PROCEDURE: MEDICATION COUNSELING
Clofazimine Pregnancy And Lactation Text: This medication is Pregnancy Category C and isn't considered safe during pregnancy. It is excreted in breast milk.
Birth Control Pills Pregnancy And Lactation Text: This medication should be avoided if pregnant and for the first 30 days post-partum.
Isotretinoin Counseling: Patient should get monthly blood tests, not donate blood, not drive at night if vision affected, not share medication, and not undergo elective surgery for 6 months after tx completed. Side effects reviewed, pt to contact office should one occur.
Metronidazole Counseling:  I discussed with the patient the risks of metronidazole including but not limited to seizures, nausea/vomiting, a metallic taste in the mouth, nausea/vomiting and severe allergy.
Tremfya Counseling: I discussed with the patient the risks of guselkumab including but not limited to immunosuppression, serious infections, worsening of inflammatory bowel disease and drug reactions.  The patient understands that monitoring is required including a PPD at baseline and must alert us or the primary physician if symptoms of infection or other concerning signs are noted.
Cephalexin Pregnancy And Lactation Text: This medication is Pregnancy Category B and considered safe during pregnancy.  It is also excreted in breast milk but can be used safely for shorter doses.
Valtrex Pregnancy And Lactation Text: this medication is Pregnancy Category B and is considered safe during pregnancy. This medication is not directly found in breast milk but it's metabolite acyclovir is present.
Include Pregnancy/Lactation Warning?: No
Glycopyrrolate Pregnancy And Lactation Text: This medication is Pregnancy Category B and is considered safe during pregnancy. It is unknown if it is excreted breast milk.
Tetracycline Counseling: Patient counseled regarding possible photosensitivity and increased risk for sunburn.  Patient instructed to avoid sunlight, if possible.  When exposed to sunlight, patients should wear protective clothing, sunglasses, and sunscreen.  The patient was instructed to call the office immediately if the following severe adverse effects occur:  hearing changes, easy bruising/bleeding, severe headache, or vision changes.  The patient verbalized understanding of the proper use and possible adverse effects of tetracycline.  All of the patient's questions and concerns were addressed. Patient understands to avoid pregnancy while on therapy due to potential birth defects.
Xelkatherinez Pregnancy And Lactation Text: This medication is Pregnancy Category D and is not considered safe during pregnancy.  The risk during breast feeding is also uncertain.
Cyclosporine Counseling:  I discussed with the patient the risks of cyclosporine including but not limited to hypertension, gingival hyperplasia,myelosuppression, immunosuppression, liver damage, kidney damage, neurotoxicity, lymphoma, and serious infections. The patient understands that monitoring is required including baseline blood pressure, CBC, CMP, lipid panel and uric acid, and then 1-2 times monthly CMP and blood pressure.
Rituxan Pregnancy And Lactation Text: This medication is Pregnancy Category C and it isn't know if it is safe during pregnancy. It is unknown if this medication is excreted in breast milk but similar antibodies are known to be excreted.
Prednisone Pregnancy And Lactation Text: This medication is Pregnancy Category C and it isn't know if it is safe during pregnancy. This medication is excreted in breast milk.
Cellcept Counseling:  I discussed with the patient the risks of mycophenolate mofetil including but not limited to infection/immunosuppression, GI upset, hypokalemia, hypercholesterolemia, bone marrow suppression, lymphoproliferative disorders, malignancy, GI ulceration/bleed/perforation, colitis, interstitial lung disease, kidney failure, progressive multifocal leukoencephalopathy, and birth defects.  The patient understands that monitoring is required including a baseline creatinine and regular CBC testing. In addition, patient must alert us immediately if symptoms of infection or other concerning signs are noted.
Topical Clindamycin Pregnancy And Lactation Text: This medication is Pregnancy Category B and is considered safe during pregnancy. It is unknown if it is excreted in breast milk.
Topical Clindamycin Counseling: Patient counseled that this medication may cause skin irritation or allergic reactions.  In the event of skin irritation, the patient was advised to reduce the amount of the drug applied or use it less frequently.   The patient verbalized understanding of the proper use and possible adverse effects of clindamycin.  All of the patient's questions and concerns were addressed.
Cimetidine Pregnancy And Lactation Text: This medication is Pregnancy Category B and is considered safe during pregnancy. It is also excreted in breast milk and breast feeding isn't recommended.
Doxepin Counseling:  Patient advised that the medication is sedating and not to drive a car after taking this medication. Patient informed of potential adverse effects including but not limited to dry mouth, urinary retention, and blurry vision.  The patient verbalized understanding of the proper use and possible adverse effects of doxepin.  All of the patient's questions and concerns were addressed.
Xeljanz Counseling: I discussed with the patient the risks of Xeljanz therapy including increased risk of infection, liver issues, headache, diarrhea, or cold symptoms. Live vaccines should be avoided. They were instructed to call if they have any problems.
Itraconazole Counseling:  I discussed with the patient the risks of itraconazole including but not limited to liver damage, nausea/vomiting, neuropathy, and severe allergy.  The patient understands that this medication is best absorbed when taken with acidic beverages such as non-diet cola or ginger ale.  The patient understands that monitoring is required including baseline LFTs and repeat LFTs at intervals.  The patient understands that they are to contact us or the primary physician if concerning signs are noted.
Thalidomide Counseling: I discussed with the patient the risks of thalidomide including but not limited to birth defects, anxiety, weakness, chest pain, dizziness, cough and severe allergy.
Quinolones Counseling:  I discussed with the patient the risks of fluoroquinolones including but not limited to GI upset, allergic reaction, drug rash, diarrhea, dizziness, photosensitivity, yeast infections, liver function test abnormalities, tendonitis/tendon rupture.
High Dose Vitamin A Pregnancy And Lactation Text: High dose vitamin A therapy is contraindicated during pregnancy and breast feeding.
Taltz Counseling: I discussed with the patient the risks of ixekizumab including but not limited to immunosuppression, serious infections, worsening of inflammatory bowel disease and drug reactions.  The patient understands that monitoring is required including a PPD at baseline and must alert us or the primary physician if symptoms of infection or other concerning signs are noted.
Griseofulvin Pregnancy And Lactation Text: This medication is Pregnancy Category X and is known to cause serious birth defects. It is unknown if this medication is excreted in breast milk but breast feeding should be avoided.
Topical Retinoid counseling:  Patient advised to apply a pea-sized amount only at bedtime and wait 30 minutes after washing their face before applying.  If too drying, patient may add a non-comedogenic moisturizer. The patient verbalized understanding of the proper use and possible adverse effects of retinoids.  All of the patient's questions and concerns were addressed.
Imiquimod Pregnancy And Lactation Text: This medication is Pregnancy Category C. It is unknown if this medication is excreted in breast milk.
Ketoconazole Pregnancy And Lactation Text: This medication is Pregnancy Category C and it isn't know if it is safe during pregnancy. It is also excreted in breast milk and breast feeding isn't recommended.
Oxybutynin Counseling:  I discussed with the patient the risks of oxybutynin including but not limited to skin rash, drowsiness, dry mouth, difficulty urinating, and blurred vision.
Arava Counseling:  Patient counseled regarding adverse effects of Arava including but not limited to nausea, vomiting, abnormalities in liver function tests. Patients may develop mouth sores, rash, diarrhea, and abnormalities in blood counts. The patient understands that monitoring is required including LFTs and blood counts.  There is a rare possibility of scarring of the liver and lung problems that can occur when taking methotrexate. Persistent nausea, loss of appetite, pale stools, dark urine, cough, and shortness of breath should be reported immediately. Patient advised to discontinue Arava treatment and consult with a physician prior to attempting conception. The patient will have to undergo a treatment to eliminate Arava from the body prior to conception.
Glycopyrrolate Counseling:  I discussed with the patient the risks of glycopyrrolate including but not limited to skin rash, drowsiness, dry mouth, difficulty urinating, and blurred vision.
Azathioprine Counseling:  I discussed with the patient the risks of azathioprine including but not limited to myelosuppression, immunosuppression, hepatotoxicity, lymphoma, and infections.  The patient understands that monitoring is required including baseline LFTs, Creatinine, possible TPMP genotyping and weekly CBCs for the first month and then every 2 weeks thereafter.  The patient verbalized understanding of the proper use and possible adverse effects of azathioprine.  All of the patient's questions and concerns were addressed.
Doxycycline Counseling:  Patient counseled regarding possible photosensitivity and increased risk for sunburn.  Patient instructed to avoid sunlight, if possible.  When exposed to sunlight, patients should wear protective clothing, sunglasses, and sunscreen.  The patient was instructed to call the office immediately if the following severe adverse effects occur:  hearing changes, easy bruising/bleeding, severe headache, or vision changes.  The patient verbalized understanding of the proper use and possible adverse effects of doxycycline.  All of the patient's questions and concerns were addressed.
Minoxidil Counseling: Minoxidil is a topical medication which can increase blood flow where it is applied. It is uncertain how this medication increases hair growth. Side effects are uncommon and include stinging and allergic reactions.
Quinolones Pregnancy And Lactation Text: This medication is Pregnancy Category C and it isn't know if it is safe during pregnancy. It is also excreted in breast milk.
Stelara Counseling:  I discussed with the patient the risks of ustekinumab including but not limited to immunosuppression, malignancy, posterior leukoencephalopathy syndrome, and serious infections.  The patient understands that monitoring is required including a PPD at baseline and must alert us or the primary physician if symptoms of infection or other concerning signs are noted.
Drysol Counseling:  I discussed with the patient the risks of drysol/aluminum chloride including but not limited to skin rash, itching, irritation, burning.
Metronidazole Pregnancy And Lactation Text: This medication is Pregnancy Category B and considered safe during pregnancy.  It is also excreted in breast milk.
Ivermectin Pregnancy And Lactation Text: This medication is Pregnancy Category C and it isn't known if it is safe during pregnancy. It is also excreted in breast milk.
Simponi Counseling:  I discussed with the patient the risks of golimumab including but not limited to myelosuppression, immunosuppression, autoimmune hepatitis, demyelinating diseases, lymphoma, and serious infections.  The patient understands that monitoring is required including a PPD at baseline and must alert us or the primary physician if symptoms of infection or other concerning signs are noted.
Xolair Pregnancy And Lactation Text: This medication is Pregnancy Category B and is considered safe during pregnancy. This medication is excreted in breast milk.
Otezla Pregnancy And Lactation Text: This medication is Pregnancy Category C and it isn't known if it is safe during pregnancy. It is unknown if it is excreted in breast milk.
Nsaids Counseling: NSAID Counseling: I discussed with the patient that NSAIDs should be taken with food. Prolonged use of NSAIDs can result in the development of stomach ulcers.  Patient advised to stop taking NSAIDs if abdominal pain occurs.  The patient verbalized understanding of the proper use and possible adverse effects of NSAIDs.  All of the patient's questions and concerns were addressed.
Zyclara Counseling:  I discussed with the patient the risks of imiquimod including but not limited to erythema, scaling, itching, weeping, crusting, and pain.  Patient understands that the inflammatory response to imiquimod is variable from person to person and was educated regarded proper titration schedule.  If flu-like symptoms develop, patient knows to discontinue the medication and contact us.
Cimetidine Counseling:  I discussed with the patient the risks of Cimetidine including but not limited to gynecomastia, headache, diarrhea, nausea, drowsiness, arrhythmias, pancreatitis, skin rashes, psychosis, bone marrow suppression and kidney toxicity.
Cellcept Pregnancy And Lactation Text: This medication is Pregnancy Category D and isn't considered safe during pregnancy. It is unknown if this medication is excreted in breast milk.
Benzoyl Peroxide Counseling: Patient counseled that medicine may cause skin irritation and bleach clothing.  In the event of skin irritation, the patient was advised to reduce the amount of the drug applied or use it less frequently.   The patient verbalized understanding of the proper use and possible adverse effects of benzoyl peroxide.  All of the patient's questions and concerns were addressed.
Hydroquinone Pregnancy And Lactation Text: This medication has not been assigned a Pregnancy Risk Category but animal studies failed to show danger with the topical medication. It is unknown if the medication is excreted in breast milk.
Rituxan Counseling:  I discussed with the patient the risks of Rituxan infusions. Side effects can include infusion reactions, severe drug rashes including mucocutaneous reactions, reactivation of latent hepatitis and other infections and rarely progressive multifocal leukoencephalopathy.  All of the patient's questions and concerns were addressed.
Nsaids Pregnancy And Lactation Text: These medications are considered safe up to 30 weeks gestation. It is excreted in breast milk.
Bactrim Counseling:  I discussed with the patient the risks of sulfa antibiotics including but not limited to GI upset, allergic reaction, drug rash, diarrhea, dizziness, photosensitivity, and yeast infections.  Rarely, more serious reactions can occur including but not limited to aplastic anemia, agranulocytosis, methemoglobinemia, blood dyscrasias, liver or kidney failure, lung infiltrates or desquamative/blistering drug rashes.
Thalidomide Pregnancy And Lactation Text: This medication is Pregnancy Category X and is absolutely contraindicated during pregnancy. It is unknown if it is excreted in breast milk.
Solaraze Pregnancy And Lactation Text: This medication is Pregnancy Category B and is considered safe. There is some data to suggest avoiding during the third trimester. It is unknown if this medication is excreted in breast milk.
Protopic Counseling: Patient may experience a mild burning sensation during topical application. Protopic is not approved in children less than 2 years of age. There have been case reports of hematologic and skin malignancies in patients using topical calcineurin inhibitors although causality is questionable.
Terbinafine Counseling: Patient counseling regarding adverse effects of terbinafine including but not limited to headache, diarrhea, rash, upset stomach, liver function test abnormalities, itching, taste/smell disturbance, nausea, abdominal pain, and flatulence.  There is a rare possibility of liver failure that can occur when taking terbinafine.  The patient understands that a baseline LFT and kidney function test may be required. The patient verbalized understanding of the proper use and possible adverse effects of terbinafine.  All of the patient's questions and concerns were addressed.
Stelara Pregnancy And Lactation Text: This medication is Pregnancy Category B and is considered safe during pregnancy. It is unknown if this medication is excreted in breast milk.
Azithromycin Pregnancy And Lactation Text: This medication is considered safe during pregnancy and is also secreted in breast milk.
Spironolactone Counseling: Patient advised regarding risks of diarrhea, abdominal pain, hyperkalemia, birth defects (for female patients), liver toxicity and renal toxicity. The patient may need blood work to monitor liver and kidney function and potassium levels while on therapy. The patient verbalized understanding of the proper use and possible adverse effects of spironolactone.  All of the patient's questions and concerns were addressed.
Bexarotene Counseling:  I discussed with the patient the risks of bexarotene including but not limited to hair loss, dry lips/skin/eyes, liver abnormalities, hyperlipidemia, pancreatitis, depression/suicidal ideation, photosensitivity, drug rash/allergic reactions, hypothyroidism, anemia, leukopenia, infection, cataracts, and teratogenicity.  Patient understands that they will need regular blood tests to check lipid profile, liver function tests, white blood cell count, thyroid function tests and pregnancy test if applicable.
Acitretin Pregnancy And Lactation Text: This medication is Pregnancy Category X and should not be given to women who are pregnant or may become pregnant in the future. This medication is excreted in breast milk.
Eucrisa Counseling: Patient may experience a mild burning sensation during topical application. Eucrisa is not approved in children less than 2 years of age.
Benzoyl Peroxide Pregnancy And Lactation Text: This medication is Pregnancy Category C. It is unknown if benzoyl peroxide is excreted in breast milk.
Dapsone Pregnancy And Lactation Text: This medication is Pregnancy Category C and is not considered safe during pregnancy or breast feeding.
Otezla Counseling: The side effects of Otezla were discussed with the patient, including but not limited to worsening or new depression, weight loss, diarrhea, nausea, upper respiratory tract infection, and headache. Patient instructed to call the office should any adverse effect occur.  The patient verbalized understanding of the proper use and possible adverse effects of Otezla.  All the patient's questions and concerns were addressed.
Dapsone Counseling: I discussed with the patient the risks of dapsone including but not limited to hemolytic anemia, agranulocytosis, rashes, methemoglobinemia, kidney failure, peripheral neuropathy, headaches, GI upset, and liver toxicity.  Patients who start dapsone require monitoring including baseline LFTs and weekly CBCs for the first month, then every month thereafter.  The patient verbalized understanding of the proper use and possible adverse effects of dapsone.  All of the patient's questions and concerns were addressed.
Fluconazole Counseling:  Patient counseled regarding adverse effects of fluconazole including but not limited to headache, diarrhea, nausea, upset stomach, liver function test abnormalities, taste disturbance, and stomach pain.  There is a rare possibility of liver failure that can occur when taking fluconazole.  The patient understands that monitoring of LFTs and kidney function test may be required, especially at baseline. The patient verbalized understanding of the proper use and possible adverse effects of fluconazole.  All of the patient's questions and concerns were addressed.
Sski Pregnancy And Lactation Text: This medication is Pregnancy Category D and isn't considered safe during pregnancy. It is excreted in breast milk.
Hydroxychloroquine Counseling:  I discussed with the patient that a baseline ophthalmologic exam is needed at the start of therapy and every year thereafter while on therapy. A CBC may also be warranted for monitoring.  The side effects of this medication were discussed with the patient, including but not limited to agranulocytosis, aplastic anemia, seizures, rashes, retinopathy, and liver toxicity. Patient instructed to call the office should any adverse effect occur.  The patient verbalized understanding of the proper use and possible adverse effects of Plaquenil.  All the patient's questions and concerns were addressed.
Rifampin Counseling: I discussed with the patient the risks of rifampin including but not limited to liver damage, kidney damage, red-orange body fluids, nausea/vomiting and severe allergy.
Picato Counseling:  I discussed with the patient the risks of Picato including but not limited to erythema, scaling, itching, weeping, crusting, and pain.
Hydroxychloroquine Pregnancy And Lactation Text: This medication has been shown to cause fetal harm but it isn't assigned a Pregnancy Risk Category. There are small amounts excreted in breast milk.
Simponi Pregnancy And Lactation Text: The risk during pregnancy and breastfeeding is uncertain with this medication.
Tazorac Pregnancy And Lactation Text: This medication is not safe during pregnancy. It is unknown if this medication is excreted in breast milk.
Humira Counseling:  I discussed with the patient the risks of adalimumab including but not limited to myelosuppression, immunosuppression, autoimmune hepatitis, demyelinating diseases, lymphoma, and serious infections.  The patient understands that monitoring is required including a PPD at baseline and must alert us or the primary physician if symptoms of infection or other concerning signs are noted.
Infliximab Counseling:  I discussed with the patient the risks of infliximab including but not limited to myelosuppression, immunosuppression, autoimmune hepatitis, demyelinating diseases, lymphoma, and serious infections.  The patient understands that monitoring is required including a PPD at baseline and must alert us or the primary physician if symptoms of infection or other concerning signs are noted.
Rifampin Pregnancy And Lactation Text: This medication is Pregnancy Category C and it isn't know if it is safe during pregnancy. It is also excreted in breast milk and should not be used if you are breast feeding.
Hydroxyzine Counseling: Patient advised that the medication is sedating and not to drive a car after taking this medication.  Patient informed of potential adverse effects including but not limited to dry mouth, urinary retention, and blurry vision.  The patient verbalized understanding of the proper use and possible adverse effects of hydroxyzine.  All of the patient's questions and concerns were addressed.
Methotrexate Pregnancy And Lactation Text: This medication is Pregnancy Category X and is known to cause fetal harm. This medication is excreted in breast milk.
Cephalexin Counseling: I counseled the patient regarding use of cephalexin as an antibiotic for prophylactic and/or therapeutic purposes. Cephalexin (commonly prescribed under brand name Keflex) is a cephalosporin antibiotic which is active against numerous classes of bacteria, including most skin bacteria. Side effects may include nausea, diarrhea, gastrointestinal upset, rash, hives, yeast infections, and in rare cases, hepatitis, kidney disease, seizures, fever, confusion, neurologic symptoms, and others. Patients with severe allergies to penicillin medications are cautioned that there is about a 10% incidence of cross-reactivity with cephalosporins. When possible, patients with penicillin allergies should use alternatives to cephalosporins for antibiotic therapy.
High Dose Vitamin A Counseling: Side effects reviewed, pt to contact office should one occur.
Birth Control Pills Counseling: Birth Control Pill Counseling: I discussed with the patient the potential side effects of OCPs including but not limited to increased risk of stroke, heart attack, thrombophlebitis, deep venous thrombosis, hepatic adenomas, breast changes, GI upset, headaches, and depression.  The patient verbalized understanding of the proper use and possible adverse effects of OCPs. All of the patient's questions and concerns were addressed.
Bactrim Pregnancy And Lactation Text: This medication is Pregnancy Category D and is known to cause fetal risk.  It is also excreted in breast milk.
Isotretinoin Pregnancy And Lactation Text: This medication is Pregnancy Category X and is considered extremely dangerous during pregnancy. It is unknown if it is excreted in breast milk.
Xolair Counseling:  Patient informed of potential adverse effects including but not limited to fever, muscle aches, rash and allergic reactions.  The patient verbalized understanding of the proper use and possible adverse effects of Xolair.  All of the patient's questions and concerns were addressed.
Elidel Counseling: Patient may experience a mild burning sensation during topical application. Elidel is not approved in children less than 2 years of age. There have been case reports of hematologic and skin malignancies in patients using topical calcineurin inhibitors although causality is questionable.
Enbrel Counseling:  I discussed with the patient the risks of etanercept including but not limited to myelosuppression, immunosuppression, autoimmune hepatitis, demyelinating diseases, lymphoma, and infections.  The patient understands that monitoring is required including a PPD at baseline and must alert us or the primary physician if symptoms of infection or other concerning signs are noted.
5-Fu Counseling: 5-Fluorouracil Counseling:  I discussed with the patient the risks of 5-fluorouracil including but not limited to erythema, scaling, itching, weeping, crusting, and pain.
Imiquimod Counseling:  I discussed with the patient the risks of imiquimod including but not limited to erythema, scaling, itching, weeping, crusting, and pain.  Patient understands that the inflammatory response to imiquimod is variable from person to person and was educated regarded proper titration schedule.  If flu-like symptoms develop, patient knows to discontinue the medication and contact us.
Minocycline Counseling: Patient advised regarding possible photosensitivity and discoloration of the teeth, skin, lips, tongue and gums.  Patient instructed to avoid sunlight, if possible.  When exposed to sunlight, patients should wear protective clothing, sunglasses, and sunscreen.  The patient was instructed to call the office immediately if the following severe adverse effects occur:  hearing changes, easy bruising/bleeding, severe headache, or vision changes.  The patient verbalized understanding of the proper use and possible adverse effects of minocycline.  All of the patient's questions and concerns were addressed.
157
Doxycycline Pregnancy And Lactation Text: This medication is Pregnancy Category D and not consider safe during pregnancy. It is also excreted in breast milk but is considered safe for shorter treatment courses.
Albendazole Counseling:  I discussed with the patient the risks of albendazole including but not limited to cytopenia, kidney damage, nausea/vomiting and severe allergy.  The patient understands that this medication is being used in an off-label manner.
Ivermectin Counseling:  Patient instructed to take medication on an empty stomach with a full glass of water.  Patient informed of potential adverse effects including but not limited to nausea, diarrhea, dizziness, itching, and swelling of the extremities or lymph nodes.  The patient verbalized understanding of the proper use and possible adverse effects of ivermectin.  All of the patient's questions and concerns were addressed.
Detail Level: Zone
Gabapentin Counseling: I discussed with the patient the risks of gabapentin including but not limited to dizziness, somnolence, fatigue and ataxia.
Clofazimine Counseling:  I discussed with the patient the risks of clofazimine including but not limited to skin and eye pigmentation, liver damage, nausea/vomiting, gastrointestinal bleeding and allergy.
Solaraze Counseling:  I discussed with the patient the risks of Solaraze including but not limited to erythema, scaling, itching, weeping, crusting, and pain.
Tazorac Counseling:  Patient advised that medication is irritating and drying.  Patient may need to apply sparingly and wash off after an hour before eventually leaving it on overnight.  The patient verbalized understanding of the proper use and possible adverse effects of tazorac.  All of the patient's questions and concerns were addressed.
Cosentyx Counseling:  I discussed with the patient the risks of Cosentyx including but not limited to worsening of Crohn's disease, immunosuppression, allergic reactions and infections.  The patient understands that monitoring is required including a PPD at baseline and must alert us or the primary physician if symptoms of infection or other concerning signs are noted.
Prednisone Counseling:  I discussed with the patient the risks of prolonged use of prednisone including but not limited to weight gain, insomnia, osteoporosis, mood changes, diabetes, susceptibility to infection, glaucoma and high blood pressure.  In cases where prednisone use is prolonged, patients should be monitored with blood pressure checks, serum glucose levels and an eye exam.  Additionally, the patient may need to be placed on GI prophylaxis, PCP prophylaxis, and calcium and vitamin D supplementation and/or a bisphosphonate.  The patient verbalized understanding of the proper use and the possible adverse effects of prednisone.  All of the patient's questions and concerns were addressed.
Hydroquinone Counseling:  Patient advised that medication may result in skin irritation, lightening (hypopigmentation), dryness, and burning.  In the event of skin irritation, the patient was advised to reduce the amount of the drug applied or use it less frequently.  Rarely, spots that are treated with hydroquinone can become darker (pseudoochronosis).  Should this occur, patient instructed to stop medication and call the office. The patient verbalized understanding of the proper use and possible adverse effects of hydroquinone.  All of the patient's questions and concerns were addressed.
5-Fu Pregnancy And Lactation Text: This medication is Pregnancy Category X and contraindicated in pregnancy and in women who may become pregnant. It is unknown if this medication is excreted in breast milk.
Acitretin Counseling:  I discussed with the patient the risks of acitretin including but not limited to hair loss, dry lips/skin/eyes, liver damage, hyperlipidemia, depression/suicidal ideation, photosensitivity.  Serious rare side effects can include but are not limited to pancreatitis, pseudotumor cerebri, bony changes, clot formation/stroke/heart attack.  Patient understands that alcohol is contraindicated since it can result in liver toxicity and significantly prolong the elimination of the drug by many years.
Protopic Pregnancy And Lactation Text: This medication is Pregnancy Category C. It is unknown if this medication is excreted in breast milk when applied topically.
Erivedge Counseling- I discussed with the patient the risks of Erivedge including but not limited to nausea, vomiting, diarrhea, constipation, weight loss, changes in the sense of taste, decreased appetite, muscle spasms, and hair loss.  The patient verbalized understanding of the proper use and possible adverse effects of Erivedge.  All of the patient's questions and concerns were addressed.
Hydroxyzine Pregnancy And Lactation Text: This medication is not safe during pregnancy and should not be taken. It is also excreted in breast milk and breast feeding isn't recommended.
Odomzo Counseling- I discussed with the patient the risks of Odomzo including but not limited to nausea, vomiting, diarrhea, constipation, weight loss, changes in the sense of taste, decreased appetite, muscle spasms, and hair loss.  The patient verbalized understanding of the proper use and possible adverse effects of Odomzo.  All of the patient's questions and concerns were addressed.
Azithromycin Counseling:  I discussed with the patient the risks of azithromycin including but not limited to GI upset, allergic reaction, drug rash, diarrhea, and yeast infections.
Erythromycin Counseling:  I discussed with the patient the risks of erythromycin including but not limited to GI upset, allergic reaction, drug rash, diarrhea, increase in liver enzymes, and yeast infections.
Valtrex Counseling: I discussed with the patient the risks of valacyclovir including but not limited to kidney damage, nausea, vomiting and severe allergy.  The patient understands that if the infection seems to be worsening or is not improving, they are to call.
Griseofulvin Counseling:  I discussed with the patient the risks of griseofulvin including but not limited to photosensitivity, cytopenia, liver damage, nausea/vomiting and severe allergy.  The patient understands that this medication is best absorbed when taken with a fatty meal (e.g., ice cream or french fries).
Minocycline Pregnancy And Lactation Text: This medication is Pregnancy Category D and not consider safe during pregnancy. It is also excreted in breast milk.
Carac Counseling:  I discussed with the patient the risks of Carac including but not limited to erythema, scaling, itching, weeping, crusting, and pain.
Colchicine Counseling:  Patient counseled regarding adverse effects including but not limited to stomach upset (nausea, vomiting, stomach pain, or diarrhea).  Patient instructed to limit alcohol consumption while taking this medication.  Colchicine may reduce blood counts especially with prolonged use.  The patient understands that monitoring of kidney function and blood counts may be required, especially at baseline. The patient verbalized understanding of the proper use and possible adverse effects of colchicine.  All of the patient's questions and concerns were addressed.
Topical Sulfur Applications Counseling: Topical Sulfur Counseling: Patient counseled that this medication may cause skin irritation or allergic reactions.  In the event of skin irritation, the patient was advised to reduce the amount of the drug applied or use it less frequently.   The patient verbalized understanding of the proper use and possible adverse effects of topical sulfur application.  All of the patient's questions and concerns were addressed.
Drysol Pregnancy And Lactation Text: This medication is considered safe during pregnancy and breast feeding.
Dupixent Pregnancy And Lactation Text: This medication likely crosses the placenta but the risk for the fetus is uncertain. This medication is excreted in breast milk.
Dupixent Counseling: I discussed with the patient the risks of dupilumab including but not limited to eye infection and irritation, cold sores, injection site reactions, worsening of asthma, allergic reactions and increased risk of parasitic infection.  Live vaccines should be avoided while taking dupilumab. Dupilumab will also interact with certain medications such as warfarin and cyclosporine. The patient understands that monitoring is required and they must alert us or the primary physician if symptoms of infection or other concerning signs are noted.
Erythromycin Pregnancy And Lactation Text: This medication is Pregnancy Category B and is considered safe during pregnancy. It is also excreted in breast milk.
Bexarotene Pregnancy And Lactation Text: This medication is Pregnancy Category X and should not be given to women who are pregnant or may become pregnant. This medication should not be used if you are breast feeding.
SSKI Counseling:  I discussed with the patient the risks of SSKI including but not limited to thyroid abnormalities, metallic taste, GI upset, fever, headache, acne, arthralgias, paraesthesias, lymphadenopathy, easy bleeding, arrhythmias, and allergic reaction.
Ketoconazole Counseling:   Patient counseled regarding improving absorption with orange juice.  Adverse effects include but are not limited to breast enlargement, headache, diarrhea, nausea, upset stomach, liver function test abnormalities, taste disturbance, and stomach pain.  There is a rare possibility of liver failure that can occur when taking ketoconazole. The patient understands that monitoring of LFTs may be required, especially at baseline. The patient verbalized understanding of the proper use and possible adverse effects of ketoconazole.  All of the patient's questions and concerns were addressed.
Spironolactone Pregnancy And Lactation Text: This medication can cause feminization of the male fetus and should be avoided during pregnancy. The active metabolite is also found in breast milk.
Methotrexate Counseling:  Patient counseled regarding adverse effects of methotrexate including but not limited to nausea, vomiting, abnormalities in liver function tests. Patients may develop mouth sores, rash, diarrhea, and abnormalities in blood counts. The patient understands that monitoring is required including LFT's and blood counts.  There is a rare possibility of scarring of the liver and lung problems that can occur when taking methotrexate. Persistent nausea, loss of appetite, pale stools, dark urine, cough, and shortness of breath should be reported immediately. Patient advised to discontinue methotrexate treatment at least three months before attempting to become pregnant.  I discussed the need for folate supplements while taking methotrexate.  These supplements can decrease side effects during methotrexate treatment. The patient verbalized understanding of the proper use and possible adverse effects of methotrexate.  All of the patient's questions and concerns were addressed.
Doxepin Pregnancy And Lactation Text: This medication is Pregnancy Category C and it isn't known if it is safe during pregnancy. It is also excreted in breast milk and breast feeding isn't recommended.
Topical Sulfur Applications Pregnancy And Lactation Text: This medication is Pregnancy Category C and has an unknown safety profile during pregnancy. It is unknown if this topical medication is excreted in breast milk.

## 2024-01-11 ENCOUNTER — PATIENT MESSAGE (OUTPATIENT)
Dept: CARDIOLOGY | Facility: MEDICAL CENTER | Age: 74
End: 2024-01-11
Payer: MEDICARE

## 2024-01-11 DIAGNOSIS — R07.89 OTHER CHEST PAIN: ICD-10-CM

## 2024-01-12 RX ORDER — ISOSORBIDE MONONITRATE 30 MG/1
30 TABLET, EXTENDED RELEASE ORAL EVERY MORNING
Qty: 30 TABLET | Refills: 1 | Status: SHIPPED
Start: 2024-01-12 | End: 2024-01-19

## 2024-01-12 NOTE — TELEPHONE ENCOUNTER
JUSTA Krueger.        I sent another response to him too.    Imdur would be 30mg once daily. #30 with 1 refill for now.   Rx sent

## 2024-01-17 ENCOUNTER — TELEPHONE (OUTPATIENT)
Dept: CARDIOLOGY | Facility: MEDICAL CENTER | Age: 74
End: 2024-01-17
Payer: MEDICARE

## 2024-01-17 NOTE — LETTER
PROCEDURE/SURGERY CLEARANCE FORM      Encounter Date: 1/17/2024    Patient: Harman Branch  YOB: 1950    CARDIOLOGIST: PETRONA Krueger    REFERRING DOCTOR:  No ref. provider found    The above patient is cleared to have the following procedure/surgery: EGD & Colonoscopy With Deep propofol sedation                                            PROCEDURE/SURGERY CLEARANCE FORM    Date: 1/17/2024   Patient Name: Harman Branch    Dear Surgeon or Proceduralist,      Thank you for your request for cardiac stratification of our mutual patient Harman Branch 1950. We have reviewed their [facility] records; and to the best of our understanding this patient has not had stenting, ablation, cardiothoracic surgery or hospitalization for cardiovascular reasons in the past 6 months.  Harman Branch has been seen within the past 18 months and is considered to have non-modifiable cardiac risk for this low-risk procedure/surgery. They may proceed from a cardiovascular standpoint and may hold their antiplatelet/anticoagulation as briefly as possible. Please have patient resume this medication when hemodynamically stable to do so.     Aspirin or Prasugrel   - hold 7 days prior to procedure/surgery, resume when hemodynamically stable      Clopidrogrel or Ticagrelor  - hold 7 days for all neurological procedures, hold 5 days prior to all other procedure/surgery,  resume when hemodynamically stable     Warfarin - hold 7 days for all neurological procedures, hold 5 days prior to all other procedure/surgery and coordinate with [facility] ([phone]) INR testing and dose management.      Pradaxa/Xarelto/Eliquis/Savesya - hold 1 day prior to procedure for low bleeding risk procedure, 2 days for high bleeding risk procedure, or consider holding 3 days or longer for patients with reduced kidney function (CrCl <30mL/min) or spinal/cranial surgeries/procedures.      If they have a mechanical heart valve, please  coordinate with [facility] ([phone]) the proper management of their anticoagulant in the periprocedural or perioperative period.      Some patients have higher risk for cardiovascular complications or holding medication. If our patient has had prior complications of holding antiplatelet or anticoagulants in the past and we have seen them after these events, we have addressed these concerns with the patient. They are at an unknown degree of increased risk for recurrent complication.  You may hold anticoagulation/antiplatelets for the procedure or surgery if the benefits of the procedure or surgery outweigh this nonmodifiable risk.      If Hamran Branch 1950 has new symptoms of heart failure decompensation, unstable arrythmia, or angina please reach out and we will assess the patient.      If you have other patient-specific concerns, please feel free to reach out to the patient's cardiologist directly at [phone].     Thank you,       [facility]

## 2024-01-17 NOTE — TELEPHONE ENCOUNTER
Last OV: 8/14/2023  Proposed Surgery: EGD & Colonoscopy with deep propofol sedation   Surgery Date: 3/15/2024  Requesting Office Name: [facility]   Fax Number: [phone]  Preference of Location (default is surgery center unless specified by Cardiologist or [facility])  Prior Clearance Addressed: No      Anticoags/Antiplatelets: Xarelto  Anticoags/Antiplatelet managed by Cardiology? YES    Outstanding Cardiac Imaging : No  Stent, Cardiac Devices, or Catheterization: Yes: Mr. Cornelio Branch is a 72-year-old male with a history of CAD with prior PCI x2 in 2005 and again in 2013- per hospital note 10/18/2022    Ablation, TAVR/Valve (including open heart), Cardioversion: No  Recent Cardiac Hospitalization: No            When: Greater than 6 months since hospitalization.   History (cardiac history):   Past Medical History:   Diagnosis Date    Arthritis     Fingers    ASTHMA     Uses inhaler prn    Back pain     CAD (coronary artery disease)     April 2013: PCI/SNEHA (Xience Xpedition 3.0 x 15mm) the proximal RCA. December 2005: PCI/SNEHA x 2 to the LCx (Taxus 3.0 x 32mm) and RCA (Taxus 3.0 x 16mm).    CAD (coronary artery disease)     Carotid artery plaque 10/2022    Carotid US with mild plaque bilaterally.    Esophageal reflux     History of asthma     History of benign prostatic hypertrophy     History of neck surgery     Hardware in neck    HLD (hyperlipidemia)     Hx of CABG 10/2022    CABG x 4 (LIMA to LAD, rSVG to 4th marginal, rSVG to PDA, rSVG to PL of RCA) at [facility]    Hypertension     Indigestion     Previous back surgery              Surgical Clearance Letter Sent: YES   **Scan clearance request letter into [facility].**

## 2024-01-18 ENCOUNTER — HOSPITAL ENCOUNTER (OUTPATIENT)
Dept: LAB | Facility: MEDICAL CENTER | Age: 74
End: 2024-01-18
Attending: NURSE PRACTITIONER
Payer: MEDICARE

## 2024-01-18 ENCOUNTER — HOSPITAL ENCOUNTER (OUTPATIENT)
Dept: LAB | Facility: MEDICAL CENTER | Age: 74
End: 2024-01-18
Attending: INTERNAL MEDICINE
Payer: MEDICARE

## 2024-01-18 DIAGNOSIS — Z79.899 HIGH RISK MEDICATION USE: ICD-10-CM

## 2024-01-18 DIAGNOSIS — E78.5 DYSLIPIDEMIA: ICD-10-CM

## 2024-01-18 DIAGNOSIS — Z95.5 STENTED CORONARY ARTERY: ICD-10-CM

## 2024-01-18 DIAGNOSIS — E78.2 MIXED HYPERLIPIDEMIA: ICD-10-CM

## 2024-01-18 PROCEDURE — 80061 LIPID PANEL: CPT

## 2024-01-18 PROCEDURE — 80053 COMPREHEN METABOLIC PANEL: CPT

## 2024-01-18 PROCEDURE — 80048 BASIC METABOLIC PNL TOTAL CA: CPT

## 2024-01-18 PROCEDURE — 36415 COLL VENOUS BLD VENIPUNCTURE: CPT

## 2024-01-19 ENCOUNTER — PATIENT MESSAGE (OUTPATIENT)
Dept: CARDIOLOGY | Facility: MEDICAL CENTER | Age: 74
End: 2024-01-19
Payer: MEDICARE

## 2024-01-19 ENCOUNTER — TELEPHONE (OUTPATIENT)
Dept: CARDIOLOGY | Facility: MEDICAL CENTER | Age: 74
End: 2024-01-19
Payer: MEDICARE

## 2024-01-19 DIAGNOSIS — I10 ESSENTIAL HYPERTENSION, BENIGN: ICD-10-CM

## 2024-01-19 DIAGNOSIS — R79.89 ELEVATED LIVER FUNCTION TESTS: ICD-10-CM

## 2024-01-19 LAB
ALBUMIN SERPL BCP-MCNC: 4.1 G/DL (ref 3.2–4.9)
ALBUMIN/GLOB SERPL: 1.3 G/DL
ALP SERPL-CCNC: 60 U/L (ref 30–99)
ALT SERPL-CCNC: 195 U/L (ref 2–50)
ANION GAP SERPL CALC-SCNC: 9 MMOL/L (ref 7–16)
ANION GAP SERPL CALC-SCNC: 9 MMOL/L (ref 7–16)
AST SERPL-CCNC: 70 U/L (ref 12–45)
BILIRUB SERPL-MCNC: 0.4 MG/DL (ref 0.1–1.5)
BUN SERPL-MCNC: 22 MG/DL (ref 8–22)
BUN SERPL-MCNC: 23 MG/DL (ref 8–22)
CALCIUM ALBUM COR SERPL-MCNC: 9.5 MG/DL (ref 8.5–10.5)
CALCIUM SERPL-MCNC: 9.6 MG/DL (ref 8.5–10.5)
CALCIUM SERPL-MCNC: 9.6 MG/DL (ref 8.5–10.5)
CHLORIDE SERPL-SCNC: 101 MMOL/L (ref 96–112)
CHLORIDE SERPL-SCNC: 102 MMOL/L (ref 96–112)
CHOLEST SERPL-MCNC: 125 MG/DL (ref 100–199)
CO2 SERPL-SCNC: 26 MMOL/L (ref 20–33)
CO2 SERPL-SCNC: 27 MMOL/L (ref 20–33)
CREAT SERPL-MCNC: 0.77 MG/DL (ref 0.5–1.4)
CREAT SERPL-MCNC: 0.87 MG/DL (ref 0.5–1.4)
GFR SERPLBLD CREATININE-BSD FMLA CKD-EPI: 90 ML/MIN/1.73 M 2
GFR SERPLBLD CREATININE-BSD FMLA CKD-EPI: 94 ML/MIN/1.73 M 2
GLOBULIN SER CALC-MCNC: 3.1 G/DL (ref 1.9–3.5)
GLUCOSE SERPL-MCNC: 90 MG/DL (ref 65–99)
GLUCOSE SERPL-MCNC: 90 MG/DL (ref 65–99)
HDLC SERPL-MCNC: 41 MG/DL
LDLC SERPL CALC-MCNC: 66 MG/DL
POTASSIUM SERPL-SCNC: 5.3 MMOL/L (ref 3.6–5.5)
POTASSIUM SERPL-SCNC: 5.5 MMOL/L (ref 3.6–5.5)
PROT SERPL-MCNC: 7.2 G/DL (ref 6–8.2)
SODIUM SERPL-SCNC: 136 MMOL/L (ref 135–145)
SODIUM SERPL-SCNC: 138 MMOL/L (ref 135–145)
TRIGL SERPL-MCNC: 92 MG/DL (ref 0–149)

## 2024-01-19 NOTE — TELEPHONE ENCOUNTER
Tried to call pt at [phone], no answer, left vm asking pt to read [employer] msg sent with feedback/plan per AB.

## 2024-01-19 NOTE — PROGRESS NOTES
LFTs have gone up.  Any EtOH intake? Any excessive Tylenol use?  Try to cut both down.  Repeat CMP in 3-4 weeks.  Thanks, AB

## 2024-01-19 NOTE — TELEPHONE ENCOUNTER
----- Message from TRACI Krueger sent at 1/19/2024 11:50 AM PST -----  LFTs have gone up.  Any EtOH intake? Any excessive Tylenol use?  Try to cut both down.  Repeat CMP in 3-4 weeks.  Thanks, AB

## 2024-02-06 ENCOUNTER — HOSPITAL ENCOUNTER (OUTPATIENT)
Dept: LAB | Facility: MEDICAL CENTER | Age: 74
End: 2024-02-06
Attending: NURSE PRACTITIONER
Payer: MEDICARE

## 2024-02-06 DIAGNOSIS — I10 ESSENTIAL HYPERTENSION, BENIGN: ICD-10-CM

## 2024-02-06 DIAGNOSIS — R79.89 ELEVATED LIVER FUNCTION TESTS: ICD-10-CM

## 2024-02-06 PROCEDURE — 36415 COLL VENOUS BLD VENIPUNCTURE: CPT

## 2024-02-06 PROCEDURE — 80053 COMPREHEN METABOLIC PANEL: CPT

## 2024-02-07 DIAGNOSIS — R79.89 ELEVATED LIVER FUNCTION TESTS: ICD-10-CM

## 2024-02-07 DIAGNOSIS — I10 ESSENTIAL HYPERTENSION, BENIGN: ICD-10-CM

## 2024-02-07 LAB
ALBUMIN SERPL BCP-MCNC: 4.2 G/DL (ref 3.2–4.9)
ALBUMIN/GLOB SERPL: 1.4 G/DL
ALP SERPL-CCNC: 62 U/L (ref 30–99)
ALT SERPL-CCNC: 113 U/L (ref 2–50)
ANION GAP SERPL CALC-SCNC: 11 MMOL/L (ref 7–16)
AST SERPL-CCNC: 54 U/L (ref 12–45)
BILIRUB SERPL-MCNC: 0.5 MG/DL (ref 0.1–1.5)
BUN SERPL-MCNC: 17 MG/DL (ref 8–22)
CALCIUM ALBUM COR SERPL-MCNC: 9.3 MG/DL (ref 8.5–10.5)
CALCIUM SERPL-MCNC: 9.5 MG/DL (ref 8.5–10.5)
CHLORIDE SERPL-SCNC: 102 MMOL/L (ref 96–112)
CO2 SERPL-SCNC: 25 MMOL/L (ref 20–33)
CREAT SERPL-MCNC: 0.94 MG/DL (ref 0.5–1.4)
GFR SERPLBLD CREATININE-BSD FMLA CKD-EPI: 85 ML/MIN/1.73 M 2
GLOBULIN SER CALC-MCNC: 2.9 G/DL (ref 1.9–3.5)
GLUCOSE SERPL-MCNC: 89 MG/DL (ref 65–99)
POTASSIUM SERPL-SCNC: 4.9 MMOL/L (ref 3.6–5.5)
PROT SERPL-MCNC: 7.1 G/DL (ref 6–8.2)
SODIUM SERPL-SCNC: 138 MMOL/L (ref 135–145)

## 2024-02-29 DIAGNOSIS — I25.10 CORONARY ARTERY DISEASE INVOLVING NATIVE CORONARY ARTERY OF NATIVE HEART WITHOUT ANGINA PECTORIS: ICD-10-CM

## 2024-02-29 DIAGNOSIS — Z95.1 HX OF CABG: ICD-10-CM

## 2024-02-29 DIAGNOSIS — I10 HTN (HYPERTENSION), MALIGNANT: ICD-10-CM

## 2024-02-29 DIAGNOSIS — Z95.5 STENTED CORONARY ARTERY: ICD-10-CM

## 2024-03-01 NOTE — TELEPHONE ENCOUNTER
Is the patient due for a refill? Yes    Was the patient seen the past year? Yes    Date of last office visit: 8.14.23    Does the patient have an upcoming appointment?  No    Provider to refill:AB    Does the patients insurance require a 100 day supply?  No

## 2024-03-04 RX ORDER — METOPROLOL TARTRATE 50 MG/1
50 TABLET, FILM COATED ORAL 2 TIMES DAILY
Qty: 180 TABLET | Refills: 1 | Status: SHIPPED | OUTPATIENT
Start: 2024-03-04

## 2024-03-07 DIAGNOSIS — I25.10 CORONARY ARTERY DISEASE INVOLVING NATIVE CORONARY ARTERY OF NATIVE HEART WITHOUT ANGINA PECTORIS: ICD-10-CM

## 2024-03-07 DIAGNOSIS — Z95.1 HX OF CABG: ICD-10-CM

## 2024-03-07 DIAGNOSIS — E78.2 MIXED HYPERLIPIDEMIA: ICD-10-CM

## 2024-03-07 RX ORDER — ROSUVASTATIN CALCIUM 40 MG/1
40 TABLET, COATED ORAL DAILY
Qty: 90 TABLET | Refills: 1 | Status: SHIPPED | OUTPATIENT
Start: 2024-03-07

## 2024-03-07 NOTE — TELEPHONE ENCOUNTER
Is the patient due for a refill? Yes    Was the patient seen the past year? Yes    Date of last office visit: 08/14/2023    Does the patient have an upcoming appointment?  No   If yes, When?     Provider to refill:AB    Does the patients insurance require a 100 day supply?  No

## 2024-03-19 ENCOUNTER — HOSPITAL ENCOUNTER (OUTPATIENT)
Dept: LAB | Facility: MEDICAL CENTER | Age: 74
End: 2024-03-19
Attending: NURSE PRACTITIONER
Payer: MEDICARE

## 2024-03-19 DIAGNOSIS — Z95.5 STENTED CORONARY ARTERY: ICD-10-CM

## 2024-03-19 DIAGNOSIS — R79.89 ELEVATED LIVER FUNCTION TESTS: ICD-10-CM

## 2024-03-19 DIAGNOSIS — I25.10 CORONARY ARTERY DISEASE INVOLVING NATIVE CORONARY ARTERY OF NATIVE HEART WITHOUT ANGINA PECTORIS: ICD-10-CM

## 2024-03-19 DIAGNOSIS — I10 ESSENTIAL HYPERTENSION, BENIGN: ICD-10-CM

## 2024-03-19 DIAGNOSIS — Z95.1 HX OF CABG: ICD-10-CM

## 2024-03-19 LAB
ALBUMIN SERPL BCP-MCNC: 4 G/DL (ref 3.2–4.9)
ALBUMIN/GLOB SERPL: 1.5 G/DL
ALP SERPL-CCNC: 59 U/L (ref 30–99)
ALT SERPL-CCNC: 48 U/L (ref 2–50)
ANION GAP SERPL CALC-SCNC: 11 MMOL/L (ref 7–16)
AST SERPL-CCNC: 28 U/L (ref 12–45)
BILIRUB SERPL-MCNC: 0.6 MG/DL (ref 0.1–1.5)
BUN SERPL-MCNC: 18 MG/DL (ref 8–22)
CALCIUM ALBUM COR SERPL-MCNC: 9 MG/DL (ref 8.5–10.5)
CALCIUM SERPL-MCNC: 9 MG/DL (ref 8.5–10.5)
CHLORIDE SERPL-SCNC: 102 MMOL/L (ref 96–112)
CO2 SERPL-SCNC: 25 MMOL/L (ref 20–33)
CREAT SERPL-MCNC: 0.86 MG/DL (ref 0.5–1.4)
GFR SERPLBLD CREATININE-BSD FMLA CKD-EPI: 90 ML/MIN/1.73 M 2
GLOBULIN SER CALC-MCNC: 2.7 G/DL (ref 1.9–3.5)
GLUCOSE SERPL-MCNC: 88 MG/DL (ref 65–99)
POTASSIUM SERPL-SCNC: 4.2 MMOL/L (ref 3.6–5.5)
PROT SERPL-MCNC: 6.7 G/DL (ref 6–8.2)
SODIUM SERPL-SCNC: 138 MMOL/L (ref 135–145)

## 2024-03-19 PROCEDURE — 36415 COLL VENOUS BLD VENIPUNCTURE: CPT

## 2024-03-19 PROCEDURE — 80053 COMPREHEN METABOLIC PANEL: CPT

## 2024-03-19 RX ORDER — RIVAROXABAN 2.5 MG/1
2.5 TABLET, FILM COATED ORAL 2 TIMES DAILY
Qty: 180 TABLET | Refills: 3 | Status: SHIPPED | OUTPATIENT
Start: 2024-03-19

## 2024-03-27 DIAGNOSIS — I10 HTN (HYPERTENSION), MALIGNANT: ICD-10-CM

## 2024-03-27 DIAGNOSIS — I10 HYPERTENSION, UNSPECIFIED TYPE: ICD-10-CM

## 2024-03-28 RX ORDER — LISINOPRIL 20 MG/1
20 TABLET ORAL 2 TIMES DAILY
Qty: 180 TABLET | Refills: 0 | Status: SHIPPED | OUTPATIENT
Start: 2024-03-28

## 2024-03-28 NOTE — TELEPHONE ENCOUNTER
Is the patient due for a refill? PHARMACY CHANGE    Was the patient seen the past year? Yes    Date of last office visit: 08/14/23    Does the patient have an upcoming appointment?  No   If yes, When?     Provider to refill:AB    Does the patients insurance require a 100 day supply?  No

## 2024-03-29 NOTE — TELEPHONE ENCOUNTER
EDEL Lynn,    I was able to get pt scheduled for a f/v w/ TT as requested. Pt would like to know, does he need labs done prior?    No labs needed, pt has had several recent labs done[facility]

## 2024-04-11 NOTE — ASSESSMENT & PLAN NOTE
Cast was placed today. She will continue to monitor for a DVT.    Medical management  Atorvastatin, losartan, metoprolol, aspirin

## 2024-04-16 ENCOUNTER — APPOINTMENT (RX ONLY)
Dept: URBAN - METROPOLITAN AREA CLINIC 22 | Facility: CLINIC | Age: 74
Setting detail: DERMATOLOGY
End: 2024-04-16

## 2024-04-16 DIAGNOSIS — D18.0 HEMANGIOMA: ICD-10-CM

## 2024-04-16 DIAGNOSIS — L91.0 HYPERTROPHIC SCAR: ICD-10-CM

## 2024-04-16 DIAGNOSIS — L81.4 OTHER MELANIN HYPERPIGMENTATION: ICD-10-CM

## 2024-04-16 DIAGNOSIS — L82.1 OTHER SEBORRHEIC KERATOSIS: ICD-10-CM

## 2024-04-16 DIAGNOSIS — L57.0 ACTINIC KERATOSIS: ICD-10-CM

## 2024-04-16 DIAGNOSIS — L40.0 PSORIASIS VULGARIS: ICD-10-CM

## 2024-04-16 DIAGNOSIS — Z71.89 OTHER SPECIFIED COUNSELING: ICD-10-CM

## 2024-04-16 DIAGNOSIS — D22 MELANOCYTIC NEVI: ICD-10-CM

## 2024-04-16 PROBLEM — D22.61 MELANOCYTIC NEVI OF RIGHT UPPER LIMB, INCLUDING SHOULDER: Status: ACTIVE | Noted: 2024-04-16

## 2024-04-16 PROBLEM — D18.01 HEMANGIOMA OF SKIN AND SUBCUTANEOUS TISSUE: Status: ACTIVE | Noted: 2024-04-16

## 2024-04-16 PROBLEM — D22.5 MELANOCYTIC NEVI OF TRUNK: Status: ACTIVE | Noted: 2024-04-16

## 2024-04-16 PROCEDURE — ? PRESCRIPTION

## 2024-04-16 PROCEDURE — ? LIQUID NITROGEN

## 2024-04-16 PROCEDURE — ? DEFER

## 2024-04-16 PROCEDURE — ? ADDITIONAL NOTES

## 2024-04-16 PROCEDURE — ? COUNSELING

## 2024-04-16 PROCEDURE — ? SUNSCREEN RECOMMENDATIONS

## 2024-04-16 PROCEDURE — 99214 OFFICE O/P EST MOD 30 MIN: CPT | Mod: 25

## 2024-04-16 PROCEDURE — 17000 DESTRUCT PREMALG LESION: CPT

## 2024-04-16 RX ORDER — CLOBETASOL PROPIONATE 0.5 MG/ML
SOLUTION TOPICAL
Qty: 50 | Refills: 5 | Status: ERX | COMMUNITY
Start: 2024-04-16

## 2024-04-16 RX ADMIN — CLOBETASOL PROPIONATE: 0.5 SOLUTION TOPICAL at 00:00

## 2024-04-16 ASSESSMENT — LOCATION ZONE DERM
LOCATION ZONE: FACE
LOCATION ZONE: LEG
LOCATION ZONE: HAND
LOCATION ZONE: ARM
LOCATION ZONE: TRUNK

## 2024-04-16 ASSESSMENT — LOCATION DETAILED DESCRIPTION DERM
LOCATION DETAILED: LEFT MEDIAL UPPER BACK
LOCATION DETAILED: LEFT CENTRAL TEMPLE
LOCATION DETAILED: RIGHT ANTERIOR MEDIAL DISTAL THIGH
LOCATION DETAILED: LEFT PROXIMAL DORSAL FOREARM
LOCATION DETAILED: EPIGASTRIC SKIN
LOCATION DETAILED: 2ND WEB SPACE RIGHT HAND
LOCATION DETAILED: RIGHT SUPERIOR LATERAL MIDBACK
LOCATION DETAILED: RIGHT MEDIAL INFERIOR CHEST
LOCATION DETAILED: LEFT INFERIOR UPPER BACK

## 2024-04-16 ASSESSMENT — LOCATION SIMPLE DESCRIPTION DERM
LOCATION SIMPLE: RIGHT LOWER BACK
LOCATION SIMPLE: LEFT UPPER BACK
LOCATION SIMPLE: LEFT TEMPLE
LOCATION SIMPLE: CHEST
LOCATION SIMPLE: LEFT FOREARM
LOCATION SIMPLE: RIGHT HAND
LOCATION SIMPLE: RIGHT THIGH
LOCATION SIMPLE: ABDOMEN

## 2024-04-16 ASSESSMENT — PGA PSORIASIS: PGA PSORIASIS 2020: ALMOST CLEAR

## 2024-04-16 ASSESSMENT — BSA PSORIASIS: % BODY COVERED IN PSORIASIS: 2

## 2024-04-16 NOTE — PROCEDURE: ADDITIONAL NOTES
Render Risk Assessment In Note?: no
Additional Notes: 4/16/24: Pt reports that back and legs are stable with no new lesions while using light box. Pt is still having flares on scalp. Scalp today is flaky but does not have plaques. Discussed topical clobetasol vs talconex. Pt opts to use clobetasol again as talconex can be thicker and more goopy.   Pt is using light box for a few minutes 4 times a week. \\n\\nPrior:Originally started at 300mj and has increased to 5000mj over the last year.\\nHe has been using the light box 16 minutes three times a week.\\nHe has been under control.  Recommend decreasing to 15 mins twice a week and then if can go to once a week if not breaking through.
Detail Level: Simple

## 2024-04-16 NOTE — PROCEDURE: DEFER
Detail Level: Detailed
Introduction Text (Please End With A Colon): .
Procedure To Be Performed At Next Visit: Intralesional Kenalog
Size Of Lesion In Cm (Optional): 0

## 2024-05-29 ENCOUNTER — OFFICE VISIT (OUTPATIENT)
Dept: CARDIOLOGY | Facility: MEDICAL CENTER | Age: 74
End: 2024-05-29
Attending: INTERNAL MEDICINE
Payer: MEDICARE

## 2024-05-29 VITALS
DIASTOLIC BLOOD PRESSURE: 82 MMHG | WEIGHT: 183.4 LBS | SYSTOLIC BLOOD PRESSURE: 114 MMHG | RESPIRATION RATE: 14 BRPM | HEIGHT: 64 IN | BODY MASS INDEX: 31.31 KG/M2 | HEART RATE: 56 BPM | OXYGEN SATURATION: 96 %

## 2024-05-29 DIAGNOSIS — I10 HYPERTENSION, UNSPECIFIED TYPE: ICD-10-CM

## 2024-05-29 DIAGNOSIS — N52.2 DRUG-INDUCED ERECTILE DYSFUNCTION: ICD-10-CM

## 2024-05-29 DIAGNOSIS — I25.10 CORONARY ARTERY DISEASE INVOLVING NATIVE CORONARY ARTERY OF NATIVE HEART WITHOUT ANGINA PECTORIS: ICD-10-CM

## 2024-05-29 DIAGNOSIS — E78.2 MIXED HYPERLIPIDEMIA: ICD-10-CM

## 2024-05-29 DIAGNOSIS — I10 HTN (HYPERTENSION), MALIGNANT: ICD-10-CM

## 2024-05-29 DIAGNOSIS — Z95.5 STENTED CORONARY ARTERY: ICD-10-CM

## 2024-05-29 DIAGNOSIS — Z95.1 HX OF CABG: ICD-10-CM

## 2024-05-29 RX ORDER — ROSUVASTATIN CALCIUM 40 MG/1
40 TABLET, COATED ORAL DAILY
Qty: 100 TABLET | Refills: 4 | Status: SHIPPED | OUTPATIENT
Start: 2024-05-29

## 2024-05-29 RX ORDER — METOPROLOL TARTRATE 50 MG/1
50 TABLET, FILM COATED ORAL 2 TIMES DAILY
Qty: 180 TABLET | Refills: 4 | Status: SHIPPED | OUTPATIENT
Start: 2024-05-29

## 2024-05-29 RX ORDER — TADALAFIL 5 MG/1
5 TABLET ORAL DAILY
COMMUNITY

## 2024-05-29 RX ORDER — LISINOPRIL 20 MG/1
20 TABLET ORAL DAILY
Qty: 100 TABLET | Refills: 4 | Status: SHIPPED | OUTPATIENT
Start: 2024-05-29

## 2024-05-29 RX ORDER — BEMPEDOIC ACID AND EZETIMIBE 180; 10 MG/1; MG/1
180 TABLET, FILM COATED ORAL DAILY
Qty: 100 TABLET | Refills: 4 | Status: SHIPPED | OUTPATIENT
Start: 2024-05-29

## 2024-05-29 ASSESSMENT — ENCOUNTER SYMPTOMS
FALLS: 0
SORE THROAT: 0
CLAUDICATION: 0
ORTHOPNEA: 0
DIZZINESS: 0
BLOOD IN STOOL: 0
DEPRESSION: 0
COUGH: 0
PND: 0
SHORTNESS OF BREATH: 0
PALPITATIONS: 0
EYE REDNESS: 0
BRUISES/BLEEDS EASILY: 0

## 2024-05-29 ASSESSMENT — FIBROSIS 4 INDEX: FIB4 SCORE: 1.01

## 2024-05-29 NOTE — PROGRESS NOTES
Chief Complaint   Patient presents with    Coronary Artery Disease     F/V Dx: Coronary artery disease involving native coronary artery of native heart without angina pectoris       Subjective     Cornelio Branch is a 74 y.o. male who presents today for cardiac care due to established coronary artery disease with prior coronary intervention, hypertension, hyperlipidemia.  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  On October 23, 2022, patient underwent successful CABG at [facility].    09/2023 I have independently interpreted and reviewed echocardiogram's actual images with patient which showed normal left ventricular systolic function. No wall motion abnormality. No evidence of pulmonary hypertension. No significant valvular disease.    Doing well after surgery. NO chest pain.     Patient is feeling better these days. Does get winded upon walking up inclines or for distance. No symptoms at rest or with daily living activities.    I have independently interpreted and reviewed blood tests results with patient in clinic which shows normal LDL level 66, triglycerides 92, renal and liver function.       Past Medical History:   Diagnosis Date    Arthritis     Fingers    ASTHMA     Uses inhaler prn    Back pain     CAD (coronary artery disease)     April 2013: PCI/SNEHA (Xience Xpedition 3.0 x 15mm) the proximal RCA. December 2005: PCI/SNEHA x 2 to the LCx (Taxus 3.0 x 32mm) and RCA (Taxus 3.0 x 16mm).    CAD (coronary artery disease)     Carotid artery plaque 10/2022    Carotid US with mild plaque bilaterally.    Esophageal reflux     History of asthma     History of benign prostatic hypertrophy     History of neck surgery     Hardware in neck    HLD (hyperlipidemia)     Hx of CABG 10/2022    CABG x 4 (LIMA to LAD, rSVG to 4th marginal, rSVG to PDA, rSVG to PL of RCA) at [facility]    Hypertension      Indigestion     Previous back surgery      Past Surgical History:   Procedure Laterality Date    [place] NJX AA&/STRD TFRML EPI LUMBAR/SACRAL 1 LEVEL Bilateral 2016    Procedure: INJ-FORAMEN EPI LUM/SAC SNGL - L4-5;  Surgeon: Jesus Rojas M.D.;  Location: SURGERY [facility];  Service: Pain Management    [place] NJX AA&/STRD TFRML EPI LUMBAR/SACRAL 1 LEVEL  2016    Procedure: INJ-FORAMEN EPI LUM/SAC SNGL;  Surgeon: Jesus Rojas M.D.;  Location: SURGERY [facility];  Service: Pain Management    PB FLUORSCOPIC GUIDANCE SPINAL INJECTION  2016    Procedure: FLUOROGUIDE FOR SPINAL INJ;  Surgeon: Jesus Rojas M.D.;  Location: [facility];  Service: Pain Management    LUMBAR LAMINECTOMY DISKECTOMY  2012    Performed by MARLIN CANDELARIA at SURGERY [facility] ORS    FORAMINOTOMY  2012    Performed by MARLIN CANDELARIA at SURGERY [facility] ORS    LUMBAR LAMINECTOMY DISKECTOMY  3/9/2011    Performed by MARLIN CANDELARIA at SURGERY [facility] ORS    FORAMINOTOMY  3/9/2011    Performed by MARLIN CANDELARIA at SURGERY [facility] ORS    ANGIOGRAM      CERVICAL DISK AND FUSION ANTERIOR      OTHER CARDIAC SURGERY   heart stents     Family History   Problem Relation Age of Onset    Heart Attack Neg Hx      Social History     Socioeconomic History    Marital status:      Spouse name: Not on file    Number of children: Not on file    Years of education: Not on file    Highest education level: Master's degree (e.g., MA, MS, Sudeep, MEd, MSW, RENEE)   Occupational History    Not on file   Tobacco Use    Smoking status: Former     Types: Cigars     Quit date: 1987     Years since quittin.1    Smokeless tobacco: Never    Tobacco comments:     Cigars socially   Vaping Use    Vaping status: Never Used   Substance and Sexual Activity    Alcohol use: Yes     Alcohol/week: 0.6 oz     Types: 1 Standard drinks or equivalent per week     Comment: socially    Drug use: No    Sexual activity: Not on  file   Other Topics Concern    Not on file   Social History Narrative    ** Merged History Encounter **          Social Determinants of Health     Financial Resource Strain: Low Risk  (1/15/2021)    Overall Financial Resource Strain (CARDIA)     Difficulty of Paying Living Expenses: Not hard at all   Food Insecurity: No Food Insecurity (1/15/2021)    Hunger Vital Sign     Worried About Running Out of Food in the Last Year: Never true     Ran Out of Food in the Last Year: Never true   Transportation Needs: No Transportation Needs (1/15/2021)    PRAPARE - Transportation     Lack of Transportation (Medical): No     Lack of Transportation (Non-Medical): No   Physical Activity: Sufficiently Active (1/15/2021)    Exercise Vital Sign     Days of Exercise per Week: 6 days     Minutes of Exercise per Session: 50 min   Stress: Stress Concern Present (1/15/2021)    [employer] [place] [employer] - Occupational Stress Questionnaire     Feeling of Stress : To some extent   Social Connections: Moderately Isolated (1/15/2021)    Social Connection and Isolation Panel [NHANES]     Frequency of Communication with Friends and Family: More than three times a week     Frequency of Social Gatherings with Friends and Family: Twice a week     Attends Protestant Services: Never     Active Member of Clubs or Organizations: No     Attends Club or Organization Meetings: Never     Marital Status:    Intimate Partner Violence: Not on file   Housing Stability: High Risk (1/15/2021)    Housing Stability Vital Sign     Unable to Pay for Housing in the Last Year: No     Number of Places Lived in the Last Year: 11     Unstable Housing in the Last Year: No     Allergies   Allergen Reactions    Heparin Rash     Concern for development of possible  heparin induced skin necrosis following heparin administration 10/17/22     Outpatient Encounter Medications as of 5/29/2024   Medication Sig Dispense Refill    tadalafil (CIALIS) 5 MG tablet  Take 5 mg by mouth every day.      lisinopril (PRINIVIL) 20 MG Tab Take 1 Tablet by mouth 2 times a day. Patient needs appointment prior to future refills. Please call [phone] to schedule. (Patient taking differently: Take 20 mg by mouth every day. Patient needs appointment prior to future refills. Please call [phone] to schedule.) 180 Tablet 0    XARELTO 2.5 MG tablet TAKE 1 TABLET TWICE A  Tablet 3    rosuvastatin (CRESTOR) 40 MG tablet Take 1 Tablet by mouth every day. 90 Tablet 1    metoprolol tartrate (LOPRESSOR) 50 MG Tab TAKE 1 TABLET TWICE A  Tablet 1    ezetimibe (ZETIA) 10 MG Tab       Cyanocobalamin (VITAMIN B-12) 1000 MCG Tab Take 1,000 mcg by mouth every day.      Coenzyme Q10 200 MG Cap Take 1 Capsule by mouth every day.      vitamin D (CHOLECALCIFEROL) 1000 Unit Tab Take 500 Units by mouth.      NUTRITIONAL SUPPLEMENTS PO Take  by mouth every day.      KRILL OIL PO Take 1 Capsule by mouth every day.      Albuterol (PROVENTIL INH) Inhale 2 Puffs every four hours as needed.      FLOVENT  MCG/ACT Aerosol Inhale 2 Puffs every four hours as needed. Indications: Asthma      B Complex Vitamins (B-COMPLEX/B-12 PO) Take 1,000 Tabs by mouth every day.      aspirin 81 MG tablet Take 1 Tablet by mouth every day.      finasteride (PROSCAR) 5 MG Tab Take 5 mg by mouth every day.       No facility-administered encounter medications on file as of 5/29/2024.     Review of Systems   Constitutional:  Negative for malaise/fatigue.   HENT:  Negative for sore throat.    Eyes:  Negative for redness.   Respiratory:  Negative for cough and shortness of breath.    Cardiovascular:  Negative for chest pain, palpitations, orthopnea, claudication, leg swelling and PND.   Gastrointestinal:  Negative for blood in stool and melena.   Musculoskeletal:  Negative for falls.   Skin:  Negative for rash.   Neurological:  Negative for dizziness.   Endo/Heme/Allergies:  Does not bruise/bleed easily.  "  Psychiatric/Behavioral:  Negative for depression.               Objective     /82 (BP Location: Left arm, Patient Position: Sitting, BP Cuff Size: Adult)   Pulse (!) 56   Resp 14   Ht 1.626 m (5' 4\")   Wt 83.2 kg (183 lb 6.4 oz)   SpO2 96%   BMI 31.48 kg/m²     Physical Exam  Vitals and nursing note reviewed.   Constitutional:       General: He is not in acute distress.     Appearance: He is not diaphoretic.   HENT:      Head: Normocephalic and atraumatic.      Right Ear: External ear normal.      Left Ear: External ear normal.      Nose: No congestion or rhinorrhea.   Eyes:      General:         Right eye: No discharge.         Left eye: No discharge.   Neck:      Thyroid: No thyromegaly.      Vascular: No JVD.   Cardiovascular:      Rate and Rhythm: Normal rate and regular rhythm.      Pulses: Normal pulses.   Pulmonary:      Effort: No respiratory distress.   Abdominal:      General: There is no distension.      Tenderness: There is no abdominal tenderness.   Musculoskeletal:         General: No swelling or tenderness.      Right lower leg: No edema.      Left lower leg: No edema.   Skin:     General: Skin is warm and dry.   Neurological:      Mental Status: He is alert and oriented to person, place, and time.      Cranial Nerves: No cranial nerve deficit.   Psychiatric:         Behavior: Behavior normal.                Assessment & Plan     1. Coronary artery disease involving native coronary artery of native heart without angina pectoris        2. Stented coronary artery        3. Hx of CABG        4. HTN (hypertension), malignant        5. Mixed hyperlipidemia              Medical Decision Making: Today's Assessment/Status/Plan:   Coronary arterial disease s/p CABG x 4 in 10/2022:    Will continue Rosuvastatin 40 mg daily, Metoprolol at 50 mg twice a day, Lisinopril 20 mg daily.    Will stop Zetia and start Nexlizet for LDL goal of 45 or less.    Based on prior history of established CAD s/p " coronary revascualization, patient is indicated to be on Xarelto 2.5 mg bid in addition to ASA to further reduce CV risks and mortality.     Hypertension:  Optimize control with BB, Lisinopril 20 mg daily as permitted above in conjunction with CAD treatment.     Hyperlipidemia:  Optimize statin as within guidelines of CAD treatment as above.    Overall, based on prior history of obstructive coronary arterial disease, patient is at at high risk for recurrent events and will require consistent ongoing guidelines directed medical therapy to reduce his cardiovascular mortality and associated complications.    I will see patient back in clinic with lab tests and studies results in 12 months.    I thank you for referring patient to our Cardiology Clinic today.      Van Childress MD.   [facility].  [phone]  [place], [place].         This visit encounter signifies the visit complexity inherent to evaluation and management associated with medical care services that serve as the continuing focal point for all needed health care services and/or with medical care services that are part of ongoing care related to this patient's single, serious condition, complex cardiac condition.    Van Childress M.D.

## 2024-05-30 ENCOUNTER — TELEPHONE (OUTPATIENT)
Dept: CARDIOLOGY | Facility: MEDICAL CENTER | Age: 74
End: 2024-05-30
Payer: MEDICARE

## 2024-05-30 NOTE — TELEPHONE ENCOUNTER
Prior Authorization for Nexlizet 180-10MG tablets   has been approved for a quantity of 30 , day supply 30    Prior Authorization reference number: [id number]  Insurance: [employer]  Effective dates: 5/30/24 to 5/30/25  Copay: $98.92     Is patient eligible to fill with [facility]? Yes    Next Steps: The patient's copay exceeds $5.00. Proceed with contacting patient to offer financial assistance.

## 2024-05-30 NOTE — TELEPHONE ENCOUNTER
Received [employer] request via [employer]  for Nexlizet 180-10MG tablets  . (Quantity:90, Day Supply:90)     Insurance: [employer]  Member ID:  [id number]  BIN: [id number]  PCN: [id number]  Group: [id number]     Ran Test claim via [employer] medication Rejects stating prior authorization is required.

## 2024-05-30 NOTE — TELEPHONE ENCOUNTER
Prior Authorization for Nexlizet 180-10MG tablets (Quantity: 90, Days: 90) has been submitted via [id number]    Insurance: [employer]    Will follow up in 24-48 business hours.

## 2024-06-03 DIAGNOSIS — Z95.5 STENTED CORONARY ARTERY: ICD-10-CM

## 2024-06-03 DIAGNOSIS — E78.2 MIXED HYPERLIPIDEMIA: ICD-10-CM

## 2024-06-03 DIAGNOSIS — I25.10 CORONARY ARTERY DISEASE INVOLVING NATIVE CORONARY ARTERY OF NATIVE HEART WITHOUT ANGINA PECTORIS: ICD-10-CM

## 2024-06-03 DIAGNOSIS — I10 HTN (HYPERTENSION), MALIGNANT: ICD-10-CM

## 2024-06-03 DIAGNOSIS — Z95.1 HX OF CABG: ICD-10-CM

## 2024-06-03 RX ORDER — BEMPEDOIC ACID AND EZETIMIBE 180; 10 MG/1; MG/1
180 TABLET, FILM COATED ORAL DAILY
Qty: 100 TABLET | Refills: 4 | Status: SHIPPED | OUTPATIENT
Start: 2024-06-03

## 2024-06-03 RX ORDER — METOPROLOL TARTRATE 50 MG/1
50 TABLET, FILM COATED ORAL 2 TIMES DAILY
Qty: 180 TABLET | Refills: 4 | Status: SHIPPED | OUTPATIENT
Start: 2024-06-03

## 2024-06-04 ENCOUNTER — HOSPITAL ENCOUNTER (OUTPATIENT)
Dept: LAB | Facility: MEDICAL CENTER | Age: 74
End: 2024-06-04
Attending: INTERNAL MEDICINE
Payer: MEDICARE

## 2024-06-04 DIAGNOSIS — I25.10 CORONARY ARTERY DISEASE INVOLVING NATIVE CORONARY ARTERY OF NATIVE HEART WITHOUT ANGINA PECTORIS: ICD-10-CM

## 2024-06-04 DIAGNOSIS — E78.2 MIXED HYPERLIPIDEMIA: ICD-10-CM

## 2024-06-04 DIAGNOSIS — Z95.1 HX OF CABG: ICD-10-CM

## 2024-06-04 DIAGNOSIS — Z95.5 STENTED CORONARY ARTERY: ICD-10-CM

## 2024-06-04 LAB
ANION GAP SERPL CALC-SCNC: 10 MMOL/L (ref 7–16)
BUN SERPL-MCNC: 21 MG/DL (ref 8–22)
CALCIUM SERPL-MCNC: 9.2 MG/DL (ref 8.5–10.5)
CHLORIDE SERPL-SCNC: 104 MMOL/L (ref 96–112)
CHOLEST SERPL-MCNC: 111 MG/DL (ref 100–199)
CO2 SERPL-SCNC: 25 MMOL/L (ref 20–33)
CREAT SERPL-MCNC: 0.92 MG/DL (ref 0.5–1.4)
GFR SERPLBLD CREATININE-BSD FMLA CKD-EPI: 87 ML/MIN/1.73 M 2
GLUCOSE SERPL-MCNC: 99 MG/DL (ref 65–99)
HDLC SERPL-MCNC: 37 MG/DL
LDLC SERPL CALC-MCNC: 50 MG/DL
POTASSIUM SERPL-SCNC: 4.6 MMOL/L (ref 3.6–5.5)
SODIUM SERPL-SCNC: 139 MMOL/L (ref 135–145)
TRIGL SERPL-MCNC: 122 MG/DL (ref 0–149)

## 2024-06-04 PROCEDURE — 80061 LIPID PANEL: CPT

## 2024-06-04 PROCEDURE — 80048 BASIC METABOLIC PNL TOTAL CA: CPT

## 2024-06-04 PROCEDURE — 36415 COLL VENOUS BLD VENIPUNCTURE: CPT

## 2024-06-04 RX ORDER — ROSUVASTATIN CALCIUM 40 MG/1
40 TABLET, COATED ORAL DAILY
Qty: 100 TABLET | Refills: 3 | Status: SHIPPED | OUTPATIENT
Start: 2024-06-04

## 2024-06-04 NOTE — TELEPHONE ENCOUNTER
Is the patient due for a refill? Yes    Was the patient seen the past year? Yes    Date of last office visit: 5/29/24    Does the patient have an upcoming appointment?  No    Provider to refill:TT    Does the patients insurance require a 100 day supply?  No    Pharmacy change

## 2024-06-05 ENCOUNTER — HOSPITAL ENCOUNTER (OUTPATIENT)
Dept: LAB | Facility: MEDICAL CENTER | Age: 74
End: 2024-06-05
Attending: PHYSICIAN ASSISTANT
Payer: MEDICARE

## 2024-06-05 LAB
ESTRADIOL SERPL-MCNC: 15 PG/ML
FSH SERPL-ACNC: 6.1 MIU/ML (ref 1.5–12.4)
LH SERPL-ACNC: 3.5 IU/L (ref 1.7–8.6)
PROLACTIN SERPL-MCNC: 11.9 NG/ML (ref 2.1–17.7)
PSA SERPL-MCNC: 1.27 NG/ML (ref 0–4)
TESTOST SERPL-MCNC: 313 NG/DL (ref 175–781)

## 2024-06-05 PROCEDURE — 84270 ASSAY OF SEX HORMONE GLOBUL: CPT

## 2024-06-05 PROCEDURE — 84153 ASSAY OF PSA TOTAL: CPT | Mod: GA

## 2024-06-05 PROCEDURE — 84403 ASSAY OF TOTAL TESTOSTERONE: CPT

## 2024-06-05 PROCEDURE — 83002 ASSAY OF GONADOTROPIN (LH): CPT

## 2024-06-05 PROCEDURE — 82670 ASSAY OF TOTAL ESTRADIOL: CPT

## 2024-06-05 PROCEDURE — 84146 ASSAY OF PROLACTIN: CPT

## 2024-06-05 PROCEDURE — 83001 ASSAY OF GONADOTROPIN (FSH): CPT

## 2024-06-05 PROCEDURE — 84402 ASSAY OF FREE TESTOSTERONE: CPT

## 2024-06-05 PROCEDURE — 36415 COLL VENOUS BLD VENIPUNCTURE: CPT

## 2024-06-06 LAB
SHBG SERPL-SCNC: 34 NMOL/L (ref 19–76)
TESTOST FREE MFR SERPL: 1.8 % (ref 1.6–2.9)
TESTOST FREE SERPL-MCNC: 61 PG/ML (ref 47–244)
TESTOST SERPL-MCNC: 331 NG/DL (ref 300–720)
TESTOSTERONE.FREE+WB SERPL-MCNC: 159 NG/DL (ref 131–682)

## 2024-06-12 ENCOUNTER — APPOINTMENT (RX ONLY)
Dept: URBAN - METROPOLITAN AREA CLINIC 22 | Facility: CLINIC | Age: 74
Setting detail: DERMATOLOGY
End: 2024-06-12

## 2024-06-12 DIAGNOSIS — L91.0 HYPERTROPHIC SCAR: ICD-10-CM

## 2024-06-12 PROCEDURE — ? COUNSELING

## 2024-06-12 PROCEDURE — ? INTRALESIONAL KENALOG

## 2024-06-12 PROCEDURE — 11900 INJECT SKIN LESIONS </W 7: CPT

## 2024-06-12 PROCEDURE — ? ADDITIONAL NOTES

## 2024-06-12 ASSESSMENT — LOCATION SIMPLE DESCRIPTION DERM
LOCATION SIMPLE: CHEST
LOCATION SIMPLE: ABDOMEN

## 2024-06-12 ASSESSMENT — LOCATION DETAILED DESCRIPTION DERM
LOCATION DETAILED: EPIGASTRIC SKIN
LOCATION DETAILED: STERNUM

## 2024-06-12 ASSESSMENT — LOCATION ZONE DERM: LOCATION ZONE: TRUNK

## 2024-06-12 NOTE — PROCEDURE: ADDITIONAL NOTES
Render Risk Assessment In Note?: no
Additional Notes: Recommend pt use Scar Away silicone sheets as well.
Detail Level: Simple

## 2024-06-12 NOTE — PROCEDURE: INTRALESIONAL KENALOG
Concentration Of Kenalog Solution Injected (Mg/Ml): 40.0
Require Ndc Code?: No
Kenalog Type Of Vial: Multiple Dose
How Many Mls Were Removed From The 10 Mg/Ml (5ml) Vial When Preparing The Injectable Solution?: 0
Expiration Date For Kenalog (Optional): Jan 2025
Lot # For Kenalog (Optional): [id number]
Consent: The risks of atrophy were reviewed with the patient.
Administered By (Optional): aKte Ace
Kenalog Preparation: Kenalog
Which Kenalog Vial Was Used?: Kenalog 40 mg/ml (10 ml vial)
Total Volume (Ccs): 1.0
Medical Necessity Clause: This procedure was medically necessary because the lesions that were treated were:
Validate Note Data When Using Inventory: Yes
Detail Level: Detailed
How Many Mls Were Removed From The 40 Mg/Ml (10ml) Vial When Preparing The Injectable Solution?: 2

## 2024-07-01 RX ORDER — CLOBETASOL PROPIONATE 0.5 MG/ML
SOLUTION TOPICAL
Qty: 150 | Refills: 1 | Status: ERX

## 2024-07-11 ENCOUNTER — APPOINTMENT (RX ONLY)
Dept: URBAN - METROPOLITAN AREA CLINIC 22 | Facility: CLINIC | Age: 74
Setting detail: DERMATOLOGY
End: 2024-07-11

## 2024-07-11 DIAGNOSIS — L91.0 HYPERTROPHIC SCAR: ICD-10-CM

## 2024-07-11 PROCEDURE — ? COUNSELING

## 2024-07-11 PROCEDURE — ? INTRALESIONAL KENALOG

## 2024-07-11 PROCEDURE — 11900 INJECT SKIN LESIONS </W 7: CPT

## 2024-07-11 PROCEDURE — ? ADDITIONAL NOTES

## 2024-07-11 ASSESSMENT — LOCATION SIMPLE DESCRIPTION DERM
LOCATION SIMPLE: ABDOMEN
LOCATION SIMPLE: CHEST

## 2024-07-11 ASSESSMENT — LOCATION DETAILED DESCRIPTION DERM
LOCATION DETAILED: EPIGASTRIC SKIN
LOCATION DETAILED: STERNUM

## 2024-07-11 ASSESSMENT — LOCATION ZONE DERM: LOCATION ZONE: TRUNK

## 2024-07-11 NOTE — PROCEDURE: INTRALESIONAL KENALOG
Concentration Of Kenalog Solution Injected (Mg/Ml): 40.0
Require Ndc Code?: No
Kenalog Type Of Vial: Multiple Dose
How Many Mls Were Removed From The 10 Mg/Ml (5ml) Vial When Preparing The Injectable Solution?: 0
Expiration Date For Kenalog (Optional): Jan 2026
Lot # For Kenalog (Optional): [id number]
Consent: The risks of atrophy were reviewed with the patient.
Administered By (Optional): Kate Ace
Kenalog Preparation: Kenalog
Which Kenalog Vial Was Used?: Kenalog 40 mg/ml (10 ml vial)
Total Volume (Ccs): 0.4
Medical Necessity Clause: This procedure was medically necessary because the lesions that were treated were:
Validate Note Data When Using Inventory: Yes
Detail Level: Detailed
How Many Mls Were Removed From The 40 Mg/Ml (10ml) Vial When Preparing The Injectable Solution?: 2

## 2024-07-11 NOTE — PROCEDURE: ADDITIONAL NOTES
Render Risk Assessment In Note?: no
Additional Notes: 7/11/24: pt states keloid has improved, not as raised or bumpy on upper half of scar. Pt elected to do one more ILK injection. Pt have verbal consent to proceed with ILK. \\n\\nPrior: Recommend pt use Scar Away silicone sheets as well.
Detail Level: Simple

## 2025-04-15 ENCOUNTER — TELEPHONE (OUTPATIENT)
Dept: CARDIOLOGY | Facility: MEDICAL CENTER | Age: 75
End: 2025-04-15
Payer: MEDICARE

## 2025-04-15 NOTE — TELEPHONE ENCOUNTER
S/W son to confirm he took patient to the ER. Son stated that they went to ER and Chest Xray & EKG appeared normal. He notes he has low suspicion for heart failure based on these findings and was not sure they would  have done an Echo on him. Due to the long ER wait,  they did not stay or proceed with full work up. He stated he an his wife will continue to monitor patient from home and take him back to the ER if he shows further decline. He asked if we wanted records and I advised he can send us records w/ EKG & Chest Xray. I advised him to let us know when patient will be returning so we can get him scheduled for FV ASAP. He asked if we have any further recommendations, to please give him a call.    ================      Phone Number Called: [phone] /[phone]    Call outcome:  Son Dr. Gaurav Branch    Message:  Patient is with son in [place]  Patient  recently had a respiratory infection and son treated patient with abx, he did improve. But still had symptoms. Per son patient has classic heart failure symptoms. I advised on patients last Echo in 2023 and noted there are no recent labs or NT,ProBNP results.  Son stated patient has some  Bilateral lower extremity pitting edema, orthnpnea, fatigue, expiratory wheezes, dyspnea on exertion but his O2, BP & HR are normal. I advised we would normally sent this type of patient to the ER as there is not much we can do here except order test and medications. I advised I am not sure if these test can be done in [place] if ordered. I let son know I would reach out to BERKLEY as TT is rounding and provide him with her recommendations.  Patient said if he does not hear back in 3-4 hours he will proceed to an ER for follow up

## 2025-04-15 NOTE — TELEPHONE ENCOUNTER
TT    Caller: Dr. Gaurav Branch - son    Topic/issue: Gaurav reached out on behalf of the patient because he is showing signs of possible CHF. He said that the patient caught a respiratory infection about 7 to 10 days ago. He said he is having swelling in his legs more so on the right side but there is also some in the left leg. His breathing sounds restricted also from the respiratory infection and he is very fatigued. Gaurav would like a callback to go over his symptoms more in depth.     Please advise.     Callback Number: [phone]    Thank you,     Tracey FLOWERS

## 2025-05-02 ENCOUNTER — PATIENT MESSAGE (OUTPATIENT)
Dept: CARDIOLOGY | Facility: PHYSICIAN GROUP | Age: 75
End: 2025-05-02
Payer: MEDICARE

## 2025-05-05 NOTE — PATIENT COMMUNICATION
Medical records request faxed to:    [facility] [address] [address]  [phone] P  [phone] F    Received receipt for confirmation.

## 2025-05-08 DIAGNOSIS — Z95.5 STENTED CORONARY ARTERY: ICD-10-CM

## 2025-05-08 DIAGNOSIS — Z95.1 HX OF CABG: ICD-10-CM

## 2025-05-08 DIAGNOSIS — I25.10 CORONARY ARTERY DISEASE INVOLVING NATIVE CORONARY ARTERY OF NATIVE HEART WITHOUT ANGINA PECTORIS: ICD-10-CM

## 2025-05-11 DIAGNOSIS — I25.10 CORONARY ARTERY DISEASE INVOLVING NATIVE CORONARY ARTERY OF NATIVE HEART WITHOUT ANGINA PECTORIS: ICD-10-CM

## 2025-05-11 DIAGNOSIS — Z95.1 HX OF CABG: ICD-10-CM

## 2025-05-11 DIAGNOSIS — E78.2 MIXED HYPERLIPIDEMIA: ICD-10-CM

## 2025-05-12 ENCOUNTER — TELEPHONE (OUTPATIENT)
Dept: CARDIOLOGY | Facility: MEDICAL CENTER | Age: 75
End: 2025-05-12
Payer: MEDICARE

## 2025-05-12 DIAGNOSIS — I10 HTN (HYPERTENSION), MALIGNANT: ICD-10-CM

## 2025-05-12 DIAGNOSIS — Z95.5 STENTED CORONARY ARTERY: ICD-10-CM

## 2025-05-12 DIAGNOSIS — I10 HYPERTENSION, UNSPECIFIED TYPE: ICD-10-CM

## 2025-05-12 DIAGNOSIS — I65.23 ATHEROSCLEROSIS OF BOTH CAROTID ARTERIES: ICD-10-CM

## 2025-05-12 DIAGNOSIS — Z95.1 HX OF CABG: ICD-10-CM

## 2025-05-12 DIAGNOSIS — I25.10 CORONARY ARTERY DISEASE INVOLVING NATIVE CORONARY ARTERY OF NATIVE HEART WITHOUT ANGINA PECTORIS: ICD-10-CM

## 2025-05-12 RX ORDER — ROSUVASTATIN CALCIUM 40 MG/1
40 TABLET, COATED ORAL DAILY
Qty: 90 TABLET | Refills: 0 | Status: SHIPPED | OUTPATIENT
Start: 2025-05-12

## 2025-05-12 RX ORDER — RIVAROXABAN 2.5 MG/1
2.5 TABLET, FILM COATED ORAL 2 TIMES DAILY
Qty: 180 TABLET | Refills: 0 | Status: SHIPPED | OUTPATIENT
Start: 2025-05-12

## 2025-05-12 NOTE — TELEPHONE ENCOUNTER
Is the patient due for a refill? Yes    Was the patient seen the last 15 months? Yes    Date of last office visit: 05/29/24    Does the patient have an upcoming appointment?  No    Provider to refill:TT    Does the patient have correction Plus and need 100-day supply? (This applies to ALL medications) Patient does not have SCP

## 2025-05-12 NOTE — TELEPHONE ENCOUNTER
[facility] refill for Rosuvastatin placed and sent to preferred pharmacy. Last OV 5/29/24. Due for annual labs, future orders in place. Needs FV - previously contacted by Scheduling. Reminders sent via [employer].

## 2025-05-12 NOTE — TELEPHONE ENCOUNTER
Pt due for FV and BMP/CBC labs per protocol. Lab orders placed, indicated in sig for pt to call and schedule FV for further med refills. Message also sent to schedulers.    Chart reviewed, pt with no noted hx AFIB, DVT, PE  To TT for RX approval per protocol. Thank you!

## 2025-05-19 ENCOUNTER — HOSPITAL ENCOUNTER (OUTPATIENT)
Dept: LAB | Facility: MEDICAL CENTER | Age: 75
End: 2025-05-19
Attending: PHYSICIAN ASSISTANT
Payer: MEDICARE

## 2025-05-19 ENCOUNTER — HOSPITAL ENCOUNTER (OUTPATIENT)
Dept: LAB | Facility: MEDICAL CENTER | Age: 75
End: 2025-05-19
Attending: INTERNAL MEDICINE
Payer: MEDICARE

## 2025-05-19 DIAGNOSIS — I10 HYPERTENSION, UNSPECIFIED TYPE: ICD-10-CM

## 2025-05-19 DIAGNOSIS — I10 HTN (HYPERTENSION), MALIGNANT: ICD-10-CM

## 2025-05-19 DIAGNOSIS — I25.10 CORONARY ARTERY DISEASE INVOLVING NATIVE CORONARY ARTERY OF NATIVE HEART WITHOUT ANGINA PECTORIS: ICD-10-CM

## 2025-05-19 DIAGNOSIS — Z95.1 HX OF CABG: ICD-10-CM

## 2025-05-19 DIAGNOSIS — Z95.5 STENTED CORONARY ARTERY: ICD-10-CM

## 2025-05-19 DIAGNOSIS — I65.23 ATHEROSCLEROSIS OF BOTH CAROTID ARTERIES: ICD-10-CM

## 2025-05-19 LAB
ERYTHROCYTE [DISTWIDTH] IN BLOOD BY AUTOMATED COUNT: 51.4 FL (ref 35.9–50)
HCT VFR BLD AUTO: 41.7 % (ref 42–52)
HGB BLD-MCNC: 14 G/DL (ref 14–18)
MCH RBC QN AUTO: 31.2 PG (ref 27–33)
MCHC RBC AUTO-ENTMCNC: 33.6 G/DL (ref 32.3–36.5)
MCV RBC AUTO: 92.9 FL (ref 81.4–97.8)
PLATELET # BLD AUTO: 294 K/UL (ref 164–446)
PMV BLD AUTO: 10 FL (ref 9–12.9)
RBC # BLD AUTO: 4.49 M/UL (ref 4.7–6.1)
WBC # BLD AUTO: 10.5 K/UL (ref 4.8–10.8)

## 2025-05-19 PROCEDURE — 85027 COMPLETE CBC AUTOMATED: CPT

## 2025-05-19 PROCEDURE — 36415 COLL VENOUS BLD VENIPUNCTURE: CPT

## 2025-05-19 PROCEDURE — 84153 ASSAY OF PSA TOTAL: CPT

## 2025-05-19 PROCEDURE — 80048 BASIC METABOLIC PNL TOTAL CA: CPT

## 2025-05-20 ENCOUNTER — RESULTS FOLLOW-UP (OUTPATIENT)
Dept: CARDIOLOGY | Facility: MEDICAL CENTER | Age: 75
End: 2025-05-20
Payer: MEDICARE

## 2025-05-20 LAB
ANION GAP SERPL CALC-SCNC: 13 MMOL/L (ref 7–16)
BUN SERPL-MCNC: 22 MG/DL (ref 8–22)
CALCIUM SERPL-MCNC: 9.3 MG/DL (ref 8.5–10.5)
CHLORIDE SERPL-SCNC: 101 MMOL/L (ref 96–112)
CO2 SERPL-SCNC: 22 MMOL/L (ref 20–33)
CREAT SERPL-MCNC: 0.91 MG/DL (ref 0.5–1.4)
GFR SERPLBLD CREATININE-BSD FMLA CKD-EPI: 87 ML/MIN/1.73 M 2
GLUCOSE SERPL-MCNC: 83 MG/DL (ref 65–99)
POTASSIUM SERPL-SCNC: 4.7 MMOL/L (ref 3.6–5.5)
PSA SERPL DL<=0.01 NG/ML-MCNC: 2.59 NG/ML (ref 0–4)
SODIUM SERPL-SCNC: 136 MMOL/L (ref 135–145)

## 2025-06-02 NOTE — PROGRESS NOTES
CC:   Chief Complaint   Patient presents with    Shortness of Breath    Peripheral Edema     Bilateral lower extremities       HPI:      Cornelio Branch is a 75 y.o. male who presents today for evaluation of shortness of breath, fatigue, and lower extremity edema.    He has prior coronary intervention, hypertension, hyperlipidemia.  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  On October 23, 2022, patient underwent successful CABG at [facility].     He is a patient of Dr. Childress and was last seen on 5/29/2024. He was feeling better at that time but does get winded upon walking up inclines or for distance. He was continued on Rosuvastatin 40 mg daily, Metoprolol at 50 mg twice a day, Lisinopril 20 mg daily.     Zetia was stopped and started on Nexlizet for LDL goal of 45 or less. He was continued on Xarelto 2.5 mg bid in addition to ASA to further reduce CV risks and mortality.     He developed respiratory infection in 4/2025 with symptoms of heart failure: shortness of breath, orthopnea, fatigue, lower extremity swelling. His son (a physician) treated him with antibiotics and had some improvement in symptoms. Son also took him to the ER. Chest Xray & EKG appeared normal. He notes he has low suspicion for heart failure based on these findings and was not sure they would  have done an Echo on him. Due to the long ER wait,  they did not stay or proceed with full work up.    Today he reports shortness of breath, orthopnea, and fatigue have resolved. Lower extremity edema still with some swelling. No chest pain, pnd, abdominal bloating/distension, palpitations, light-headedness/dizziness, pre-syncope, or syncope.     Wt at home: 177 lb  BP at home: not checking    Current list of administered Medications:  Current Medications[1]    Past Medical History[2]    Past Surgical History[3]    Family History   Problem  "Relation Age of Onset    Heart Attack Neg Hx      Patient family history was personally reviewed, no pertinent family history to current presentation    Social History[4]    ALLERGIES:  Allergies[5]    Review of systems:  ALL OTHERS reviewed and negative. As per HPI.    Physical exam:    /80 (BP Location: Left arm, Patient Position: Sitting, BP Cuff Size: Adult)   Pulse (!) 54   Resp 16   Ht 1.626 m (5' 4\")   Wt 79.8 kg (176 lb)   SpO2 97%   BMI 30.21 kg/m²        General: No acute distress.   EYES: no jaundice  HEENT: Normocephalic and atraumatic.  Neck: No bruits No JVD.   CVS: RRR. S1 + S2. +systolic murmur  Resp: +bibasilar crackles  Abdomen: Soft, NT, ND, +BS  Skin: Skin is warm and dry.  Neurological: Alert, Moves all extremities  Extremities:  trace lower extremity edema. No cyanosis.       LABS:  Lab Results   Component Value Date/Time    SODIUM 136 05/19/2025 1340    POTASSIUM 4.7 05/19/2025 1340    CHLORIDE 101 05/19/2025 1340    CO2 22 05/19/2025 1340    ANION 13.0 05/19/2025 1340    GLUCOSE 83 05/19/2025 1340    BUN 22 05/19/2025 1340    CREATININE 0.91 05/19/2025 1340    GFRCKD 87 05/19/2025 1340    CALCIUM 9.3 05/19/2025 1340    CORRCALC 9.0 03/19/2024 0606    ASTSGOT 28 03/19/2024 0606    ALTSGPT 48 03/19/2024 0606    ALKPHOSPHAT 59 03/19/2024 0606    TBILIRUBIN 0.6 03/19/2024 0606    ALBUMIN 4.0 03/19/2024 0606    TOTPROTEIN 6.7 03/19/2024 0606    GLOBULIN 2.7 03/19/2024 0606    AGRATIO 1.5 03/19/2024 0606     No results found for: \"NTPROBNP\"  Lab Results   Component Value Date/Time    CHOLSTRLTOT 111 06/04/2024 1308    CHOLSTRLTOT 125 01/18/2024 1349    TRIGLYCERIDE 122 06/04/2024 1308    TRIGLYCERIDE 92 01/18/2024 1349    HDL 37 (A) 06/04/2024 1308    HDL 41 01/18/2024 1349    LDL 50 06/04/2024 1308    LDL 66 01/18/2024 1349     Lab Results   Component Value Date/Time    WBC 10.5 05/19/2025 1340    WBC 8.0 01/04/2023 0757    HEMOGLOBIN 14.0 05/19/2025 1340    HEMOGLOBIN 15.3 01/04/2023 " 0757    HEMATOCRIT 41.7 (L) 05/19/2025 1340    HEMATOCRIT 47.1 01/04/2023 0757    PLATELETCT 294 05/19/2025 1340    PLATELETCT 301 01/04/2023 0757     Imaging:    TTE: 9/7/2023  CONCLUSIONS  Normal left ventricular size and systolic function. Grade II diastolic dysfunction.  Normal right ventricular size and systolic function.  Mildly dilated left atrium.  Mild mitral regurgitation.  No pericardial effusion.     Compared to the prior study on 10/14/2022, similar findings.     EKG : 6/3/2025  Sinus bradycardia   Prolonged NH interval     1. Coronary artery disease involving native coronary artery of native heart without angina pectoris  EKG    Basic Metabolic Panel      2. Edema, unspecified type  furosemide (LASIX) 20 MG Tab    potassium chloride (MICRO-K) 10 MEQ capsule    proBrain Natriuretic Peptide, NT      3. Undiagnosed cardiac murmurs  EC-ECHOCARDIOGRAM COMPLETE W/O CONT        Assessment / Plan:    Cardiac murmur  Lower extremity edema  -will check TTE  -will start furosemide 20 mg daily with Kcl 10 mEq daily  -check BMP and NT-proBNP in 1-2 weeks    Coronary arterial disease s/p CABG x 4 in 10/2022:  -no anginal symptom  -Will continue Metoprolol tartrate 50 mg twice a day, Lisinopril 20 mg daily, Rosuvastatin 40 mg daily, Nexlizet.     Per previous note: Based on prior history of established CAD s/p coronary revascualization, patient is indicated to be on Xarelto 2.5 mg bid in addition to ASA to further reduce CV risks and mortality.     Follow-up in 2 weeks.    Gonzalez Gill, Cardiology NP  [facility], [facility]  [address] [address]  Phone: [phone]  Fax: [phone]     Please note that this dictation was created using voice recognition software. There may be errors I did not discover before finalizing the note.          [1]   Current Outpatient Medications:     OMEPRAZOLE PO, Take 20 mg by mouth every 48  hours., Disp: , Rfl:     furosemide (LASIX) 20 MG Tab, Take 1 Tablet by mouth every morning., Disp: 90 Tablet, Rfl: 3    potassium chloride (MICRO-K) 10 MEQ capsule, Take 1 Capsule by mouth every morning., Disp: 90 Capsule, Rfl: 3    rivaroxaban (XARELTO) 2.5 MG tablet, Take 1 Tablet by mouth 2 times a day. PLEASE CALL [phone] AND SCHEDULE A FOLLOW UP APPOINTMENT FOR FURTHER REFILLS. THANK YOU!, Disp: 180 Tablet, Rfl: 0    rosuvastatin (CRESTOR) 40 MG tablet, Take 1 Tablet by mouth every day. COURTESY REFILL ONLY.  PLEASE COMPLETE YOUR LABS BY 6/1/25 and PLEASE CALL [phone] AND SCHEDULE A FOLLOW UP APPOINTMENT FOR FURTHER REFILLS. THANK YOU!, Disp: 90 Tablet, Rfl: 0    metoprolol tartrate (LOPRESSOR) 50 MG Tab, Take 1 Tablet by mouth 2 times a day., Disp: 180 Tablet, Rfl: 4    Bempedoic Acid-Ezetimibe (NEXLIZET) 180-10 MG Tab, Take 180 mg by mouth every day., Disp: 100 Tablet, Rfl: 4    tadalafil (CIALIS) 5 MG tablet, Take 5 mg by mouth every day., Disp: , Rfl:     lisinopril (PRINIVIL) 20 MG Tab, Take 1 Tablet by mouth every day. Patient needs appointment prior to future refills. Please call [phone] to schedule., Disp: 100 Tablet, Rfl: 4    Cyanocobalamin (VITAMIN B-12) 1000 MCG Tab, Take 1,000 mcg by mouth every day., Disp: , Rfl:     Coenzyme Q10 200 MG Cap, Take 1 Capsule by mouth every day., Disp: , Rfl:     vitamin D (CHOLECALCIFEROL) 1000 Unit Tab, Take 500 Units by mouth., Disp: , Rfl:     KRILL OIL PO, Take 1 Capsule by mouth every day., Disp: , Rfl:     Albuterol (PROVENTIL INH), Inhale 2 Puffs every four hours as needed., Disp: , Rfl:     FLOVENT  MCG/ACT Aerosol, Inhale 2 Puffs every four hours as needed. Indications: Asthma, Disp: , Rfl:     B Complex Vitamins (B-COMPLEX/B-12 PO), Take 1,000 Tabs by mouth every day., Disp: , Rfl:     aspirin 81 MG tablet, Take 1 Tablet by mouth every day., Disp: , Rfl:     NUTRITIONAL SUPPLEMENTS PO, Take  by mouth every day. (Patient not taking:  Reported on 6/3/2025), Disp: , Rfl:   [2]   Past Medical History:  Diagnosis Date    Arthritis     Fingers    ASTHMA     Uses inhaler prn    Back pain     CAD (coronary artery disease)     April 2013: PCI/SNEHA (Xience Xpedition 3.0 x 15mm) the proximal RCA. December 2005: PCI/SNEHA x 2 to the LCx (Taxus 3.0 x 32mm) and RCA (Taxus 3.0 x 16mm).    CAD (coronary artery disease)     Carotid artery plaque 10/2022    Carotid US with mild plaque bilaterally.    Esophageal reflux     History of asthma     History of benign prostatic hypertrophy     History of neck surgery     Hardware in neck    HLD (hyperlipidemia)     Hx of CABG 10/2022    CABG x 4 (LIMA to LAD, rSVG to 4th marginal, rSVG to PDA, rSVG to PL of RCA) at [facility]    Hypertension     Indigestion     Previous back surgery    [3]   Past Surgical History:  Procedure Laterality Date    [place] NJX AA&/STRD TFRML EPI LUMBAR/SACRAL 1 LEVEL Bilateral 9/2/2016    Procedure: INJ-FORAMEN EPI LUM/SAC SNGL - L4-5;  Surgeon: Jesus Rojas M.D.;  Location: SURGERY [facility];  Service: Pain Management    [place] NJX AA&/STRD TFRML EPI LUMBAR/SACRAL 1 LEVEL  9/2/2016    Procedure: INJ-FORAMEN EPI LUM/SAC SNGL;  Surgeon: Jesus Rojas M.D.;  Location: SURGERY [facility] ORS;  Service: Pain Management    PB FLUORSCOPIC GUIDANCE SPINAL INJECTION  9/2/2016    Procedure: FLUOROGUIDE FOR SPINAL INJ;  Surgeon: Jesus Rojas M.D.;  Location: SURGERY [facility];  Service: Pain Management    LUMBAR LAMINECTOMY DISKECTOMY  4/7/2012    Performed by MARLIN CANDELARIA at SURGERY [facility] ORS    FORAMINOTOMY  4/7/2012    Performed by MARLIN CANDELARIA at SURGERY [facility] ORS    LUMBAR LAMINECTOMY DISKECTOMY  3/9/2011    Performed by MARLIN CANDELARIA at SURGERY [facility] ORS    FORAMINOTOMY  3/9/2011    Performed by MARLIN CANDELARIA at SURGERY [facility] ORS    ANGIOGRAM      CERVICAL DISK AND FUSION ANTERIOR      OTHER CARDIAC SURGERY  2007 heart stents   [4]   Social  History  Tobacco Use    Smoking status: Former     Types: Cigars     Quit date: 1987     Years since quittin.1    Smokeless tobacco: Never    Tobacco comments:     Cigars socially   Vaping Use    Vaping status: Never Used   Substance Use Topics    Alcohol use: Yes     Alcohol/week: 0.6 oz     Types: 1 Standard drinks or equivalent per week     Comment: socially    Drug use: No   [5] No Active Allergies

## 2025-06-03 ENCOUNTER — OFFICE VISIT (OUTPATIENT)
Dept: CARDIOLOGY | Facility: MEDICAL CENTER | Age: 75
End: 2025-06-03
Attending: NURSE PRACTITIONER
Payer: MEDICARE

## 2025-06-03 VITALS
DIASTOLIC BLOOD PRESSURE: 80 MMHG | BODY MASS INDEX: 30.05 KG/M2 | RESPIRATION RATE: 16 BRPM | WEIGHT: 176 LBS | SYSTOLIC BLOOD PRESSURE: 120 MMHG | HEART RATE: 54 BPM | OXYGEN SATURATION: 97 % | HEIGHT: 64 IN

## 2025-06-03 DIAGNOSIS — R60.9 EDEMA, UNSPECIFIED TYPE: ICD-10-CM

## 2025-06-03 DIAGNOSIS — I25.10 CORONARY ARTERY DISEASE INVOLVING NATIVE CORONARY ARTERY OF NATIVE HEART WITHOUT ANGINA PECTORIS: Primary | ICD-10-CM

## 2025-06-03 DIAGNOSIS — R01.1 UNDIAGNOSED CARDIAC MURMURS: ICD-10-CM

## 2025-06-03 LAB — EKG IMPRESSION: NORMAL

## 2025-06-03 PROCEDURE — 93010 ELECTROCARDIOGRAM REPORT: CPT | Performed by: INTERNAL MEDICINE

## 2025-06-03 PROCEDURE — 3079F DIAST BP 80-89 MM HG: CPT | Performed by: NURSE PRACTITIONER

## 2025-06-03 PROCEDURE — 99214 OFFICE O/P EST MOD 30 MIN: CPT | Performed by: NURSE PRACTITIONER

## 2025-06-03 PROCEDURE — 99213 OFFICE O/P EST LOW 20 MIN: CPT | Performed by: NURSE PRACTITIONER

## 2025-06-03 PROCEDURE — 3074F SYST BP LT 130 MM HG: CPT | Performed by: NURSE PRACTITIONER

## 2025-06-03 PROCEDURE — 93005 ELECTROCARDIOGRAM TRACING: CPT | Mod: TC | Performed by: NURSE PRACTITIONER

## 2025-06-03 RX ORDER — POTASSIUM CHLORIDE 750 MG/1
10 CAPSULE, EXTENDED RELEASE ORAL EVERY MORNING
Qty: 90 CAPSULE | Refills: 3 | Status: SHIPPED
Start: 2025-06-03 | End: 2025-06-17

## 2025-06-03 RX ORDER — FUROSEMIDE 20 MG/1
20 TABLET ORAL EVERY MORNING
Qty: 90 TABLET | Refills: 3 | Status: SHIPPED
Start: 2025-06-03 | End: 2025-06-17

## 2025-06-03 ASSESSMENT — FIBROSIS 4 INDEX: FIB4 SCORE: 1.04

## 2025-06-04 ENCOUNTER — HOSPITAL ENCOUNTER (OUTPATIENT)
Dept: CARDIOLOGY | Facility: MEDICAL CENTER | Age: 75
End: 2025-06-04
Attending: INTERNAL MEDICINE
Payer: MEDICARE

## 2025-06-04 DIAGNOSIS — R01.1 UNDIAGNOSED CARDIAC MURMURS: ICD-10-CM

## 2025-06-04 PROCEDURE — 93306 TTE W/DOPPLER COMPLETE: CPT

## 2025-06-04 PROCEDURE — 700117 HCHG RX CONTRAST REV CODE 255: Performed by: INTERNAL MEDICINE

## 2025-06-04 RX ADMIN — HUMAN ALBUMIN MICROSPHERES AND PERFLUTREN 3 ML: 10; .22 INJECTION, SOLUTION INTRAVENOUS at 17:45

## 2025-06-05 ENCOUNTER — TELEPHONE (OUTPATIENT)
Dept: CARDIOLOGY | Facility: MEDICAL CENTER | Age: 75
End: 2025-06-05

## 2025-06-05 ENCOUNTER — RESULTS FOLLOW-UP (OUTPATIENT)
Dept: CARDIOLOGY | Facility: MEDICAL CENTER | Age: 75
End: 2025-06-05

## 2025-06-05 LAB
LV EJECT FRACT  99904: 65
LV EJECT FRACT MOD 2C 99903: 79.58
LV EJECT FRACT MOD 4C 99902: 68.55
LV EJECT FRACT MOD BP 99901: 74.22

## 2025-06-05 PROCEDURE — 93306 TTE W/DOPPLER COMPLETE: CPT | Mod: 26 | Performed by: INTERNAL MEDICINE

## 2025-06-05 NOTE — TELEPHONE ENCOUNTER
Confirmed in the Appt Desk patient has a pending appointment for labs on 6/16/25.    Reminder set to ensure completed prior to upcoming appointment.

## 2025-06-08 ENCOUNTER — PATIENT MESSAGE (OUTPATIENT)
Dept: CARDIOLOGY | Facility: MEDICAL CENTER | Age: 75
End: 2025-06-08
Payer: MEDICARE

## 2025-06-09 NOTE — TELEPHONE ENCOUNTER
Please see other [employer] message received on 06/08/25. Sent to HJ for review and further recommendations.

## 2025-06-10 DIAGNOSIS — Z95.5 STENTED CORONARY ARTERY: ICD-10-CM

## 2025-06-10 DIAGNOSIS — I25.10 CORONARY ARTERY DISEASE INVOLVING NATIVE CORONARY ARTERY OF NATIVE HEART WITHOUT ANGINA PECTORIS: ICD-10-CM

## 2025-06-10 DIAGNOSIS — I10 HTN (HYPERTENSION), MALIGNANT: ICD-10-CM

## 2025-06-10 DIAGNOSIS — Z95.1 HX OF CABG: ICD-10-CM

## 2025-06-10 DIAGNOSIS — E78.2 MIXED HYPERLIPIDEMIA: ICD-10-CM

## 2025-06-10 RX ORDER — METOPROLOL TARTRATE 50 MG
50 TABLET ORAL 2 TIMES DAILY
Qty: 180 TABLET | Refills: 3 | Status: SHIPPED | OUTPATIENT
Start: 2025-06-10

## 2025-06-10 NOTE — TELEPHONE ENCOUNTER
Is the patient due for a refill? Yes    Was the patient seen the last 15 months? Yes    Date of last office visit: 06/03/2025    Does the patient have an upcoming appointment?  Yes   If yes, When? 06/17/2025    Provider to refill:HJ    Does the patient have correction Plus and need 100-day supply? (This applies to ALL medications) Patient does not have SCP

## 2025-06-10 NOTE — TELEPHONE ENCOUNTER
Is the patient due for a refill? Yes    Was the patient seen the last 15 months? Yes    Date of last office visit: 6/3/2025    Does the patient have an upcoming appointment?  Yes   If yes, When? 6/17/2025    Provider to refill:FACUNDO    Does the patient have alf Plus and need 100-day supply? (This applies to ALL medications) Patient does not have SCP

## 2025-06-10 NOTE — PATIENT COMMUNICATION
PALLAVI Morgan to Me (Selected Message)  6/10/25  9:26 AM  If lower extremity edema is improving, may take furosemide and potassium as needed.  He has a follow-up with me next week for reevaluation.

## 2025-06-11 RX ORDER — BEMPEDOIC ACID AND EZETIMIBE 180; 10 MG/1; MG/1
180 TABLET, FILM COATED ORAL DAILY
Qty: 90 TABLET | Refills: 3 | Status: SHIPPED | OUTPATIENT
Start: 2025-06-11

## 2025-06-13 ENCOUNTER — HOSPITAL ENCOUNTER (OUTPATIENT)
Dept: LAB | Facility: MEDICAL CENTER | Age: 75
End: 2025-06-13
Attending: NURSE PRACTITIONER
Payer: MEDICARE

## 2025-06-13 DIAGNOSIS — R60.9 EDEMA, UNSPECIFIED TYPE: ICD-10-CM

## 2025-06-13 DIAGNOSIS — I25.10 CORONARY ARTERY DISEASE INVOLVING NATIVE CORONARY ARTERY OF NATIVE HEART WITHOUT ANGINA PECTORIS: ICD-10-CM

## 2025-06-13 LAB
ANION GAP SERPL CALC-SCNC: 10 MMOL/L (ref 7–16)
BUN SERPL-MCNC: 24 MG/DL (ref 8–22)
CALCIUM SERPL-MCNC: 9.3 MG/DL (ref 8.5–10.5)
CHLORIDE SERPL-SCNC: 104 MMOL/L (ref 96–112)
CO2 SERPL-SCNC: 25 MMOL/L (ref 20–33)
CREAT SERPL-MCNC: 1.22 MG/DL (ref 0.5–1.4)
GFR SERPLBLD CREATININE-BSD FMLA CKD-EPI: 61 ML/MIN/1.73 M 2
GLUCOSE SERPL-MCNC: 104 MG/DL (ref 65–99)
POTASSIUM SERPL-SCNC: 4.9 MMOL/L (ref 3.6–5.5)
SODIUM SERPL-SCNC: 139 MMOL/L (ref 135–145)

## 2025-06-13 PROCEDURE — 80048 BASIC METABOLIC PNL TOTAL CA: CPT

## 2025-06-13 PROCEDURE — 36415 COLL VENOUS BLD VENIPUNCTURE: CPT

## 2025-06-16 ENCOUNTER — RESULTS FOLLOW-UP (OUTPATIENT)
Dept: CARDIOLOGY | Facility: MEDICAL CENTER | Age: 75
End: 2025-06-16
Payer: MEDICARE

## 2025-06-16 NOTE — PROGRESS NOTES
CC:   Chief Complaint   Patient presents with    Coronary Artery Disease     F/V DX: Coronary artery disease involving native coronary artery of native heart without angina pectoris       HPI:      Cornelio Branch is a 75 y.o. male who presents today for evaluation of shortness of breath, fatigue, and lower extremity edema.    He has prior coronary intervention, hypertension, hyperlipidemia.  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  On October 23, 2022, patient underwent successful CABG at [facility].     He is a patient of Dr. Childress and was last seen on 5/29/2024. He was feeling better at that time but does get winded upon walking up inclines or for distance. He was continued on Rosuvastatin 40 mg daily, Metoprolol at 50 mg twice a day, Lisinopril 20 mg daily.     Zetia was stopped and started on Nexlizet for LDL goal of 45 or less. He was continued on Xarelto 2.5 mg bid in addition to ASA to further reduce CV risks and mortality.     He developed respiratory infection in 4/2025 with symptoms of heart failure: shortness of breath, orthopnea, fatigue, lower extremity swelling. His son (a physician) treated him with antibiotics and had some improvement in symptoms. Son also took him to the ER. Chest Xray & EKG appeared normal. He notes he has low suspicion for heart failure based on these findings and was not sure they would  have done an Echo on him. Due to the long ER wait,  they did not stay or proceed with full work up.    Since last seen on 6/3/2025, reports doing well.  Lower extremity edema has resolved. No chest pain, dyspnea, fatigue, orthopnea, pnd, abdominal bloating/distension, palpitations, pre-syncope, or syncope.       Current list of administered Medications:  Current Medications[1]    Past Medical History[2]    Past Surgical History[3]    Family History   Problem Relation Age of Onset     "Heart Attack Neg Hx      Patient family history was personally reviewed, no pertinent family history to current presentation    Social History[4]    ALLERGIES:  Allergies[5]    Review of systems:  ALL OTHERS reviewed and negative. As per HPI.    Physical exam:    /76 (BP Location: Right arm, Patient Position: Sitting, BP Cuff Size: Adult)   Pulse 65   Resp 16   Ht 1.626 m (5' 4\")   Wt 78.9 kg (174 lb)   SpO2 97%   BMI 29.87 kg/m²        General: No acute distress.   EYES: no jaundice  HEENT: Normocephalic and atraumatic.  Neck: No bruits No JVD.   CVS: RRR. S1 + S2. +soft systolic murmur  Resp: CTAB  Abdomen: Soft, NT, ND, +BS  Skin: Skin is warm and dry.  Neurological: Alert, Moves all extremities  Extremities:  No lower extremity edema. No cyanosis.       LABS:  Lab Results   Component Value Date/Time    SODIUM 136 05/19/2025 1340    POTASSIUM 4.7 05/19/2025 1340    CHLORIDE 101 05/19/2025 1340    CO2 22 05/19/2025 1340    ANION 13.0 05/19/2025 1340    GLUCOSE 83 05/19/2025 1340    BUN 22 05/19/2025 1340    CREATININE 0.91 05/19/2025 1340    GFRCKD 87 05/19/2025 1340    CALCIUM 9.3 05/19/2025 1340    CORRCALC 9.0 03/19/2024 0606    ASTSGOT 28 03/19/2024 0606    ALTSGPT 48 03/19/2024 0606    ALKPHOSPHAT 59 03/19/2024 0606    TBILIRUBIN 0.6 03/19/2024 0606    ALBUMIN 4.0 03/19/2024 0606    TOTPROTEIN 6.7 03/19/2024 0606    GLOBULIN 2.7 03/19/2024 0606    AGRATIO 1.5 03/19/2024 0606     No results found for: \"NTPROBNP\"  Lab Results   Component Value Date/Time    CHOLSTRLTOT 111 06/04/2024 1308    CHOLSTRLTOT 125 01/18/2024 1349    TRIGLYCERIDE 122 06/04/2024 1308    TRIGLYCERIDE 92 01/18/2024 1349    HDL 37 (A) 06/04/2024 1308    HDL 41 01/18/2024 1349    LDL 50 06/04/2024 1308    LDL 66 01/18/2024 1349     Lab Results   Component Value Date/Time    WBC 10.5 05/19/2025 1340    WBC 8.0 01/04/2023 0757    HEMOGLOBIN 14.0 05/19/2025 1340    HEMOGLOBIN 15.3 01/04/2023 0757    HEMATOCRIT 41.7 (L) 05/19/2025 " 1340    HEMATOCRIT 47.1 01/04/2023 0757    PLATELETCT 294 05/19/2025 1340    PLATELETCT 301 01/04/2023 0757     Imaging:    TTE: 6/4/2025  CONCLUSIONS  Compared to the prior study on 9/7/2023, no changes.   Normal left ventricular systolic function.   No evidence of valvular abnormality based on Doppler evaluation.     TTE: 9/7/2023  CONCLUSIONS  Normal left ventricular size and systolic function. Grade II diastolic dysfunction.  Normal right ventricular size and systolic function.  Mildly dilated left atrium.  Mild mitral regurgitation.  No pericardial effusion.     Compared to the prior study on 10/14/2022, similar findings.     EKG : 6/3/2025  Sinus bradycardia   Prolonged CO interval     1. Coronary artery disease involving native coronary artery of native heart without angina pectoris  Comp Metabolic Panel    LIPID PANEL      2. Edema, unspecified type  furosemide (LASIX) 20 MG Tab    potassium chloride (MICRO-K) 10 MEQ capsule          Assessment / Plan:      Lower extremity edema  -resolved  -will change furosemide 20 mg to as needed with Kcl 10 mEq     Coronary arterial disease s/p CABG x 4 in 10/2022:  -no anginal symptom  -Will continue Metoprolol tartrate 50 mg twice a day, Lisinopril 20 mg daily, Rosuvastatin 40 mg daily, Nexlizet.  -LDL 50 on 6/4/2024, LDL at goal  -Check CMP and lipid panel on next clinic visit     Per previous note: Based on prior history of established CAD s/p coronary revascularization, patient is indicated to be on Xarelto 2.5 mg bid in addition to ASA to further reduce CV risks and mortality.     Follow-up in 3 months.    Gonzalez Gill, Cardiology NP  [facility] Vascular [facility], [facility]  [address] [place] [address]  Phone: [phone]  Fax: [phone]     Please note that this dictation was created using voice recognition software. There may be errors I did not discover before finalizing the note.          [1]    Current Outpatient Medications:     furosemide (LASIX) 20 MG Tab, Take 1 Tablet by mouth 1 time a day as needed (if gains 3 lb overnight or 5 lb in 1 week or leg swelling)., Disp: 30 Tablet, Rfl: 3    potassium chloride (MICRO-K) 10 MEQ capsule, Take 1 Capsule by mouth 1 time a day as needed (when taking furosemide)., Disp: 30 Capsule, Rfl: 3    Bempedoic Acid-Ezetimibe (NEXLIZET) 180-10 MG Tab, Take 180 mg by mouth every day., Disp: 90 Tablet, Rfl: 3    metoprolol tartrate (LOPRESSOR) 50 MG Tab, Take 1 Tablet by mouth 2 times a day., Disp: 180 Tablet, Rfl: 3    OMEPRAZOLE PO, Take 20 mg by mouth every 48 hours., Disp: , Rfl:     rivaroxaban (XARELTO) 2.5 MG tablet, Take 1 Tablet by mouth 2 times a day. PLEASE CALL [phone] AND SCHEDULE A FOLLOW UP APPOINTMENT FOR FURTHER REFILLS. THANK YOU!, Disp: 180 Tablet, Rfl: 0    rosuvastatin (CRESTOR) 40 MG tablet, Take 1 Tablet by mouth every day. COURTESY REFILL ONLY.  PLEASE COMPLETE YOUR LABS BY 6/1/25 and PLEASE CALL [phone] AND SCHEDULE A FOLLOW UP APPOINTMENT FOR FURTHER REFILLS. THANK YOU!, Disp: 90 Tablet, Rfl: 0    tadalafil (CIALIS) 5 MG tablet, Take 5 mg by mouth every day., Disp: , Rfl:     lisinopril (PRINIVIL) 20 MG Tab, Take 1 Tablet by mouth every day. Patient needs appointment prior to future refills. Please call [phone] to schedule., Disp: 100 Tablet, Rfl: 4    Cyanocobalamin (VITAMIN B-12) 1000 MCG Tab, Take 1,000 mcg by mouth every day., Disp: , Rfl:     Coenzyme Q10 200 MG Cap, Take 1 Capsule by mouth every day., Disp: , Rfl:     vitamin D (CHOLECALCIFEROL) 1000 Unit Tab, Take 500 Units by mouth., Disp: , Rfl:     KRILL OIL PO, Take 1 Capsule by mouth every day., Disp: , Rfl:     Albuterol (PROVENTIL INH), Inhale 2 Puffs every four hours as needed., Disp: , Rfl:     FLOVENT  MCG/ACT Aerosol, Inhale 2 Puffs every four hours as needed. Indications: Asthma, Disp: , Rfl:     B Complex Vitamins (B-COMPLEX/B-12 PO), Take 1,000 Tabs by  mouth every day., Disp: , Rfl:     aspirin 81 MG tablet, Take 1 Tablet by mouth every day., Disp: , Rfl:     NUTRITIONAL SUPPLEMENTS PO, Take  by mouth every day. (Patient not taking: Reported on 6/17/2025), Disp: , Rfl:   [2]   Past Medical History:  Diagnosis Date    Arthritis     Fingers    ASTHMA     Uses inhaler prn    Back pain     CAD (coronary artery disease)     April 2013: PCI/SNEHA (Xience Xpedition 3.0 x 15mm) the proximal RCA. December 2005: PCI/SNEHA x 2 to the LCx (Taxus 3.0 x 32mm) and RCA (Taxus 3.0 x 16mm).    CAD (coronary artery disease)     Carotid artery plaque 10/2022    Carotid US with mild plaque bilaterally.    Esophageal reflux     Heart disease     History of asthma     History of benign prostatic hypertrophy     History of neck surgery     Hardware in neck    HLD (hyperlipidemia)     Hx of CABG 10/2022    CABG x 4 (LIMA to LAD, rSVG to 4th marginal, rSVG to PDA, rSVG to PL of RCA) at [facility]    Hypertension     Indigestion     Previous back surgery     Stomach ulcer    [3]   Past Surgical History:  Procedure Laterality Date    [place] NJX AA&/STRD TFRML EPI LUMBAR/SACRAL 1 LEVEL Bilateral 9/2/2016    Procedure: INJ-FORAMEN EPI LUM/SAC SNGL - L4-5;  Surgeon: Jesus Rojas M.D.;  Location: [facility] ORS;  Service: Pain Management    [place] NJX AA&/STRD TFRML EPI LUMBAR/SACRAL 1 LEVEL  9/2/2016    Procedure: INJ-FORAMEN EPI LUM/SAC SNGL;  Surgeon: Jesus Rojas M.D.;  Location: SURGERY [facility] ORS;  Service: Pain Management    PB FLUORSCOPIC GUIDANCE SPINAL INJECTION  9/2/2016    Procedure: FLUOROGUIDE FOR SPINAL INJ;  Surgeon: Jesus Rojas M.D.;  Location: SURGERY [facility];  Service: Pain Management    LUMBAR LAMINECTOMY DISKECTOMY  4/7/2012    Performed by MARLIN CANDELARIA at [facility]    FORAMINOTOMY  4/7/2012    Performed by MARLIN CANDELARIA at [facility]    LUMBAR LAMINECTOMY DISKECTOMY  3/9/2011    [facility]    FORAMINOTOMY  3/9/2011    Performed by MARLIN CANDELARIA at SURGERY [facility] ORS    ANGIOGRAM      CERVICAL DISK AND FUSION ANTERIOR      OTHER CARDIAC SURGERY  2007 heart stents   [4]   Social History  Tobacco Use    Smoking status: Former     Types: Cigars     Quit date: 1987     Years since quittin.2    Smokeless tobacco: Never    Tobacco comments:     Cigars socially   Vaping Use    Vaping status: Never Used   Substance Use Topics    Alcohol use: Yes     Alcohol/week: 0.6 oz     Types: 1 Standard drinks or equivalent per week     Comment: socially    Drug use: No   [5] No Known Allergies

## 2025-06-17 ENCOUNTER — OFFICE VISIT (OUTPATIENT)
Dept: CARDIOLOGY | Facility: MEDICAL CENTER | Age: 75
End: 2025-06-17
Attending: NURSE PRACTITIONER
Payer: MEDICARE

## 2025-06-17 VITALS
HEIGHT: 64 IN | DIASTOLIC BLOOD PRESSURE: 76 MMHG | HEART RATE: 65 BPM | BODY MASS INDEX: 29.71 KG/M2 | OXYGEN SATURATION: 97 % | SYSTOLIC BLOOD PRESSURE: 122 MMHG | RESPIRATION RATE: 16 BRPM | WEIGHT: 174 LBS

## 2025-06-17 DIAGNOSIS — I25.10 CORONARY ARTERY DISEASE INVOLVING NATIVE CORONARY ARTERY OF NATIVE HEART WITHOUT ANGINA PECTORIS: Primary | ICD-10-CM

## 2025-06-17 DIAGNOSIS — R60.9 EDEMA, UNSPECIFIED TYPE: ICD-10-CM

## 2025-06-17 DIAGNOSIS — Z95.5 STENTED CORONARY ARTERY: ICD-10-CM

## 2025-06-17 DIAGNOSIS — I25.10 CORONARY ARTERY DISEASE INVOLVING NATIVE CORONARY ARTERY OF NATIVE HEART WITHOUT ANGINA PECTORIS: ICD-10-CM

## 2025-06-17 DIAGNOSIS — E78.2 MIXED HYPERLIPIDEMIA: ICD-10-CM

## 2025-06-17 PROCEDURE — 3078F DIAST BP <80 MM HG: CPT | Performed by: NURSE PRACTITIONER

## 2025-06-17 PROCEDURE — 99213 OFFICE O/P EST LOW 20 MIN: CPT | Performed by: NURSE PRACTITIONER

## 2025-06-17 PROCEDURE — 3074F SYST BP LT 130 MM HG: CPT | Performed by: NURSE PRACTITIONER

## 2025-06-17 PROCEDURE — 99214 OFFICE O/P EST MOD 30 MIN: CPT | Performed by: NURSE PRACTITIONER

## 2025-06-17 RX ORDER — POTASSIUM CHLORIDE 750 MG/1
10 CAPSULE, EXTENDED RELEASE ORAL
Qty: 30 CAPSULE | Refills: 3 | Status: SHIPPED | OUTPATIENT
Start: 2025-06-17

## 2025-06-17 RX ORDER — FUROSEMIDE 20 MG/1
20 TABLET ORAL
Qty: 30 TABLET | Refills: 3 | Status: SHIPPED | OUTPATIENT
Start: 2025-06-17

## 2025-06-17 ASSESSMENT — FIBROSIS 4 INDEX: FIB4 SCORE: 1.04

## 2025-06-18 RX ORDER — BEMPEDOIC ACID AND EZETIMIBE 180; 10 MG/1; MG/1
180 TABLET, FILM COATED ORAL DAILY
Qty: 90 TABLET | Refills: 3 | OUTPATIENT
Start: 2025-06-18

## 2025-06-23 ENCOUNTER — TELEPHONE (OUTPATIENT)
Dept: CARDIOLOGY | Facility: MEDICAL CENTER | Age: 75
End: 2025-06-23
Payer: MEDICARE

## 2025-06-23 NOTE — TELEPHONE ENCOUNTER
Prior Authorization renewal for Nexlizet 180-10MG tablets (Quantity: 90, Days: 90) has been submitted via Cover My Meds: Key ((Key: [id number]) [id number])    Insurance: [employer]-  commercial    Will follow up in 48-72 business hours.

## 2025-06-25 NOTE — TELEPHONE ENCOUNTER
Prior Authorization for Nexlizet 180-10MG tablets has been approved for a quantity of 90 , day supply 90    Prior Authorization reference number: (Key: [id number])  PA Case ID #: [id number]    Insurance: [employer]- commercial  Effective dates: 05/24/25 - 06/24/26  Copay: Refill too soon      Is patient eligible to fill with [facility]? Yes    Next Steps: Too soon to refill until 06/30/2025.

## 2025-06-27 ENCOUNTER — HOSPITAL ENCOUNTER (OUTPATIENT)
Dept: LAB | Facility: MEDICAL CENTER | Age: 75
End: 2025-06-27
Attending: FAMILY MEDICINE
Payer: MEDICARE

## 2025-06-27 LAB
ALBUMIN SERPL BCP-MCNC: 4.1 G/DL (ref 3.2–4.9)
ALBUMIN/GLOB SERPL: 1.6 G/DL
ALP SERPL-CCNC: 53 U/L (ref 30–99)
ALT SERPL-CCNC: 26 U/L (ref 2–50)
ANION GAP SERPL CALC-SCNC: 7 MMOL/L (ref 7–16)
AST SERPL-CCNC: 29 U/L (ref 12–45)
BASOPHILS # BLD AUTO: 1 % (ref 0–1.8)
BASOPHILS # BLD: 0.07 K/UL (ref 0–0.12)
BILIRUB SERPL-MCNC: 0.6 MG/DL (ref 0.1–1.5)
BUN SERPL-MCNC: 20 MG/DL (ref 8–22)
CALCIUM ALBUM COR SERPL-MCNC: 9.3 MG/DL (ref 8.5–10.5)
CALCIUM SERPL-MCNC: 9.4 MG/DL (ref 8.5–10.5)
CHLORIDE SERPL-SCNC: 104 MMOL/L (ref 96–112)
CHOLEST SERPL-MCNC: 111 MG/DL (ref 100–199)
CO2 SERPL-SCNC: 27 MMOL/L (ref 20–33)
CREAT SERPL-MCNC: 1.09 MG/DL (ref 0.5–1.4)
EOSINOPHIL # BLD AUTO: 0.48 K/UL (ref 0–0.51)
EOSINOPHIL NFR BLD: 6.9 % (ref 0–6.9)
ERYTHROCYTE [DISTWIDTH] IN BLOOD BY AUTOMATED COUNT: 49.4 FL (ref 35.9–50)
FASTING STATUS PATIENT QL REPORTED: NORMAL
GFR SERPLBLD CREATININE-BSD FMLA CKD-EPI: 70 ML/MIN/1.73 M 2
GLOBULIN SER CALC-MCNC: 2.6 G/DL (ref 1.9–3.5)
GLUCOSE SERPL-MCNC: 96 MG/DL (ref 65–99)
HCT VFR BLD AUTO: 43.3 % (ref 42–52)
HDLC SERPL-MCNC: 39 MG/DL
HGB BLD-MCNC: 14.6 G/DL (ref 14–18)
IMM GRANULOCYTES # BLD AUTO: 0.02 K/UL (ref 0–0.11)
IMM GRANULOCYTES NFR BLD AUTO: 0.3 % (ref 0–0.9)
LDLC SERPL CALC-MCNC: 56 MG/DL
LYMPHOCYTES # BLD AUTO: 2.03 K/UL (ref 1–4.8)
LYMPHOCYTES NFR BLD: 29.3 % (ref 22–41)
MCH RBC QN AUTO: 31.1 PG (ref 27–33)
MCHC RBC AUTO-ENTMCNC: 33.7 G/DL (ref 32.3–36.5)
MCV RBC AUTO: 92.3 FL (ref 81.4–97.8)
MONOCYTES # BLD AUTO: 0.71 K/UL (ref 0–0.85)
MONOCYTES NFR BLD AUTO: 10.2 % (ref 0–13.4)
NEUTROPHILS # BLD AUTO: 3.62 K/UL (ref 1.82–7.42)
NEUTROPHILS NFR BLD: 52.3 % (ref 44–72)
NRBC # BLD AUTO: 0 K/UL
NRBC BLD-RTO: 0 /100 WBC (ref 0–0.2)
PLATELET # BLD AUTO: 285 K/UL (ref 164–446)
PMV BLD AUTO: 9.8 FL (ref 9–12.9)
POTASSIUM SERPL-SCNC: 4.9 MMOL/L (ref 3.6–5.5)
PROT SERPL-MCNC: 6.7 G/DL (ref 6–8.2)
PSA SERPL DL<=0.01 NG/ML-MCNC: 1.71 NG/ML (ref 0–4)
RBC # BLD AUTO: 4.69 M/UL (ref 4.7–6.1)
SODIUM SERPL-SCNC: 138 MMOL/L (ref 135–145)
TRIGL SERPL-MCNC: 80 MG/DL (ref 0–149)
TSH SERPL-ACNC: 1.78 UIU/ML (ref 0.38–5.33)
WBC # BLD AUTO: 6.9 K/UL (ref 4.8–10.8)

## 2025-06-27 PROCEDURE — 84153 ASSAY OF PSA TOTAL: CPT

## 2025-06-27 PROCEDURE — 36415 COLL VENOUS BLD VENIPUNCTURE: CPT

## 2025-06-27 PROCEDURE — 85025 COMPLETE CBC W/AUTO DIFF WBC: CPT

## 2025-06-27 PROCEDURE — 80061 LIPID PANEL: CPT

## 2025-06-27 PROCEDURE — 84443 ASSAY THYROID STIM HORMONE: CPT

## 2025-06-27 PROCEDURE — 80053 COMPREHEN METABOLIC PANEL: CPT

## 2025-07-01 ENCOUNTER — RESULTS FOLLOW-UP (OUTPATIENT)
Dept: CARDIOLOGY | Facility: MEDICAL CENTER | Age: 75
End: 2025-07-01

## 2025-07-24 DIAGNOSIS — I10 HTN (HYPERTENSION), MALIGNANT: ICD-10-CM

## 2025-07-24 DIAGNOSIS — I10 HYPERTENSION, UNSPECIFIED TYPE: ICD-10-CM

## 2025-07-24 RX ORDER — LISINOPRIL 20 MG/1
20 TABLET ORAL DAILY
Qty: 90 TABLET | Refills: 3 | Status: SHIPPED | OUTPATIENT
Start: 2025-07-24

## 2025-07-24 NOTE — TELEPHONE ENCOUNTER
Is the patient due for a refill? Yes    Was the patient seen the last 15 months? Yes    Date of last office visit: 6/17/2025    Does the patient have an upcoming appointment?  Yes   If yes, When? 10/13/2025    Provider to refill:FACUNDO     Does the patient have retirement Plus and need 100-day supply? (This applies to ALL medications) Patient does not have SCP

## 2025-07-28 NOTE — PROGRESS NOTES
Bedside report received from ALAYNA Palmer. Patient A&O x 4. TR band removal was attempted by AM RN, per report. Pt did not tolerate TR removal during day shift due to IV anticoagulation status. Pt does not complain of pain at this time. POC discussed with patient. Patient verbalizes understanding. Call light and belongings within reach. Bed locked in lowest position, alarm and fall precautions in place.     Ernestine from [facility] - in media on 6/27 & 7/4 there were notes rcvd from [facility] - the second half of those faxes there are plans of care for each day.  Each of those need Dr Sanchez signature on them then need faxed back to #[phone].  Any questions please call [phone]

## 2025-08-06 ENCOUNTER — APPOINTMENT (OUTPATIENT)
Dept: URBAN - METROPOLITAN AREA CLINIC 22 | Facility: CLINIC | Age: 75
Setting detail: DERMATOLOGY
End: 2025-08-06

## 2025-08-06 DIAGNOSIS — Z71.89 OTHER SPECIFIED COUNSELING: ICD-10-CM

## 2025-08-06 DIAGNOSIS — D18.0 HEMANGIOMA: ICD-10-CM

## 2025-08-06 DIAGNOSIS — L94.2 CALCINOSIS CUTIS: ICD-10-CM

## 2025-08-06 DIAGNOSIS — L40.0 PSORIASIS VULGARIS: ICD-10-CM

## 2025-08-06 DIAGNOSIS — D22 MELANOCYTIC NEVI: ICD-10-CM

## 2025-08-06 DIAGNOSIS — L82.1 OTHER SEBORRHEIC KERATOSIS: ICD-10-CM

## 2025-08-06 DIAGNOSIS — L81.4 OTHER MELANIN HYPERPIGMENTATION: ICD-10-CM

## 2025-08-06 PROBLEM — D22.5 MELANOCYTIC NEVI OF TRUNK: Status: ACTIVE | Noted: 2025-08-06

## 2025-08-06 PROBLEM — D22.61 MELANOCYTIC NEVI OF RIGHT UPPER LIMB, INCLUDING SHOULDER: Status: ACTIVE | Noted: 2025-08-06

## 2025-08-06 PROBLEM — D48.5 NEOPLASM OF UNCERTAIN BEHAVIOR OF SKIN: Status: ACTIVE | Noted: 2025-08-06

## 2025-08-06 PROBLEM — D18.01 HEMANGIOMA OF SKIN AND SUBCUTANEOUS TISSUE: Status: ACTIVE | Noted: 2025-08-06

## 2025-08-06 PROCEDURE — ? ADDITIONAL NOTES

## 2025-08-06 PROCEDURE — ? PRESCRIPTION MEDICATION MANAGEMENT

## 2025-08-06 PROCEDURE — ? SUNSCREEN RECOMMENDATIONS

## 2025-08-06 PROCEDURE — ? BIOPSY BY SHAVE METHOD

## 2025-08-06 PROCEDURE — ? COUNSELING

## 2025-08-06 ASSESSMENT — LOCATION DETAILED DESCRIPTION DERM
LOCATION DETAILED: LEFT INFERIOR UPPER BACK
LOCATION DETAILED: LEFT SUPERIOR MEDIAL MALAR CHEEK
LOCATION DETAILED: LEFT MEDIAL UPPER BACK
LOCATION DETAILED: RIGHT ANTERIOR MEDIAL DISTAL THIGH
LOCATION DETAILED: XIPHOID
LOCATION DETAILED: LEFT PROXIMAL DORSAL FOREARM
LOCATION DETAILED: 2ND WEB SPACE RIGHT HAND
LOCATION DETAILED: RIGHT SUPERIOR LATERAL MIDBACK
LOCATION DETAILED: RIGHT MEDIAL INFERIOR CHEST

## 2025-08-06 ASSESSMENT — LOCATION SIMPLE DESCRIPTION DERM
LOCATION SIMPLE: LEFT CHEEK
LOCATION SIMPLE: LEFT UPPER BACK
LOCATION SIMPLE: RIGHT THIGH
LOCATION SIMPLE: RIGHT HAND
LOCATION SIMPLE: RIGHT LOWER BACK
LOCATION SIMPLE: LEFT FOREARM
LOCATION SIMPLE: CHEST
LOCATION SIMPLE: ABDOMEN

## 2025-08-06 ASSESSMENT — LOCATION ZONE DERM
LOCATION ZONE: ARM
LOCATION ZONE: TRUNK
LOCATION ZONE: FACE
LOCATION ZONE: LEG
LOCATION ZONE: HAND

## 2025-08-10 DIAGNOSIS — Z95.1 HX OF CABG: ICD-10-CM

## 2025-08-10 DIAGNOSIS — Z95.5 STENTED CORONARY ARTERY: ICD-10-CM

## 2025-08-10 DIAGNOSIS — I25.10 CORONARY ARTERY DISEASE INVOLVING NATIVE CORONARY ARTERY OF NATIVE HEART WITHOUT ANGINA PECTORIS: ICD-10-CM

## 2025-08-10 DIAGNOSIS — E78.2 MIXED HYPERLIPIDEMIA: ICD-10-CM

## 2025-08-11 RX ORDER — ROSUVASTATIN CALCIUM 40 MG/1
40 TABLET, COATED ORAL DAILY
Qty: 90 TABLET | Refills: 3 | Status: SHIPPED | OUTPATIENT
Start: 2025-08-11

## 2025-08-12 RX ORDER — RIVAROXABAN 2.5 MG/1
2.5 TABLET, FILM COATED ORAL 2 TIMES DAILY
Qty: 180 TABLET | Refills: 0 | Status: SHIPPED | OUTPATIENT
Start: 2025-08-12

## (undated) DEVICE — ADHESIVE DERMABOND HVD MINI (12EA/BX)

## (undated) DEVICE — SUTURE 0 ETHIBOND MO6 C/R - (12/BX) 8-18 INCH ETHICON

## (undated) DEVICE — TOWELS CLOTH SURGICAL - (4/PK 20PK/CA)

## (undated) DEVICE — DRAPE MAYO STAND - (30/CA)

## (undated) DEVICE — PACK CV DRAPING/BASIN 2PART - (1/CA)

## (undated) DEVICE — STOPCOCK MALE 4-WAY - (50/CA)

## (undated) DEVICE — BAG RESUSCITATION DISPOSABLE - WITH MASK (10 EA/CA)

## (undated) DEVICE — DECANTER FLD BLS - (50/CA)

## (undated) DEVICE — GOWN SURGEONS LARGE - (32/CA)

## (undated) DEVICE — SET EXTENSION WITH 2 PORTS (48EA/CA) ***PART #2C8610 IS A SUBSTITUTE*****

## (undated) DEVICE — SYS DLV COST CLS RM TEMP - INJECTATE (CO-SET II) (10EA/CA)

## (undated) DEVICE — LEAD PACING TEMP MYO - (12/BX)

## (undated) DEVICE — SUTURE OHS

## (undated) DEVICE — NEEDLE SAFETY 18 GA X 1 1/2 IN (100EA/BX)

## (undated) DEVICE — TRAY CATHETER FOLEY URINE METER W/STATLOCK 350ML (10EA/CA)

## (undated) DEVICE — RETRACTOR OFF PUMP OCTO ONLY - 10/BX

## (undated) DEVICE — GLOVE BIOGEL SZ 6 PF LATEX - (50EA/BX 4BX/CA)

## (undated) DEVICE — MICRODRIP PRIMARY VENTED 60 (48EA/CA) THIS WAS PART #2C8428 WHICH WAS DISCONTINUED

## (undated) DEVICE — WAX BONE ALKYLENE OXIDE HEMOSTASIS OSTENE 3.5GM (10EA/CA)

## (undated) DEVICE — SODIUM CHL IRRIGATION 0.9% 1000ML (12EA/CA)

## (undated) DEVICE — SPONGE XRAY 8X4 STERL. 12PL - (10EA/TY 80TY/CA)

## (undated) DEVICE — LACTATED RINGERS INJ 1000 ML - (14EA/CA 60CA/PF)

## (undated) DEVICE — BLOWER/MISTER (5EA/PK)

## (undated) DEVICE — SUTURE 6-0 PROLENE RB-2 D/A 30 (36PK/BX)"

## (undated) DEVICE — ELECTRODE DUAL RETURN W/ CORD - (50/PK)

## (undated) DEVICE — CONNECTOR HUBLESS DRAINAGE - ONE WAY (20/BX)

## (undated) DEVICE — BLADE STERNUM SAW SURGICAL 32.0 X 6.4 MM STERILE (1/EA)

## (undated) DEVICE — BLADE SURGICAL #15 - (50/BX 3BX/CA)

## (undated) DEVICE — D-5-W INJ. 500 ML - (24EA/CA)

## (undated) DEVICE — TUBING INSUFFLATION - (10/BX)

## (undated) DEVICE — GOWN WARMING STANDARD FLEX - (30/CA)

## (undated) DEVICE — TOWEL STOP TIMEOUT SAFETY FLAG (40EA/CA)

## (undated) DEVICE — BLADE BEAVER 6900 MINI SHARP ALL AROUND (20/CA)

## (undated) DEVICE — SOD. CHL. INJ. 0.9% 1000 ML - (14EA/CA 60CA/PF)

## (undated) DEVICE — KIT RADIAL ARTERY 20GA W/MAX BARRIER AND BIOPATCH  (5EA/CA) #10740 IS FOR THE SET RADIAL ARTERIAL

## (undated) DEVICE — DRAPE SLUSH WARMER DISC (24EA/CA)

## (undated) DEVICE — SYSTEM CHEST DRAIN ADULT/PEDS W/AUTO TRANSFUSION CAPABILITY SAHARA (6EA/CA)

## (undated) DEVICE — COVER LIGHT HANDLE ALC PLUS DISP (18EA/BX)

## (undated) DEVICE — SYRINGE 30 ML LL (56/BX)

## (undated) DEVICE — COVER CAMERA LENS LIGHT HANDLE STUBBY DISPOSABLE (20EA/BX)

## (undated) DEVICE — CORDS BIPOLAR COAGULATION - 12FT STERILE DISP. (10EA/BX)

## (undated) DEVICE — SUTURE 3-0 PROLENE SH 36 (36PK/BX)"

## (undated) DEVICE — SENSOR OXIMETER ADULT SPO2 RD SET (20EA/BX)

## (undated) DEVICE — CATHETER IV 14 GA X 2 ---SURG.& SDS ONLY---(200EA/CA)

## (undated) DEVICE — CORETEMP DRAPE FORM-FITTED EASY DROPANDGO DRAPE FOR USE ON THE CORETEMP FLUID MANAGEMENT 56IN X 56IN

## (undated) DEVICE — SUTURE 4-0 30CM STRATAFIX SPIRAL PS-2 (12EA/BX)

## (undated) DEVICE — SPONGE DRAIN 4 X 4IN 6-PLY - (2/PK25PK/BX12BX/CS)

## (undated) DEVICE — SUTURE 7-0 PROLENE BV-1 D/A 24 (36PK/BX)"

## (undated) DEVICE — TRANSDUCER BIFURCATED MONITORING KIT (10EA/CA)

## (undated) DEVICE — CATHETER THERMALDILUTION SWAN - (5EA/CA)

## (undated) DEVICE — INSERT STEALTH #5 - (10/BX)

## (undated) DEVICE — SET LEADWIRE 5 LEAD BEDSIDE DISPOSABLE ECG (1SET OF 5/EA)

## (undated) DEVICE — SPRING BULLDOG 1/2 FORCE BLUE - (10/BX)

## (undated) DEVICE — KIT TOURNIQUET DLP (40EA/PK)

## (undated) DEVICE — SODIUM CHL. INJ. 0.9% 500ML (24EA/CA 50CA/PF)

## (undated) DEVICE — PACK VEIN - (19/CA)

## (undated) DEVICE — SOLUTION NORMOSOL-4 INJ 1000ML

## (undated) DEVICE — DRESSING SURGICAL NUKNIT 6X9 (10EA/BX)

## (undated) DEVICE — APPLICATOR SURGIFLO - (6EA/CA)

## (undated) DEVICE — TUBING CLEARLINK DUO-VENT - C-FLO (48EA/CA)

## (undated) DEVICE — SUTURE 5 SURGICAL STEEL V-40 - (12/BX) CCS CURRENT

## (undated) DEVICE — SYRINGE NON SAFETY 3 CC 21 GA X 1 1/2 IN (100/BX 8BX/CA)

## (undated) DEVICE — GLOVE BIOGEL INDICATOR SZ 6.5 SURGICAL PF LTX - (50PR/BX 4BX/CA)

## (undated) DEVICE — SET BIFURCATED BLOOD - (48EA/CS)

## (undated) DEVICE — CANISTER SUCTION 3000ML MECHANICAL FILTER AUTO SHUTOFF MEDI-VAC NONSTERILE LF DISP  (40EA/CA)

## (undated) DEVICE — CONTAINER SPECIMEN BAG OR - STERILE 4 OZ W/LID (100EA/CA)

## (undated) DEVICE — SET FLUID WARMING STANDARD FLOW - (10/CA)

## (undated) DEVICE — SUCTION INSTRUMENT YANKAUER BULBOUS TIP W/O VENT (50EA/CA)

## (undated) DEVICE — COVER LIGHT HANDLE FLEXIBLE - SOFT (2EA/PK 80PK/CA)

## (undated) DEVICE — KIT ENDOHARVEST SYSTEM 8 - MUST ORDER 5 AT A TIME

## (undated) DEVICE — KIT HEMOSTATIC GELATIN MATRIX FLOSEAL NT 5ML (6EA/CA)

## (undated) DEVICE — KIT INTROPERCUTANEOUS SHEATH - 8.5 FR W/MAX BARRIER AND BIOPATCH  (5/CA)

## (undated) DEVICE — WIRE STEEL 5-0 B&S 20 OHS - 5/PK 12PK/BX ITEM. D5329 OR D6625 CAN BE USED AS A SUB